# Patient Record
Sex: MALE | Race: WHITE | Employment: FULL TIME | URBAN - METROPOLITAN AREA
[De-identification: names, ages, dates, MRNs, and addresses within clinical notes are randomized per-mention and may not be internally consistent; named-entity substitution may affect disease eponyms.]

---

## 2017-02-20 RX ORDER — OMEPRAZOLE 20 MG/1
CAPSULE, DELAYED RELEASE ORAL
Qty: 30 CAPSULE | Refills: 10 | Status: SHIPPED | OUTPATIENT
Start: 2017-02-20 | End: 2018-01-10 | Stop reason: ALTCHOICE

## 2017-03-13 ENCOUNTER — OFFICE VISIT (OUTPATIENT)
Dept: FAMILY MEDICINE CLINIC | Age: 62
End: 2017-03-13

## 2017-03-13 VITALS
SYSTOLIC BLOOD PRESSURE: 116 MMHG | DIASTOLIC BLOOD PRESSURE: 72 MMHG | BODY MASS INDEX: 23.69 KG/M2 | OXYGEN SATURATION: 98 % | WEIGHT: 194.6 LBS | RESPIRATION RATE: 16 BRPM | HEART RATE: 75 BPM

## 2017-03-13 DIAGNOSIS — E78.5 HYPERLIPIDEMIA, UNSPECIFIED HYPERLIPIDEMIA TYPE: Chronic | ICD-10-CM

## 2017-03-13 DIAGNOSIS — I10 ESSENTIAL HYPERTENSION: Primary | Chronic | ICD-10-CM

## 2017-03-13 DIAGNOSIS — Z13.9 SCREENING: ICD-10-CM

## 2017-03-13 DIAGNOSIS — K21.9 GASTROESOPHAGEAL REFLUX DISEASE WITHOUT ESOPHAGITIS: Chronic | ICD-10-CM

## 2017-03-13 DIAGNOSIS — R10.13 EPIGASTRIC ABDOMINAL PAIN: ICD-10-CM

## 2017-03-13 DIAGNOSIS — Z72.0 TOBACCO ABUSE: ICD-10-CM

## 2017-03-13 PROCEDURE — 99214 OFFICE O/P EST MOD 30 MIN: CPT | Performed by: FAMILY MEDICINE

## 2017-03-14 DIAGNOSIS — Z13.9 SCREENING: ICD-10-CM

## 2017-03-14 DIAGNOSIS — E78.5 HYPERLIPIDEMIA, UNSPECIFIED HYPERLIPIDEMIA TYPE: Chronic | ICD-10-CM

## 2017-03-14 DIAGNOSIS — I10 ESSENTIAL HYPERTENSION: Chronic | ICD-10-CM

## 2017-03-14 LAB
A/G RATIO: 1.8 (ref 1.1–2.2)
ALBUMIN SERPL-MCNC: 4.5 G/DL (ref 3.4–5)
ALP BLD-CCNC: 99 U/L (ref 40–129)
ALT SERPL-CCNC: 15 U/L (ref 10–40)
ANION GAP SERPL CALCULATED.3IONS-SCNC: 15 MMOL/L (ref 3–16)
AST SERPL-CCNC: 16 U/L (ref 15–37)
ATYPICAL LYMPHOCYTE RELATIVE PERCENT: 1 % (ref 0–6)
BASOPHILS ABSOLUTE: 0 K/UL (ref 0–0.2)
BASOPHILS RELATIVE PERCENT: 0 %
BILIRUB SERPL-MCNC: 0.6 MG/DL (ref 0–1)
BUN BLDV-MCNC: 15 MG/DL (ref 7–20)
CALCIUM SERPL-MCNC: 9.5 MG/DL (ref 8.3–10.6)
CHLORIDE BLD-SCNC: 105 MMOL/L (ref 99–110)
CHOLESTEROL, TOTAL: 154 MG/DL (ref 0–199)
CO2: 26 MMOL/L (ref 21–32)
CREAT SERPL-MCNC: 0.8 MG/DL (ref 0.8–1.3)
EOSINOPHILS ABSOLUTE: 0.1 K/UL (ref 0–0.6)
EOSINOPHILS RELATIVE PERCENT: 1 %
GFR AFRICAN AMERICAN: >60
GFR NON-AFRICAN AMERICAN: >60
GLOBULIN: 2.5 G/DL
GLUCOSE BLD-MCNC: 95 MG/DL (ref 70–99)
HCT VFR BLD CALC: 48.1 % (ref 40.5–52.5)
HDLC SERPL-MCNC: 29 MG/DL (ref 40–60)
HEMOGLOBIN: 16.1 G/DL (ref 13.5–17.5)
LDL CHOLESTEROL CALCULATED: 104 MG/DL
LYMPHOCYTES ABSOLUTE: 1.4 K/UL (ref 1–5.1)
LYMPHOCYTES RELATIVE PERCENT: 16 %
MCH RBC QN AUTO: 31.9 PG (ref 26–34)
MCHC RBC AUTO-ENTMCNC: 33.5 G/DL (ref 31–36)
MCV RBC AUTO: 95.2 FL (ref 80–100)
MONOCYTES ABSOLUTE: 0.7 K/UL (ref 0–1.3)
MONOCYTES RELATIVE PERCENT: 8 %
NEUTROPHILS ABSOLUTE: 6.3 K/UL (ref 1.7–7.7)
NEUTROPHILS RELATIVE PERCENT: 74 %
PDW BLD-RTO: 14 % (ref 12.4–15.4)
PLATELET # BLD: 169 K/UL (ref 135–450)
PMV BLD AUTO: 8.6 FL (ref 5–10.5)
POTASSIUM SERPL-SCNC: 4.9 MMOL/L (ref 3.5–5.1)
PROSTATE SPECIFIC ANTIGEN: 2.17 NG/ML (ref 0–4)
RBC # BLD: 5.05 M/UL (ref 4.2–5.9)
SLIDE REVIEW: NORMAL
SODIUM BLD-SCNC: 146 MMOL/L (ref 136–145)
TOTAL PROTEIN: 7 G/DL (ref 6.4–8.2)
TRIGL SERPL-MCNC: 105 MG/DL (ref 0–150)
VLDLC SERPL CALC-MCNC: 21 MG/DL
WBC # BLD: 8.5 K/UL (ref 4–11)

## 2017-03-17 ENCOUNTER — TELEPHONE (OUTPATIENT)
Dept: FAMILY MEDICINE CLINIC | Age: 62
End: 2017-03-17

## 2017-05-01 ENCOUNTER — TELEPHONE (OUTPATIENT)
Dept: CARDIOLOGY CLINIC | Age: 62
End: 2017-05-01

## 2017-05-15 RX ORDER — FAMOTIDINE 20 MG/1
TABLET, FILM COATED ORAL
Qty: 60 TABLET | Refills: 9 | Status: SHIPPED | OUTPATIENT
Start: 2017-05-15 | End: 2018-05-07

## 2017-08-24 ENCOUNTER — HOSPITAL ENCOUNTER (OUTPATIENT)
Dept: OTHER | Age: 62
Discharge: HOME | End: 2017-08-24
Attending: INTERNAL MEDICINE | Admitting: INTERNAL MEDICINE

## 2017-09-01 ENCOUNTER — HOSPITAL ENCOUNTER (OUTPATIENT)
Dept: OTHER | Age: 62
Discharge: HOME | End: 2017-09-01
Attending: INTERNAL MEDICINE | Admitting: INTERNAL MEDICINE

## 2017-09-01 LAB — H PYLORI ANTIGEN STOOL: NEGATIVE

## 2017-09-27 ENCOUNTER — HOSPITAL ENCOUNTER (OUTPATIENT)
Dept: ENDOSCOPY | Age: 62
Discharge: HOME | End: 2017-09-27
Attending: INTERNAL MEDICINE | Admitting: INTERNAL MEDICINE

## 2017-09-27 RX ORDER — SODIUM CHLORIDE 9 MG/ML
INJECTION, SOLUTION INTRAVENOUS CONTINUOUS
Status: DISCONTINUED | OUTPATIENT
Start: 2017-09-27 | End: 2017-09-28 | Stop reason: HOSPADM

## 2017-09-27 RX ORDER — SODIUM CHLORIDE 0.9 % (FLUSH) 0.9 %
10 SYRINGE (ML) INJECTION PRN
Status: DISCONTINUED | OUTPATIENT
Start: 2017-09-27 | End: 2017-09-28 | Stop reason: HOSPADM

## 2017-09-27 RX ORDER — SODIUM CHLORIDE 0.9 % (FLUSH) 0.9 %
10 SYRINGE (ML) INJECTION EVERY 12 HOURS SCHEDULED
Status: DISCONTINUED | OUTPATIENT
Start: 2017-09-27 | End: 2017-09-28 | Stop reason: HOSPADM

## 2017-10-08 DIAGNOSIS — I10 ESSENTIAL HYPERTENSION: ICD-10-CM

## 2017-10-09 RX ORDER — AMLODIPINE BESYLATE 10 MG/1
TABLET ORAL
Qty: 30 TABLET | Refills: 10 | Status: SHIPPED | OUTPATIENT
Start: 2017-10-09 | End: 2018-08-20 | Stop reason: SDUPTHER

## 2017-11-03 ENCOUNTER — OFFICE VISIT (OUTPATIENT)
Dept: FAMILY MEDICINE CLINIC | Age: 62
End: 2017-11-03

## 2017-11-03 VITALS
HEIGHT: 76 IN | HEART RATE: 78 BPM | BODY MASS INDEX: 21.55 KG/M2 | SYSTOLIC BLOOD PRESSURE: 138 MMHG | OXYGEN SATURATION: 98 % | RESPIRATION RATE: 17 BRPM | WEIGHT: 177 LBS | DIASTOLIC BLOOD PRESSURE: 80 MMHG

## 2017-11-03 DIAGNOSIS — R10.13 ABDOMINAL PAIN, EPIGASTRIC: Primary | ICD-10-CM

## 2017-11-03 DIAGNOSIS — R10.13 ABDOMINAL PAIN, EPIGASTRIC: ICD-10-CM

## 2017-11-03 LAB
A/G RATIO: 1.6 (ref 1.1–2.2)
ALBUMIN SERPL-MCNC: 4.6 G/DL (ref 3.4–5)
ALP BLD-CCNC: 88 U/L (ref 40–129)
ALT SERPL-CCNC: 15 U/L (ref 10–40)
ANION GAP SERPL CALCULATED.3IONS-SCNC: 18 MMOL/L (ref 3–16)
AST SERPL-CCNC: 16 U/L (ref 15–37)
BANDED NEUTROPHILS RELATIVE PERCENT: 1 % (ref 0–7)
BASOPHILS ABSOLUTE: 0 K/UL (ref 0–0.2)
BASOPHILS RELATIVE PERCENT: 0 %
BILIRUB SERPL-MCNC: 0.4 MG/DL (ref 0–1)
BUN BLDV-MCNC: 12 MG/DL (ref 7–20)
CALCIUM SERPL-MCNC: 10 MG/DL (ref 8.3–10.6)
CHLORIDE BLD-SCNC: 104 MMOL/L (ref 99–110)
CO2: 21 MMOL/L (ref 21–32)
CREAT SERPL-MCNC: 0.7 MG/DL (ref 0.8–1.3)
CRENATED RBC'S: ABNORMAL
EOSINOPHILS ABSOLUTE: 0 K/UL (ref 0–0.6)
EOSINOPHILS RELATIVE PERCENT: 0 %
GFR AFRICAN AMERICAN: >60
GFR NON-AFRICAN AMERICAN: >60
GLOBULIN: 2.9 G/DL
GLUCOSE BLD-MCNC: 107 MG/DL (ref 70–99)
HCT VFR BLD CALC: 46.5 % (ref 40.5–52.5)
HEMOGLOBIN: 15.9 G/DL (ref 13.5–17.5)
LYMPHOCYTES ABSOLUTE: 1.6 K/UL (ref 1–5.1)
LYMPHOCYTES RELATIVE PERCENT: 17 %
MCH RBC QN AUTO: 32.9 PG (ref 26–34)
MCHC RBC AUTO-ENTMCNC: 34.3 G/DL (ref 31–36)
MCV RBC AUTO: 96 FL (ref 80–100)
MONOCYTES ABSOLUTE: 0.5 K/UL (ref 0–1.3)
MONOCYTES RELATIVE PERCENT: 5 %
NEUTROPHILS ABSOLUTE: 7.5 K/UL (ref 1.7–7.7)
NEUTROPHILS RELATIVE PERCENT: 77 %
PDW BLD-RTO: 13.6 % (ref 12.4–15.4)
PLATELET # BLD: 206 K/UL (ref 135–450)
PMV BLD AUTO: 8.9 FL (ref 5–10.5)
POTASSIUM SERPL-SCNC: 4.2 MMOL/L (ref 3.5–5.1)
RBC # BLD: 4.84 M/UL (ref 4.2–5.9)
SODIUM BLD-SCNC: 143 MMOL/L (ref 136–145)
TOTAL PROTEIN: 7.5 G/DL (ref 6.4–8.2)
WBC # BLD: 9.6 K/UL (ref 4–11)

## 2017-11-03 PROCEDURE — 99215 OFFICE O/P EST HI 40 MIN: CPT | Performed by: FAMILY MEDICINE

## 2017-11-03 RX ORDER — OMEPRAZOLE 40 MG/1
40 CAPSULE, DELAYED RELEASE ORAL DAILY
Qty: 30 CAPSULE | Refills: 3 | Status: SHIPPED | OUTPATIENT
Start: 2017-11-03 | End: 2018-01-10 | Stop reason: SDUPTHER

## 2017-11-03 ASSESSMENT — PATIENT HEALTH QUESTIONNAIRE - PHQ9
SUM OF ALL RESPONSES TO PHQ QUESTIONS 1-9: 0
2. FEELING DOWN, DEPRESSED OR HOPELESS: 0
1. LITTLE INTEREST OR PLEASURE IN DOING THINGS: 0
SUM OF ALL RESPONSES TO PHQ9 QUESTIONS 1 & 2: 0

## 2017-11-03 NOTE — PATIENT INSTRUCTIONS
stomach upset. Talk to your doctor if you take daily aspirin for another health problem. When should you call for help? Call 911 anytime you think you may need emergency care. For example, call if:  · You passed out (lost consciousness). · You pass maroon or very bloody stools. · You vomit blood or what looks like coffee grounds. · You have new, severe belly pain. Call your doctor now or seek immediate medical care if:  · Your pain gets worse, especially if it becomes focused in one area of your belly. · You have a new or higher fever. · Your stools are black and look like tar, or they have streaks of blood. · You have unexpected vaginal bleeding. · You have symptoms of a urinary tract infection. These may include:  ¨ Pain when you urinate. ¨ Urinating more often than usual.  ¨ Blood in your urine. · You are dizzy or lightheaded, or you feel like you may faint. Watch closely for changes in your health, and be sure to contact your doctor if:  · You are not getting better after 1 day (24 hours). Where can you learn more? Go to https://Soricimed.[employer]. org and sign in to your FlowCo account. Enter P507 in the [employer] box to learn more about \"Abdominal Pain: Care Instructions. \"     If you do not have an account, please click on the \"Sign Up Now\" link. Current as of: March 20, 2017  Content Version: 11.3  © 4866-2457 [employer]. Care instructions adapted under license by [facility] ([facility]). If you have questions about a medical condition or this instruction, always ask your healthcare professional. [place] [address] any warranty or liability for your use of this information.

## 2017-11-03 NOTE — PROGRESS NOTES
[address] Note    Date: 11/3/2017                                               Subjective/Objective:     Chief Complaint   Patient presents with    Abdominal Pain     mid abdomen pain, off and on, couple years       HPI   Pt here with abdominal pain that afflicts him intermittently for a few years. Two years ago was seen in ED for possible MI but stress test was normal. Since then the pain happens about every other day. Had normal EGD and Cscope 2 months ago. Located in the epigastrium. Is worse with food. Has some constipation with dry hard stools. Pain does not wake him up from sleeping. Sitting still in a car for a long period makes it worse. Pt currently takes pepcid. Patient Active Problem List    Diagnosis Date Noted    Essential hypertension 03/14/2016    GERD (gastroesophageal reflux disease) 03/09/2015    Hyperlipemia 09/08/2014       Past Medical History:   Diagnosis Date    GERD (gastroesophageal reflux disease)     HTN (hypertension)     Hyperlipidemia        Current Outpatient Prescriptions   Medication Sig Dispense Refill    omeprazole (PRILOSEC) 40 MG delayed release capsule Take 1 capsule by mouth daily 30 capsule 3    amLODIPine (NORVASC) 10 MG tablet TAKE ONE TABLET BY MOUTH DAILY 30 tablet 10    famotidine (PEPCID) 20 MG tablet TAKE ONE TABLET BY MOUTH TWICE A DAY 60 tablet 9    omeprazole (PRILOSEC) 20 MG delayed release capsule TAKE ONE CAPSULE BY MOUTH DAILY 30 capsule 10    pravastatin (PRAVACHOL) 40 MG tablet Take 1 tablet by mouth daily 30 tablet 11    Simethicone (GAS-X PO) Take 1 tablet by mouth daily as needed      aspirin 81 MG tablet Take 81 mg by mouth daily.  CHANTIX STARTING MONTH YESSY 0.5 MG X 11 & 1 MG X 42 tablet Take by mouth. 42 tablet 0     No current facility-administered medications for this visit.         No Known Allergies    Review of Systems   No rash, no bruise, no HA    Vitals:  /80 (Site: Left Arm, Position: Sitting, Cuff Size: Medium Adult)   Pulse 78   Resp 17   Ht 6' 4\" (1.93 m)   Wt 177 lb (80.3 kg)   SpO2 98%   BMI 21.55 kg/m²     Physical Exam   General:  Well-appearing, NAD, alert, non-toxic  HEENT:  Normocephalic, atraumatic. Pupils equal and round. CHEST/LUNGS: CTAB, no crackles, no wheeze, no rhonchi. Symmetric rise  CARDIOVASCULAR: RRR,  no murmur, no rub  EXTREMETIES: Normal movement of all extremities  GI: S/NT/ND. No masses  SKIN:  No rash, no cellulitis, no bruising, no petechiae/purpura/vesicles/pustules/abscess  PSYCH:  A+O x 3; normal affect  NEURO:  GCS 15, CN2-12 grossly intact, no focal motor/sensory deficits, no cerebellar deficits, normal gait, normal speech      Assessment/Plan     52-year-old male with ongoing abdominal pain. This is of unclear etiology. Peptic ulcer disease likely ruled out due to normal esophagogastroduodenoscopy recently, however the pain is over the epigastrium. Could possibly be cholecystitis, will obtain abdominal ultrasound. Will switch from Pepcid to high-dose omeprazole for possible gastritis. We'll check baseline labs. This could be a form of IBS, however we will await the results of the tests before referring to GI. Discussed with patient medication/s dosage, instructions, potential S/E, A/R and Drug Interaction  Educational material provided regarding patient's condition  Advise to return here if worse or go to nearest ER  Encourage fluids  Pt discharged in stable condition at 11:42      1. Abdominal pain, epigastric    - omeprazole (PRILOSEC) 40 MG delayed release capsule; Take 1 capsule by mouth daily  Dispense: 30 capsule; Refill: 3  - CBC Auto Differential; Future  - COMPREHENSIVE METABOLIC PANEL;  Future  - US Abdomen Complete      Orders Placed This Encounter   Procedures    US Abdomen Complete    CBC Auto Differential     Standing Status:   Future     Number of Occurrences:   1     Standing Expiration Date:   11/3/2018    COMPREHENSIVE

## 2018-01-10 ENCOUNTER — OFFICE VISIT (OUTPATIENT)
Dept: FAMILY MEDICINE CLINIC | Age: 63
End: 2018-01-10

## 2018-01-10 VITALS
HEIGHT: 76 IN | BODY MASS INDEX: 21.43 KG/M2 | HEART RATE: 88 BPM | OXYGEN SATURATION: 97 % | SYSTOLIC BLOOD PRESSURE: 124 MMHG | WEIGHT: 176 LBS | RESPIRATION RATE: 15 BRPM | DIASTOLIC BLOOD PRESSURE: 80 MMHG

## 2018-01-10 DIAGNOSIS — K40.20 BILATERAL INGUINAL HERNIA WITHOUT OBSTRUCTION OR GANGRENE, RECURRENCE NOT SPECIFIED: Primary | ICD-10-CM

## 2018-01-10 DIAGNOSIS — R10.13 ABDOMINAL PAIN, EPIGASTRIC: ICD-10-CM

## 2018-01-10 DIAGNOSIS — E78.5 HYPERLIPIDEMIA, UNSPECIFIED HYPERLIPIDEMIA TYPE: Chronic | ICD-10-CM

## 2018-01-10 PROCEDURE — 99214 OFFICE O/P EST MOD 30 MIN: CPT | Performed by: FAMILY MEDICINE

## 2018-01-10 RX ORDER — PRAVASTATIN SODIUM 40 MG
40 TABLET ORAL DAILY
Qty: 30 TABLET | Refills: 11 | Status: SHIPPED | OUTPATIENT
Start: 2018-01-10 | End: 2019-01-31 | Stop reason: SDUPTHER

## 2018-01-10 RX ORDER — OMEPRAZOLE 40 MG/1
40 CAPSULE, DELAYED RELEASE ORAL DAILY
Qty: 30 CAPSULE | Refills: 3 | Status: SHIPPED | OUTPATIENT
Start: 2018-01-10 | End: 2018-05-07

## 2018-01-17 ENCOUNTER — OFFICE VISIT (OUTPATIENT)
Dept: SURGERY | Age: 63
End: 2018-01-17

## 2018-01-17 VITALS
WEIGHT: 182 LBS | HEIGHT: 75 IN | SYSTOLIC BLOOD PRESSURE: 110 MMHG | DIASTOLIC BLOOD PRESSURE: 74 MMHG | BODY MASS INDEX: 22.63 KG/M2

## 2018-01-17 DIAGNOSIS — K40.90 LEFT INGUINAL HERNIA: Primary | ICD-10-CM

## 2018-01-17 PROCEDURE — 99203 OFFICE O/P NEW LOW 30 MIN: CPT | Performed by: SURGERY

## 2018-01-17 ASSESSMENT — ENCOUNTER SYMPTOMS
APNEA: 0
COLOR CHANGE: 0
EYE ITCHING: 0
BACK PAIN: 0
ABDOMINAL PAIN: 1
CHEST TIGHTNESS: 0
EYE DISCHARGE: 0

## 2018-02-05 ENCOUNTER — HOSPITAL ENCOUNTER (OUTPATIENT)
Dept: ULTRASOUND IMAGING | Age: 63
Discharge: HOME | End: 2018-02-05
Attending: FAMILY MEDICINE | Admitting: FAMILY MEDICINE

## 2018-02-05 DIAGNOSIS — R10.13 EPIGASTRIC PAIN: ICD-10-CM

## 2018-05-07 ENCOUNTER — OFFICE VISIT (OUTPATIENT)
Dept: FAMILY MEDICINE CLINIC | Age: 63
End: 2018-05-07

## 2018-05-07 VITALS
OXYGEN SATURATION: 98 % | HEART RATE: 81 BPM | SYSTOLIC BLOOD PRESSURE: 140 MMHG | BODY MASS INDEX: 21.5 KG/M2 | TEMPERATURE: 97.8 F | WEIGHT: 172 LBS | DIASTOLIC BLOOD PRESSURE: 86 MMHG

## 2018-05-07 DIAGNOSIS — R10.13 ABDOMINAL PAIN, EPIGASTRIC: ICD-10-CM

## 2018-05-07 PROCEDURE — 99213 OFFICE O/P EST LOW 20 MIN: CPT | Performed by: FAMILY MEDICINE

## 2018-05-07 RX ORDER — PANTOPRAZOLE SODIUM 40 MG/1
40 TABLET, DELAYED RELEASE ORAL DAILY
Qty: 30 TABLET | Refills: 3 | Status: SHIPPED | OUTPATIENT
Start: 2018-05-07 | End: 2018-07-23

## 2018-05-07 RX ORDER — OMEPRAZOLE 40 MG/1
40 CAPSULE, DELAYED RELEASE ORAL DAILY
Qty: 30 CAPSULE | Refills: 11 | Status: CANCELLED | OUTPATIENT
Start: 2018-05-07

## 2018-05-07 RX ORDER — SUCRALFATE 1 G/1
1 TABLET ORAL 4 TIMES DAILY
Qty: 120 TABLET | Refills: 3 | Status: SHIPPED | OUTPATIENT
Start: 2018-05-07 | End: 2018-11-15 | Stop reason: ALTCHOICE

## 2018-07-23 ENCOUNTER — OFFICE VISIT (OUTPATIENT)
Dept: FAMILY MEDICINE CLINIC | Age: 63
End: 2018-07-23

## 2018-07-23 ENCOUNTER — TELEPHONE (OUTPATIENT)
Dept: FAMILY MEDICINE CLINIC | Age: 63
End: 2018-07-23

## 2018-07-23 VITALS
HEART RATE: 81 BPM | DIASTOLIC BLOOD PRESSURE: 64 MMHG | TEMPERATURE: 98.5 F | SYSTOLIC BLOOD PRESSURE: 108 MMHG | BODY MASS INDEX: 21.25 KG/M2 | WEIGHT: 170 LBS | OXYGEN SATURATION: 99 %

## 2018-07-23 DIAGNOSIS — R10.13 EPIGASTRIC ABDOMINAL PAIN: Primary | ICD-10-CM

## 2018-07-23 DIAGNOSIS — R10.13 EPIGASTRIC ABDOMINAL PAIN: ICD-10-CM

## 2018-07-23 DIAGNOSIS — R94.31 ABNORMAL EKG: ICD-10-CM

## 2018-07-23 PROCEDURE — 99214 OFFICE O/P EST MOD 30 MIN: CPT | Performed by: FAMILY MEDICINE

## 2018-07-23 PROCEDURE — 93000 ELECTROCARDIOGRAM COMPLETE: CPT | Performed by: FAMILY MEDICINE

## 2018-07-23 RX ORDER — ESOMEPRAZOLE MAGNESIUM 20 MG/1
20 TABLET, DELAYED RELEASE ORAL DAILY
Qty: 30 TABLET | Refills: 5 | Status: SHIPPED | OUTPATIENT
Start: 2018-07-23 | End: 2018-07-23

## 2018-07-23 RX ORDER — OMEPRAZOLE 20 MG/1
20 TABLET, DELAYED RELEASE ORAL 2 TIMES DAILY
Qty: 60 TABLET | Refills: 3 | Status: SHIPPED | OUTPATIENT
Start: 2018-07-23 | End: 2020-01-08

## 2018-07-23 NOTE — PATIENT INSTRUCTIONS
Patient Education   Patient Education        Gastritis: Care Instructions  Your Care Instructions    Gastritis is a sore and upset stomach. It happens when something irritates the stomach lining. Many things can cause it. These include an infection such as the flu or something you ate or drank. Medicines or a sore on the lining of the stomach (ulcer) also can cause it. Your belly may bloat and ache. You may belch, vomit, and feel sick to your stomach. You should be able to relieve the problem by taking medicine. And it may help to change your diet. If gastritis lasts, your doctor may prescribe medicine. Follow-up care is a key part of your treatment and safety. Be sure to make and go to all appointments, and call your doctor if you are having problems. It's also a good idea to know your test results and keep a list of the medicines you take. How can you care for yourself at home? · If your doctor prescribed antibiotics, take them as directed. Do not stop taking them just because you feel better. You need to take the full course of antibiotics. · Be safe with medicines. If your doctor prescribed medicine to decrease stomach acid, take it as directed. Call your doctor if you think you are having a problem with your medicine. · Do not take any other medicine, including over-the-counter pain relievers, without talking to your doctor first.  · If your doctor recommends over-the-counter medicine to reduce stomach acid, such as Pepcid AC, Prilosec, Tagamet HB, or Zantac 75, follow the directions on the label. · Drink plenty of fluids (enough so that your urine is light yellow or clear like water) to prevent dehydration. Choose water and other caffeine-free clear liquids. If you have kidney, heart, or liver disease and have to limit fluids, talk with your doctor before you increase the amount of fluids you drink. · Limit how much alcohol you drink.   · Avoid coffee, tea, cola drinks, chocolate, and other foods with caffeine. They increase stomach acid. When should you call for help? Call 911 anytime you think you may need emergency care. For example, call if:    · You vomit blood or what looks like coffee grounds.     · You pass maroon or very bloody stools.    Call your doctor now or seek immediate medical care if:    · You start breathing fast and have not produced urine in the last 8 hours.     · You cannot keep fluids down.    Watch closely for changes in your health, and be sure to contact your doctor if:    · You do not get better as expected. Where can you learn more? Go to [email or web address] [email or web address].[email or web address]. org and sign in to your [employer] account. Enter [id number] in the [employer] box to learn more about \"Gastritis: Care Instructions. \"     If you do not have an account, please click on the \"Sign Up Now\" link. Current as of: May 12, 2017  Content Version: 11.6  © 4622-3756 [employer]. Care instructions adapted under license by [facility] [address] If you have questions about a medical condition or this instruction, always ask your healthcare professional. [place] [address] any warranty or liability for your use of this information.

## 2018-07-23 NOTE — PROGRESS NOTES
[address] Note    Date: 7/23/2018                                               Subjective/Objective:     Chief Complaint   Patient presents with    Gastroesophageal Reflux     hx of gastro reflux taking rx not always helping, unsure if protonix or carafate        HPI   Epigastric pain that is ongoing. Has gotten better with protonix in the past. But it's been back again the last few weeks. Had normal US months ago. Doesn't notice it with any particular food, has been avoiding spicy foods. No vomiting. No acid reflux. Has avoided eating certain foods. Patient Active Problem List    Diagnosis Date Noted    Essential hypertension 03/14/2016    GERD (gastroesophageal reflux disease) 03/09/2015    Hyperlipemia 09/08/2014       Past Medical History:   Diagnosis Date    GERD (gastroesophageal reflux disease)     HTN (hypertension)     Hyperlipidemia        Current Outpatient Prescriptions   Medication Sig Dispense Refill    Esomeprazole Magnesium (NEXIUM 24HR) 20 MG TBEC Take 20 mg by mouth daily 30 tablet 5    pravastatin (PRAVACHOL) 40 MG tablet Take 1 tablet by mouth daily 30 tablet 11    amLODIPine (NORVASC) 10 MG tablet TAKE ONE TABLET BY MOUTH DAILY 30 tablet 10    aspirin 81 MG tablet Take 81 mg by mouth daily.  sucralfate (CARAFATE) 1 GM tablet Take 1 tablet by mouth 4 times daily 120 tablet 3     No current facility-administered medications for this visit. No Known Allergies    Review of Systems   No diarrhea, no shortness of breath, no headache    Vitals:  /64   Pulse 81   Temp 98.5 °F (36.9 °C)   Wt 170 lb (77.1 kg)   SpO2 99%   BMI 21.25 kg/m²     Physical Exam   General:  Well-appearing, NAD, alert, non-toxic  HEENT:  Normocephalic, atraumatic. CHEST/LUNGS: No dyspnea  EXTREMETIES: Normal movement of all extremities. No edema. No joint swelling.   SKIN:  No rash, no cellulitis, no bruising, no petechiae/purpura/vesicles/pustules/abscess  PSYCH:  A+O x 3; normal affect  NEURO:  GCS 15, CN2-12 grossly intact, no focal motor/sensory deficits, normal gait, normal speech      Assessment/Plan     62-year-old male with epigastric pain, persistent. The seems most likely due to a primary gastritis or peptic ulcer disease. We will rule out H. pylori with a urea breath test.  We will switch from Protonix to Nexium to see if there is any improvement. Patient advised to follow-up with GI if his symptoms do not improve after 2 weeks. We checked an EKG to rule out cardiac cause of his pain, and it showed changes in the anterior leads compared to EKG from 3 years ago, with T-wave inversions. Unclear whether not this demonstrates ACS, we'll check a stress test to rule out significant coronary artery disease. Discussed with patient medication/s dosage, instructions, potential S/E, A/R and Drug Interaction  Educational material provided regarding patient's condition  Tylenol as needed for pain or fever  Advise to return here if worse or go to nearest ER  Encourage fluids  Pt discharged in stable condition at 13:03      1. Epigastric abdominal pain    - Esomeprazole Magnesium (NEXIUM 24HR) 20 MG TBEC; Take 20 mg by mouth daily  Dispense: 30 tablet; Refill: 5  - H. Pylori Breath Test; Future  - Nilam Aviles MD (DAREK)  - EKG 12 lead  - ECHO Stress Test; Future    2. Chest pain, unspecified type    - EKG 12 lead    3. Abnormal EKG    - ECHO Stress Test; Future      Orders Placed This Encounter   Procedures    H.  Pylori Breath Test     Standing Status:   Future     Number of Occurrences:   1     Standing Expiration Date:   7/24/2018   Nilam Aviles MD (DAREK)     Referral Priority:   Routine     Referral Type:   Consult for Advice and Opinion     Referral Reason:   Specialty Services Required     Referred to Provider:   Leah Soto MD     Requested Specialty:

## 2018-07-23 NOTE — TELEPHONE ENCOUNTER
Patient states he was currently in pharmacy but would come back later to  medication.  Please advise

## 2018-07-24 ENCOUNTER — TELEPHONE (OUTPATIENT)
Dept: FAMILY MEDICINE CLINIC | Age: 63
End: 2018-07-24

## 2018-07-24 NOTE — TELEPHONE ENCOUNTER
PA has been submitted for Omeprazole 20MG OR CPDR on [facility]  Key: [id number] PA Case ID: [id number]    Pending review

## 2018-07-25 LAB — H PYLORI BREATH TEST: NEGATIVE

## 2018-07-26 NOTE — TELEPHONE ENCOUNTER
Fax Received from [facility] regarding the Omeprazole 20mg. Insurance won't cover BID dosing on this. Pharmacy asking if Dr. Zhang Rodriguez wants to do PA or change to 40mg QD?  Please advise

## 2018-07-27 NOTE — TELEPHONE ENCOUNTER
Advised Ramona from [facility] of Dr. Martha Stone instructions - ok to change Omeprazole 20 MG to 40mg QD. Ramona from pharmacy asked to clarify if pt is supposed to be on both Omeprzole 40mg and pantoprazole 40mg.  Please contact pharmacy to advise

## 2018-09-19 ENCOUNTER — HOSPITAL ENCOUNTER (INPATIENT)
Dept: INPATIENT UNIT | Age: 63
LOS: 8 days | Discharge: HOME | DRG: 287 | End: 2018-09-27
Attending: EMERGENCY MEDICINE | Admitting: INTERNAL MEDICINE
Payer: COMMERCIAL

## 2018-09-19 DIAGNOSIS — R59.0 LYMPHADENOPATHY, MEDIASTINAL: ICD-10-CM

## 2018-09-19 DIAGNOSIS — I51.3 LEFT VENTRICULAR THROMBUS: Primary | ICD-10-CM

## 2018-09-19 LAB
A/G RATIO: 1.6 (ref 1.1–2.2)
ALBUMIN SERPL-MCNC: 4.5 G/DL (ref 3.4–5)
ALP BLD-CCNC: 83 U/L (ref 40–129)
ALT SERPL-CCNC: 18 U/L (ref 10–40)
ANION GAP SERPL CALCULATED.3IONS-SCNC: 12 MMOL/L (ref 3–16)
APTT: 26.5 SEC (ref 26–36)
APTT: 41.8 SEC (ref 26–36)
AST SERPL-CCNC: 20 U/L (ref 15–37)
BASOPHILS ABSOLUTE: 0.1 K/UL (ref 0–0.2)
BASOPHILS RELATIVE PERCENT: 1 %
BILIRUB SERPL-MCNC: 0.6 MG/DL (ref 0–1)
BILIRUBIN URINE: NEGATIVE
BLOOD, URINE: NEGATIVE
BUN BLDV-MCNC: 12 MG/DL (ref 7–20)
CALCIUM SERPL-MCNC: 9.4 MG/DL (ref 8.3–10.6)
CHLORIDE BLD-SCNC: 104 MMOL/L (ref 99–110)
CLARITY: ABNORMAL
CO2: 22 MMOL/L (ref 21–32)
COLOR: YELLOW
CREAT SERPL-MCNC: 0.7 MG/DL (ref 0.8–1.3)
EOSINOPHILS ABSOLUTE: 0 K/UL (ref 0–0.6)
EOSINOPHILS RELATIVE PERCENT: 0.3 %
EPITHELIAL CELLS, UA: 0 /HPF (ref 0–5)
GFR AFRICAN AMERICAN: >60
GFR NON-AFRICAN AMERICAN: >60
GLOBULIN: 2.8 G/DL
GLUCOSE BLD-MCNC: 100 MG/DL (ref 70–99)
GLUCOSE URINE: NEGATIVE MG/DL
HCT VFR BLD CALC: 45.1 % (ref 40.5–52.5)
HEMOGLOBIN: 15.6 G/DL (ref 13.5–17.5)
HYALINE CASTS: 0 /LPF (ref 0–8)
KETONES, URINE: NEGATIVE MG/DL
LEUKOCYTE ESTERASE, URINE: NEGATIVE
LIPASE: 48 U/L (ref 13–60)
LYMPHOCYTES ABSOLUTE: 1.1 K/UL (ref 1–5.1)
LYMPHOCYTES RELATIVE PERCENT: 14.4 %
MCH RBC QN AUTO: 33.2 PG (ref 26–34)
MCHC RBC AUTO-ENTMCNC: 34.5 G/DL (ref 31–36)
MCV RBC AUTO: 96.1 FL (ref 80–100)
MICROSCOPIC EXAMINATION: YES
MONOCYTES ABSOLUTE: 0.4 K/UL (ref 0–1.3)
MONOCYTES RELATIVE PERCENT: 5.8 %
NEUTROPHILS ABSOLUTE: 6 K/UL (ref 1.7–7.7)
NEUTROPHILS RELATIVE PERCENT: 78.5 %
NITRITE, URINE: NEGATIVE
PDW BLD-RTO: 14 % (ref 12.4–15.4)
PH UA: 6.5
PLATELET # BLD: 181 K/UL (ref 135–450)
PMV BLD AUTO: 8.1 FL (ref 5–10.5)
POTASSIUM SERPL-SCNC: 3.7 MMOL/L (ref 3.5–5.1)
PRO-BNP: 439 PG/ML (ref 0–124)
PROTEIN UA: NEGATIVE MG/DL
RBC # BLD: 4.7 M/UL (ref 4.2–5.9)
RBC UA: 1 /HPF (ref 0–4)
SODIUM BLD-SCNC: 138 MMOL/L (ref 136–145)
SPECIFIC GRAVITY UA: 1.01
TOTAL PROTEIN: 7.3 G/DL (ref 6.4–8.2)
TROPONIN: <0.01 NG/ML
URINE REFLEX TO CULTURE: ABNORMAL
URINE TYPE: ABNORMAL
UROBILINOGEN, URINE: 0.2 E.U./DL
WBC # BLD: 7.6 K/UL (ref 4–11)
WBC UA: 0 /HPF (ref 0–5)

## 2018-09-19 PROCEDURE — 99285 EMERGENCY DEPT VISIT HI MDM: CPT

## 2018-09-19 PROCEDURE — 36415 COLL VENOUS BLD VENIPUNCTURE: CPT

## 2018-09-19 PROCEDURE — 9990 CHARGE CONVERSION

## 2018-09-19 PROCEDURE — 71260 CT THORAX DX C+: CPT

## 2018-09-19 PROCEDURE — 96374 THER/PROPH/DIAG INJ IV PUSH: CPT

## 2018-09-19 PROCEDURE — 81001 URINALYSIS AUTO W/SCOPE: CPT

## 2018-09-19 PROCEDURE — 83690 ASSAY OF LIPASE: CPT

## 2018-09-19 PROCEDURE — 80053 COMPREHEN METABOLIC PANEL: CPT

## 2018-09-19 PROCEDURE — 71046 X-RAY EXAM CHEST 2 VIEWS: CPT

## 2018-09-19 PROCEDURE — 93010 ELECTROCARDIOGRAM REPORT: CPT | Performed by: INTERNAL MEDICINE

## 2018-09-19 PROCEDURE — 83880 ASSAY OF NATRIURETIC PEPTIDE: CPT

## 2018-09-19 PROCEDURE — 84484 ASSAY OF TROPONIN QUANT: CPT

## 2018-09-19 PROCEDURE — 85025 COMPLETE CBC W/AUTO DIFF WBC: CPT

## 2018-09-19 PROCEDURE — 93005 ELECTROCARDIOGRAM TRACING: CPT

## 2018-09-19 PROCEDURE — 85730 THROMBOPLASTIN TIME PARTIAL: CPT

## 2018-09-19 RX ORDER — SODIUM CHLORIDE 0.9 % (FLUSH) 0.9 %
10 SYRINGE (ML) INJECTION EVERY 12 HOURS SCHEDULED
Status: DISCONTINUED | OUTPATIENT
Start: 2018-09-19 | End: 2018-09-27 | Stop reason: HOSPADM

## 2018-09-19 RX ORDER — HEPARIN SODIUM 1000 [USP'U]/ML
2000 INJECTION, SOLUTION INTRAVENOUS; SUBCUTANEOUS PRN
Status: DISCONTINUED | OUTPATIENT
Start: 2018-09-19 | End: 2018-09-27 | Stop reason: HOSPADM

## 2018-09-19 RX ORDER — AMLODIPINE BESYLATE 5 MG/1
10 TABLET ORAL DAILY
Status: DISCONTINUED | OUTPATIENT
Start: 2018-09-20 | End: 2018-09-27 | Stop reason: HOSPADM

## 2018-09-19 RX ORDER — PRAVASTATIN SODIUM 40 MG
40 TABLET ORAL NIGHTLY
Status: DISCONTINUED | OUTPATIENT
Start: 2018-09-19 | End: 2018-09-27 | Stop reason: HOSPADM

## 2018-09-19 RX ORDER — SUCRALFATE 1 G/1
1 TABLET ORAL 4 TIMES DAILY
Status: DISCONTINUED | OUTPATIENT
Start: 2018-09-19 | End: 2018-09-27 | Stop reason: HOSPADM

## 2018-09-19 RX ORDER — PANTOPRAZOLE SODIUM 40 MG/1
40 TABLET, DELAYED RELEASE ORAL
Status: DISCONTINUED | OUTPATIENT
Start: 2018-09-20 | End: 2018-09-27 | Stop reason: HOSPADM

## 2018-09-19 RX ORDER — ONDANSETRON 2 MG/ML
4 INJECTION INTRAMUSCULAR; INTRAVENOUS EVERY 6 HOURS PRN
Status: DISCONTINUED | OUTPATIENT
Start: 2018-09-19 | End: 2018-09-27 | Stop reason: HOSPADM

## 2018-09-19 RX ORDER — ASPIRIN 81 MG/1
81 TABLET ORAL DAILY
Status: DISCONTINUED | OUTPATIENT
Start: 2018-09-20 | End: 2018-09-27 | Stop reason: HOSPADM

## 2018-09-19 RX ORDER — HEPARIN SODIUM 1000 [USP'U]/ML
4000 INJECTION, SOLUTION INTRAVENOUS; SUBCUTANEOUS PRN
Status: DISCONTINUED | OUTPATIENT
Start: 2018-09-19 | End: 2018-09-27 | Stop reason: HOSPADM

## 2018-09-19 RX ORDER — SODIUM CHLORIDE 0.9 % (FLUSH) 0.9 %
10 SYRINGE (ML) INJECTION PRN
Status: DISCONTINUED | OUTPATIENT
Start: 2018-09-19 | End: 2018-09-27 | Stop reason: HOSPADM

## 2018-09-19 RX ORDER — HEPARIN SODIUM 1000 [USP'U]/ML
4000 INJECTION, SOLUTION INTRAVENOUS; SUBCUTANEOUS ONCE
Status: COMPLETED | OUTPATIENT
Start: 2018-09-19 | End: 2018-09-19

## 2018-09-19 RX ORDER — HEPARIN SODIUM 10000 [USP'U]/100ML
12 INJECTION, SOLUTION INTRAVENOUS CONTINUOUS
Status: DISCONTINUED | OUTPATIENT
Start: 2018-09-19 | End: 2018-09-25

## 2018-09-19 RX ADMIN — PRAVASTATIN SODIUM 40 MG: 40 TABLET ORAL at 22:41

## 2018-09-19 RX ADMIN — PRAVASTATIN SODIUM 40 MG: 40 TABLET ORAL at 22:42

## 2018-09-19 RX ADMIN — HEPARIN SODIUM 4000 UNITS: 1000 INJECTION, SOLUTION INTRAVENOUS; SUBCUTANEOUS at 18:00

## 2018-09-19 RX ADMIN — HEPARIN SODIUM 12 UNITS/KG/HR: 10000 INJECTION, SOLUTION INTRAVENOUS at 18:00

## 2018-09-19 RX ADMIN — Medication 10 ML: at 22:41

## 2018-09-19 ASSESSMENT — ENCOUNTER SYMPTOMS
SHORTNESS OF BREATH: 0
ABDOMINAL PAIN: 0
DIARRHEA: 0
NAUSEA: 0
VOMITING: 0
COUGH: 0
CONSTIPATION: 0

## 2018-09-19 NOTE — LETTER
[address]  Phone: [phone]    Chandana Batista MD        September 27, 2018     Patient: Moni Gooden   YOB: 1955   Date of Visit: 9/19/2018       To Whom It May Concern: It is my medical opinion that Yanira Brown may return to full duty immediately with no restrictions. If you have any questions or concerns, please don't hesitate to call.     Sincerely,      Ashli Pearson Cardiology MD Chandana Batista MD

## 2018-09-19 NOTE — ED PROVIDER NOTES
components within normal limits    Narrative:     Performed at:  [facility] [address], [facility], [address]   Phone [phone]   URINE RT REFLEX TO CULTURE - Abnormal; Notable for the following:     Clarity, UA TURBID (*)     All other components within normal limits    Narrative:     Performed at:  [facility] [address], [facility], [address]   Phone [phone]   CBC WITH AUTO DIFFERENTIAL    Narrative:     Performed at:  [facility] [address], [facility], [facility] [address]   Phone [phone]   TROPONIN    Narrative:     Performed at:  [facility] [address], [facility], [facility] [employer]   Phone [phone]   LIPASE    Narrative:     Performed at:  [facility] [address], [facility], [facility] [address]   Phone [phone]   MICROSCOPIC URINALYSIS    Narrative:     Performed at:  [facility] [address], [facility], [address]   Phone [phone]       All other labs were within normal range or not returned as of this dictation. EKG: All EKG's are interpreted by the Emergency Department Physician who either signs or Co-signs this chart in the absence of a cardiologist.    RADIOLOGY:   Non-plain film images such as CT, Ultrasound and MRI are read by the radiologist. [facility] radiographic images are visualized and preliminarily interpreted by the  ED Provider with the below findings:    Interpretation per the Radiologist below, if available at the time of this note:    CT Chest Pulmonary Embolism W Contrast   Final Result   Large subcarinal lymph node measuring 3.6 cm in short axis. Tissue sampling   or PET-CT should be considered for further evaluation. No evidence of pulmonary embolism. Emphysematous changes within the lungs as described above.       Thickening of the bilateral adrenal epigastric, substernal chest pain has been GERD and he has been taking medication which has not been helping. Finally his GI doctor ordered a CT scan which showed a ventricular thrombus and was sent to the ER for evaluation. Patient denies any worsening of chest pain. Denies any fevers or chills. Patient denies any numbness tingling or focal weakness. Denies abdominal pain nausea vomiting diarrhea or constipation. I did give patient a CT scan CD to Dr. Mckenna Wilburn who has reviewed this and also agrees that appears to be thrombus within the ventricle. CBC was given to  of radiology who will upload this. Urinalysis is negative. Normal white count of 7.6. BNP of 438. Lipase normal.  Troponin negative. CT chest pulmonary embolism with contrast shows a lot of subcranial lymph node measuring 3.6 cm in short axis tissue sampling or PET CT scan should be considered for further evaluation. No evidence of PE. Emphysematous changes within the lungs. There is also thickening of the bilateral adrenal glands suggestive of hyperplasia. I did speak with cardiology who is updated on Patient ED plan and he is agreeable. We'll consult tomorrow. Hospitalist for inpatient for further care and management. The patient tolerated their visit well. They were seen and evaluated by the attending physician, Tj Yepez MD who agreed with the assessment and plan. I have discussed the findings of today's workup with the patient and addressed the patient's questions and concerns. FINAL IMPRESSION      1. Left ventricular thrombus    2. Lymphadenopathy, mediastinal          DISPOSITION/PLAN   DISPOSITION Decision To Admit 09/19/2018 04:26:59 PM      PATIENT REFERRED TO:  No follow-up provider specified.     DISCHARGE MEDICATIONS:  New Prescriptions    No medications on file       DISCONTINUED MEDICATIONS:  Discontinued Medications    No medications on file              (Please note that portions of this note were completed with a voice recognition program.  Efforts were made to edit the dictations but occasionally words are mis-transcribed.)    ENRIQUE Arroyo (electronically signed)            ENRIQUE Spivey  09/19/18 1737       ENRIQUE Mcconnell  09/19/18 1734

## 2018-09-19 NOTE — H&P
tablet by mouth daily 1/10/18   Grace Cueto MD       Allergies:  Patient has no known allergies. Social History:  The patient currently lives at home with family     TOBACCO:   reports that he has been smoking Cigarettes. He has a 20.00 pack-year smoking history. He has never used smokeless tobacco.  ETOH:   reports that he does not drink alcohol. Family History:  Reviewed in detail and negative for DM, Early CAD, Cancer, CVA. Positive as follows:    Family History   Problem Relation Age of Onset    Heart Disease Brother 27        CABG x 2    Heart Disease Mother         CABG x 2       REVIEW OF SYSTEMS:   Positive  And negative  noted in the HPI. All other systems reviewed and negative. PHYSICAL EXAM:    BP (!) 157/84   Pulse 50   Temp 99 °F (37.2 °C) (Infrared)   Resp 16   Ht 6' 3\" (1.905 m)   Wt 174 lb (78.9 kg)   SpO2 98%   BMI 21.75 kg/m²     General appearance: No apparent distress appears stated age and cooperative. HEENT Normal cephalic, atraumatic without obvious deformity. Pupils equal, round, and reactive to light. Extra ocular muscles intact. Conjunctivae/corneas clear. Neck: Supple, No jugular venous distention/bruits. Trachea midline without thyromegaly or adenopathy with full range of motion. Lungs: Clear to auscultation, bilaterally without Rales/Wheezes/Rhonchi with good respiratory effort. Heart: Regular rate and rhythm with Normal S1/S2 without murmurs, rubs or gallops, point of maximum impulse non-displaced  Abdomen: Soft, non-tender or non-distended without rigidity or guarding and positive bowel sounds all four quadrants. Extremities: No clubbing, cyanosis, or edema bilaterally. Full range of motion without deformity and normal gait intact. Skin: Skin color, texture, turgor normal.  No rashes or lesions. Neurologic: Alert and oriented X 3, neurovascularly intact with sensory/motor intact upper extremities/lower extremities, bilaterally.   Cranial nerves:

## 2018-09-19 NOTE — ED PROVIDER NOTES
Please note this report has been produced using speech recognition software and may contain errors related to that system including errors in grammar, punctuation, and spelling, as well as words and phrases that may be inappropriate. If there are any questions or concerns please feel free to contact the dictating provider for clarification.      Electronically signed, Emily Joya PA-C,          Emily Joya PA-C  09/19/18 1152

## 2018-09-20 LAB
APTT: 54 SEC (ref 26–36)
APTT: 63.2 SEC (ref 26–36)
EKG ATRIAL RATE: 72 BPM
EKG DIAGNOSIS: NORMAL
EKG P AXIS: 78 DEGREES
EKG P-R INTERVAL: 146 MS
EKG Q-T INTERVAL: 368 MS
EKG QRS DURATION: 88 MS
EKG QTC CALCULATION (BAZETT): 402 MS
EKG R AXIS: 89 DEGREES
EKG T AXIS: 67 DEGREES
EKG VENTRICULAR RATE: 72 BPM
LV EF: 45 %
LVEF MODALITY: NORMAL

## 2018-09-20 PROCEDURE — 9990 CHARGE CONVERSION

## 2018-09-20 PROCEDURE — 99254 IP/OBS CNSLTJ NEW/EST MOD 60: CPT | Performed by: INTERNAL MEDICINE

## 2018-09-20 PROCEDURE — 93306 TTE W/DOPPLER COMPLETE: CPT

## 2018-09-20 PROCEDURE — 85730 THROMBOPLASTIN TIME PARTIAL: CPT

## 2018-09-20 PROCEDURE — 36415 COLL VENOUS BLD VENIPUNCTURE: CPT

## 2018-09-20 RX ADMIN — PRAVASTATIN SODIUM 40 MG: 40 TABLET ORAL at 21:27

## 2018-09-20 RX ADMIN — Medication 10 ML: at 21:28

## 2018-09-20 RX ADMIN — ASPIRIN 81 MG: 81 TABLET, COATED ORAL at 08:18

## 2018-09-20 RX ADMIN — AMLODIPINE BESYLATE 10 MG: 5 TABLET ORAL at 08:18

## 2018-09-20 RX ADMIN — PANTOPRAZOLE SODIUM 40 MG: 40 TABLET, DELAYED RELEASE ORAL at 07:05

## 2018-09-20 RX ADMIN — HEPARIN SODIUM 13.94 UNITS/KG/HR: 10000 INJECTION, SOLUTION INTRAVENOUS at 18:09

## 2018-09-20 RX ADMIN — HEPARIN SODIUM 2000 UNITS: 1000 INJECTION, SOLUTION INTRAVENOUS; SUBCUTANEOUS at 00:26

## 2018-09-20 NOTE — PAYOR INFORMATION
Patient Zain Gamino:  [de-identified]  Primary AUTH/CERT:    [address] Name:   [employer]  Primary Insurance Plan Name:  Coye Ruse [employer]ESS[employer]  Primary Insurance Group Number:  [id number]  Primary Insurance Plan Type: B  Primary Insurance Policy Number:  [id number]

## 2018-09-20 NOTE — PROGRESS NOTES
Hospitalist Progress Note      PCP: Tarun Marcus MD    Date of Admission: 9/19/2018    Chief Complaint: Abnormal CT abdomen and pelvis    Hospital Course: The patient is a 59 y.o. male who presents to [facility] ([facility]) with abnormal CT abdomen/ pelvis. Patient has been having substernal / epigatsric chest pain for last 2- 3 years. He had extensive workup including egd, colonoscopy , stress test last year for similar problem. He has been told that pain is probably due to GERD. He states that he has no improvement in chest pain and last Friday he had CT abdomen done , which showed left ventricular thromus and he was advised to come to ER. Chest pain is 4/10 in internsity, aggravated by eating.      Here CTPA Large subcarinal LN . Outside film are given to radiolgy for upload. Discussed with radiologist on call,  Dr Vargas Ramirez, there is left ventricular thrombus  Present in outside films.      PMHx of HTN, GERD, HLD. .   Subjective:   Echo cardiogram is pending  If Echocardiogram reveals left ventricle thrombus he will need hypercoagulable workup and anticoagulation.   That required an another day of stay in the hospital .  If echocardiogram is normal he'll be discharged later today and will follow up with PCP about abnormal lymphadenopathy        Medications:  Reviewed    Infusion Medications    heparin (porcine) 14 Units/kg/hr (09/20/18 0027)     Scheduled Medications    pravastatin  40 mg Oral Nightly    sucralfate  1 g Oral 4x Daily    amLODIPine  10 mg Oral Daily    aspirin  81 mg Oral Daily    pantoprazole  40 mg Oral QAM AC    sodium chloride flush  10 mL Intravenous 2 times per day     PRN Meds: heparin (porcine), heparin (porcine), sodium chloride flush, magnesium hydroxide, ondansetron      Intake/Output Summary (Last 24 hours) at 09/20/18 1354  Last data filed at 09/20/18 1231   Gross per 24 hour   Intake              480 ml   Output                0 ml   Net              480 ml       Physical Exam Performed:    /63   Pulse 55   Temp 97.3 °F (36.3 °C) (Temporal)   Resp 16   Ht 6' 3\" (1.905 m)   Wt 173 lb 6.4 oz (78.7 kg)   SpO2 97%   BMI 21.67 kg/m²     General appearance: No apparent distress appears stated age and cooperative. HEENT Normal cephalic, atraumatic without obvious deformity. Pupils equal, round, and reactive to light. Extra ocular muscles intact. Conjunctivae/corneas clear. Neck: Supple, No jugular venous distention/bruits. Trachea midline without thyromegaly or adenopathy with full range of motion. Lungs: Clear to auscultation, bilaterally without Rales/Wheezes/Rhonchi with good respiratory effort. Heart: Regular rate and rhythm with Normal S1/S2 without murmurs, rubs or gallops, point of maximum impulse non-displaced  Abdomen: Soft, non-tender or non-distended without rigidity or guarding and positive bowel sounds all four quadrants. Extremities: No clubbing, cyanosis, or edema bilaterally. Full range of motion without deformity and normal gait intact. Skin: Skin color, texture, turgor normal.  No rashes or lesions. Neurologic: Alert and oriented X 3, neurovascularly intact with sensory/motor intact upper extremities/lower extremities, bilaterally. Cranial nerves: II-XII intact, grossly non-focal.  Mental status: Alert, oriented, thought content appropriate. Capillary Refill: Acceptable  < 3 seconds  Peripheral Pulses: +3 Easily felt, not easily obliterated with pressure      Labs:   Recent Labs      09/19/18   1304   WBC  7.6   HGB  15.6   HCT  45.1   PLT  181     Recent Labs      09/19/18   1304   NA  138   K  3.7   CL  104   CO2  22   BUN  12   CREATININE  0.7*   CALCIUM  9.4     Recent Labs      09/19/18   1304   AST  20   ALT  18   BILITOT  0.6   ALKPHOS  83     No results for input(s): INR in the last 72 hours.   Recent Labs      09/19/18   1304   TROPONINI  <0.01       Urinalysis:    Lab Results   Component Value Date    NITRU Negative 09/19/2018    WBCUA 0

## 2018-09-20 NOTE — CONSULTS
Lips, mucosa, tongue nl Skin Color/text/turg nl, no rash/lesions   Neck S/S, TM, NT, no bruit/JVD Psych Nl mood and affect   Lung CTA-B, unlabored, no DTP Lymph   No cervical or axillary LA   Ch wall NT, no deform Neuro  Nl gross M/S exam     LABS     CBC:   Lab Results   Component Value Date    WBC 7.6 09/19/2018    RBC 4.70 09/19/2018    HGB 15.6 09/19/2018    HCT 45.1 09/19/2018    MCV 96.1 09/19/2018    RDW 14.0 09/19/2018     09/19/2018     CMP:  Lab Results   Component Value Date     09/19/2018    K 3.7 09/19/2018     09/19/2018    CO2 22 09/19/2018    BUN 12 09/19/2018    CREATININE 0.7 09/19/2018    GFRAA >60 09/19/2018    AGRATIO 1.6 09/19/2018    LABGLOM >60 09/19/2018    GLUCOSE 100 09/19/2018    PROT 7.3 09/19/2018    CALCIUM 9.4 09/19/2018    BILITOT 0.6 09/19/2018    ALKPHOS 83 09/19/2018    AST 20 09/19/2018    ALT 18 09/19/2018     PT/INR:  No results found for: PTINR  Lab Results   Component Value Date    TROPONINI <0.01 09/19/2018     ASSESSMENT AND PLAN   ~LV thrombus   Date EF Results   Sx   Chronic sternal pain, worse with palpation.      Hx   No cad or interventions   LHC   None   MPI 3/15 73% Normal perfusion   TTE 9/13 55% No wma   Plan   Echo pending and will review  CT reviewed and may represent pap muscle  If thrombus present, will need:  1)hypercoag workup  2)anticoag with coumadin   ~SCLN  Agree with PET  Malignancy would increase risk thrombotic complications like LV thrombus

## 2018-09-21 LAB
APTT: 54.4 SEC (ref 26–36)
BASE EXCESS ARTERIAL: 0.2 MMOL/L (ref -3–3)
CARBOXYHEMOGLOBIN ARTERIAL: 1 % (ref 0–1.5)
HCO3 ARTERIAL: 24.8 MMOL/L (ref 21–29)
HEMOGLOBIN, ART, EXTENDED: 15.1 G/DL (ref 13.5–17.5)
METHEMOGLOBIN ARTERIAL: 0.5 %
O2 CONTENT ARTERIAL: 20 ML/DL
O2 SAT, ARTERIAL: 95.7 %
O2 THERAPY: NORMAL
PCO2 ARTERIAL: 39.5 MMHG (ref 35–45)
PH ARTERIAL: 7.41 (ref 7.35–7.45)
PO2 ARTERIAL: 75.3 MMHG (ref 75–108)
POC ACT LR: 143 SEC
TCO2 ARTERIAL: 58.4 MMOL/L

## 2018-09-21 PROCEDURE — 93454 CORONARY ARTERY ANGIO S&I: CPT | Performed by: INTERNAL MEDICINE

## 2018-09-21 PROCEDURE — 6360000002 HC RX W HCPCS

## 2018-09-21 PROCEDURE — 93454 CORONARY ARTERY ANGIO S&I: CPT

## 2018-09-21 PROCEDURE — 99152 MOD SED SAME PHYS/QHP 5/>YRS: CPT | Performed by: INTERNAL MEDICINE

## 2018-09-21 PROCEDURE — 4A023N7 MEASUREMENT OF CARDIAC SAMPLING AND PRESSURE, LEFT HEART, PERCUTANEOUS APPROACH: ICD-10-PCS | Performed by: INTERNAL MEDICINE

## 2018-09-21 PROCEDURE — B2111ZZ FLUOROSCOPY OF MULTIPLE CORONARY ARTERIES USING LOW OSMOLAR CONTRAST: ICD-10-PCS | Performed by: INTERNAL MEDICINE

## 2018-09-21 PROCEDURE — C1760 CLOSURE DEV, VASC: HCPCS

## 2018-09-21 PROCEDURE — C1894 INTRO/SHEATH, NON-LASER: HCPCS

## 2018-09-21 PROCEDURE — 85347 COAGULATION TIME ACTIVATED: CPT

## 2018-09-21 PROCEDURE — 99153 MOD SED SAME PHYS/QHP EA: CPT

## 2018-09-21 PROCEDURE — 36600 WITHDRAWAL OF ARTERIAL BLOOD: CPT

## 2018-09-21 PROCEDURE — 94010 BREATHING CAPACITY TEST: CPT

## 2018-09-21 PROCEDURE — 99152 MOD SED SAME PHYS/QHP 5/>YRS: CPT

## 2018-09-21 PROCEDURE — 36415 COLL VENOUS BLD VENIPUNCTURE: CPT

## 2018-09-21 PROCEDURE — 85730 THROMBOPLASTIN TIME PARTIAL: CPT

## 2018-09-21 PROCEDURE — 9990 CHARGE CONVERSION

## 2018-09-21 PROCEDURE — C1769 GUIDE WIRE: HCPCS

## 2018-09-21 PROCEDURE — 2720000010 HC SURG SUPPLY STERILE

## 2018-09-21 PROCEDURE — 99233 SBSQ HOSP IP/OBS HIGH 50: CPT | Performed by: INTERNAL MEDICINE

## 2018-09-21 PROCEDURE — 82803 BLOOD GASES ANY COMBINATION: CPT

## 2018-09-21 RX ORDER — ALPRAZOLAM 0.5 MG/1
0.5 TABLET ORAL NIGHTLY PRN
Status: DISCONTINUED | OUTPATIENT
Start: 2018-09-21 | End: 2018-09-27 | Stop reason: HOSPADM

## 2018-09-21 RX ADMIN — Medication 10 ML: at 12:53

## 2018-09-21 RX ADMIN — SUCRALFATE 1 G: 1 TABLET ORAL at 18:33

## 2018-09-21 RX ADMIN — AMLODIPINE BESYLATE 10 MG: 5 TABLET ORAL at 12:52

## 2018-09-21 RX ADMIN — SUCRALFATE 1 G: 1 TABLET ORAL at 23:24

## 2018-09-21 RX ADMIN — ASPIRIN 81 MG: 81 TABLET, COATED ORAL at 12:52

## 2018-09-21 RX ADMIN — PANTOPRAZOLE SODIUM 40 MG: 40 TABLET, DELAYED RELEASE ORAL at 05:08

## 2018-09-21 RX ADMIN — SUCRALFATE 1 G: 1 TABLET ORAL at 12:53

## 2018-09-21 RX ADMIN — PRAVASTATIN SODIUM 40 MG: 40 TABLET ORAL at 23:23

## 2018-09-21 ASSESSMENT — PAIN SCALES - GENERAL
PAINLEVEL_OUTOF10: 0

## 2018-09-21 NOTE — CONSULTS
Department of Cardiovascular & Thoracic Surgery  History and Physical / Consultation          PCP: Cassandra Morales MD    Referring Physician: Dr. Leslie Saldivar    DIAGNOSIS:  Multi-vessel CAD, LV thrombus    CHIEF COMPLAINT:  Mid-epigastric pain    History Obtained From:  patient, electronic medical record    HISTORY OF PRESENT ILLNESS:      The patient is a 59 y.o. male presenting with an abnormal abdominal CT showing LV thrombus. He stated that the epigastric pain was persistent despite taking anti-reflux medications and NSAIDs; his EGD in 2017 was benign so further CT imaging was required. He came to the ER for cardiac workup when notified his outpatient abdominal CT was read abnormal with LV thrombus. 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 A CISCO His angiogram revealed multi-vessel CAD and we are being consulted for possible CABG. CTA of chest is remarkable for large infa-hilar / paraesophageal mass and a small right lower lobe mass, as well as marked bullous emphysema. At present, the patient is in stable condition at has no complaints of chest pain or SOB and is pleasant at time of assessment. Past Medical History:        Diagnosis Date    GERD (gastroesophageal reflux disease)     HTN (hypertension)     Hyperlipidemia        Past Surgical History:    History reviewed. No pertinent surgical history. Medications:     Prior to Admission medications    Medication Sig Start Date End Date Taking? Authorizing Provider   amLODIPine (NORVASC) 10 MG tablet TAKE ONE TABLET BY MOUTH DAILY 8/20/18  Yes Rahul Glynn MD   aspirin 81 MG tablet Take 81 mg by mouth daily.    Yes Historical Provider, MD   omeprazole (PRILOSEC OTC) 20 MG tablet Take 1 tablet by mouth 2 times daily 7/23/18   Rahul Glynn MD   sucralfate (CARAFATE) 1 GM tablet Take 1 tablet by mouth 4 times daily 5/7/18   Rahul Glynn MD   pravastatin (PRAVACHOL) 40 MG tablet Take 1 tablet by mouth daily 1/10/18   Wyatt Yan MD       Current Facility-Administered Medications   Medication Dose Route Frequency Provider Last Rate Last Dose    heparin (porcine) injection 4,000 Units  4,000 Units Intravenous PRN CaroleENRIQUE Burger        heparin (porcine) injection 2,000 Units  2,000 Units Intravenous PRN CaroleENRIQUE Burger   2,000 Units at 09/20/18 0026    heparin 25,000 units in dextrose 5% 250 mL infusion  12 Units/kg/hr Intravenous Continuous CaroleENRIQUE Burger 11 mL/hr at 09/20/18 1809 13.942 Units/kg/hr at 09/20/18 1809    pravastatin (PRAVACHOL) tablet 40 mg  40 mg Oral Nightly Bob Sanchez MD   40 mg at 09/20/18 2127    sucralfate (CARAFATE) tablet 1 g  1 g Oral 4x Daily Bob Sanchez MD        amLODIPine (NORVASC) tablet 10 mg  10 mg Oral Daily Bob Sanchez MD   10 mg at 09/20/18 0818    aspirin EC tablet 81 mg  81 mg Oral Daily Bob Sanchez MD   81 mg at 09/20/18 0818    pantoprazole (PROTONIX) tablet 40 mg  40 mg Oral QAM AC Bob Sanchez MD   40 mg at 09/21/18 0718    sodium chloride flush 0.9 % injection 10 mL  10 mL Intravenous 2 times per day Bob Sanchez MD   10 mL at 09/20/18 2128    sodium chloride flush 0.9 % injection 10 mL  10 mL Intravenous PRN Bob Sanchez MD        magnesium hydroxide (MILK OF MAGNESIA) 400 MG/5ML suspension 30 mL  30 mL Oral Daily PRN Bob Sanchez MD        ondansetron [facility]) injection 4 mg  4 mg Intravenous Q6H PRN Bob Sanchez MD           Allergies:  Patient has no known allergies. Social History:    TOBACCO:   reports that he has been smoking Cigarettes. He has a 20.00 pack-year smoking history. He has never used smokeless tobacco.  ETOH:   reports that he does not drink alcohol. DRUGS:   reports that he does not use drugs. LIFESTYLE: He lives a mildly active life. He states he walks frequently each day and mows the grass every weekend.      MARITAL STATUS:    OCCUPATION:  Employed as a printer/copier repairman. Family History:    Family History   Problem Relation Age of Onset    Heart Disease Brother 27        CABG x 2    Heart Disease Mother         CABG x 2       REVIEW OF SYSTEMS:    CONSTITUTIONAL:  Pleasant and converses appropriately.  A&Ox4  DERMATOLOGICAL: Negative  NEUROLOGICAL:  negative  EYES:  Positive for  glasses  HEENT:  Negative  RESPIRATORY:  negative  CARDIOVASCULAR:  negative  GASTROINTESTINAL:  positive for reflux  GENITO-URINARY: no dysuria, trouble voiding, or hematuria  ENDOCRINE: negative  MUSCULOSKELETAL:  negative except for left knee dysarthria  HEMATOLOGICAL AND LYMPHATIC: positive for - swollen perihilar lymph node per CTPA  IMMUNOLOGICAL: negative  PSYCHOLOGICAL: negative    PHYSICAL EXAM:    VITALS:  /75   Pulse 71   Temp 98.1 °F (36.7 °C) (Oral)   Resp 18   Ht 6' 3\" (1.905 m)   Wt 177 lb (80.3 kg)   SpO2 93%   BMI 22.12 kg/m²   Blood pressure--left arm: 119/71     Right arm: 115/75    Eyes:  lids and lashes normal, pupils equal and round, extra ocular muscles intact, sclera clear, conjunctiva normal    Head/ENT:  Normocephalic, atraumatic, slightly loose teeth, has his own teeth, normal gums, & palate    Neck:  supple, symmetrical, trachea midline, no lymphadenopathy, no jugular venous distension, no carotid bruits and MASSES:  no masses    Lungs:  no increased work of breathing, good air exchange, no retractions and diminished breath sounds throughout lungs, no palpable / percussible abnormalities    Cardiovascular:  regular rate and rhythm, S1, S2 normal, no murmur, click, rub or gallop and normal apical impulse    Pulses:  Right dorsalis pedis 1, Left dorsalis pedis 1, Right posterior tibial 1, Left Posterior tibial 1, Right Femoral 2, Left Femoral 2, Right radial 2, and Left radial 2    Abdomen:  Soft, normal bowel sounds, non-tender, no hepatosplenomegaly, aorta normal and bruits

## 2018-09-21 NOTE — PROGRESS NOTES
Shift assessment complete, VSS at this time. Medications held until after cardiac cath procedure. Pt remains NPO at this time until procedure complete. Pt updated on POC, no questions at this time. Pt is anxious about procedure this morning. Pt low fall risk, up ad john and independent. Call light within reach, will continue to monitor.

## 2018-09-21 NOTE — PROGRESS NOTES
Hospitalist Progress Note      PCP: Sudha Mae MD    Date of Admission: 9/19/2018    Chief Complaint: Abnormal CT abdomen and pelvis    Hospital Course: The patient is a 59 y.o. male who presents to [facility]) with abnormal CT abdomen/ pelvis. Patient has been having substernal / epigatsric chest pain for last 2- 3 years. He had extensive workup including egd, colonoscopy , stress test last year for similar problem. He has been told that pain is probably due to GERD. He states that he has no improvement in chest pain and last Friday he had CT abdomen done , which showed left ventricular thromus and he was advised to come to ER. Chest pain is 4/10 in internsity, aggravated by eating.      Here CTPA Large subcarinal LN . Outside film are given to radiolgy for upload. Discussed with radiologist on call,  Dr Rylee Shukla, there is left ventricular thrombus  Present in outside films.      PMHx of HTN, GERD, HLD.    .   Subjective:   [facility]  today with multivessel disease, referred to  surgery for CABG  GI consulted       Medications:  Reviewed    Infusion Medications    heparin (porcine) 13.942 Units/kg/hr (09/20/18 1809)     Scheduled Medications    pravastatin  40 mg Oral Nightly    sucralfate  1 g Oral 4x Daily    amLODIPine  10 mg Oral Daily    aspirin  81 mg Oral Daily    pantoprazole  40 mg Oral QAM AC    sodium chloride flush  10 mL Intravenous 2 times per day     PRN Meds: heparin (porcine), heparin (porcine), sodium chloride flush, magnesium hydroxide, ondansetron      Intake/Output Summary (Last 24 hours) at 09/21/18 1302  Last data filed at 09/20/18 1811   Gross per 24 hour   Intake           256.34 ml   Output                0 ml   Net           256.34 ml       Physical Exam Performed:    /72   Pulse 52   Temp 98.1 °F (36.7 °C) (Oral)   Resp 18   Ht 6' 3\" (1.905 m)   Wt 177 lb (80.3 kg)   SpO2 94%   BMI 22.12 kg/m²     General appearance: No apparent distress appears stated age and cooperative. HEENT Normal cephalic, atraumatic without obvious deformity. Pupils equal, round, and reactive to light. Extra ocular muscles intact. Conjunctivae/corneas clear. Neck: Supple, No jugular venous distention/bruits. Trachea midline without thyromegaly or adenopathy with full range of motion. Lungs: Clear to auscultation, bilaterally without Rales/Wheezes/Rhonchi with good respiratory effort. Heart: Regular rate and rhythm with Normal S1/S2 without murmurs, rubs or gallops, point of maximum impulse non-displaced  Abdomen: Soft, non-tender or non-distended without rigidity or guarding and positive bowel sounds all four quadrants. Extremities: No clubbing, cyanosis, or edema bilaterally. Full range of motion without deformity and normal gait intact. Skin: Skin color, texture, turgor normal.  No rashes or lesions. Neurologic: Alert and oriented X 3, neurovascularly intact with sensory/motor intact upper extremities/lower extremities, bilaterally. Cranial nerves: II-XII intact, grossly non-focal.  Mental status: Alert, oriented, thought content appropriate. Capillary Refill: Acceptable  < 3 seconds  Peripheral Pulses: +3 Easily felt, not easily obliterated with pressure      Labs:   Recent Labs      09/19/18   1304   WBC  7.6   HGB  15.6   HCT  45.1   PLT  181     Recent Labs      09/19/18   1304   NA  138   K  3.7   CL  104   CO2  22   BUN  12   CREATININE  0.7*   CALCIUM  9.4     Recent Labs      09/19/18   1304   AST  20   ALT  18   BILITOT  0.6   ALKPHOS  83     No results for input(s): INR in the last 72 hours.   Recent Labs      09/19/18   1304   TROPONINI  <0.01       Urinalysis:      Lab Results   Component Value Date    NITRU Negative 09/19/2018    WBCUA 0 09/19/2018    RBCUA 1 09/19/2018    BLOODU Negative 09/19/2018    SPECGRAV 1.008 09/19/2018    GLUCOSEU Negative 09/19/2018       Radiology:  CT Chest Pulmonary Embolism W Contrast   Final Result   Large subcarinal lymph node measuring

## 2018-09-21 NOTE — PLAN OF CARE
Problem: Safety:  Intervention: Assess risk factors for falls  Pt low fall risk at this time, encouraged to call for assistance as needed. Problem: Pain:  Intervention: Assess pain scale for assessing level of pain  Pt aware of pain scale, denies pain at this time. Problem: Skin Integrity:  Intervention: Assess risk factors for impaired skin integrity and/or pressure ulcers  Skin remains dry and intact, no evidence of breakdown.

## 2018-09-21 NOTE — SEDATION DOCUMENTATION
Brief Pre-Op Note/Sedation Assessment      Moni Gooden  1955  [id number]  [id number]  10:34 AM    Planned Procedure: Cardiac Catheterization Procedure    Post Procedure Plan: Return to same level of care    Consent: I have discussed with the patient and/or the patient representative the indication, alternatives, and the possible risks and/or complications of the planned procedure and the anesthesia methods. The patient and/or patient representative appear to understand and agree to proceed. Chief Complaint: STEMI      Indications for the Procedure:   CAD Presentation:  Suspected CAD  Anginal Classification within 2 weeks:  CCS III - Symptoms with everyday living activities, i.e., moderate limitation  NYHA Heart Failure Class within 2 weeks: No symptoms      Anti- Anginal Meds within 2 weeks:   ANTI-ANGINAL MEDS: Yes: Aspirin and Statin (Any)      Stress or Imaging Studies Performed:  None    Vital Signs:  /75   Pulse 71   Temp 98.1 °F (36.7 °C) (Oral)   Resp 18   Ht 6' 3\" (1.905 m)   Wt 177 lb (80.3 kg)   SpO2 93%   BMI 22.12 kg/m²     Allergies:  No Known Allergies    Past Medical History:  Past Medical History:   Diagnosis Date    GERD (gastroesophageal reflux disease)     HTN (hypertension)     Hyperlipidemia          Surgical History:  History reviewed. No pertinent surgical history.       Medications:  Current Facility-Administered Medications   Medication Dose Route Frequency Provider Last Rate Last Dose    heparin (porcine) injection 4,000 Units  4,000 Units Intravenous PRN ENRIQUE Mcconnell        heparin (porcine) injection 2,000 Units  2,000 Units Intravenous PRN ENRIQUE Mcconnell   2,000 Units at 09/20/18 0026    heparin 25,000 units in dextrose 5% 250 mL infusion  12 Units/kg/hr Intravenous Continuous ENRIQUE Mcconnell 11 mL/hr at 09/20/18 1809 13.942 Units/kg/hr at 09/20/18 1809    pravastatin (PRAVACHOL) tablet 40 mg  40 mg Oral Nightly Chandana Batista MD

## 2018-09-21 NOTE — PLAN OF CARE
Problem: Safety:  Intervention: Assess risk factors for falls  Patient free from harm. ID bands on, bed in lowest position, call light in reach. Patient instructed to call for help if needed. Patient educated on ambulation and safety. Problem: Pain:  Intervention: Administer analgesics as ordered  Pt denies pain at this time. Problem: Bleeding:  Goal: Will show no signs and symptoms of excessive bleeding  Will show no signs and symptoms of excessive bleeding   Pt shows no signs of bleeding.  PTT is therapeutic   Marcello Cortes RN

## 2018-09-21 NOTE — CONSULTS
Gastroenterology Consult Note      Patient: Leydi Batista  : 1955  Acct#:      Date:  2018       History of Present Illness  Patient is a 59 y.o. male admitted with LEFT VENTRICULAR THROMBUS who is seen in consult for EUS for mediastinal lymphadenopathy. He has had chronic xyphoid pain. The pain is intermittent and not brought on with anything in particular. Had EGD 2017 which showed erosive gastritis. Tried various PPI's without help. RUQ US was recommended. Saw Dr Phu Yarbrough for the pain recently and had CT chest which showed left ventricular thrombus so was sent to the ED. CT also with large subcarinal lymph node. He underwent LHC today with diffuse CAD and CABG was recommended. We are consulted for EUS for lymph node bx. Pt denies abdominal pain, N/V. Maybe 5 pound weight loss. Past Medical History:   Diagnosis Date    GERD (gastroesophageal reflux disease)     HTN (hypertension)     Hyperlipidemia       History reviewed. No pertinent surgical history. Past Endoscopic History: see hpi  Colonoscopy with Dr Lauro Prieto for left groin pain 2017 with 2 polyps (4-5 mm in size) and external hemorrhoids. Admission Meds  No current facility-administered medications on file prior to encounter. Current Outpatient Prescriptions on File Prior to Encounter   Medication Sig Dispense Refill    amLODIPine (NORVASC) 10 MG tablet TAKE ONE TABLET BY MOUTH DAILY 30 tablet 5    aspirin 81 MG tablet Take 81 mg by mouth daily.       omeprazole (PRILOSEC OTC) 20 MG tablet Take 1 tablet by mouth 2 times daily 60 tablet 3    sucralfate (CARAFATE) 1 GM tablet Take 1 tablet by mouth 4 times daily 120 tablet 3    pravastatin (PRAVACHOL) 40 MG tablet Take 1 tablet by mouth daily 30 tablet 11         Allergies  No Known Allergies   Social   Social History   Substance Use Topics    Smoking status: Current Every Day Smoker     Packs/day: 0.50     Years: 40.00     Types: Cigarettes    Smokeless in the last 72 hours. Imaging Studies:                            CTPA: 18  Impression   Large subcarinal lymph node measuring 3.6 cm in short axis.  Tissue sampling   or PET-CT should be considered for further evaluation.       No evidence of pulmonary embolism.       Emphysematous changes within the lungs as described above.       Thickening of the bilateral adrenal glands suggestive of hyperplasia. Assessment:     Active Problems:    Left ventricular thrombus  Resolved Problems:    * No resolved hospital problems. *    Mediastinal lymphadenopathy - CT with 3.6 cm subcarinal lymph node. CAD - CT surgery following for CABG. Left ventricular thrombus - On heparin drip. Recommendations:   - will arrange for EUS with LN biopsy hopefully next week Wednesday    Discussed with Dr. Grady Ashley PA-C  [address]    I have personally performed a face to face diagnostic evaluation on this patient. I have interviewed and examined the patient and I agree with the findings and recommended plan of care. In summary, my findings and plan are the followin/M whom I have seen in GI clinic for chronic epigastric/xiphoid pain. He had EGD previously and dx with esophagitis and has tried PPI without relief. I ordered a CT to evaluate this further. The CT revealed an LV thrombus. I called the patient and asked that he goes to [facility] ER. He has since been seen by cardiology and cardiothoracic surgery. He had an echocardiogram and LHC with showed diffuse CAD and CABG recommended. CTA showed a large subcarinal mass in the background of emphysema and a small peripheral lung nodule. We are asked to get an EUS sample of the mediastinal mass. He is currently getting IV heparin for the LV thrombus.      Mediastinal mass could be lymphoma or metastatic lung cancer (adenocarcinoma or small cell cancer)    If he remains inpatient, we will try to schedule him for an EUS-FNA/FNB

## 2018-09-22 LAB — APTT: 32.2 SEC (ref 26–36)

## 2018-09-22 PROCEDURE — 2060000000 HC ICU INTERMEDIATE R&B

## 2018-09-22 PROCEDURE — 99232 SBSQ HOSP IP/OBS MODERATE 35: CPT | Performed by: INTERNAL MEDICINE

## 2018-09-22 PROCEDURE — 85730 THROMBOPLASTIN TIME PARTIAL: CPT

## 2018-09-22 PROCEDURE — 6360000002 HC RX W HCPCS: Performed by: PHYSICIAN ASSISTANT

## 2018-09-22 PROCEDURE — 36415 COLL VENOUS BLD VENIPUNCTURE: CPT

## 2018-09-22 PROCEDURE — 6370000000 HC RX 637 (ALT 250 FOR IP): Performed by: INTERNAL MEDICINE

## 2018-09-22 RX ORDER — CALCIUM CARBONATE 200(500)MG
500 TABLET,CHEWABLE ORAL 3 TIMES DAILY PRN
Status: DISCONTINUED | OUTPATIENT
Start: 2018-09-22 | End: 2018-09-27 | Stop reason: HOSPADM

## 2018-09-22 RX ADMIN — ANTACID TABLETS 500 MG: 500 TABLET, CHEWABLE ORAL at 13:35

## 2018-09-22 RX ADMIN — ASPIRIN 81 MG: 81 TABLET, COATED ORAL at 09:44

## 2018-09-22 RX ADMIN — PRAVASTATIN SODIUM 40 MG: 40 TABLET ORAL at 21:10

## 2018-09-22 RX ADMIN — LIDOCAINE HYDROCHLORIDE: 20 SOLUTION ORAL; TOPICAL at 15:30

## 2018-09-22 RX ADMIN — SUCRALFATE 1 G: 1 TABLET ORAL at 17:16

## 2018-09-22 RX ADMIN — SUCRALFATE 1 G: 1 TABLET ORAL at 11:53

## 2018-09-22 RX ADMIN — SUCRALFATE 1 G: 1 TABLET ORAL at 21:10

## 2018-09-22 RX ADMIN — HEPARIN SODIUM 12 UNITS/KG/HR: 10000 INJECTION, SOLUTION INTRAVENOUS at 06:05

## 2018-09-22 RX ADMIN — ANTACID TABLETS 500 MG: 500 TABLET, CHEWABLE ORAL at 11:53

## 2018-09-22 RX ADMIN — AMLODIPINE BESYLATE 10 MG: 5 TABLET ORAL at 09:44

## 2018-09-22 RX ADMIN — PANTOPRAZOLE SODIUM 40 MG: 40 TABLET, DELAYED RELEASE ORAL at 06:05

## 2018-09-22 ASSESSMENT — PAIN SCALES - GENERAL
PAINLEVEL_OUTOF10: 4
PAINLEVEL_OUTOF10: 0
PAINLEVEL_OUTOF10: 1
PAINLEVEL_OUTOF10: 1

## 2018-09-22 ASSESSMENT — PAIN DESCRIPTION - PAIN TYPE
TYPE: CHRONIC PAIN
TYPE: CHRONIC PAIN

## 2018-09-22 ASSESSMENT — PAIN DESCRIPTION - DESCRIPTORS: DESCRIPTORS: BURNING

## 2018-09-22 ASSESSMENT — PAIN DESCRIPTION - LOCATION
LOCATION: ABDOMEN
LOCATION: ABDOMEN

## 2018-09-22 NOTE — PROGRESS NOTES
Patient complaining of stomach pain similar to what he has been experiencing for months. Received order for TUMS and has already taken 2 with carafate dose at 1300. States pain has gotten a \"little better. \" Spoke with Dr. Da Pelaez. Will order GI cocktail and percocet for pain if GI cocktail does not alleviate pain. Will continue to monitor.

## 2018-09-22 NOTE — PROGRESS NOTES
Spoke to Dr. Rivera Romeo. Do not adjust heparin gtt at this time based on sub-therapeutic aPTT.   Santa Sotelo RN

## 2018-09-22 NOTE — PROGRESS NOTES
Patient states he had relief from GI cocktail given at 1530. Wants to know if he can have it ordered on PRN basis. It was only ordered as a one time dose. Waiting response from lori.

## 2018-09-22 NOTE — PROGRESS NOTES
Hospitalist Progress Note      PCP: Robert Mccartney MD    Date of Admission: 9/19/2018    Chief Complaint: Abnormal CT abdomen and pelvis    Hospital Course: The patient is a 59 y.o. male who presents to [facility] with abnormal CT abdomen/ pelvis. Patient has been having substernal / epigatsric chest pain for last 2- 3 years. He had extensive workup including egd, colonoscopy , stress test last year for similar problem. He has been told that pain is probably due to GERD. He states that he has no improvement in chest pain and last Friday he had CT abdomen done , which showed left ventricular thromus and he was advised to come to ER. Chest pain is 4/10 in internsity, aggravated by eating.      Here CTPA Large subcarinal LN . Outside film are given to radiolgy for upload. Discussed with radiologist on call,  Dr Cheng Stafford, there is left ventricular thrombus  Present in outside films.      PMHx of HTN, GERD, HLD. .   Subjective:   [facility]  with multivessel disease, referred to  surgery for CABG  GI consulted  for EUS for the subcarinal LN,  planning for Wednesday? ?  Patient complaining of chest pain and stated sometime it's very  sharp pain  Looks sad today  And is tearful     Medications:  Reviewed    Infusion Medications    heparin (porcine) 12 Units/kg/hr (09/22/18 3851)     Scheduled Medications    GI cocktail   Oral Once    pravastatin  40 mg Oral Nightly    sucralfate  1 g Oral 4x Daily    amLODIPine  10 mg Oral Daily    aspirin  81 mg Oral Daily    pantoprazole  40 mg Oral QAM AC    sodium chloride flush  10 mL Intravenous 2 times per day     PRN Meds: calcium carbonate, ALPRAZolam, heparin (porcine), heparin (porcine), sodium chloride flush, magnesium hydroxide, ondansetron      Intake/Output Summary (Last 24 hours) at 09/22/18 1524  Last data filed at 09/22/18 0956   Gross per 24 hour   Intake             1124 ml   Output              300 ml   Net              824 ml       Physical Exam Performed:    /76   Pulse 67   Temp 98.6 °F (37 °C) (Temporal)   Resp 16   Ht 6' 3\" (1.905 m)   Wt 171 lb 4.8 oz (77.7 kg)   SpO2 98%   BMI 21.41 kg/m²     General appearance: No apparent distress appears stated age and cooperative. HEENT Normal cephalic, atraumatic without obvious deformity. Pupils equal, round, and reactive to light. Extra ocular muscles intact. Conjunctivae/corneas clear. Neck: Supple, No jugular venous distention/bruits. Trachea midline without thyromegaly or adenopathy with full range of motion. Lungs: Clear to auscultation, bilaterally without Rales/Wheezes/Rhonchi with good respiratory effort. Heart: Regular rate and rhythm with Normal S1/S2 without murmurs, rubs or gallops, point of maximum impulse non-displaced  Abdomen: Soft, non-tender or non-distended without rigidity or guarding and positive bowel sounds all four quadrants. Extremities: No clubbing, cyanosis, or edema bilaterally. Full range of motion without deformity and normal gait intact. Skin: Skin color, texture, turgor normal.  No rashes or lesions. Neurologic: Alert and oriented X 3, neurovascularly intact with sensory/motor intact upper extremities/lower extremities, bilaterally. Cranial nerves: II-XII intact, grossly non-focal.  Mental status: Alert, oriented, thought content appropriate. Capillary Refill: Acceptable  < 3 seconds  Peripheral Pulses: +3 Easily felt, not easily obliterated with pressure      Labs:   No results for input(s): WBC, HGB, HCT, PLT in the last 72 hours. No results for input(s): NA, K, CL, CO2, BUN, CREATININE, CALCIUM, PHOS in the last 72 hours. Invalid input(s): MAGNES  No results for input(s): AST, ALT, BILIDIR, BILITOT, ALKPHOS in the last 72 hours. No results for input(s): INR in the last 72 hours. No results for input(s): Barth Recinos in the last 72 hours.     Urinalysis:      Lab Results   Component Value Date    NITRU Negative 09/19/2018    WBCUA 0 09/19/2018    RBCUA 1 09/19/2018    BLOODU Negative 09/19/2018    SPECGRAV 1.008 09/19/2018    GLUCOSEU Negative 09/19/2018       Radiology:  CT Chest Pulmonary Embolism W Contrast   Final Result   Large subcarinal lymph node measuring 3.6 cm in short axis. Tissue sampling   or PET-CT should be considered for further evaluation. No evidence of pulmonary embolism. Emphysematous changes within the lungs as described above. Thickening of the bilateral adrenal glands suggestive of hyperplasia. XR CHEST STANDARD (2 VW)   Final Result   No acute process. Assessment/Plan:    Active Hospital Problems    Diagnosis Date Noted    Left ventricular thrombus [[id number]] 09/19/2018     CAD, LHc on 09/20/18,  with MVD, referred for CABG, appreciate cardiology Recommendations. Needs cancer workup before  That. Left ventricular thrombus:- incidental finding?, will admit for anticoagulation. Start heparin gtt to prevent stroke. Echo . Consult cardiology.  Aspirin.      Chest pain:- likely GERD with relation to eating, not sure if due to thrombus vs  Enlarged LN vs  GERD vs costochondritis, trial of GI cocktail.      Subcarinal LAD:- consulted GI for EUS and bx     HTN:- stable, continue home meds    Tobacco abuse:- counseled on cessation    DVT Prophylaxis: heparin gtt   Diet: DIET LOW SODIUM 2 GM;  Code Status: Full Code    PT/OT Eval Status: n/a     Dispo - inpatient     Ashley Clark MD

## 2018-09-23 LAB — APTT: 34 SEC (ref 26–36)

## 2018-09-23 PROCEDURE — 85730 THROMBOPLASTIN TIME PARTIAL: CPT

## 2018-09-23 PROCEDURE — 2060000000 HC ICU INTERMEDIATE R&B

## 2018-09-23 PROCEDURE — 6360000002 HC RX W HCPCS: Performed by: PHYSICIAN ASSISTANT

## 2018-09-23 PROCEDURE — 99024 POSTOP FOLLOW-UP VISIT: CPT | Performed by: THORACIC SURGERY (CARDIOTHORACIC VASCULAR SURGERY)

## 2018-09-23 PROCEDURE — 99232 SBSQ HOSP IP/OBS MODERATE 35: CPT | Performed by: INTERNAL MEDICINE

## 2018-09-23 PROCEDURE — 6370000000 HC RX 637 (ALT 250 FOR IP): Performed by: INTERNAL MEDICINE

## 2018-09-23 PROCEDURE — 36415 COLL VENOUS BLD VENIPUNCTURE: CPT

## 2018-09-23 RX ADMIN — AMLODIPINE BESYLATE 10 MG: 5 TABLET ORAL at 09:19

## 2018-09-23 RX ADMIN — SUCRALFATE 1 G: 1 TABLET ORAL at 22:14

## 2018-09-23 RX ADMIN — HEPARIN SODIUM 12 UNITS/KG/HR: 10000 INJECTION, SOLUTION INTRAVENOUS at 06:11

## 2018-09-23 RX ADMIN — SUCRALFATE 1 G: 1 TABLET ORAL at 11:19

## 2018-09-23 RX ADMIN — PANTOPRAZOLE SODIUM 40 MG: 40 TABLET, DELAYED RELEASE ORAL at 06:11

## 2018-09-23 RX ADMIN — PRAVASTATIN SODIUM 40 MG: 40 TABLET ORAL at 22:14

## 2018-09-23 RX ADMIN — LIDOCAINE HYDROCHLORIDE: 20 SOLUTION ORAL; TOPICAL at 16:13

## 2018-09-23 RX ADMIN — ANTACID TABLETS 500 MG: 500 TABLET, CHEWABLE ORAL at 22:14

## 2018-09-23 RX ADMIN — SUCRALFATE 1 G: 1 TABLET ORAL at 16:13

## 2018-09-23 RX ADMIN — ANTACID TABLETS 500 MG: 500 TABLET, CHEWABLE ORAL at 17:49

## 2018-09-23 RX ADMIN — ASPIRIN 81 MG: 81 TABLET, COATED ORAL at 09:19

## 2018-09-23 ASSESSMENT — PAIN DESCRIPTION - DESCRIPTORS
DESCRIPTORS: PRESSURE
DESCRIPTORS: BURNING

## 2018-09-23 ASSESSMENT — PAIN SCALES - GENERAL
PAINLEVEL_OUTOF10: 0
PAINLEVEL_OUTOF10: 2
PAINLEVEL_OUTOF10: 0
PAINLEVEL_OUTOF10: 0
PAINLEVEL_OUTOF10: 2

## 2018-09-23 ASSESSMENT — PAIN DESCRIPTION - LOCATION
LOCATION: CHEST
LOCATION: ABDOMEN

## 2018-09-23 ASSESSMENT — PAIN DESCRIPTION - PAIN TYPE
TYPE: ACUTE PAIN
TYPE: ACUTE PAIN

## 2018-09-23 NOTE — PROGRESS NOTES
Progress Note         DIAGNOSIS:  Multi-vessel CAD, LV thrombus  stable  Vital Signs:                                                 /73   Pulse 61   Temp 97.6 °F (36.4 °C) (Oral)   Resp 16   Ht 6' 3\" (1.905 m)   Wt 174 lb 11.2 oz (79.2 kg)   SpO2 97%   BMI 21.84 kg/m²           Admission Weight: 174 lb (78.9 kg)      Vent Settings:        Drips:      I/O:    Intake/Output Summary (Last 24 hours) at 09/23/18 1351  Last data filed at 09/22/18 1757   Gross per 24 hour   Intake           569.18 ml   Output                0 ml   Net           569.18 ml     Chest Tube:     CV: reg,  Pulm: decreased  Abd: soft  Ext: warm, 1+ edema    Data Review:  CBC: No results for input(s): WBC, HGB, HCT, MCV, PLT in the last 72 hours. BMP: No results for input(s): NA, K, CL, CO2, PHOS, BUN, CREATININE, CALCIUM, MG in the last 72 hours. Invalid input(s): KRFLXMG  Cardiac Enzymes: No results for input(s): CKTOTAL, CKMB, CKMBINDEX, TROPONINI in the last 72 hours. PT/INR: No results for input(s): PROTIME, INR in the last 72 hours. APTT:   Recent Labs      09/21/18   0450  09/22/18   0912  09/23/18   1020   APTT  54.4*  32.2  34.0       Assessment/Plan:  US-guided biopsy of mass on Wednesday.    Chandana Dominguez MD  9/23/2018  1:51 PM

## 2018-09-23 NOTE — PROGRESS NOTES
Hospitalist Progress Note      PCP: Mirna Mendiola MD    Date of Admission: 9/19/2018    Chief Complaint: Abnormal CT abdomen and pelvis    Hospital Course: The patient is a 59 y.o. male who presents to [facility] with abnormal CT abdomen/ pelvis. Patient has been having substernal / epigatsric chest pain for last 2- 3 years. He had extensive workup including egd, colonoscopy , stress test last year for similar problem. He has been told that pain is probably due to GERD. He states that he has no improvement in chest pain and last Friday he had CT abdomen done , which showed left ventricular thromus and he was advised to come to ER. Chest pain is 4/10 in internsity, aggravated by eating.      Here CTPA Large subcarinal LN . Outside film are given to radiolgy for upload. Discussed with radiologist on call,  Dr Bonnell Denver, there is left ventricular thrombus  Present in outside films.      PMHx of HTN, GERD, HLD.    .   Subjective:   [facility]  with multivessel disease, referred to  surgery for CABG  GI consulted  for EUS for the subcarinal LN,  can only be done on Wednesday at this hospital  Chest pain has improved after GI cocktail  Still on heparin drip  We'll check with cardiology about the further plan of care, inpatient versus outpatient anticoagulation    Medications:  Reviewed    Infusion Medications    heparin (porcine) 12 Units/kg/hr (09/23/18 3610)     Scheduled Medications    pravastatin  40 mg Oral Nightly    sucralfate  1 g Oral 4x Daily    amLODIPine  10 mg Oral Daily    aspirin  81 mg Oral Daily    pantoprazole  40 mg Oral QAM AC    sodium chloride flush  10 mL Intravenous 2 times per day     PRN Meds: calcium carbonate, GI cocktail, ALPRAZolam, heparin (porcine), heparin (porcine), sodium chloride flush, magnesium hydroxide, ondansetron      Intake/Output Summary (Last 24 hours) at 09/23/18 1002  Last data filed at 09/22/18 1757   Gross per 24 hour   Intake           569.18 ml   Output 0 ml   Net           569.18 ml       Physical Exam Performed:    /71   Pulse 63   Temp 97.5 °F (36.4 °C) (Oral)   Resp 16   Ht 6' 3\" (1.905 m)   Wt 174 lb 11.2 oz (79.2 kg)   SpO2 98%   BMI 21.84 kg/m²     General appearance: No apparent distress appears stated age and cooperative. HEENT Normal cephalic, atraumatic without obvious deformity. Pupils equal, round, and reactive to light. Extra ocular muscles intact. Conjunctivae/corneas clear. Neck: Supple, No jugular venous distention/bruits. Trachea midline without thyromegaly or adenopathy with full range of motion. Lungs: Clear to auscultation, bilaterally without Rales/Wheezes/Rhonchi with good respiratory effort. Heart: Regular rate and rhythm with Normal S1/S2 without murmurs, rubs or gallops, point of maximum impulse non-displaced  Abdomen: Soft, non-tender or non-distended without rigidity or guarding and positive bowel sounds all four quadrants. Extremities: No clubbing, cyanosis, or edema bilaterally. Full range of motion without deformity and normal gait intact. Skin: Skin color, texture, turgor normal.  No rashes or lesions. Neurologic: Alert and oriented X 3, neurovascularly intact with sensory/motor intact upper extremities/lower extremities, bilaterally. Cranial nerves: II-XII intact, grossly non-focal.  Mental status: Alert, oriented, thought content appropriate. Capillary Refill: Acceptable  < 3 seconds  Peripheral Pulses: +3 Easily felt, not easily obliterated with pressure      Labs:   No results for input(s): WBC, HGB, HCT, PLT in the last 72 hours. No results for input(s): NA, K, CL, CO2, BUN, CREATININE, CALCIUM, PHOS in the last 72 hours. Invalid input(s): MAGNES  No results for input(s): AST, ALT, BILIDIR, BILITOT, ALKPHOS in the last 72 hours. No results for input(s): INR in the last 72 hours. No results for input(s): Lenice Blitz in the last 72 hours.     Urinalysis:      Lab Results   Component Value Date    NITRU Negative 09/19/2018    WBCUA 0 09/19/2018    RBCUA 1 09/19/2018    BLOODU Negative 09/19/2018    SPECGRAV 1.008 09/19/2018    GLUCOSEU Negative 09/19/2018       Radiology:  CT Chest Pulmonary Embolism W Contrast   Final Result   Large subcarinal lymph node measuring 3.6 cm in short axis. Tissue sampling   or PET-CT should be considered for further evaluation. No evidence of pulmonary embolism. Emphysematous changes within the lungs as described above. Thickening of the bilateral adrenal glands suggestive of hyperplasia. XR CHEST STANDARD (2 VW)   Final Result   No acute process. Assessment/Plan:    Active Hospital Problems    Diagnosis Date Noted    Left ventricular thrombus [[id number]] 09/19/2018     CAD, LHc on 09/20/18,  with MVD, referred for CABG, appreciate cardiology Recommendations. Needs cancer workup before That. Left ventricular thrombus:- Start heparin gtt to prevent stroke. appreciate cardiology recommnedtaion, decision about need for anticoagulation     Chest pain:- likely GERD with relation to eating, not sure if due to thrombus vs  Enlarged LN vs  GERD vs costochondritis, trial of GI cocktail.      Subcarinal LAD:- consulted GI for EUS and bx     HTN:- stable, continue home meds    Tobacco abuse:- counseled on cessation    DVT Prophylaxis: heparin gtt   Diet: DIET LOW SODIUM 2 GM;  Code Status: Full Code    PT/OT Eval Status: n/a     Dispo - inpatient     Tracie Gomez MD

## 2018-09-24 PROBLEM — Z72.0 TOBACCO USE: Status: ACTIVE | Noted: 2018-09-24

## 2018-09-24 PROBLEM — R59.9 ENLARGED LYMPH NODE: Status: ACTIVE | Noted: 2018-09-24

## 2018-09-24 PROBLEM — I21.02 ST ELEVATION MYOCARDIAL INFARCTION INVOLVING LEFT ANTERIOR DESCENDING (LAD) CORONARY ARTERY (HCC): Status: ACTIVE | Noted: 2018-09-24

## 2018-09-24 LAB
APTT: 42.1 SEC (ref 26–36)
TROPONIN: <0.01 NG/ML

## 2018-09-24 PROCEDURE — 93005 ELECTROCARDIOGRAM TRACING: CPT | Performed by: INTERNAL MEDICINE

## 2018-09-24 PROCEDURE — 36415 COLL VENOUS BLD VENIPUNCTURE: CPT

## 2018-09-24 PROCEDURE — 6370000000 HC RX 637 (ALT 250 FOR IP): Performed by: INTERNAL MEDICINE

## 2018-09-24 PROCEDURE — 6370000000 HC RX 637 (ALT 250 FOR IP): Performed by: NURSE PRACTITIONER

## 2018-09-24 PROCEDURE — 2580000003 HC RX 258: Performed by: INTERNAL MEDICINE

## 2018-09-24 PROCEDURE — 2060000000 HC ICU INTERMEDIATE R&B

## 2018-09-24 PROCEDURE — 99233 SBSQ HOSP IP/OBS HIGH 50: CPT | Performed by: INTERNAL MEDICINE

## 2018-09-24 PROCEDURE — 85730 THROMBOPLASTIN TIME PARTIAL: CPT

## 2018-09-24 PROCEDURE — 6360000002 HC RX W HCPCS: Performed by: PHYSICIAN ASSISTANT

## 2018-09-24 PROCEDURE — 84484 ASSAY OF TROPONIN QUANT: CPT

## 2018-09-24 RX ORDER — NITROGLYCERIN 0.4 MG/1
TABLET SUBLINGUAL
Status: DISPENSED
Start: 2018-09-24 | End: 2018-09-24

## 2018-09-24 RX ORDER — NITROGLYCERIN 0.4 MG/1
0.4 TABLET SUBLINGUAL EVERY 5 MIN PRN
Status: DISCONTINUED | OUTPATIENT
Start: 2018-09-24 | End: 2018-09-27 | Stop reason: HOSPADM

## 2018-09-24 RX ADMIN — PRAVASTATIN SODIUM 40 MG: 40 TABLET ORAL at 20:35

## 2018-09-24 RX ADMIN — SUCRALFATE 1 G: 1 TABLET ORAL at 15:47

## 2018-09-24 RX ADMIN — Medication 10 ML: at 20:36

## 2018-09-24 RX ADMIN — AMLODIPINE BESYLATE 10 MG: 5 TABLET ORAL at 11:00

## 2018-09-24 RX ADMIN — SUCRALFATE 1 G: 1 TABLET ORAL at 23:57

## 2018-09-24 RX ADMIN — NITROGLYCERIN 0.4 MG: 0.4 TABLET SUBLINGUAL at 17:30

## 2018-09-24 RX ADMIN — SUCRALFATE 1 G: 1 TABLET ORAL at 20:35

## 2018-09-24 RX ADMIN — HEPARIN SODIUM 12 UNITS/KG/HR: 10000 INJECTION, SOLUTION INTRAVENOUS at 07:11

## 2018-09-24 RX ADMIN — SUCRALFATE 1 G: 1 TABLET ORAL at 11:00

## 2018-09-24 RX ADMIN — NITROGLYCERIN 0.4 MG: 0.4 TABLET SUBLINGUAL at 11:00

## 2018-09-24 RX ADMIN — PANTOPRAZOLE SODIUM 40 MG: 40 TABLET, DELAYED RELEASE ORAL at 04:36

## 2018-09-24 RX ADMIN — MAGESIUM CITRATE 296 ML: 1.75 LIQUID ORAL at 15:47

## 2018-09-24 RX ADMIN — ALPRAZOLAM 0.5 MG: 0.5 TABLET ORAL at 23:58

## 2018-09-24 RX ADMIN — ANTACID TABLETS 500 MG: 500 TABLET, CHEWABLE ORAL at 15:47

## 2018-09-24 RX ADMIN — ASPIRIN 81 MG: 81 TABLET, COATED ORAL at 11:00

## 2018-09-24 ASSESSMENT — PAIN SCALES - GENERAL
PAINLEVEL_OUTOF10: 0
PAINLEVEL_OUTOF10: 0
PAINLEVEL_OUTOF10: 3
PAINLEVEL_OUTOF10: 3

## 2018-09-24 ASSESSMENT — PAIN DESCRIPTION - PAIN TYPE
TYPE: ACUTE PAIN
TYPE: ACUTE PAIN

## 2018-09-24 ASSESSMENT — PAIN DESCRIPTION - LOCATION
LOCATION: CHEST
LOCATION: CHEST

## 2018-09-24 NOTE — PROGRESS NOTES
CC: Mid-epigastric pain    Consult Multi-Vessel CAD - LV thrombus    Mediastinal lymphadenopathy - marked bullous emphysema    CP last evening - better after GI cocktail     On Heparin gtt    EKG changes noted    NSR  CTA  Cardiology - repeat trops <0.01 X 2  GI: schedule for an EUS-FNA/FNB with Dr. Laura Rivas or Dr. Kya Mohamud this coming Wednesday    Plan: will follow, will verify timing of procedure with patient

## 2018-09-25 ENCOUNTER — ANESTHESIA EVENT (OUTPATIENT)
Dept: ENDOSCOPY | Age: 63
DRG: 287 | End: 2018-09-25
Payer: COMMERCIAL

## 2018-09-25 LAB
APTT: 33.1 SEC (ref 26–36)
EKG ATRIAL RATE: 64 BPM
EKG DIAGNOSIS: NORMAL
EKG P AXIS: 86 DEGREES
EKG P-R INTERVAL: 154 MS
EKG Q-T INTERVAL: 402 MS
EKG QRS DURATION: 90 MS
EKG QTC CALCULATION (BAZETT): 414 MS
EKG R AXIS: 84 DEGREES
EKG T AXIS: 71 DEGREES
EKG VENTRICULAR RATE: 64 BPM

## 2018-09-25 PROCEDURE — 36415 COLL VENOUS BLD VENIPUNCTURE: CPT

## 2018-09-25 PROCEDURE — 6370000000 HC RX 637 (ALT 250 FOR IP): Performed by: INTERNAL MEDICINE

## 2018-09-25 PROCEDURE — 93010 ELECTROCARDIOGRAM REPORT: CPT | Performed by: INTERNAL MEDICINE

## 2018-09-25 PROCEDURE — 85730 THROMBOPLASTIN TIME PARTIAL: CPT

## 2018-09-25 PROCEDURE — 2580000003 HC RX 258: Performed by: INTERNAL MEDICINE

## 2018-09-25 PROCEDURE — 2060000000 HC ICU INTERMEDIATE R&B

## 2018-09-25 PROCEDURE — 99232 SBSQ HOSP IP/OBS MODERATE 35: CPT | Performed by: INTERNAL MEDICINE

## 2018-09-25 PROCEDURE — 6360000002 HC RX W HCPCS: Performed by: PHYSICIAN ASSISTANT

## 2018-09-25 RX ORDER — HEPARIN SODIUM 10000 [USP'U]/100ML
12 INJECTION, SOLUTION INTRAVENOUS CONTINUOUS
Status: DISCONTINUED | OUTPATIENT
Start: 2018-09-25 | End: 2018-09-26

## 2018-09-25 RX ADMIN — HEPARIN SODIUM 12 UNITS/KG/HR: 10000 INJECTION, SOLUTION INTRAVENOUS at 12:29

## 2018-09-25 RX ADMIN — SUCRALFATE 1 G: 1 TABLET ORAL at 21:46

## 2018-09-25 RX ADMIN — Medication 10 ML: at 08:26

## 2018-09-25 RX ADMIN — AMLODIPINE BESYLATE 10 MG: 5 TABLET ORAL at 08:26

## 2018-09-25 RX ADMIN — ASPIRIN 81 MG: 81 TABLET, COATED ORAL at 08:26

## 2018-09-25 RX ADMIN — PANTOPRAZOLE SODIUM 40 MG: 40 TABLET, DELAYED RELEASE ORAL at 07:20

## 2018-09-25 RX ADMIN — SUCRALFATE 1 G: 1 TABLET ORAL at 18:55

## 2018-09-25 RX ADMIN — Medication 10 ML: at 21:46

## 2018-09-25 RX ADMIN — PRAVASTATIN SODIUM 40 MG: 40 TABLET ORAL at 21:46

## 2018-09-25 RX ADMIN — SUCRALFATE 1 G: 1 TABLET ORAL at 14:52

## 2018-09-25 RX ADMIN — SUCRALFATE 1 G: 1 TABLET ORAL at 08:26

## 2018-09-25 ASSESSMENT — PAIN SCALES - GENERAL
PAINLEVEL_OUTOF10: 0

## 2018-09-25 ASSESSMENT — PAIN DESCRIPTION - LOCATION: LOCATION: CHEST

## 2018-09-25 ASSESSMENT — PAIN DESCRIPTION - PAIN TYPE: TYPE: ACUTE PAIN

## 2018-09-25 NOTE — PROGRESS NOTES
CC: Consult Multi-Vessel CAD - LV thrombus    Mediastinal lymphadenopathy - marked bullous emphysema    No chest or mid-upper gastric pain overnight    Remains on Heparin gtt    NSR  CTA  GI: 360 EUS tomorrow at 330 pm    Plan: will need to stop Heparin gtt prior to procedure, will wait for GI to decide

## 2018-09-25 NOTE — PLAN OF CARE
Problem: Pain:  Goal: Pain level will decrease  Pain level will decrease   Denied any pain so far this shift. Xanax given per request for sleep.

## 2018-09-25 NOTE — PROGRESS NOTES
[address]   Progress Note  Cardiology    Chief Complaint   Patient presents with    Abdominal Pain     Pt c/o epigastric pain for a few months now. Pt had CT done with abnormal results. HPI  Repeat trops were neg. He will stay here until biopsy and then can probably be dismissed until biopsy is resulted. He will need CABG ultimately for severe 3V cad. The pt tells me his biopsy is scheduled for 3:30 tomorrow. There are no notes in the chart. Medications/Labs all Reviewed    No results for input(s): WBC, HGB, HCT, MCV, PLT in the last 72 hours. No results for input(s): CREATININE, BUN, NA, K, CL, CO2 in the last 72 hours. No results for input(s): INR, PROTIME in the last 72 hours. MEDICATIONS:    pravastatin  40 mg Oral Nightly    sucralfate  1 g Oral 4x Daily    amLODIPine  10 mg Oral Daily    aspirin  81 mg Oral Daily    pantoprazole  40 mg Oral QAM AC    sodium chloride flush  10 mL Intravenous 2 times per day      heparin (porcine) 12 Units/kg/hr (09/25/18 1229)       Physical Examination:    /69   Pulse 54   Temp 97.7 °F (36.5 °C) (Oral)   Resp 18   Ht 6' 3\" (1.905 m)   Wt 168 lb 11.2 oz (76.5 kg)   SpO2 99%   BMI 21.09 kg/m²     Respiratory:  · Resp Assessment: Normal respiratory effort  · Resp Auscultation: Clear to auscultation bilaterally   Cardiovascular:  · Auscultation: regular rate and rhythm, normal S1S2, no murmur, rub or gallop  · Palpation:  Nl PMI  · JVP:  normal  · Extremities: No Edema  Abdomen:  · Soft, non-tender  · Normal bowel sounds  Extremities:  ·  No Cyanosis or Clubbing  Neurological/Psychiatric:  · Oriented to time, place, and person  · Non-anxious  Skin Warm and dry    Assessment:    Patient Active Hospital Problem List:   Left ventricular thrombus (9/19/2018) - due to anterior MI of unknown duration    Assessment: on heparin- thrombus does appear to be old.     3vcad - needs CABG    Mediastinal lymph node which is large and needs

## 2018-09-25 NOTE — PROGRESS NOTES
Nutrition Assessment    Type and Reason for Visit: Initial    Malnutrition Assessment:  · Malnutrition Status: No malnutrition    Nutrition Diagnosis:   · Problem: No nutrition diagnosis at this time    Nutrition Assessment:  · Subjective Assessment: RD identified LOS- Pt reports good appetite pta and now. Pt denies any n/v. Pt denies any recent wt loss. Pt denies any needs. Nutrition Risk Level   Risk Level: Low    Nutrition Intervention  Food and/or Delivery: Continue current diet  Nutrition Education/Counseling/Coordination of Care:  Continued Inpatient Monitoring, Education Not Indicated    Patient assessed for nutrition risk. Deemed to be at low risk at this time. Will continue to follow patient.       Electronically signed by Ernie Nageotte, RD, [address] FRANK Leon on 9/25/18 at 12:14 PM    Contact Number: [phone]

## 2018-09-26 ENCOUNTER — ANESTHESIA (OUTPATIENT)
Dept: ENDOSCOPY | Age: 63
DRG: 287 | End: 2018-09-26
Payer: COMMERCIAL

## 2018-09-26 VITALS — OXYGEN SATURATION: 99 % | SYSTOLIC BLOOD PRESSURE: 130 MMHG | DIASTOLIC BLOOD PRESSURE: 73 MMHG

## 2018-09-26 LAB — APTT: 41.6 SEC (ref 26–36)

## 2018-09-26 PROCEDURE — 3609012400 HC EGD TRANSORAL BIOPSY SINGLE/MULTIPLE: Performed by: INTERNAL MEDICINE

## 2018-09-26 PROCEDURE — 85730 THROMBOPLASTIN TIME PARTIAL: CPT

## 2018-09-26 PROCEDURE — 2709999900 HC NON-CHARGEABLE SUPPLY: Performed by: INTERNAL MEDICINE

## 2018-09-26 PROCEDURE — 88173 CYTOPATH EVAL FNA REPORT: CPT

## 2018-09-26 PROCEDURE — 7100000000 HC PACU RECOVERY - FIRST 15 MIN: Performed by: INTERNAL MEDICINE

## 2018-09-26 PROCEDURE — 07D78ZX EXTRACTION OF THORAX LYMPHATIC, VIA NATURAL OR ARTIFICIAL OPENING ENDOSCOPIC, DIAGNOSTIC: ICD-10-PCS | Performed by: INTERNAL MEDICINE

## 2018-09-26 PROCEDURE — 2500000003 HC RX 250 WO HCPCS: Performed by: INTERNAL MEDICINE

## 2018-09-26 PROCEDURE — 2720000010 HC SURG SUPPLY STERILE: Performed by: INTERNAL MEDICINE

## 2018-09-26 PROCEDURE — 3609018500 HC EGD US SCOPE W/ADJACENT STRUCTURES: Performed by: INTERNAL MEDICINE

## 2018-09-26 PROCEDURE — 6370000000 HC RX 637 (ALT 250 FOR IP): Performed by: INTERNAL MEDICINE

## 2018-09-26 PROCEDURE — 88305 TISSUE EXAM BY PATHOLOGIST: CPT

## 2018-09-26 PROCEDURE — 3609020800 HC EGD W/EUS FNA: Performed by: INTERNAL MEDICINE

## 2018-09-26 PROCEDURE — 3700000001 HC ADD 15 MINUTES (ANESTHESIA): Performed by: INTERNAL MEDICINE

## 2018-09-26 PROCEDURE — 88172 CYTP DX EVAL FNA 1ST EA SITE: CPT

## 2018-09-26 PROCEDURE — 6360000002 HC RX W HCPCS: Performed by: NURSE ANESTHETIST, CERTIFIED REGISTERED

## 2018-09-26 PROCEDURE — 2580000003 HC RX 258: Performed by: INTERNAL MEDICINE

## 2018-09-26 PROCEDURE — 0DJ08ZZ INSPECTION OF UPPER INTESTINAL TRACT, VIA NATURAL OR ARTIFICIAL OPENING ENDOSCOPIC: ICD-10-PCS | Performed by: INTERNAL MEDICINE

## 2018-09-26 PROCEDURE — 7100000001 HC PACU RECOVERY - ADDTL 15 MIN: Performed by: INTERNAL MEDICINE

## 2018-09-26 PROCEDURE — 2060000000 HC ICU INTERMEDIATE R&B

## 2018-09-26 PROCEDURE — S0028 INJECTION, FAMOTIDINE, 20 MG: HCPCS | Performed by: INTERNAL MEDICINE

## 2018-09-26 PROCEDURE — 2500000003 HC RX 250 WO HCPCS: Performed by: NURSE ANESTHETIST, CERTIFIED REGISTERED

## 2018-09-26 PROCEDURE — 2580000003 HC RX 258: Performed by: NURSE ANESTHETIST, CERTIFIED REGISTERED

## 2018-09-26 PROCEDURE — 88177 CYTP FNA EVAL EA ADDL: CPT

## 2018-09-26 PROCEDURE — 3700000000 HC ANESTHESIA ATTENDED CARE: Performed by: INTERNAL MEDICINE

## 2018-09-26 PROCEDURE — 36415 COLL VENOUS BLD VENIPUNCTURE: CPT

## 2018-09-26 PROCEDURE — 6360000002 HC RX W HCPCS: Performed by: NURSE PRACTITIONER

## 2018-09-26 RX ORDER — PROPOFOL 10 MG/ML
INJECTION, EMULSION INTRAVENOUS CONTINUOUS PRN
Status: DISCONTINUED | OUTPATIENT
Start: 2018-09-26 | End: 2018-09-26 | Stop reason: SDUPTHER

## 2018-09-26 RX ORDER — LIDOCAINE HYDROCHLORIDE 20 MG/ML
INJECTION, SOLUTION INFILTRATION; PERINEURAL PRN
Status: DISCONTINUED | OUTPATIENT
Start: 2018-09-26 | End: 2018-09-26 | Stop reason: SDUPTHER

## 2018-09-26 RX ORDER — HEPARIN SODIUM 10000 [USP'U]/100ML
12 INJECTION, SOLUTION INTRAVENOUS CONTINUOUS
Status: DISCONTINUED | OUTPATIENT
Start: 2018-09-26 | End: 2018-09-27 | Stop reason: HOSPADM

## 2018-09-26 RX ORDER — SODIUM CHLORIDE 9 MG/ML
INJECTION, SOLUTION INTRAVENOUS CONTINUOUS PRN
Status: DISCONTINUED | OUTPATIENT
Start: 2018-09-26 | End: 2018-09-26 | Stop reason: SDUPTHER

## 2018-09-26 RX ORDER — PROPOFOL 10 MG/ML
INJECTION, EMULSION INTRAVENOUS PRN
Status: DISCONTINUED | OUTPATIENT
Start: 2018-09-26 | End: 2018-09-26 | Stop reason: SDUPTHER

## 2018-09-26 RX ORDER — EPHEDRINE SULFATE 50 MG/ML
INJECTION INTRAVENOUS PRN
Status: DISCONTINUED | OUTPATIENT
Start: 2018-09-26 | End: 2018-09-26 | Stop reason: SDUPTHER

## 2018-09-26 RX ADMIN — PROPOFOL 200 MCG/KG/MIN: 10 INJECTION, EMULSION INTRAVENOUS at 16:06

## 2018-09-26 RX ADMIN — Medication 10 ML: at 21:48

## 2018-09-26 RX ADMIN — HEPARIN SODIUM 12 UNITS/KG/HR: 10000 INJECTION, SOLUTION INTRAVENOUS at 21:43

## 2018-09-26 RX ADMIN — SODIUM CHLORIDE: 9 INJECTION, SOLUTION INTRAVENOUS at 16:06

## 2018-09-26 RX ADMIN — SUCRALFATE 1 G: 1 TABLET ORAL at 19:15

## 2018-09-26 RX ADMIN — PRAVASTATIN SODIUM 40 MG: 40 TABLET ORAL at 21:46

## 2018-09-26 RX ADMIN — SUCRALFATE 1 G: 1 TABLET ORAL at 21:46

## 2018-09-26 RX ADMIN — NITROGLYCERIN 0.4 MG: 0.4 TABLET SUBLINGUAL at 13:20

## 2018-09-26 RX ADMIN — LIDOCAINE HYDROCHLORIDE 80 MG: 20 INJECTION, SOLUTION INFILTRATION; PERINEURAL at 16:08

## 2018-09-26 RX ADMIN — Medication 10 ML: at 12:10

## 2018-09-26 RX ADMIN — FAMOTIDINE 40 MG: 10 INJECTION, SOLUTION INTRAVENOUS at 08:50

## 2018-09-26 RX ADMIN — ALPRAZOLAM 0.5 MG: 0.5 TABLET ORAL at 21:50

## 2018-09-26 RX ADMIN — EPHEDRINE SULFATE 10 MG: 50 INJECTION INTRAVENOUS at 16:14

## 2018-09-26 RX ADMIN — PROPOFOL 70 MG: 10 INJECTION, EMULSION INTRAVENOUS at 16:08

## 2018-09-26 ASSESSMENT — PAIN DESCRIPTION - DESCRIPTORS
DESCRIPTORS: ACHING
DESCRIPTORS: ACHING

## 2018-09-26 ASSESSMENT — PAIN DESCRIPTION - ORIENTATION
ORIENTATION: LEFT
ORIENTATION: LEFT

## 2018-09-26 ASSESSMENT — PAIN SCALES - GENERAL
PAINLEVEL_OUTOF10: 1
PAINLEVEL_OUTOF10: 0

## 2018-09-26 ASSESSMENT — PAIN DESCRIPTION - LOCATION
LOCATION: FLANK
LOCATION: RIB CAGE

## 2018-09-26 ASSESSMENT — ENCOUNTER SYMPTOMS: DYSPNEA ACTIVITY LEVEL: AFTER AMBULATING 1 FLIGHT OF STAIRS

## 2018-09-26 ASSESSMENT — PAIN DESCRIPTION - PAIN TYPE
TYPE: ACUTE PAIN
TYPE: ACUTE PAIN

## 2018-09-26 ASSESSMENT — PAIN - FUNCTIONAL ASSESSMENT: PAIN_FUNCTIONAL_ASSESSMENT: 0-10

## 2018-09-26 NOTE — PROGRESS NOTES
Pt awaiting biopsy. Please let me know if he needs to stay overnight after this procedure or if he can be dismissed on meds including anticoagulants.

## 2018-09-26 NOTE — PROGRESS NOTES
0 ml   Net              800 ml       Physical Exam Performed:    /72   Pulse 54   Temp 97.5 °F (36.4 °C) (Oral)   Resp 18   Ht 6' 3\" (1.905 m)   Wt 169 lb 11.2 oz (77 kg)   SpO2 100%   BMI 21.21 kg/m²     General appearance: No apparent distress appears stated age and cooperative. HEENT Normal cephalic, atraumatic without obvious deformity. Pupils equal, round, and reactive to light. Extra ocular muscles intact. Conjunctivae/corneas clear. Neck: Supple, No jugular venous distention/bruits. Trachea midline without thyromegaly or adenopathy with full range of motion. Lungs: Clear to auscultation, bilaterally without Rales/Wheezes/Rhonchi with good respiratory effort. Heart: Regular rate and rhythm with Normal S1/S2 without murmurs, rubs or gallops, point of maximum impulse non-displaced  Abdomen: Soft, non-tender or non-distended without rigidity or guarding and positive bowel sounds all four quadrants. Extremities: No clubbing, cyanosis, or edema bilaterally. Full range of motion without deformity and normal gait intact. Skin: Skin color, texture, turgor normal.  No rashes or lesions. Neurologic: Alert and oriented X 3, neurovascularly intact with sensory/motor intact upper extremities/lower extremities, bilaterally. Cranial nerves: II-XII intact, grossly non-focal.  Mental status: Alert, oriented, thought content appropriate. Capillary Refill: Acceptable  < 3 seconds  Peripheral Pulses: +3 Easily felt, not easily obliterated with pressure      Labs:   No results for input(s): WBC, HGB, HCT, PLT in the last 72 hours. No results for input(s): NA, K, CL, CO2, BUN, CREATININE, CALCIUM, PHOS in the last 72 hours. Invalid input(s): MAGNES  No results for input(s): AST, ALT, BILIDIR, BILITOT, ALKPHOS in the last 72 hours. No results for input(s): INR in the last 72 hours.   Recent Labs      09/24/18   1043  09/24/18   1237  09/24/18   1857   TROPONINI  <0.01  <0.01  <0.01 Urinalysis:      Lab Results   Component Value Date    NITRU Negative 09/19/2018    WBCUA 0 09/19/2018    RBCUA 1 09/19/2018    BLOODU Negative 09/19/2018    SPECGRAV 1.008 09/19/2018    GLUCOSEU Negative 09/19/2018       Radiology:  CT Chest Pulmonary Embolism W Contrast   Final Result   Large subcarinal lymph node measuring 3.6 cm in short axis. Tissue sampling   or PET-CT should be considered for further evaluation. No evidence of pulmonary embolism. Emphysematous changes within the lungs as described above. Thickening of the bilateral adrenal glands suggestive of hyperplasia. XR CHEST STANDARD (2 VW)   Final Result   No acute process. Assessment/Plan:    Active Hospital Problems    Diagnosis Date Noted    ST elevation myocardial infarction involving left anterior descending (LAD) coronary artery ([facility] [address] [I21.02] 09/24/2018    Tobacco use [Z72.0] 09/24/2018    Enlarged lymph node [R59.9] 09/24/2018    Left ventricular thrombus [[id number]] 09/19/2018     CAD, c on 09/20/18,  with MVD, referred for CABG, appreciate cardiology Recommendations. Left ventricular thrombus:- Start heparin gtt to prevent stroke. appreciate cardiology recommnedtaion, decision about need for anticoagulation. Thrombus is old per cards.     Chest pain:- likely GERD with relation to eating, not sure if due to thrombus vs  Enlarged LN vs  GERD vs costochondritis, trial of GI cocktail.      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  No lymph node identified. FNA of a proximal periesophgeal lesion turned out to be thyroid   Management of the mediastinal cyst per CT surgery. HTN:- stable, continue home meds    Tobacco abuse:- counseled on cessation    DVT Prophylaxis: heparin gtt   Diet: Diet NPO, After Midnight  Code Status: Full Code    PT/OT Eval Status: n/a     Dispo - await CT sure input on moving forward with CABG.     Gonzalo Scales MD

## 2018-09-26 NOTE — ANESTHESIA PRE PROCEDURE
Department of Anesthesiology  Preprocedure Note       Name:  Zara Medel   Age:  59 y.o.  :  1955                                          MRN:  [id number]         Date:  2018      Surgeon: Raul Wise):  Farhana Barron MD    Procedure: Procedure(s):  EGD ESOPHAGOGASTRODUODENOSCOPY ULTRASOUND    Medications prior to admission:   Prior to Admission medications    Medication Sig Start Date End Date Taking? Authorizing Provider   amLODIPine (NORVASC) 10 MG tablet TAKE ONE TABLET BY MOUTH DAILY 18  Yes Loreto Deshpande MD   aspirin 81 MG tablet Take 81 mg by mouth daily.    Yes Historical Provider, MD   omeprazole (PRILOSEC OTC) 20 MG tablet Take 1 tablet by mouth 2 times daily 18   Loreto Deshpande MD   sucralfate (CARAFATE) 1 GM tablet Take 1 tablet by mouth 4 times daily 18   Loreto Deshpande MD   pravastatin (PRAVACHOL) 40 MG tablet Take 1 tablet by mouth daily 1/10/18   Loreto Deshpande MD       Current medications:    Current Facility-Administered Medications   Medication Dose Route Frequency Provider Last Rate Last Dose    nitroGLYCERIN (NITROSTAT) SL tablet 0.4 mg  0.4 mg Sublingual Q5 Min PRN Krishna Quinn MD   0.4 mg at 18 1320    calcium carbonate (TUMS) chewable tablet 500 mg  500 mg Oral TID PRN Ezequiel Barajas MD   500 mg at 18 1547    aluminum & magnesium hydroxide-simethicone (MAALOX) 30 mL, lidocaine viscous (XYLOCAINE) 5 mL (GI COCKTAIL)   Oral BID PRN Ezequiel Barajas MD        ALPRAZolam Kriss Fees) tablet 0.5 mg  0.5 mg Oral Nightly PRN Ezequiel Barajas MD   0.5 mg at 18 2648    heparin (porcine) injection 4,000 Units  4,000 Units Intravenous PRN CaroleENRIQUE Burger        heparin (porcine) injection 2,000 Units  2,000 Units Intravenous PRN CaroleENRIQUE Burger   2,000 Units at 18 0026    pravastatin (PRAVACHOL) tablet 40 mg  40 mg Oral Nightly Ezequiel Barajas MD   40 mg at 18 2146    sucralfate (CARAFATE) tablet 1 g  1 g Oral 4x

## 2018-09-26 NOTE — PROGRESS NOTES
Adm to pacu from endo per bed awakening. Respirations easy & symmetrical. Abdomen soft. Denies pain.

## 2018-09-26 NOTE — OP NOTE
[address] [place] [address]  Endoscopy Note    Patient: Odilon Mcleod   : 1955  Acct#:     Procedure: Endoscopic ultrasound with FNA  EGD    Date: 2018    Surgeon:  Collette Serrano MD    Referring Physician:  Dr. Shea Vázquez    Anesthesia:  MAC per Anesthesia. Indications: This is a 59y.o. year old male who presents today with 3.6 cm subcarinal lymph node for EGD/EUS for further evaluation. Consent: Risks, benefits, and alternatives were explained and informed consent was obtained. Monitoring:  Patient was monitored with continuous pulse oximetry, telemetry, and intermittent blood pressures. Details of the Procedure: The patient was then taken to the endoscopy suite. A time-out was performed. The patient and staff were in agreement as to the correct patient and procedure. The above anesthesia was administered by the anesthesia department. The patient was placed in the left lateral position. The Olympus videoendoscope was placed in the patient's mouth and under direct visualization passed into the esophagus and advanced without difficulty to the 2nd portion of the duodenum. Views were good, patient toleration was good. Retroflexion was performed in the stomach. Findings:  1. The esophagus appeared normal without evidence of Vallejo's esophagus or reflux esophagitis. 2.  Normal stomach. 3.  Normal duodenum    Next, the curvilinear array echoendoscope was advanced without difficulty to the 2nd portion of the duodenum. Endosonographic views were good, patient toleration was good. Findings: There was a 4.26 x 1.79 cm anechoic cyst in the subcarinal space. I reviewed CT with radiology and the lesion identified on CT was fluid density consistent with a cyst.  There were some surrounding lymph nodes. I looked for mediastinal adenopathy and did see a 3.57 x 1.55 cm lesion adjacent to the proximal esophagus.    Using doppler ultrasound to find a vessel-free path into the lesion,

## 2018-09-26 NOTE — ANESTHESIA POSTPROCEDURE EVALUATION
Department of Anesthesiology  Postprocedure Note    Patient: Scooby Carter  MRN: [id number]  YOB: 1955  Date of evaluation: 9/26/2018  Time:  5:29 PM     Procedure Summary     Date:  09/26/18 Room / Location:  [facility] ENDO 05 / [facility] ENDOSCOPY    Anesthesia Start:  6310 Anesthesia Stop:  0206    Procedures:       EGD ESOPHAGOGASTRODUODENOSCOPY ULTRASOUND (N/A )      EGD BIOPSY (N/A Abdomen) Diagnosis:  (Mediastinal mass)    Surgeon:  Charley Nicole MD Responsible Provider:      Anesthesia Type:  MAC ASA Status:  4          Anesthesia Type: MAC    Bala Phase I: Bala Score: 10    Bala Phase II:      Last vitals: Reviewed and per EMR flowsheets.        Anesthesia Post Evaluation    Patient location during evaluation: PACU  Patient participation: complete - patient participated  Level of consciousness: awake and alert  Pain score: 0  Airway patency: patent  Nausea & Vomiting: no nausea and no vomiting  Complications: no  Cardiovascular status: blood pressure returned to baseline  Respiratory status: acceptable  Hydration status: euvolemic

## 2018-09-26 NOTE — PROGRESS NOTES
Teaching / education initiated regarding perioperative experience, expectations, and pain management during stay. Patient verbalized understanding. Pt to unit for EGD with US. Pt initially admitted with Left sided Rib cage pain. Pt with multiple consults noted. Pt to have this procedure for Lymph Node Biopsy. Dr. Neo Oshea aware Heparin gtt turned off at 0900 and PTT drawn at 0440 this AM. No additional Bloodwork to be drawn. Per floor Nurse Pt reported Left sided Rib cage pain and given Nitro with relief noted at 1320. Dr. Simon Trejo aware and will proceed with procedure.

## 2018-09-26 NOTE — PLAN OF CARE
Problem: Bleeding:  Goal: Will show no signs and symptoms of excessive bleeding  Will show no signs and symptoms of excessive bleeding   Heparin infusing without difficulty no signs of bleeding.

## 2018-09-26 NOTE — H&P
Relation Age of Onset    Heart Disease Brother 27        CABG x 2    Heart Disease Mother         CABG x 2        PHYSICAL EXAM:      BP (!) 152/86   Pulse 67   Temp 98.2 °F (36.8 °C) (Temporal)   Resp 16   Ht 6' 3\" (1.905 m)   Wt 169 lb 11.2 oz (77 kg)   SpO2 100%   BMI 21.21 kg/m²  I        Heart:  RRR    Lungs:  CTA b    Abdomen:  S/NT/ND/+BS      ASSESSMENT AND PLAN:  ASA: per anesthesia  Mallampati: per anesthesia  1. Patient is a 59 y.o. male here for EUS and EGD   2. Procedure options, risks and benefits reviewed with the patient. The patient expresses understanding.     Neal Catherine

## 2018-09-26 NOTE — PROCEDURES
[facility]                      350 Located within [facility], [address]                                PULMONARY FUNCTION    PATIENT NAME: Sadie Morris                      :        1955  MED REC NO:   [id number]                          ROOM:       3361  ACCOUNT NO:   [de-identified]                          ADMIT DATE: 2018  PROVIDER:     Jaxon Perez MD    DATE OF PROCEDURE:  2018    INTERPRETATION:  Spirometry reveals normal FVC. FEV1 is reduced at 2.89  liters which is 68% predicted. FEV1/FVC ratio is reduced at 53%. IMPRESSION:  Mild to moderate obstructive lung disease.         Eri Najera MD    D: 2018 16:56:10       T: 2018 2:37:15     [id number]  Job#: [id number]     Doc#: [id number]    CC:

## 2018-09-27 VITALS
HEIGHT: 75 IN | HEART RATE: 60 BPM | SYSTOLIC BLOOD PRESSURE: 111 MMHG | WEIGHT: 170.7 LBS | BODY MASS INDEX: 21.22 KG/M2 | RESPIRATION RATE: 17 BRPM | DIASTOLIC BLOOD PRESSURE: 62 MMHG | TEMPERATURE: 97.9 F | OXYGEN SATURATION: 98 %

## 2018-09-27 LAB
APTT: 37.3 SEC (ref 26–36)
APTT: 38.6 SEC (ref 26–36)

## 2018-09-27 PROCEDURE — 6360000002 HC RX W HCPCS: Performed by: NURSE PRACTITIONER

## 2018-09-27 PROCEDURE — 6370000000 HC RX 637 (ALT 250 FOR IP): Performed by: INTERNAL MEDICINE

## 2018-09-27 PROCEDURE — 2580000003 HC RX 258: Performed by: INTERNAL MEDICINE

## 2018-09-27 PROCEDURE — 99232 SBSQ HOSP IP/OBS MODERATE 35: CPT | Performed by: NURSE PRACTITIONER

## 2018-09-27 PROCEDURE — 36415 COLL VENOUS BLD VENIPUNCTURE: CPT

## 2018-09-27 PROCEDURE — 6360000002 HC RX W HCPCS: Performed by: PHYSICIAN ASSISTANT

## 2018-09-27 PROCEDURE — 85730 THROMBOPLASTIN TIME PARTIAL: CPT

## 2018-09-27 RX ORDER — NITROGLYCERIN 0.4 MG/1
TABLET SUBLINGUAL
Qty: 25 TABLET | Refills: 3 | Status: ON HOLD | OUTPATIENT
Start: 2018-09-27 | End: 2018-10-31 | Stop reason: HOSPADM

## 2018-09-27 RX ADMIN — ASPIRIN 81 MG: 81 TABLET, COATED ORAL at 08:41

## 2018-09-27 RX ADMIN — AMLODIPINE BESYLATE 10 MG: 5 TABLET ORAL at 08:42

## 2018-09-27 RX ADMIN — HEPARIN SODIUM 16 UNITS/KG/HR: 10000 INJECTION, SOLUTION INTRAVENOUS at 11:28

## 2018-09-27 RX ADMIN — HEPARIN SODIUM 2000 UNITS: 1000 INJECTION, SOLUTION INTRAVENOUS; SUBCUTANEOUS at 04:20

## 2018-09-27 RX ADMIN — Medication 10 ML: at 11:30

## 2018-09-27 RX ADMIN — PANTOPRAZOLE SODIUM 40 MG: 40 TABLET, DELAYED RELEASE ORAL at 04:21

## 2018-09-27 RX ADMIN — HEPARIN SODIUM 2000 UNITS: 1000 INJECTION, SOLUTION INTRAVENOUS; SUBCUTANEOUS at 11:28

## 2018-09-27 RX ADMIN — SUCRALFATE 1 G: 1 TABLET ORAL at 08:42

## 2018-09-27 RX ADMIN — SUCRALFATE 1 G: 1 TABLET ORAL at 13:34

## 2018-09-27 ASSESSMENT — PAIN DESCRIPTION - PAIN TYPE: TYPE: ACUTE PAIN

## 2018-09-27 ASSESSMENT — PAIN DESCRIPTION - LOCATION: LOCATION: RIB CAGE

## 2018-09-27 ASSESSMENT — PAIN SCALES - GENERAL
PAINLEVEL_OUTOF10: 0

## 2018-09-27 ASSESSMENT — PAIN DESCRIPTION - DESCRIPTORS: DESCRIPTORS: ACHING

## 2018-09-27 ASSESSMENT — PAIN DESCRIPTION - ORIENTATION: ORIENTATION: LEFT

## 2018-09-27 NOTE — PROGRESS NOTES
[facility] GI  Gastroenterology Progress Note    Ev Voss is a 59 y.o. male patient. Principal Problem:    ST elevation myocardial infarction involving left anterior descending (LAD) coronary artery (HCC)  Active Problems:    Left ventricular thrombus    Tobacco use    Enlarged lymph node  Resolved Problems:    * No resolved hospital problems. *      SUBJECTIVE:  No GI complaints. ROS:  No fever, chills  No chest pain, palpitations  No SOB, cough  Gastrointestinal ROS: no abdominal pain, N/V     Physical    VITALS:  /67   Pulse 65   Temp 97.7 °F (36.5 °C) (Oral)   Resp 18   Ht 6' 3\" (1.905 m)   Wt 170 lb 11.2 oz (77.4 kg)   SpO2 98%   BMI 21.34 kg/m²   TEMPERATURE:  Current - Temp: 97.7 °F (36.5 °C); Max - Temp  Av.8 °F (36.6 °C)  Min: 97.3 °F (36.3 °C)  Max: 98.2 °F (36.8 °C)    NAD  Regular rate   Lungs CTA Bilaterally  Abdomen soft, ND, NT,  Bowel sounds normal.    Data    Data Review:    No results for input(s): WBC, HGB, HCT, MCV, PLT in the last 72 hours. No results for input(s): NA, K, CL, CO2, PHOS, BUN, CREATININE in the last 72 hours. Invalid input(s): CA  No results for input(s): AST, ALT, ALB, BILIDIR, BILITOT, ALKPHOS in the last 72 hours. No results for input(s): LIPASE, AMYLASE in the last 72 hours. No results for input(s): PROTIME, INR in the last 72 hours. No results for input(s): PTT in the last 72 hours. 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 - per CT surgery  CAD - CT surgery following for CABG. Left ventricular thrombus - On heparin drip. Discussed with Dr. Jessenia Grace, [address]        I have personally performed a face to face diagnostic evaluation on this patient. I have interviewed and examined the patient and I agree with the findings and recommended plan of care.   In summary, my findings and plan are the following:  EUS noted from yesterday. Mediastinal lesion is a cyst.  Pt denies any xiphoid pain at this point also. Will defer mgt at this time to cardiology. Will sign off. Call with questions.        Erasmo Flood MD  [address] [facility]

## 2018-09-27 NOTE — PLAN OF CARE
Problem: Pain:  Goal: Pain level will decrease  Pain level will decrease   Outcome: Met This Shift  Patient has no complaint of pain at this time. Patient encouraged to inform nurse if this changes. Will continue to monitor comfort. Problem: Bleeding:  Goal: Will show no signs and symptoms of excessive bleeding  Will show no signs and symptoms of excessive bleeding   Outcome: Met This Shift  Patient with low Aptt. Infusion increased and half of bolus given as ordered. Will re-check aptt at 1730. Patient has no signs of bleeding at this time. Will continue to monitor. Problem: Falls - Risk of:  Goal: Will remain free from falls  Will remain free from falls   Outcome: Completed Date Met: 09/27/18  Fall risk assessment completed. Pt gait steady. Clear pathway provided to and from bathroom as pt is ambulatory. Bed kept in lowest position with wheels locked. Call light within reach. Pt encouraged to call for any needs. Pt free from accidental injury this shift.

## 2018-09-27 NOTE — PROGRESS NOTES
Update:    S/P Endoscopic ultrasound with FNA;  EGD - 9/26    2 passes were made into the lesion. Slides were made and specimen was also sent in cytology. I spoke w/ Dr. Ghazal Gage; it looks like benign thyroid tissue, he also states that the mass itself was not Bx, per  GI (Dr. Rogers Lose;  the mediastinal \"cyst\" was not Bx because of risk of infection. Plan: OK for D/C from our standpoint and follow with Pulmonary re: bronchus nodule and prior to F/U with us. ADD: Spoke with Radiology, Dr. 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 She review the CT with another Radiologist and said the same thing. She, Dr. Uriel Ulloa did say the that we should Bx that right bronchus node, it does look concerning. Dr. Uriel Ulloa will update CT results to include it. ADD: Spoke with Dr. Juliana West regarding case.   He stated that mass  a cyst and the bronchus node is a lymph node    ADD: Spoke with Dr. Natalia Garduno and Hospitalist they willD/C patient and follow-up with Dr. Lukasz Rodgers in the office +/- MRI

## 2018-09-27 NOTE — PLAN OF CARE
Problem: Pain:  Goal: Pain level will decrease  Pain level will decrease   Outcome: Ongoing  Pt denies pain overnight. Problem: Discharge Planning:  Goal: Patients continuum of care needs are met  Patients continuum of care needs are met   Outcome: Ongoing  Pt independent, ambulatory, aware of needs, calls for assistance appropriately. Problem: Falls - Risk of:  Goal: Will remain free from falls  Will remain free from falls   Outcome: Ongoing  Pt remains free from falls. Safety precautions in place. Bed in lowest position, bed wheels locked, medium fall risk, alert and oriented, able to call for assistance as needed, call light with in reach, yellow blanket in place, fall risk wrist band on, SAFE outside of doorway.  Will continue to monitor

## 2018-09-27 NOTE — PROGRESS NOTES
CVTS    CAD -    Card: LV thrombus. Hep gtt. Transition to oral med. Pulm: bullous emphysema. Bronchial cyst. Lymph node biopsy 8/26, pending. Anticipate period of anticoagulation for LV thrombus, then cabg.   Will discuss with Dr. Nehemiah Cook MD  9/27/2018  8:31 AM

## 2018-09-28 PROBLEM — Q34.1 MEDIASTINAL CYST: Status: ACTIVE | Noted: 2018-09-01

## 2018-10-01 ENCOUNTER — TELEPHONE (OUTPATIENT)
Dept: CARDIOLOGY CLINIC | Age: 63
End: 2018-10-01

## 2018-10-01 NOTE — TELEPHONE ENCOUNTER
called with concerns of chest pains. Nitro only last a couple hours and then he has shooting pains across his chest.  He is waiting on double bypass. The pain comes and goes through out the day, increasing in frequency. Spoke with RN Jacquelynne Crigler, advised him to go to the ER.

## 2018-10-03 ENCOUNTER — TELEPHONE (OUTPATIENT)
Dept: CARDIOTHORACIC SURGERY | Age: 63
End: 2018-10-03

## 2018-10-10 ENCOUNTER — HOSPITAL ENCOUNTER (OUTPATIENT)
Dept: PET IMAGING | Age: 63
Discharge: HOME | End: 2018-10-10
Payer: COMMERCIAL

## 2018-10-10 ENCOUNTER — HOSPITAL ENCOUNTER (OUTPATIENT)
Age: 63
End: 2018-10-10
Payer: COMMERCIAL

## 2018-10-10 VITALS — HEIGHT: 74 IN | WEIGHT: 174 LBS | BODY MASS INDEX: 22.33 KG/M2

## 2018-10-10 DIAGNOSIS — R91.8 LUNG MASS: ICD-10-CM

## 2018-10-10 PROCEDURE — 3430000000 HC RX DIAGNOSTIC RADIOPHARMACEUTICAL: Performed by: THORACIC SURGERY (CARDIOTHORACIC VASCULAR SURGERY)

## 2018-10-10 PROCEDURE — A9552 F18 FDG: HCPCS | Performed by: THORACIC SURGERY (CARDIOTHORACIC VASCULAR SURGERY)

## 2018-10-10 PROCEDURE — 78815 PET IMAGE W/CT SKULL-THIGH: CPT

## 2018-10-10 RX ORDER — FLUDEOXYGLUCOSE F 18 200 MCI/ML
16 INJECTION, SOLUTION INTRAVENOUS
Status: COMPLETED | OUTPATIENT
Start: 2018-10-10 | End: 2018-10-10

## 2018-10-10 RX ADMIN — FLUDEOXYGLUCOSE F 18 16 MILLICURIE: 200 INJECTION, SOLUTION INTRAVENOUS at 15:44

## 2018-10-11 ENCOUNTER — TELEPHONE (OUTPATIENT)
Dept: CARDIOTHORACIC SURGERY | Age: 63
End: 2018-10-11

## 2018-10-11 ENCOUNTER — OFFICE VISIT (OUTPATIENT)
Dept: CARDIOTHORACIC SURGERY | Age: 63
End: 2018-10-11
Payer: COMMERCIAL

## 2018-10-11 VITALS
WEIGHT: 175.6 LBS | OXYGEN SATURATION: 97 % | HEIGHT: 75 IN | DIASTOLIC BLOOD PRESSURE: 80 MMHG | SYSTOLIC BLOOD PRESSURE: 128 MMHG | HEART RATE: 88 BPM | BODY MASS INDEX: 21.83 KG/M2 | TEMPERATURE: 97.8 F

## 2018-10-11 DIAGNOSIS — I25.10 CORONARY ARTERY DISEASE INVOLVING NATIVE CORONARY ARTERY OF NATIVE HEART, ANGINA PRESENCE UNSPECIFIED: Primary | ICD-10-CM

## 2018-10-11 DIAGNOSIS — Q34.1 MEDIASTINAL CYST: ICD-10-CM

## 2018-10-11 PROCEDURE — 99212 OFFICE O/P EST SF 10 MIN: CPT | Performed by: THORACIC SURGERY (CARDIOTHORACIC VASCULAR SURGERY)

## 2018-10-11 RX ORDER — BUPROPION HYDROCHLORIDE 75 MG/1
75 TABLET ORAL 2 TIMES DAILY
Qty: 60 TABLET | Refills: 1 | Status: SHIPPED | OUTPATIENT
Start: 2018-10-11 | End: 2019-05-02 | Stop reason: SDUPTHER

## 2018-10-11 NOTE — PROGRESS NOTES
MrCharley Domingo Grijalva is here to discuss treatment of CAD +  Review of Systems:  Constitutional:  No night sweats, headaches,no  weight loss. Eyes:  No glaucoma, cataracts. ENMT:  No nosebleeds, no  deviated septum. Has not seen a DDS in a while  Cardiac:  No arrhythmias. Vascular:  No claudication,no  varicosities. GI:  No PUD, + heartburn +. GERD  :  No kidney stones,no  frequent UTIs  Musculoskeletal:  No arthritis,no  gout. Respiratory:  No SOB, emphysema, asthma +COPD  Integumentary:  No dermatitis, itching, no  rash. Neurological:  No stroke, TIAs, no seizures. Psychiatric:  No depression,  +anxiety pertaining to surgery  Endocrine: No diabetes, no thyroid issues. Hematologic:  No bleeding, easy bruising. Immunologic:  No known cancer, no steroid therapies.

## 2018-10-17 ENCOUNTER — TELEPHONE (OUTPATIENT)
Dept: CARDIOTHORACIC SURGERY | Age: 63
End: 2018-10-17

## 2018-10-17 ENCOUNTER — TELEPHONE (OUTPATIENT)
Dept: CARDIOLOGY CLINIC | Age: 63
End: 2018-10-17

## 2018-10-18 ENCOUNTER — TELEPHONE (OUTPATIENT)
Dept: CARDIOTHORACIC SURGERY | Age: 63
End: 2018-10-18

## 2018-10-22 ENCOUNTER — HOSPITAL ENCOUNTER (OUTPATIENT)
Dept: PREADMISSION TESTING | Age: 63
Discharge: HOME | DRG: 236 | End: 2018-10-26
Payer: COMMERCIAL

## 2018-10-22 ENCOUNTER — ANESTHESIA EVENT (OUTPATIENT)
Dept: OPERATING ROOM | Age: 63
DRG: 236 | End: 2018-10-22
Payer: COMMERCIAL

## 2018-10-22 ENCOUNTER — HOSPITAL ENCOUNTER (OUTPATIENT)
Dept: VASCULAR LAB | Age: 63
Discharge: HOME | DRG: 236 | End: 2018-10-22
Payer: COMMERCIAL

## 2018-10-22 VITALS
HEIGHT: 74 IN | OXYGEN SATURATION: 97 % | BODY MASS INDEX: 22.46 KG/M2 | HEART RATE: 75 BPM | WEIGHT: 175 LBS | DIASTOLIC BLOOD PRESSURE: 68 MMHG | SYSTOLIC BLOOD PRESSURE: 117 MMHG | RESPIRATION RATE: 18 BRPM | TEMPERATURE: 99.1 F

## 2018-10-22 DIAGNOSIS — Z01.810 PRE-OPERATIVE CARDIOVASCULAR EXAMINATION: Primary | ICD-10-CM

## 2018-10-22 LAB
ABO/RH: NORMAL
ALBUMIN SERPL-MCNC: 4.6 G/DL (ref 3.4–5)
ALP BLD-CCNC: 79 U/L (ref 40–129)
ALT SERPL-CCNC: 19 U/L (ref 10–40)
ANION GAP SERPL CALCULATED.3IONS-SCNC: 13 MMOL/L (ref 3–16)
ANTIBODY SCREEN: NORMAL
APTT: 26.3 SEC (ref 26–36)
AST SERPL-CCNC: 21 U/L (ref 15–37)
BASOPHILS ABSOLUTE: 0.1 K/UL (ref 0–0.2)
BASOPHILS RELATIVE PERCENT: 1.1 %
BILIRUB SERPL-MCNC: 0.6 MG/DL (ref 0–1)
BILIRUBIN DIRECT: <0.2 MG/DL (ref 0–0.3)
BILIRUBIN URINE: NEGATIVE
BILIRUBIN, INDIRECT: NORMAL MG/DL (ref 0–1)
BLOOD, URINE: NEGATIVE
BUN BLDV-MCNC: 14 MG/DL (ref 7–20)
CALCIUM SERPL-MCNC: 9.8 MG/DL (ref 8.3–10.6)
CHLORIDE BLD-SCNC: 102 MMOL/L (ref 99–110)
CHOLESTEROL, TOTAL: 139 MG/DL (ref 0–199)
CLARITY: CLEAR
CO2: 23 MMOL/L (ref 21–32)
COLOR: ABNORMAL
CREAT SERPL-MCNC: 0.9 MG/DL (ref 0.8–1.3)
EOSINOPHILS ABSOLUTE: 0.1 K/UL (ref 0–0.6)
EOSINOPHILS RELATIVE PERCENT: 0.7 %
ESTIMATED AVERAGE GLUCOSE: 108.3 MG/DL
GFR AFRICAN AMERICAN: >60
GFR NON-AFRICAN AMERICAN: >60
GLUCOSE BLD-MCNC: 105 MG/DL (ref 70–99)
GLUCOSE URINE: NEGATIVE MG/DL
HBA1C MFR BLD: 5.4 %
HCT VFR BLD CALC: 45.7 % (ref 40.5–52.5)
HDLC SERPL-MCNC: 38 MG/DL (ref 40–60)
HEMOGLOBIN: 16 G/DL (ref 13.5–17.5)
INR BLD: 1.19 (ref 0.86–1.14)
KETONES, URINE: ABNORMAL MG/DL
LDL CHOLESTEROL CALCULATED: 82 MG/DL
LEUKOCYTE ESTERASE, URINE: NEGATIVE
LYMPHOCYTES ABSOLUTE: 1 K/UL (ref 1–5.1)
LYMPHOCYTES RELATIVE PERCENT: 12.5 %
MCH RBC QN AUTO: 33.2 PG (ref 26–34)
MCHC RBC AUTO-ENTMCNC: 34.9 G/DL (ref 31–36)
MCV RBC AUTO: 95 FL (ref 80–100)
MICROSCOPIC EXAMINATION: ABNORMAL
MONOCYTES ABSOLUTE: 0.5 K/UL (ref 0–1.3)
MONOCYTES RELATIVE PERCENT: 6 %
NEUTROPHILS ABSOLUTE: 6.5 K/UL (ref 1.7–7.7)
NEUTROPHILS RELATIVE PERCENT: 79.7 %
NITRITE, URINE: NEGATIVE
PDW BLD-RTO: 13.6 % (ref 12.4–15.4)
PH UA: 6.5
PLATELET # BLD: 173 K/UL (ref 135–450)
PMV BLD AUTO: 7.8 FL (ref 5–10.5)
POTASSIUM SERPL-SCNC: 4.3 MMOL/L (ref 3.5–5.1)
PROTEIN UA: NEGATIVE MG/DL
PROTHROMBIN TIME: 13.6 SEC (ref 9.8–13)
RBC # BLD: 4.81 M/UL (ref 4.2–5.9)
SODIUM BLD-SCNC: 138 MMOL/L (ref 136–145)
SPECIFIC GRAVITY UA: 1.02
TOTAL PROTEIN: 7.5 G/DL (ref 6.4–8.2)
TRIGL SERPL-MCNC: 93 MG/DL (ref 0–150)
URINE REFLEX TO CULTURE: ABNORMAL
URINE TYPE: ABNORMAL
UROBILINOGEN, URINE: 0.2 E.U./DL
VLDLC SERPL CALC-MCNC: 19 MG/DL
WBC # BLD: 8.1 K/UL (ref 4–11)

## 2018-10-22 PROCEDURE — 80048 BASIC METABOLIC PNL TOTAL CA: CPT

## 2018-10-22 PROCEDURE — 86900 BLOOD TYPING SEROLOGIC ABO: CPT

## 2018-10-22 PROCEDURE — 93005 ELECTROCARDIOGRAM TRACING: CPT | Performed by: EMERGENCY MEDICINE

## 2018-10-22 PROCEDURE — 93880 EXTRACRANIAL BILAT STUDY: CPT

## 2018-10-22 PROCEDURE — 93010 ELECTROCARDIOGRAM REPORT: CPT | Performed by: INTERNAL MEDICINE

## 2018-10-22 PROCEDURE — 80061 LIPID PANEL: CPT

## 2018-10-22 PROCEDURE — 85610 PROTHROMBIN TIME: CPT

## 2018-10-22 PROCEDURE — 36415 COLL VENOUS BLD VENIPUNCTURE: CPT

## 2018-10-22 PROCEDURE — 81003 URINALYSIS AUTO W/O SCOPE: CPT

## 2018-10-22 PROCEDURE — 85025 COMPLETE CBC W/AUTO DIFF WBC: CPT

## 2018-10-22 PROCEDURE — 85730 THROMBOPLASTIN TIME PARTIAL: CPT

## 2018-10-22 PROCEDURE — 86901 BLOOD TYPING SEROLOGIC RH(D): CPT

## 2018-10-22 PROCEDURE — 93971 EXTREMITY STUDY: CPT

## 2018-10-22 PROCEDURE — 83036 HEMOGLOBIN GLYCOSYLATED A1C: CPT

## 2018-10-22 PROCEDURE — 86850 RBC ANTIBODY SCREEN: CPT

## 2018-10-22 PROCEDURE — 80076 HEPATIC FUNCTION PANEL: CPT

## 2018-10-22 RX ORDER — UREA 10 %
1 LOTION (ML) TOPICAL NIGHTLY PRN
COMMUNITY
End: 2021-09-01

## 2018-10-22 ASSESSMENT — COPD QUESTIONNAIRES: CAT_SEVERITY: MODERATE

## 2018-10-25 ENCOUNTER — HOSPITAL ENCOUNTER (INPATIENT)
Age: 63
LOS: 13 days | Discharge: HOME HEALTH | DRG: 236 | End: 2018-11-07
Attending: EMERGENCY MEDICINE | Admitting: THORACIC SURGERY (CARDIOTHORACIC VASCULAR SURGERY)
Payer: COMMERCIAL

## 2018-10-25 ENCOUNTER — TELEPHONE (OUTPATIENT)
Dept: CARDIOLOGY CLINIC | Age: 63
End: 2018-10-25

## 2018-10-25 ENCOUNTER — APPOINTMENT (OUTPATIENT)
Dept: GENERAL RADIOLOGY | Age: 63
DRG: 236 | End: 2018-10-25
Payer: COMMERCIAL

## 2018-10-25 DIAGNOSIS — I25.708 ATHEROSCLEROSIS OF CORONARY ARTERY BYPASS GRAFT OF NATIVE HEART WITH STABLE ANGINA PECTORIS (HCC): ICD-10-CM

## 2018-10-25 DIAGNOSIS — I20.0 UNSTABLE ANGINA (HCC): Primary | ICD-10-CM

## 2018-10-25 DIAGNOSIS — I25.810 ARTERIOSCLEROSIS OF ARTERIAL CORONARY ARTERY BYPASS GRAFT: ICD-10-CM

## 2018-10-25 LAB
A/G RATIO: 1.5 (ref 1.1–2.2)
ALBUMIN SERPL-MCNC: 4.4 G/DL (ref 3.4–5)
ALP BLD-CCNC: 79 U/L (ref 40–129)
ALT SERPL-CCNC: 21 U/L (ref 10–40)
ANION GAP SERPL CALCULATED.3IONS-SCNC: 11 MMOL/L (ref 3–16)
APTT: 29 SEC (ref 26–36)
APTT: 29.4 SEC (ref 26–36)
AST SERPL-CCNC: 22 U/L (ref 15–37)
BASOPHILS ABSOLUTE: 0.1 K/UL (ref 0–0.2)
BASOPHILS RELATIVE PERCENT: 1 %
BILIRUB SERPL-MCNC: 0.6 MG/DL (ref 0–1)
BUN BLDV-MCNC: 13 MG/DL (ref 7–20)
CALCIUM SERPL-MCNC: 9.9 MG/DL (ref 8.3–10.6)
CHLORIDE BLD-SCNC: 102 MMOL/L (ref 99–110)
CO2: 26 MMOL/L (ref 21–32)
CREAT SERPL-MCNC: 0.8 MG/DL (ref 0.8–1.3)
EOSINOPHILS ABSOLUTE: 0.1 K/UL (ref 0–0.6)
EOSINOPHILS RELATIVE PERCENT: 1.8 %
GFR AFRICAN AMERICAN: >60
GFR NON-AFRICAN AMERICAN: >60
GLOBULIN: 2.9 G/DL
GLUCOSE BLD-MCNC: 92 MG/DL (ref 70–99)
HCT VFR BLD CALC: 43.2 % (ref 40.5–52.5)
HCT VFR BLD CALC: 44.6 % (ref 40.5–52.5)
HEMOGLOBIN: 14.6 G/DL (ref 13.5–17.5)
HEMOGLOBIN: 15.1 G/DL (ref 13.5–17.5)
INR BLD: 1.1 (ref 0.86–1.14)
LYMPHOCYTES ABSOLUTE: 1.4 K/UL (ref 1–5.1)
LYMPHOCYTES RELATIVE PERCENT: 18.6 %
MCH RBC QN AUTO: 33 PG (ref 26–34)
MCH RBC QN AUTO: 33.1 PG (ref 26–34)
MCHC RBC AUTO-ENTMCNC: 33.8 G/DL (ref 31–36)
MCHC RBC AUTO-ENTMCNC: 33.9 G/DL (ref 31–36)
MCV RBC AUTO: 97.2 FL (ref 80–100)
MCV RBC AUTO: 97.9 FL (ref 80–100)
MONOCYTES ABSOLUTE: 0.7 K/UL (ref 0–1.3)
MONOCYTES RELATIVE PERCENT: 8.8 %
NEUTROPHILS ABSOLUTE: 5.2 K/UL (ref 1.7–7.7)
NEUTROPHILS RELATIVE PERCENT: 69.8 %
PDW BLD-RTO: 13.4 % (ref 12.4–15.4)
PDW BLD-RTO: 13.6 % (ref 12.4–15.4)
PLATELET # BLD: 165 K/UL (ref 135–450)
PLATELET # BLD: 170 K/UL (ref 135–450)
PMV BLD AUTO: 7.2 FL (ref 5–10.5)
PMV BLD AUTO: 7.2 FL (ref 5–10.5)
POTASSIUM REFLEX MAGNESIUM: 4.3 MMOL/L (ref 3.5–5.1)
PROTHROMBIN TIME: 12.5 SEC (ref 9.8–13)
RBC # BLD: 4.42 M/UL (ref 4.2–5.9)
RBC # BLD: 4.59 M/UL (ref 4.2–5.9)
SODIUM BLD-SCNC: 139 MMOL/L (ref 136–145)
TOTAL PROTEIN: 7.3 G/DL (ref 6.4–8.2)
TROPONIN: <0.01 NG/ML
TROPONIN: <0.01 NG/ML
WBC # BLD: 7.2 K/UL (ref 4–11)
WBC # BLD: 7.4 K/UL (ref 4–11)

## 2018-10-25 PROCEDURE — 2000000000 HC ICU R&B

## 2018-10-25 PROCEDURE — 96374 THER/PROPH/DIAG INJ IV PUSH: CPT

## 2018-10-25 PROCEDURE — 6360000002 HC RX W HCPCS: Performed by: EMERGENCY MEDICINE

## 2018-10-25 PROCEDURE — 2500000003 HC RX 250 WO HCPCS: Performed by: EMERGENCY MEDICINE

## 2018-10-25 PROCEDURE — 94760 N-INVAS EAR/PLS OXIMETRY 1: CPT

## 2018-10-25 PROCEDURE — 80053 COMPREHEN METABOLIC PANEL: CPT

## 2018-10-25 PROCEDURE — 93005 ELECTROCARDIOGRAM TRACING: CPT | Performed by: PHYSICIAN ASSISTANT

## 2018-10-25 PROCEDURE — 99291 CRITICAL CARE FIRST HOUR: CPT

## 2018-10-25 PROCEDURE — 6360000002 HC RX W HCPCS: Performed by: HOSPITALIST

## 2018-10-25 PROCEDURE — 71046 X-RAY EXAM CHEST 2 VIEWS: CPT

## 2018-10-25 PROCEDURE — 6370000000 HC RX 637 (ALT 250 FOR IP): Performed by: HOSPITALIST

## 2018-10-25 PROCEDURE — 85730 THROMBOPLASTIN TIME PARTIAL: CPT

## 2018-10-25 PROCEDURE — 6370000000 HC RX 637 (ALT 250 FOR IP): Performed by: EMERGENCY MEDICINE

## 2018-10-25 PROCEDURE — 85027 COMPLETE CBC AUTOMATED: CPT

## 2018-10-25 PROCEDURE — 2100000000 HC CCU R&B

## 2018-10-25 PROCEDURE — 85025 COMPLETE CBC W/AUTO DIFF WBC: CPT

## 2018-10-25 PROCEDURE — 84484 ASSAY OF TROPONIN QUANT: CPT

## 2018-10-25 PROCEDURE — 85610 PROTHROMBIN TIME: CPT

## 2018-10-25 RX ORDER — HEPARIN SODIUM 5000 [USP'U]/ML
4000 INJECTION, SOLUTION INTRAVENOUS; SUBCUTANEOUS PRN
Status: DISCONTINUED | OUTPATIENT
Start: 2018-10-25 | End: 2018-10-26 | Stop reason: SDUPTHER

## 2018-10-25 RX ORDER — ASPIRIN 81 MG/1
243 TABLET, CHEWABLE ORAL ONCE
Status: COMPLETED | OUTPATIENT
Start: 2018-10-25 | End: 2018-10-25

## 2018-10-25 RX ORDER — HEPARIN SODIUM 1000 [USP'U]/ML
2000 INJECTION, SOLUTION INTRAVENOUS; SUBCUTANEOUS PRN
Status: DISCONTINUED | OUTPATIENT
Start: 2018-10-25 | End: 2018-10-26 | Stop reason: ALTCHOICE

## 2018-10-25 RX ORDER — NITROGLYCERIN 20 MG/100ML
5 INJECTION INTRAVENOUS CONTINUOUS
Status: DISCONTINUED | OUTPATIENT
Start: 2018-10-25 | End: 2018-10-26 | Stop reason: ALTCHOICE

## 2018-10-25 RX ORDER — HEPARIN SODIUM 5000 [USP'U]/ML
4000 INJECTION, SOLUTION INTRAVENOUS; SUBCUTANEOUS ONCE
Status: COMPLETED | OUTPATIENT
Start: 2018-10-25 | End: 2018-10-25

## 2018-10-25 RX ORDER — ASPIRIN 81 MG/1
81 TABLET ORAL DAILY
Status: DISCONTINUED | OUTPATIENT
Start: 2018-10-26 | End: 2018-10-29

## 2018-10-25 RX ORDER — HEPARIN SODIUM 1000 [USP'U]/ML
4000 INJECTION, SOLUTION INTRAVENOUS; SUBCUTANEOUS PRN
Status: DISCONTINUED | OUTPATIENT
Start: 2018-10-25 | End: 2018-10-26 | Stop reason: ALTCHOICE

## 2018-10-25 RX ORDER — BUPROPION HYDROCHLORIDE 75 MG/1
75 TABLET ORAL 2 TIMES DAILY
Status: DISCONTINUED | OUTPATIENT
Start: 2018-10-25 | End: 2018-11-04

## 2018-10-25 RX ORDER — ONDANSETRON 2 MG/ML
4 INJECTION INTRAMUSCULAR; INTRAVENOUS EVERY 6 HOURS PRN
Status: DISCONTINUED | OUTPATIENT
Start: 2018-10-25 | End: 2018-10-29

## 2018-10-25 RX ORDER — HEPARIN SODIUM 1000 [USP'U]/ML
4000 INJECTION, SOLUTION INTRAVENOUS; SUBCUTANEOUS ONCE
Status: DISCONTINUED | OUTPATIENT
Start: 2018-10-25 | End: 2018-10-26 | Stop reason: ALTCHOICE

## 2018-10-25 RX ORDER — PRAVASTATIN SODIUM 40 MG
40 TABLET ORAL DAILY
Status: DISCONTINUED | OUTPATIENT
Start: 2018-10-26 | End: 2018-11-07 | Stop reason: HOSPADM

## 2018-10-25 RX ORDER — ASPIRIN 81 MG/1
81 TABLET, CHEWABLE ORAL DAILY
Status: DISCONTINUED | OUTPATIENT
Start: 2018-10-26 | End: 2018-10-26

## 2018-10-25 RX ORDER — ATORVASTATIN CALCIUM 40 MG/1
40 TABLET, FILM COATED ORAL NIGHTLY
Status: DISCONTINUED | OUTPATIENT
Start: 2018-10-25 | End: 2018-10-25 | Stop reason: ALTCHOICE

## 2018-10-25 RX ORDER — HEPARIN SODIUM 10000 [USP'U]/100ML
12 INJECTION, SOLUTION INTRAVENOUS CONTINUOUS
Status: DISCONTINUED | OUTPATIENT
Start: 2018-10-25 | End: 2018-10-26 | Stop reason: SDUPTHER

## 2018-10-25 RX ORDER — MORPHINE SULFATE 2 MG/ML
2 INJECTION, SOLUTION INTRAMUSCULAR; INTRAVENOUS EVERY 4 HOURS PRN
Status: DISCONTINUED | OUTPATIENT
Start: 2018-10-25 | End: 2018-10-29

## 2018-10-25 RX ORDER — SODIUM CHLORIDE 0.9 % (FLUSH) 0.9 %
10 SYRINGE (ML) INJECTION EVERY 12 HOURS SCHEDULED
Status: DISCONTINUED | OUTPATIENT
Start: 2018-10-25 | End: 2018-10-29

## 2018-10-25 RX ORDER — SODIUM CHLORIDE 0.9 % (FLUSH) 0.9 %
10 SYRINGE (ML) INJECTION PRN
Status: DISCONTINUED | OUTPATIENT
Start: 2018-10-25 | End: 2018-10-29

## 2018-10-25 RX ORDER — SUCRALFATE 1 G/1
1 TABLET ORAL
Status: DISCONTINUED | OUTPATIENT
Start: 2018-10-25 | End: 2018-10-29

## 2018-10-25 RX ORDER — HEPARIN SODIUM 10000 [USP'U]/100ML
12 INJECTION, SOLUTION INTRAVENOUS CONTINUOUS
Status: DISCONTINUED | OUTPATIENT
Start: 2018-10-25 | End: 2018-10-26 | Stop reason: ALTCHOICE

## 2018-10-25 RX ORDER — HEPARIN SODIUM 5000 [USP'U]/ML
2000 INJECTION, SOLUTION INTRAVENOUS; SUBCUTANEOUS PRN
Status: DISCONTINUED | OUTPATIENT
Start: 2018-10-25 | End: 2018-10-26 | Stop reason: SDUPTHER

## 2018-10-25 RX ADMIN — HEPARIN SODIUM AND DEXTROSE 12 UNITS/KG/HR: 10000; 5 INJECTION INTRAVENOUS at 18:54

## 2018-10-25 RX ADMIN — SUCRALFATE 1 G: 1 TABLET ORAL at 21:39

## 2018-10-25 RX ADMIN — HEPARIN SODIUM 4000 UNITS: 1000 INJECTION INTRAVENOUS; SUBCUTANEOUS at 06:00

## 2018-10-25 RX ADMIN — HEPARIN SODIUM 4000 UNITS: 5000 INJECTION INTRAVENOUS; SUBCUTANEOUS at 18:54

## 2018-10-25 RX ADMIN — BUPROPION HYDROCHLORIDE 75 MG: 75 TABLET, FILM COATED ORAL at 22:13

## 2018-10-25 RX ADMIN — NITROGLYCERIN 5 MCG/MIN: 20 INJECTION INTRAVENOUS at 18:54

## 2018-10-25 RX ADMIN — ASPIRIN 243 MG: 81 TABLET, CHEWABLE ORAL at 19:27

## 2018-10-25 ASSESSMENT — PAIN DESCRIPTION - PAIN TYPE: TYPE: ACUTE PAIN

## 2018-10-25 ASSESSMENT — PAIN DESCRIPTION - LOCATION: LOCATION: CHEST

## 2018-10-25 ASSESSMENT — PAIN SCALES - GENERAL
PAINLEVEL_OUTOF10: 2
PAINLEVEL_OUTOF10: 0

## 2018-10-25 NOTE — ED PROVIDER NOTES
MG FOR LOW K   TROPONIN     Radiology Studies:   XR CHEST STANDARD (2 VW)    (Results Pending)       For all further history/physical please see subsequent provider note for further details and disposition. Comment: Please note this report has been produced using speech recognition software and may contain errors related to that system including errors in grammar, punctuation, and spelling, as well as words and phrases that may be inappropriate. If there are any questions or concerns please feel free to contact the dictating provider for clarification.      Electronically signed, ENRIQUE Hung,           ENRIQUE Mcconnell  10/25/18 0900

## 2018-10-26 LAB
APTT: 102.6 SEC (ref 26–36)
APTT: 28.5 SEC (ref 26–36)
EKG ATRIAL RATE: 57 BPM
EKG ATRIAL RATE: 69 BPM
EKG DIAGNOSIS: NORMAL
EKG DIAGNOSIS: NORMAL
EKG P AXIS: 71 DEGREES
EKG P AXIS: 78 DEGREES
EKG P-R INTERVAL: 152 MS
EKG P-R INTERVAL: 154 MS
EKG Q-T INTERVAL: 362 MS
EKG Q-T INTERVAL: 424 MS
EKG QRS DURATION: 90 MS
EKG QRS DURATION: 90 MS
EKG QTC CALCULATION (BAZETT): 387 MS
EKG QTC CALCULATION (BAZETT): 412 MS
EKG R AXIS: 74 DEGREES
EKG R AXIS: 83 DEGREES
EKG T AXIS: 65 DEGREES
EKG T AXIS: 86 DEGREES
EKG VENTRICULAR RATE: 57 BPM
EKG VENTRICULAR RATE: 69 BPM
HCT VFR BLD CALC: 44 % (ref 40.5–52.5)
HEMOGLOBIN: 14.8 G/DL (ref 13.5–17.5)
MCH RBC QN AUTO: 32.8 PG (ref 26–34)
MCHC RBC AUTO-ENTMCNC: 33.7 G/DL (ref 31–36)
MCV RBC AUTO: 97.4 FL (ref 80–100)
PDW BLD-RTO: 13.4 % (ref 12.4–15.4)
PLATELET # BLD: 155 K/UL (ref 135–450)
PMV BLD AUTO: 7.5 FL (ref 5–10.5)
RBC # BLD: 4.52 M/UL (ref 4.2–5.9)
TROPONIN: <0.01 NG/ML
WBC # BLD: 5.9 K/UL (ref 4–11)

## 2018-10-26 PROCEDURE — 85730 THROMBOPLASTIN TIME PARTIAL: CPT

## 2018-10-26 PROCEDURE — 93010 ELECTROCARDIOGRAM REPORT: CPT | Performed by: INTERNAL MEDICINE

## 2018-10-26 PROCEDURE — 99223 1ST HOSP IP/OBS HIGH 75: CPT | Performed by: INTERNAL MEDICINE

## 2018-10-26 PROCEDURE — 6370000000 HC RX 637 (ALT 250 FOR IP): Performed by: INTERNAL MEDICINE

## 2018-10-26 PROCEDURE — 2100000000 HC CCU R&B

## 2018-10-26 PROCEDURE — 6370000000 HC RX 637 (ALT 250 FOR IP): Performed by: HOSPITALIST

## 2018-10-26 PROCEDURE — 93005 ELECTROCARDIOGRAM TRACING: CPT | Performed by: HOSPITALIST

## 2018-10-26 PROCEDURE — 2580000003 HC RX 258: Performed by: HOSPITALIST

## 2018-10-26 PROCEDURE — 84484 ASSAY OF TROPONIN QUANT: CPT

## 2018-10-26 PROCEDURE — 85027 COMPLETE CBC AUTOMATED: CPT

## 2018-10-26 PROCEDURE — 2000000000 HC ICU R&B

## 2018-10-26 RX ORDER — SODIUM CHLORIDE 0.9 % (FLUSH) 0.9 %
10 SYRINGE (ML) INJECTION EVERY 12 HOURS SCHEDULED
Status: DISCONTINUED | OUTPATIENT
Start: 2018-10-26 | End: 2018-10-29

## 2018-10-26 RX ORDER — NITROGLYCERIN 0.4 MG/1
0.4 TABLET SUBLINGUAL EVERY 5 MIN PRN
Status: DISCONTINUED | OUTPATIENT
Start: 2018-10-26 | End: 2018-10-29

## 2018-10-26 RX ORDER — ALPRAZOLAM 0.5 MG/1
0.5 TABLET ORAL NIGHTLY PRN
Status: DISCONTINUED | OUTPATIENT
Start: 2018-10-26 | End: 2018-10-29

## 2018-10-26 RX ORDER — PANTOPRAZOLE SODIUM 40 MG/10ML
40 INJECTION, POWDER, LYOPHILIZED, FOR SOLUTION INTRAVENOUS ONCE
Status: COMPLETED | OUTPATIENT
Start: 2018-10-29 | End: 2018-10-29

## 2018-10-26 RX ORDER — SODIUM CHLORIDE 0.9 % (FLUSH) 0.9 %
10 SYRINGE (ML) INJECTION PRN
Status: DISCONTINUED | OUTPATIENT
Start: 2018-10-26 | End: 2018-10-29

## 2018-10-26 RX ORDER — ALPRAZOLAM 0.25 MG/1
0.25 TABLET ORAL ONCE
Status: DISCONTINUED | OUTPATIENT
Start: 2018-10-28 | End: 2018-10-29

## 2018-10-26 RX ORDER — SODIUM CHLORIDE, SODIUM LACTATE, POTASSIUM CHLORIDE, CALCIUM CHLORIDE 600; 310; 30; 20 MG/100ML; MG/100ML; MG/100ML; MG/100ML
INJECTION, SOLUTION INTRAVENOUS CONTINUOUS
Status: DISCONTINUED | OUTPATIENT
Start: 2018-10-29 | End: 2018-10-29

## 2018-10-26 RX ADMIN — SUCRALFATE 1 G: 1 TABLET ORAL at 20:43

## 2018-10-26 RX ADMIN — Medication 10 ML: at 09:59

## 2018-10-26 RX ADMIN — SUCRALFATE 1 G: 1 TABLET ORAL at 06:57

## 2018-10-26 RX ADMIN — BUPROPION HYDROCHLORIDE 75 MG: 75 TABLET, FILM COATED ORAL at 20:43

## 2018-10-26 RX ADMIN — PRAVASTATIN SODIUM 40 MG: 40 TABLET ORAL at 09:58

## 2018-10-26 RX ADMIN — ASPIRIN 81 MG: 81 TABLET, COATED ORAL at 09:58

## 2018-10-26 RX ADMIN — ALPRAZOLAM 0.5 MG: 0.5 TABLET ORAL at 20:43

## 2018-10-26 RX ADMIN — BUPROPION HYDROCHLORIDE 75 MG: 75 TABLET, FILM COATED ORAL at 09:58

## 2018-10-26 RX ADMIN — SUCRALFATE 1 G: 1 TABLET ORAL at 17:29

## 2018-10-26 RX ADMIN — SUCRALFATE 1 G: 1 TABLET ORAL at 09:58

## 2018-10-26 ASSESSMENT — PAIN SCALES - GENERAL
PAINLEVEL_OUTOF10: 0

## 2018-10-26 NOTE — ED NOTES
Report reviewed and given to [facility]. Pt to be transferred to CVU 12 with RN on monitor. NTG gtt and Heparin gtt will continue to infuse on transport.      Christina Bermudez RN  10/25/18 4091

## 2018-10-26 NOTE — PROGRESS NOTES
APTT = 102.6. Per Heparin sliding scale protocol, placing Heparin gtt on hold for one hour and reducing the gtt by 4 units/kg/hr to 8 units/kg./hr. Recheck due at 0815.

## 2018-10-27 PROCEDURE — 99232 SBSQ HOSP IP/OBS MODERATE 35: CPT | Performed by: INTERNAL MEDICINE

## 2018-10-27 PROCEDURE — 2580000003 HC RX 258: Performed by: HOSPITALIST

## 2018-10-27 PROCEDURE — 6360000002 HC RX W HCPCS: Performed by: HOSPITALIST

## 2018-10-27 PROCEDURE — 6370000000 HC RX 637 (ALT 250 FOR IP): Performed by: HOSPITALIST

## 2018-10-27 PROCEDURE — 2580000003 HC RX 258: Performed by: NURSE PRACTITIONER

## 2018-10-27 PROCEDURE — 6370000000 HC RX 637 (ALT 250 FOR IP): Performed by: INTERNAL MEDICINE

## 2018-10-27 PROCEDURE — 2000000000 HC ICU R&B

## 2018-10-27 RX ADMIN — BUPROPION HYDROCHLORIDE 75 MG: 75 TABLET, FILM COATED ORAL at 08:19

## 2018-10-27 RX ADMIN — SUCRALFATE 1 G: 1 TABLET ORAL at 22:23

## 2018-10-27 RX ADMIN — Medication 10 ML: at 22:24

## 2018-10-27 RX ADMIN — ENOXAPARIN SODIUM 80 MG: 80 INJECTION SUBCUTANEOUS at 08:18

## 2018-10-27 RX ADMIN — ALPRAZOLAM 0.5 MG: 0.5 TABLET ORAL at 22:23

## 2018-10-27 RX ADMIN — ASPIRIN 81 MG: 81 TABLET, COATED ORAL at 08:19

## 2018-10-27 RX ADMIN — BUPROPION HYDROCHLORIDE 75 MG: 75 TABLET, FILM COATED ORAL at 22:23

## 2018-10-27 RX ADMIN — SUCRALFATE 1 G: 1 TABLET ORAL at 08:18

## 2018-10-27 RX ADMIN — SUCRALFATE 1 G: 1 TABLET ORAL at 16:43

## 2018-10-27 RX ADMIN — SUCRALFATE 1 G: 1 TABLET ORAL at 12:29

## 2018-10-27 RX ADMIN — Medication 10 ML: at 08:19

## 2018-10-27 RX ADMIN — PRAVASTATIN SODIUM 40 MG: 40 TABLET ORAL at 08:19

## 2018-10-27 ASSESSMENT — PAIN SCALES - GENERAL
PAINLEVEL_OUTOF10: 0

## 2018-10-27 NOTE — PROGRESS NOTES
rhythm, normal S1, S2. No murmur. No edema in lower extremities  Respiratory: Not using accessory muscles. Good inspiratory effort. Clear to auscultation bilaterally, no wheeze or crackles. GI: Abdomen soft, no tenderness, not distended, normal bowel sounds  Musculoskeletal: No cyanosis in digits, neck supple  Neurology: CN 2-12 grossly intact. No speech or motor deficits  Psych: Normal affect. Alert and oriented in time, place and person  Skin: Warm, dry, normal turgor  Extremity exam shows brisk capillary refill. Peripheral pulses are palpable in lower extremities     Labs and Tests:  CBC:   Recent Labs      10/25/18   1749  10/25/18   1848  10/26/18   0900   WBC  7.4  7.2  5.9   HGB  15.1  14.6  14.8   PLT  170  165  155     BMP:    Recent Labs      10/25/18   1749   NA  139   K  4.3   CL  102   CO2  26   BUN  13   CREATININE  0.8   GLUCOSE  92     Hepatic:   Recent Labs      10/25/18   1749   AST  22   ALT  21   BILITOT  0.6   ALKPHOS  79     XR CHEST STANDARD (2 VW)   Final Result   Emphysema without acute findings. Problem List  Active Problems:    Chest pain    Unstable angina (HCC)  Resolved Problems:    * No resolved hospital problems. *       Assessment & Plan:   Chest pain  - believe this is likely GI rather than actual cardia  - no EKG changes  - Continue home medication no beta blocker due to low heart rate  -Plan for CABG on Monday    CAD  Scheduled for CABG on Monday. Per CT Surg    GERD  Ppi.       IV Access:   Ha:  Diet: DIET CARDIAC; No Caffeine  Diet NPO Time Specified  Code:Full Code  DVT PPX  Disposition       Haleigh Parks MD   10/27/2018 12:04 PM

## 2018-10-28 LAB
A/G RATIO: 1.6 (ref 1.1–2.2)
ABO/RH: NORMAL
ABO/RH: NORMAL
ALBUMIN SERPL-MCNC: 3.9 G/DL (ref 3.4–5)
ALP BLD-CCNC: 71 U/L (ref 40–129)
ALT SERPL-CCNC: 13 U/L (ref 10–40)
ANION GAP SERPL CALCULATED.3IONS-SCNC: 10 MMOL/L (ref 3–16)
ANTIBODY SCREEN: NORMAL
AST SERPL-CCNC: 11 U/L (ref 15–37)
BILIRUB SERPL-MCNC: 0.3 MG/DL (ref 0–1)
BUN BLDV-MCNC: 14 MG/DL (ref 7–20)
CALCIUM SERPL-MCNC: 8.7 MG/DL (ref 8.3–10.6)
CHLORIDE BLD-SCNC: 106 MMOL/L (ref 99–110)
CO2: 25 MMOL/L (ref 21–32)
CREAT SERPL-MCNC: 0.8 MG/DL (ref 0.8–1.3)
GFR AFRICAN AMERICAN: >60
GFR NON-AFRICAN AMERICAN: >60
GLOBULIN: 2.5 G/DL
GLUCOSE BLD-MCNC: 142 MG/DL (ref 70–99)
HCT VFR BLD CALC: 42.9 % (ref 40.5–52.5)
HEMOGLOBIN: 14.4 G/DL (ref 13.5–17.5)
MCH RBC QN AUTO: 32.7 PG (ref 26–34)
MCHC RBC AUTO-ENTMCNC: 33.6 G/DL (ref 31–36)
MCV RBC AUTO: 97.3 FL (ref 80–100)
PDW BLD-RTO: 14.1 % (ref 12.4–15.4)
PLATELET # BLD: 155 K/UL (ref 135–450)
PMV BLD AUTO: 8.7 FL (ref 5–10.5)
POTASSIUM REFLEX MAGNESIUM: 4.2 MMOL/L (ref 3.5–5.1)
RBC # BLD: 4.41 M/UL (ref 4.2–5.9)
REASON FOR REJECTION: NORMAL
REASON FOR REJECTION: NORMAL
REJECTED TEST: NORMAL
REJECTED TEST: NORMAL
SODIUM BLD-SCNC: 141 MMOL/L (ref 136–145)
TOTAL PROTEIN: 6.4 G/DL (ref 6.4–8.2)
WBC # BLD: 5.8 K/UL (ref 4–11)

## 2018-10-28 PROCEDURE — 86900 BLOOD TYPING SEROLOGIC ABO: CPT

## 2018-10-28 PROCEDURE — 2580000003 HC RX 258: Performed by: NURSE PRACTITIONER

## 2018-10-28 PROCEDURE — 80053 COMPREHEN METABOLIC PANEL: CPT

## 2018-10-28 PROCEDURE — 6370000000 HC RX 637 (ALT 250 FOR IP): Performed by: NURSE PRACTITIONER

## 2018-10-28 PROCEDURE — 2580000003 HC RX 258: Performed by: HOSPITALIST

## 2018-10-28 PROCEDURE — 6370000000 HC RX 637 (ALT 250 FOR IP): Performed by: HOSPITALIST

## 2018-10-28 PROCEDURE — 2000000000 HC ICU R&B

## 2018-10-28 PROCEDURE — 6370000000 HC RX 637 (ALT 250 FOR IP): Performed by: INTERNAL MEDICINE

## 2018-10-28 PROCEDURE — 86923 COMPATIBILITY TEST ELECTRIC: CPT

## 2018-10-28 PROCEDURE — 86850 RBC ANTIBODY SCREEN: CPT

## 2018-10-28 PROCEDURE — 85027 COMPLETE CBC AUTOMATED: CPT

## 2018-10-28 PROCEDURE — 99232 SBSQ HOSP IP/OBS MODERATE 35: CPT | Performed by: INTERNAL MEDICINE

## 2018-10-28 PROCEDURE — 6360000002 HC RX W HCPCS: Performed by: HOSPITALIST

## 2018-10-28 PROCEDURE — 86901 BLOOD TYPING SEROLOGIC RH(D): CPT

## 2018-10-28 RX ORDER — CEFAZOLIN SODIUM 2 G/100ML
2 INJECTION, SOLUTION INTRAVENOUS
Status: DISCONTINUED | OUTPATIENT
Start: 2018-10-29 | End: 2018-10-29 | Stop reason: HOSPADM

## 2018-10-28 RX ORDER — VANCOMYCIN HYDROCHLORIDE 1 G/200ML
1000 INJECTION, SOLUTION INTRAVENOUS
Status: COMPLETED | OUTPATIENT
Start: 2018-10-29 | End: 2018-10-29

## 2018-10-28 RX ORDER — CHLORHEXIDINE GLUCONATE 4 G/100ML
SOLUTION TOPICAL SEE ADMIN INSTRUCTIONS
Status: DISCONTINUED | OUTPATIENT
Start: 2018-10-28 | End: 2018-10-28 | Stop reason: CLARIF

## 2018-10-28 RX ORDER — ALPRAZOLAM 0.25 MG/1
0.25 TABLET ORAL 4 TIMES DAILY PRN
Status: DISCONTINUED | OUTPATIENT
Start: 2018-10-28 | End: 2018-11-07 | Stop reason: HOSPADM

## 2018-10-28 RX ADMIN — ALPRAZOLAM 0.5 MG: 0.5 TABLET ORAL at 22:18

## 2018-10-28 RX ADMIN — Medication 10 ML: at 08:10

## 2018-10-28 RX ADMIN — SUCRALFATE 1 G: 1 TABLET ORAL at 22:18

## 2018-10-28 RX ADMIN — SUCRALFATE 1 G: 1 TABLET ORAL at 08:05

## 2018-10-28 RX ADMIN — SUCRALFATE 1 G: 1 TABLET ORAL at 11:59

## 2018-10-28 RX ADMIN — PRAVASTATIN SODIUM 40 MG: 40 TABLET ORAL at 08:05

## 2018-10-28 RX ADMIN — BUPROPION HYDROCHLORIDE 75 MG: 75 TABLET, FILM COATED ORAL at 08:08

## 2018-10-28 RX ADMIN — BUPROPION HYDROCHLORIDE 75 MG: 75 TABLET, FILM COATED ORAL at 22:18

## 2018-10-28 RX ADMIN — ENOXAPARIN SODIUM 80 MG: 80 INJECTION SUBCUTANEOUS at 08:09

## 2018-10-28 RX ADMIN — MUPIROCIN: 20 OINTMENT TOPICAL at 23:08

## 2018-10-28 RX ADMIN — SUCRALFATE 1 G: 1 TABLET ORAL at 17:00

## 2018-10-28 RX ADMIN — ASPIRIN 81 MG: 81 TABLET, COATED ORAL at 08:05

## 2018-10-28 RX ADMIN — Medication 10 ML: at 22:18

## 2018-10-28 ASSESSMENT — PAIN SCALES - GENERAL
PAINLEVEL_OUTOF10: 0

## 2018-10-28 NOTE — PROGRESS NOTES
No CP. Up and around ad john. Bored and a little anxious. Exam unchanged. Has no new questions regarding tomorrow's surgery. Will provide xanax if he would like.

## 2018-10-28 NOTE — PROGRESS NOTES
Assisted patient with the completion of [address] and Living Will. Patient designated his spouse, Soheila President, as his [address]. Copy of Advance Directives placed in soft chart on unit and original returned to patient. Patient to notify RN if/when follow-up  visit wanted and/or needed. Spiritual support provided through active listening, pastoral presence, and blessing as Patient and his wife reflected on current health issues and concerns.

## 2018-10-28 NOTE — PROGRESS NOTES
[facility]  Daily Progress Note    Admit Date:  10/25/2018     CC:  CAD, HTN, CHOL, TOB  Subj:  Today, doing well. No cp, sob. Obj:   /60   Pulse 69   Temp 98.6 °F (37 °C) (Temporal)   Resp 18   Ht 6' 2\" (1.88 m)   Wt 172 lb 6.4 oz (78.2 kg)   SpO2 97%   BMI 22.13 kg/m²       Intake/Output Summary (Last 24 hours) at 10/28/18 0742  Last data filed at 10/27/18 2100   Gross per 24 hour   Intake              720 ml   Output              250 ml   Net              470 ml     Gen Alert, coop, no distress Heart  RRR, no MRG, nl apical impulse   Head NC, AT, no abnorm Abd  Soft, NT, +BS, no mass, no OM   Eyes PERRLA, conj/corn clear Ext  Ext nl, AT, no C/C/E   Nose Nares nl, no drain, NT Pulse 2+ and symmetric   Throat Lips, mucosa, tongue nl Skin Color/text/turg nl, no rash/lesions   Neck S/S, TM, NT, no bruit/JVD Psych Nl mood and affect   Lung CTA-B, unlabored, no DTP Lymph   No cervical or axillary LA   Ch wall NT, no deform Neuro  Nl gross M/S exam     Medications:    [START ON 11/2/2018] influenza virus vaccine  0.5 mL Intramuscular Once    sodium chloride flush  10 mL Intravenous 2 times per day    [START ON 10/29/2018] pantoprazole  40 mg Intravenous Once    ALPRAZolam  0.25 mg Oral Once    enoxaparin  1 mg/kg Subcutaneous Daily    aspirin  81 mg Oral Daily    buPROPion  75 mg Oral BID    pravastatin  40 mg Oral Daily    sucralfate  1 g Oral 4x Daily AC & HS    sodium chloride flush  10 mL Intravenous 2 times per day      [START ON 10/29/2018] lactated ringers         Lab Data:  CBC:   Recent Labs      10/25/18   1848  10/26/18   0900  10/28/18   0625   WBC  7.2  5.9  5.8   HGB  14.6  14.8  14.4   PLT  165  155  155     BMP:    Recent Labs      10/25/18   1749   NA  139   K  4.3   CO2  26   BUN  13   CREATININE  0.8     BNP:  No results for input(s): BNP in the last 72 hours.     Plan:  ~CAD  No sx today  Plan CABG Monday   No bb due to low hr  ~HTN  Stable  Plan Continue

## 2018-10-29 ENCOUNTER — ANESTHESIA (OUTPATIENT)
Dept: OPERATING ROOM | Age: 63
DRG: 236 | End: 2018-10-29
Payer: COMMERCIAL

## 2018-10-29 ENCOUNTER — APPOINTMENT (OUTPATIENT)
Dept: GENERAL RADIOLOGY | Age: 63
DRG: 236 | End: 2018-10-29
Payer: COMMERCIAL

## 2018-10-29 VITALS — TEMPERATURE: 99.2 F | RESPIRATION RATE: 16 BRPM | OXYGEN SATURATION: 98 %

## 2018-10-29 LAB
ACTIVATED CLOTTING TIME: 119 SEC (ref 99–130)
ACTIVATED CLOTTING TIME: 143 SEC (ref 99–130)
ACTIVATED CLOTTING TIME: 408 SEC (ref 99–130)
ACTIVATED CLOTTING TIME: 421 SEC (ref 99–130)
ACTIVATED CLOTTING TIME: 445 SEC (ref 99–130)
ACTIVATED CLOTTING TIME: 507 SEC (ref 99–130)
ACTIVATED CLOTTING TIME: 518 SEC (ref 99–130)
ACTIVATED CLOTTING TIME: 535 SEC (ref 99–130)
ANION GAP SERPL CALCULATED.3IONS-SCNC: 6 MMOL/L (ref 3–16)
BASE EXCESS ARTERIAL: -1 (ref -3–3)
BASE EXCESS ARTERIAL: -2 (ref -3–3)
BASE EXCESS ARTERIAL: 0 (ref -3–3)
BASE EXCESS ARTERIAL: 0 (ref -3–3)
BASE EXCESS ARTERIAL: 1 (ref -3–3)
BUN BLDV-MCNC: 13 MG/DL (ref 7–20)
CALCIUM IONIZED: 1.08 MMOL/L (ref 1.12–1.32)
CALCIUM IONIZED: 1.09 MMOL/L (ref 1.12–1.32)
CALCIUM IONIZED: 1.09 MMOL/L (ref 1.12–1.32)
CALCIUM IONIZED: 1.1 MMOL/L (ref 1.12–1.32)
CALCIUM IONIZED: 1.12 MMOL/L (ref 1.12–1.32)
CALCIUM IONIZED: 1.14 MMOL/L (ref 1.12–1.32)
CALCIUM IONIZED: 1.18 MMOL/L (ref 1.12–1.32)
CALCIUM IONIZED: 1.18 MMOL/L (ref 1.12–1.32)
CALCIUM IONIZED: 1.3 MMOL/L (ref 1.12–1.32)
CALCIUM SERPL-MCNC: 8.6 MG/DL (ref 8.3–10.6)
CHLORIDE BLD-SCNC: 109 MMOL/L (ref 99–110)
CO2: 27 MMOL/L (ref 21–32)
CREAT SERPL-MCNC: 0.8 MG/DL (ref 0.8–1.3)
GFR AFRICAN AMERICAN: >60
GFR NON-AFRICAN AMERICAN: >60
GLUCOSE BLD-MCNC: 100 MG/DL (ref 70–99)
GLUCOSE BLD-MCNC: 100 MG/DL (ref 70–99)
GLUCOSE BLD-MCNC: 103 MG/DL (ref 70–99)
GLUCOSE BLD-MCNC: 104 MG/DL (ref 70–99)
GLUCOSE BLD-MCNC: 104 MG/DL (ref 70–99)
GLUCOSE BLD-MCNC: 105 MG/DL (ref 70–99)
GLUCOSE BLD-MCNC: 106 MG/DL (ref 70–99)
GLUCOSE BLD-MCNC: 107 MG/DL (ref 70–99)
GLUCOSE BLD-MCNC: 107 MG/DL (ref 70–99)
GLUCOSE BLD-MCNC: 109 MG/DL (ref 70–99)
GLUCOSE BLD-MCNC: 109 MG/DL (ref 70–99)
GLUCOSE BLD-MCNC: 114 MG/DL (ref 70–99)
GLUCOSE BLD-MCNC: 116 MG/DL (ref 70–99)
GLUCOSE BLD-MCNC: 119 MG/DL (ref 70–99)
GLUCOSE BLD-MCNC: 125 MG/DL (ref 70–99)
GLUCOSE BLD-MCNC: 131 MG/DL (ref 70–99)
GLUCOSE BLD-MCNC: 140 MG/DL (ref 70–99)
GLUCOSE BLD-MCNC: 145 MG/DL (ref 70–99)
GLUCOSE BLD-MCNC: 155 MG/DL (ref 70–99)
GLUCOSE BLD-MCNC: 92 MG/DL (ref 70–99)
GLUCOSE BLD-MCNC: 99 MG/DL (ref 70–99)
HCO3 ARTERIAL: 23.3 MMOL/L (ref 21–29)
HCO3 ARTERIAL: 23.3 MMOL/L (ref 21–29)
HCO3 ARTERIAL: 23.6 MMOL/L (ref 21–29)
HCO3 ARTERIAL: 23.8 MMOL/L (ref 21–29)
HCO3 ARTERIAL: 24.3 MMOL/L (ref 21–29)
HCO3 ARTERIAL: 24.4 MMOL/L (ref 21–29)
HCO3 ARTERIAL: 24.8 MMOL/L (ref 21–29)
HCO3 ARTERIAL: 25.3 MMOL/L (ref 21–29)
HCO3 ARTERIAL: 26.9 MMOL/L (ref 21–29)
HCO3 ARTERIAL: 27.1 MMOL/L (ref 21–29)
HCT VFR BLD CALC: 39.6 % (ref 40.5–52.5)
HEMOGLOBIN: 10.7 GM/DL (ref 13.5–17.5)
HEMOGLOBIN: 12.4 GM/DL (ref 13.5–17.5)
HEMOGLOBIN: 12.5 GM/DL (ref 13.5–17.5)
HEMOGLOBIN: 13.3 GM/DL (ref 13.5–17.5)
HEMOGLOBIN: 13.6 G/DL (ref 13.5–17.5)
HEMOGLOBIN: 9 GM/DL (ref 13.5–17.5)
HEMOGLOBIN: 9.2 GM/DL (ref 13.5–17.5)
HEMOGLOBIN: 9.4 GM/DL (ref 13.5–17.5)
HEMOGLOBIN: 9.5 GM/DL (ref 13.5–17.5)
HEMOGLOBIN: 9.8 GM/DL (ref 13.5–17.5)
LACTATE: 0.9 MMOL/L (ref 0.4–2)
LACTATE: 1.06 MMOL/L (ref 0.4–2)
LACTATE: 1.62 MMOL/L (ref 0.4–2)
LACTATE: 2.45 MMOL/L (ref 0.4–2)
LACTATE: 2.7 MMOL/L (ref 0.4–2)
LACTATE: 2.92 MMOL/L (ref 0.4–2)
LACTATE: 3.55 MMOL/L (ref 0.4–2)
MAGNESIUM: 3.2 MG/DL (ref 1.8–2.4)
MCH RBC QN AUTO: 33.1 PG (ref 26–34)
MCHC RBC AUTO-ENTMCNC: 34.4 G/DL (ref 31–36)
MCV RBC AUTO: 96.2 FL (ref 80–100)
O2 SAT, ARTERIAL: 100 % (ref 93–100)
O2 SAT, ARTERIAL: 95 % (ref 93–100)
O2 SAT, ARTERIAL: 99 % (ref 93–100)
O2 SAT, ARTERIAL: 99 % (ref 93–100)
PCO2 ARTERIAL: 34.4 MM HG (ref 35–45)
PCO2 ARTERIAL: 36.5 MM HG (ref 35–45)
PCO2 ARTERIAL: 40.1 MM HG (ref 35–45)
PCO2 ARTERIAL: 40.1 MM HG (ref 35–45)
PCO2 ARTERIAL: 42.6 MM HG (ref 35–45)
PCO2 ARTERIAL: 44.2 MM HG (ref 35–45)
PCO2 ARTERIAL: 47.3 MM HG (ref 35–45)
PCO2 ARTERIAL: 49.7 MM HG (ref 35–45)
PCO2 ARTERIAL: 54.7 MM HG (ref 35–45)
PCO2 ARTERIAL: 58.1 MM HG (ref 35–45)
PDW BLD-RTO: 13.1 % (ref 12.4–15.4)
PERFORMED ON: ABNORMAL
PERFORMED ON: NORMAL
PH ARTERIAL: 7.28 (ref 7.35–7.45)
PH ARTERIAL: 7.3 (ref 7.35–7.45)
PH ARTERIAL: 7.3 (ref 7.35–7.45)
PH ARTERIAL: 7.32 (ref 7.35–7.45)
PH ARTERIAL: 7.35 (ref 7.35–7.45)
PH ARTERIAL: 7.36 (ref 7.35–7.45)
PH ARTERIAL: 7.37 (ref 7.35–7.45)
PH ARTERIAL: 7.41 (ref 7.35–7.45)
PH ARTERIAL: 7.42 (ref 7.35–7.45)
PH ARTERIAL: 7.44 (ref 7.35–7.45)
PLATELET # BLD: 122 K/UL (ref 135–450)
PMV BLD AUTO: 7.5 FL (ref 5–10.5)
PO2 ARTERIAL: 152.2 MM HG (ref 75–108)
PO2 ARTERIAL: 159.6 MM HG (ref 75–108)
PO2 ARTERIAL: 229.3 MM HG (ref 75–108)
PO2 ARTERIAL: 295.3 MM HG (ref 75–108)
PO2 ARTERIAL: 297.8 MM HG (ref 75–108)
PO2 ARTERIAL: 301.9 MM HG (ref 75–108)
PO2 ARTERIAL: 364.4 MM HG (ref 75–108)
PO2 ARTERIAL: 374.7 MM HG (ref 75–108)
PO2 ARTERIAL: 398.7 MM HG (ref 75–108)
PO2 ARTERIAL: 79.1 MM HG (ref 75–108)
POC HEMATOCRIT: 26 % (ref 41–53)
POC HEMATOCRIT: 27 % (ref 41–53)
POC HEMATOCRIT: 28 % (ref 41–53)
POC HEMATOCRIT: 28 % (ref 41–53)
POC HEMATOCRIT: 29 % (ref 41–53)
POC HEMATOCRIT: 31 % (ref 41–53)
POC HEMATOCRIT: 37 % (ref 41–53)
POC HEMATOCRIT: 37 % (ref 41–53)
POC HEMATOCRIT: 39 % (ref 41–53)
POC PATIENT TEMP: 97.6
POC PATIENT TEMP: 98
POC PATIENT TEMP: 98.8
POC POTASSIUM: 4.4 MMOL/L (ref 3.5–5.1)
POC POTASSIUM: 4.5 MMOL/L (ref 3.5–5.1)
POC POTASSIUM: 4.6 MMOL/L (ref 3.5–5.1)
POC POTASSIUM: 4.6 MMOL/L (ref 3.5–5.1)
POC POTASSIUM: 4.7 MMOL/L (ref 3.5–5.1)
POC POTASSIUM: 5.1 MMOL/L (ref 3.5–5.1)
POC POTASSIUM: 5.2 MMOL/L (ref 3.5–5.1)
POC POTASSIUM: 5.3 MMOL/L (ref 3.5–5.1)
POC POTASSIUM: 5.3 MMOL/L (ref 3.5–5.1)
POC SAMPLE TYPE: ABNORMAL
POC SODIUM: 141 MMOL/L (ref 136–145)
POC SODIUM: 142 MMOL/L (ref 136–145)
POC SODIUM: 143 MMOL/L (ref 136–145)
POC SODIUM: 147 MMOL/L (ref 136–145)
POTASSIUM SERPL-SCNC: 4.4 MMOL/L (ref 3.5–5.1)
POTASSIUM SERPL-SCNC: 4.9 MMOL/L (ref 3.5–5.1)
RBC # BLD: 4.12 M/UL (ref 4.2–5.9)
SODIUM BLD-SCNC: 142 MMOL/L (ref 136–145)
TCO2 ARTERIAL: 24 MMOL/L
TCO2 ARTERIAL: 25 MMOL/L
TCO2 ARTERIAL: 26 MMOL/L
TCO2 ARTERIAL: 27 MMOL/L
TCO2 ARTERIAL: 29 MMOL/L
TCO2 ARTERIAL: 29 MMOL/L
WBC # BLD: 12.1 K/UL (ref 4–11)

## 2018-10-29 PROCEDURE — 6370000000 HC RX 637 (ALT 250 FOR IP): Performed by: ANESTHESIOLOGY

## 2018-10-29 PROCEDURE — 6360000002 HC RX W HCPCS: Performed by: THORACIC SURGERY (CARDIOTHORACIC VASCULAR SURGERY)

## 2018-10-29 PROCEDURE — 6370000000 HC RX 637 (ALT 250 FOR IP): Performed by: NURSE PRACTITIONER

## 2018-10-29 PROCEDURE — 2500000003 HC RX 250 WO HCPCS: Performed by: ANESTHESIOLOGY

## 2018-10-29 PROCEDURE — 85027 COMPLETE CBC AUTOMATED: CPT

## 2018-10-29 PROCEDURE — C9113 INJ PANTOPRAZOLE SODIUM, VIA: HCPCS | Performed by: THORACIC SURGERY (CARDIOTHORACIC VASCULAR SURGERY)

## 2018-10-29 PROCEDURE — 94002 VENT MGMT INPAT INIT DAY: CPT

## 2018-10-29 PROCEDURE — 6370000000 HC RX 637 (ALT 250 FOR IP): Performed by: THORACIC SURGERY (CARDIOTHORACIC VASCULAR SURGERY)

## 2018-10-29 PROCEDURE — 85347 COAGULATION TIME ACTIVATED: CPT

## 2018-10-29 PROCEDURE — 7100000011 HC PHASE II RECOVERY - ADDTL 15 MIN

## 2018-10-29 PROCEDURE — 7100000010 HC PHASE II RECOVERY - FIRST 15 MIN

## 2018-10-29 PROCEDURE — 06BQ4ZZ EXCISION OF LEFT SAPHENOUS VEIN, PERCUTANEOUS ENDOSCOPIC APPROACH: ICD-10-PCS | Performed by: THORACIC SURGERY (CARDIOTHORACIC VASCULAR SURGERY)

## 2018-10-29 PROCEDURE — C9113 INJ PANTOPRAZOLE SODIUM, VIA: HCPCS | Performed by: NURSE PRACTITIONER

## 2018-10-29 PROCEDURE — C1729 CATH, DRAINAGE: HCPCS | Performed by: THORACIC SURGERY (CARDIOTHORACIC VASCULAR SURGERY)

## 2018-10-29 PROCEDURE — 2580000003 HC RX 258: Performed by: ANESTHESIOLOGY

## 2018-10-29 PROCEDURE — 2580000003 HC RX 258: Performed by: NURSE PRACTITIONER

## 2018-10-29 PROCEDURE — 84132 ASSAY OF SERUM POTASSIUM: CPT

## 2018-10-29 PROCEDURE — 6360000002 HC RX W HCPCS: Performed by: ANESTHESIOLOGY

## 2018-10-29 PROCEDURE — P9045 ALBUMIN (HUMAN), 5%, 250 ML: HCPCS | Performed by: THORACIC SURGERY (CARDIOTHORACIC VASCULAR SURGERY)

## 2018-10-29 PROCEDURE — 85014 HEMATOCRIT: CPT

## 2018-10-29 PROCEDURE — 3600000018 HC SURGERY OHS ADDTL 15MIN: Performed by: THORACIC SURGERY (CARDIOTHORACIC VASCULAR SURGERY)

## 2018-10-29 PROCEDURE — 6370000000 HC RX 637 (ALT 250 FOR IP)

## 2018-10-29 PROCEDURE — 2500000003 HC RX 250 WO HCPCS: Performed by: THORACIC SURGERY (CARDIOTHORACIC VASCULAR SURGERY)

## 2018-10-29 PROCEDURE — 2700000000 HC OXYGEN THERAPY PER DAY

## 2018-10-29 PROCEDURE — C1773 RET DEV, INSERTABLE: HCPCS | Performed by: THORACIC SURGERY (CARDIOTHORACIC VASCULAR SURGERY)

## 2018-10-29 PROCEDURE — 6370000000 HC RX 637 (ALT 250 FOR IP): Performed by: HOSPITALIST

## 2018-10-29 PROCEDURE — 3700000001 HC ADD 15 MINUTES (ANESTHESIA): Performed by: THORACIC SURGERY (CARDIOTHORACIC VASCULAR SURGERY)

## 2018-10-29 PROCEDURE — 2100000000 HC CCU R&B

## 2018-10-29 PROCEDURE — 2580000003 HC RX 258: Performed by: THORACIC SURGERY (CARDIOTHORACIC VASCULAR SURGERY)

## 2018-10-29 PROCEDURE — 94762 N-INVAS EAR/PLS OXIMTRY CONT: CPT

## 2018-10-29 PROCEDURE — 71045 X-RAY EXAM CHEST 1 VIEW: CPT

## 2018-10-29 PROCEDURE — 88305 TISSUE EXAM BY PATHOLOGIST: CPT

## 2018-10-29 PROCEDURE — C1751 CATH, INF, PER/CENT/MIDLINE: HCPCS | Performed by: THORACIC SURGERY (CARDIOTHORACIC VASCULAR SURGERY)

## 2018-10-29 PROCEDURE — 82803 BLOOD GASES ANY COMBINATION: CPT

## 2018-10-29 PROCEDURE — 021209W BYPASS CORONARY ARTERY, THREE ARTERIES FROM AORTA WITH AUTOLOGOUS VENOUS TISSUE, OPEN APPROACH: ICD-10-PCS | Performed by: THORACIC SURGERY (CARDIOTHORACIC VASCULAR SURGERY)

## 2018-10-29 PROCEDURE — 83735 ASSAY OF MAGNESIUM: CPT

## 2018-10-29 PROCEDURE — 3600000008 HC SURGERY OHS BASE: Performed by: THORACIC SURGERY (CARDIOTHORACIC VASCULAR SURGERY)

## 2018-10-29 PROCEDURE — 88185 FLOWCYTOMETRY/TC ADD-ON: CPT

## 2018-10-29 PROCEDURE — 83605 ASSAY OF LACTIC ACID: CPT

## 2018-10-29 PROCEDURE — 6360000002 HC RX W HCPCS: Performed by: NURSE PRACTITIONER

## 2018-10-29 PROCEDURE — 5A1221Z PERFORMANCE OF CARDIAC OUTPUT, CONTINUOUS: ICD-10-PCS | Performed by: THORACIC SURGERY (CARDIOTHORACIC VASCULAR SURGERY)

## 2018-10-29 PROCEDURE — 0232T NJX PLATELET PLASMA: CPT | Performed by: THORACIC SURGERY (CARDIOTHORACIC VASCULAR SURGERY)

## 2018-10-29 PROCEDURE — 88184 FLOWCYTOMETRY/ TC 1 MARKER: CPT

## 2018-10-29 PROCEDURE — 80048 BASIC METABOLIC PNL TOTAL CA: CPT

## 2018-10-29 PROCEDURE — 84295 ASSAY OF SERUM SODIUM: CPT

## 2018-10-29 PROCEDURE — 2709999900 HC NON-CHARGEABLE SUPPLY: Performed by: THORACIC SURGERY (CARDIOTHORACIC VASCULAR SURGERY)

## 2018-10-29 PROCEDURE — 3700000000 HC ANESTHESIA ATTENDED CARE: Performed by: THORACIC SURGERY (CARDIOTHORACIC VASCULAR SURGERY)

## 2018-10-29 PROCEDURE — B246ZZ4 ULTRASONOGRAPHY OF RIGHT AND LEFT HEART, TRANSESOPHAGEAL: ICD-10-PCS | Performed by: THORACIC SURGERY (CARDIOTHORACIC VASCULAR SURGERY)

## 2018-10-29 PROCEDURE — 02100Z9 BYPASS CORONARY ARTERY, ONE ARTERY FROM LEFT INTERNAL MAMMARY, OPEN APPROACH: ICD-10-PCS | Performed by: THORACIC SURGERY (CARDIOTHORACIC VASCULAR SURGERY)

## 2018-10-29 PROCEDURE — 82330 ASSAY OF CALCIUM: CPT

## 2018-10-29 PROCEDURE — 2580000003 HC RX 258

## 2018-10-29 PROCEDURE — 82947 ASSAY GLUCOSE BLOOD QUANT: CPT

## 2018-10-29 RX ORDER — PROTAMINE SULFATE 10 MG/ML
INJECTION, SOLUTION INTRAVENOUS PRN
Status: DISCONTINUED | OUTPATIENT
Start: 2018-10-29 | End: 2018-10-29 | Stop reason: SDUPTHER

## 2018-10-29 RX ORDER — ALBUTEROL SULFATE 90 UG/1
2 AEROSOL, METERED RESPIRATORY (INHALATION) EVERY 6 HOURS PRN
Status: DISCONTINUED | OUTPATIENT
Start: 2018-10-29 | End: 2018-10-29

## 2018-10-29 RX ORDER — SODIUM CHLORIDE 0.9 % (FLUSH) 0.9 %
10 SYRINGE (ML) INJECTION PRN
Status: DISCONTINUED | OUTPATIENT
Start: 2018-10-29 | End: 2018-11-07 | Stop reason: HOSPADM

## 2018-10-29 RX ORDER — ESMOLOL HYDROCHLORIDE 10 MG/ML
5 INJECTION INTRAVENOUS ONCE
Status: COMPLETED | OUTPATIENT
Start: 2018-10-29 | End: 2018-10-29

## 2018-10-29 RX ORDER — SODIUM CHLORIDE 9 MG/ML
INJECTION, SOLUTION INTRAVENOUS
Status: COMPLETED
Start: 2018-10-29 | End: 2018-10-29

## 2018-10-29 RX ORDER — DEXTROSE MONOHYDRATE 25 G/50ML
12.5 INJECTION, SOLUTION INTRAVENOUS PRN
Status: DISCONTINUED | OUTPATIENT
Start: 2018-10-29 | End: 2018-11-07 | Stop reason: HOSPADM

## 2018-10-29 RX ORDER — SODIUM CHLORIDE, SODIUM LACTATE, POTASSIUM CHLORIDE, AND CALCIUM CHLORIDE .6; .31; .03; .02 G/100ML; G/100ML; G/100ML; G/100ML
250 INJECTION, SOLUTION INTRAVENOUS CONTINUOUS PRN
Status: DISCONTINUED | OUTPATIENT
Start: 2018-10-29 | End: 2018-10-30

## 2018-10-29 RX ORDER — LISINOPRIL 5 MG/1
2.5 TABLET ORAL
Status: DISCONTINUED | OUTPATIENT
Start: 2018-10-31 | End: 2018-11-04

## 2018-10-29 RX ORDER — ASPIRIN 300 MG/1
300 SUPPOSITORY RECTAL ONCE
Status: DISCONTINUED | OUTPATIENT
Start: 2018-10-29 | End: 2018-10-31

## 2018-10-29 RX ORDER — MILRINONE LACTATE 0.2 MG/ML
0.38 INJECTION, SOLUTION INTRAVENOUS CONTINUOUS PRN
Status: DISCONTINUED | OUTPATIENT
Start: 2018-10-29 | End: 2018-10-30

## 2018-10-29 RX ORDER — PANTOPRAZOLE SODIUM 40 MG/1
40 TABLET, DELAYED RELEASE ORAL DAILY
Status: DISCONTINUED | OUTPATIENT
Start: 2018-10-30 | End: 2018-11-07 | Stop reason: HOSPADM

## 2018-10-29 RX ORDER — DEXTROSE MONOHYDRATE 50 MG/ML
100 INJECTION, SOLUTION INTRAVENOUS PRN
Status: DISCONTINUED | OUTPATIENT
Start: 2018-10-29 | End: 2018-11-07 | Stop reason: HOSPADM

## 2018-10-29 RX ORDER — OXYCODONE HYDROCHLORIDE 5 MG/1
5 TABLET ORAL EVERY 4 HOURS PRN
Status: DISCONTINUED | OUTPATIENT
Start: 2018-10-29 | End: 2018-11-07 | Stop reason: HOSPADM

## 2018-10-29 RX ORDER — DEXTROSE, SODIUM CHLORIDE, AND POTASSIUM CHLORIDE 5; .45; .15 G/100ML; G/100ML; G/100ML
1000 INJECTION INTRAVENOUS CONTINUOUS
Status: DISCONTINUED | OUTPATIENT
Start: 2018-10-29 | End: 2018-10-30

## 2018-10-29 RX ORDER — TETRACAINE HYDROCHLORIDE 5 MG/ML
2 SOLUTION OPHTHALMIC 2 TIMES DAILY
Status: DISCONTINUED | OUTPATIENT
Start: 2018-10-29 | End: 2018-10-29

## 2018-10-29 RX ORDER — SODIUM CHLORIDE 0.9 % (FLUSH) 0.9 %
10 SYRINGE (ML) INJECTION EVERY 12 HOURS SCHEDULED
Status: DISCONTINUED | OUTPATIENT
Start: 2018-10-29 | End: 2018-11-07 | Stop reason: HOSPADM

## 2018-10-29 RX ORDER — NITROGLYCERIN 20 MG/100ML
10 INJECTION INTRAVENOUS CONTINUOUS PRN
Status: DISCONTINUED | OUTPATIENT
Start: 2018-10-29 | End: 2018-10-30

## 2018-10-29 RX ORDER — DOCUSATE SODIUM 100 MG/1
100 CAPSULE, LIQUID FILLED ORAL 2 TIMES DAILY
Status: DISCONTINUED | OUTPATIENT
Start: 2018-10-30 | End: 2018-11-07 | Stop reason: HOSPADM

## 2018-10-29 RX ORDER — NICOTINE POLACRILEX 4 MG
15 LOZENGE BUCCAL PRN
Status: DISCONTINUED | OUTPATIENT
Start: 2018-10-29 | End: 2018-10-29

## 2018-10-29 RX ORDER — ALBUMIN, HUMAN INJ 5% 5 %
25 SOLUTION INTRAVENOUS PRN
Status: DISCONTINUED | OUTPATIENT
Start: 2018-10-29 | End: 2018-11-05

## 2018-10-29 RX ORDER — ZOLPIDEM TARTRATE 5 MG/1
5 TABLET ORAL NIGHTLY PRN
Status: DISCONTINUED | OUTPATIENT
Start: 2018-10-30 | End: 2018-11-07 | Stop reason: HOSPADM

## 2018-10-29 RX ORDER — SUCCINYLCHOLINE CHLORIDE 20 MG/ML
INJECTION INTRAMUSCULAR; INTRAVENOUS PRN
Status: DISCONTINUED | OUTPATIENT
Start: 2018-10-29 | End: 2018-10-29 | Stop reason: SDUPTHER

## 2018-10-29 RX ORDER — VANCOMYCIN HYDROCHLORIDE 1 G/20ML
INJECTION, POWDER, LYOPHILIZED, FOR SOLUTION INTRAVENOUS PRN
Status: DISCONTINUED | OUTPATIENT
Start: 2018-10-29 | End: 2018-10-29 | Stop reason: SDUPTHER

## 2018-10-29 RX ORDER — DOBUTAMINE HYDROCHLORIDE 200 MG/100ML
2 INJECTION INTRAVENOUS CONTINUOUS PRN
Status: DISCONTINUED | OUTPATIENT
Start: 2018-10-29 | End: 2018-10-30

## 2018-10-29 RX ORDER — CEFAZOLIN SODIUM 1 G/3ML
INJECTION, POWDER, FOR SOLUTION INTRAMUSCULAR; INTRAVENOUS PRN
Status: DISCONTINUED | OUTPATIENT
Start: 2018-10-29 | End: 2018-10-29 | Stop reason: SDUPTHER

## 2018-10-29 RX ORDER — MEPERIDINE HYDROCHLORIDE 50 MG/ML
25 INJECTION INTRAMUSCULAR; INTRAVENOUS; SUBCUTANEOUS
Status: ACTIVE | OUTPATIENT
Start: 2018-10-29 | End: 2018-10-29

## 2018-10-29 RX ORDER — CEFAZOLIN SODIUM 2 G/100ML
2 INJECTION, SOLUTION INTRAVENOUS EVERY 8 HOURS
Status: COMPLETED | OUTPATIENT
Start: 2018-10-29 | End: 2018-10-31

## 2018-10-29 RX ORDER — VANCOMYCIN HYDROCHLORIDE 1 G/200ML
1000 INJECTION, SOLUTION INTRAVENOUS ONCE
Status: COMPLETED | OUTPATIENT
Start: 2018-10-29 | End: 2018-10-29

## 2018-10-29 RX ORDER — BUPIVACAINE HYDROCHLORIDE 2.5 MG/ML
INJECTION, SOLUTION EPIDURAL; INFILTRATION; INTRACAUDAL
Status: COMPLETED | OUTPATIENT
Start: 2018-10-29 | End: 2018-10-29

## 2018-10-29 RX ORDER — POTASSIUM CHLORIDE 29.8 MG/ML
20 INJECTION INTRAVENOUS PRN
Status: DISCONTINUED | OUTPATIENT
Start: 2018-10-29 | End: 2018-10-31

## 2018-10-29 RX ORDER — TETRAHYDROZOLINE HCL 0.05 %
2 DROPS OPHTHALMIC (EYE) 2 TIMES DAILY
Status: DISCONTINUED | OUTPATIENT
Start: 2018-10-29 | End: 2018-10-29 | Stop reason: CLARIF

## 2018-10-29 RX ORDER — FENTANYL CITRATE 50 UG/ML
INJECTION, SOLUTION INTRAMUSCULAR; INTRAVENOUS PRN
Status: DISCONTINUED | OUTPATIENT
Start: 2018-10-29 | End: 2018-10-29 | Stop reason: SDUPTHER

## 2018-10-29 RX ORDER — SODIUM CHLORIDE 9 MG/ML
INJECTION, SOLUTION INTRAVENOUS CONTINUOUS PRN
Status: DISCONTINUED | OUTPATIENT
Start: 2018-10-29 | End: 2018-10-29 | Stop reason: SDUPTHER

## 2018-10-29 RX ORDER — CARBOXYMETHYLCELLULOSE SODIUM 10 MG/ML
2 GEL OPHTHALMIC EVERY 4 HOURS PRN
Status: DISCONTINUED | OUTPATIENT
Start: 2018-10-29 | End: 2018-11-07 | Stop reason: HOSPADM

## 2018-10-29 RX ORDER — MAGNESIUM SULFATE IN WATER 40 MG/ML
2 INJECTION, SOLUTION INTRAVENOUS PRN
Status: DISCONTINUED | OUTPATIENT
Start: 2018-10-29 | End: 2018-10-31

## 2018-10-29 RX ORDER — DEXTROSE MONOHYDRATE 50 MG/ML
100 INJECTION, SOLUTION INTRAVENOUS PRN
Status: DISCONTINUED | OUTPATIENT
Start: 2018-10-29 | End: 2018-10-29

## 2018-10-29 RX ORDER — CALCIUM CHLORIDE 100 MG/ML
INJECTION INTRAVENOUS; INTRAVENTRICULAR
Status: COMPLETED | OUTPATIENT
Start: 2018-10-29 | End: 2018-10-29

## 2018-10-29 RX ORDER — MIDAZOLAM HYDROCHLORIDE 5 MG/ML
INJECTION INTRAMUSCULAR; INTRAVENOUS PRN
Status: DISCONTINUED | OUTPATIENT
Start: 2018-10-29 | End: 2018-10-29 | Stop reason: SDUPTHER

## 2018-10-29 RX ORDER — NICOTINE POLACRILEX 4 MG
15 LOZENGE BUCCAL PRN
Status: DISCONTINUED | OUTPATIENT
Start: 2018-10-29 | End: 2018-11-07 | Stop reason: HOSPADM

## 2018-10-29 RX ORDER — POTASSIUM CHLORIDE 750 MG/1
10 TABLET, FILM COATED, EXTENDED RELEASE ORAL
Status: DISCONTINUED | OUTPATIENT
Start: 2018-10-30 | End: 2018-11-02

## 2018-10-29 RX ORDER — OXYCODONE HYDROCHLORIDE 5 MG/1
10 TABLET ORAL EVERY 4 HOURS PRN
Status: DISCONTINUED | OUTPATIENT
Start: 2018-10-29 | End: 2018-11-07 | Stop reason: HOSPADM

## 2018-10-29 RX ORDER — DIPHENHYDRAMINE HCL 25 MG
25 TABLET ORAL NIGHTLY PRN
Status: DISCONTINUED | OUTPATIENT
Start: 2018-10-30 | End: 2018-11-07 | Stop reason: HOSPADM

## 2018-10-29 RX ORDER — FUROSEMIDE 10 MG/ML
20 INJECTION INTRAMUSCULAR; INTRAVENOUS PRN
Status: DISCONTINUED | OUTPATIENT
Start: 2018-10-29 | End: 2018-11-07 | Stop reason: HOSPADM

## 2018-10-29 RX ORDER — NITROGLYCERIN 40 MG/1
1 PATCH TRANSDERMAL DAILY
Status: DISCONTINUED | OUTPATIENT
Start: 2018-10-30 | End: 2018-10-31

## 2018-10-29 RX ORDER — FUROSEMIDE 10 MG/ML
20 INJECTION INTRAMUSCULAR; INTRAVENOUS 2 TIMES DAILY
Status: DISCONTINUED | OUTPATIENT
Start: 2018-10-30 | End: 2018-11-02

## 2018-10-29 RX ORDER — DOPAMINE HYDROCHLORIDE 160 MG/100ML
2 INJECTION, SOLUTION INTRAVENOUS CONTINUOUS PRN
Status: DISCONTINUED | OUTPATIENT
Start: 2018-10-29 | End: 2018-10-30

## 2018-10-29 RX ORDER — FONDAPARINUX SODIUM 2.5 MG/.5ML
2.5 INJECTION SUBCUTANEOUS DAILY
Status: DISCONTINUED | OUTPATIENT
Start: 2018-10-30 | End: 2018-11-07 | Stop reason: HOSPADM

## 2018-10-29 RX ORDER — PROPOFOL 10 MG/ML
INJECTION, EMULSION INTRAVENOUS PRN
Status: DISCONTINUED | OUTPATIENT
Start: 2018-10-29 | End: 2018-10-29 | Stop reason: SDUPTHER

## 2018-10-29 RX ORDER — POLYETHYLENE GLYCOL 3350 17 G/17G
17 POWDER, FOR SOLUTION ORAL DAILY PRN
Status: DISCONTINUED | OUTPATIENT
Start: 2018-10-29 | End: 2018-11-07 | Stop reason: HOSPADM

## 2018-10-29 RX ORDER — DEXTROSE MONOHYDRATE 25 G/50ML
12.5 INJECTION, SOLUTION INTRAVENOUS PRN
Status: DISCONTINUED | OUTPATIENT
Start: 2018-10-29 | End: 2018-10-29

## 2018-10-29 RX ORDER — ALBUTEROL SULFATE 2.5 MG/3ML
2.5 SOLUTION RESPIRATORY (INHALATION) EVERY 6 HOURS PRN
Status: DISCONTINUED | OUTPATIENT
Start: 2018-10-29 | End: 2018-11-07 | Stop reason: HOSPADM

## 2018-10-29 RX ORDER — PANTOPRAZOLE SODIUM 40 MG/10ML
40 INJECTION, POWDER, LYOPHILIZED, FOR SOLUTION INTRAVENOUS DAILY
Status: DISCONTINUED | OUTPATIENT
Start: 2018-10-29 | End: 2018-10-30

## 2018-10-29 RX ORDER — BISACODYL 10 MG
10 SUPPOSITORY, RECTAL RECTAL DAILY PRN
Status: DISCONTINUED | OUTPATIENT
Start: 2018-10-29 | End: 2018-11-07 | Stop reason: HOSPADM

## 2018-10-29 RX ORDER — MAGNESIUM HYDROXIDE 1200 MG/15ML
LIQUID ORAL CONTINUOUS PRN
Status: COMPLETED | OUTPATIENT
Start: 2018-10-29 | End: 2018-10-29

## 2018-10-29 RX ORDER — METOCLOPRAMIDE HYDROCHLORIDE 5 MG/ML
10 INJECTION INTRAMUSCULAR; INTRAVENOUS EVERY 6 HOURS PRN
Status: DISCONTINUED | OUTPATIENT
Start: 2018-10-29 | End: 2018-11-07 | Stop reason: HOSPADM

## 2018-10-29 RX ORDER — PROTAMINE SULFATE 10 MG/ML
50 INJECTION, SOLUTION INTRAVENOUS
Status: ACTIVE | OUTPATIENT
Start: 2018-10-29 | End: 2018-10-29

## 2018-10-29 RX ORDER — LANOLIN ALCOHOL/MO/W.PET/CERES
400 CREAM (GRAM) TOPICAL 2 TIMES DAILY
Status: DISCONTINUED | OUTPATIENT
Start: 2018-10-30 | End: 2018-11-02

## 2018-10-29 RX ORDER — ACETAMINOPHEN 500 MG
1000 TABLET ORAL EVERY 6 HOURS
Status: DISCONTINUED | OUTPATIENT
Start: 2018-10-29 | End: 2018-11-07 | Stop reason: HOSPADM

## 2018-10-29 RX ORDER — MORPHINE SULFATE 2 MG/ML
2 INJECTION, SOLUTION INTRAMUSCULAR; INTRAVENOUS
Status: DISCONTINUED | OUTPATIENT
Start: 2018-10-29 | End: 2018-10-31

## 2018-10-29 RX ORDER — ONDANSETRON 2 MG/ML
4 INJECTION INTRAMUSCULAR; INTRAVENOUS EVERY 6 HOURS PRN
Status: DISCONTINUED | OUTPATIENT
Start: 2018-10-29 | End: 2018-11-07 | Stop reason: HOSPADM

## 2018-10-29 RX ORDER — TETRACAINE HYDROCHLORIDE 5 MG/ML
1 SOLUTION OPHTHALMIC EVERY 4 HOURS
Status: DISCONTINUED | OUTPATIENT
Start: 2018-10-29 | End: 2018-10-31

## 2018-10-29 RX ORDER — 0.9 % SODIUM CHLORIDE 0.9 %
10 VIAL (ML) INJECTION DAILY
Status: DISCONTINUED | OUTPATIENT
Start: 2018-10-29 | End: 2018-10-30

## 2018-10-29 RX ORDER — HEPARIN SODIUM 1000 [USP'U]/ML
INJECTION, SOLUTION INTRAVENOUS; SUBCUTANEOUS PRN
Status: DISCONTINUED | OUTPATIENT
Start: 2018-10-29 | End: 2018-10-29 | Stop reason: SDUPTHER

## 2018-10-29 RX ORDER — PROPOFOL 10 MG/ML
10 INJECTION, EMULSION INTRAVENOUS
Status: DISCONTINUED | OUTPATIENT
Start: 2018-10-29 | End: 2018-10-30

## 2018-10-29 RX ADMIN — POLYETHYLENE GLYCOL 3350 17 G: 17 POWDER, FOR SOLUTION ORAL at 15:00

## 2018-10-29 RX ADMIN — ALBUMIN (HUMAN) 12.5 G: 12.5 INJECTION, SOLUTION INTRAVENOUS at 12:42

## 2018-10-29 RX ADMIN — MORPHINE SULFATE 2 MG: 2 INJECTION, SOLUTION INTRAMUSCULAR; INTRAVENOUS at 12:56

## 2018-10-29 RX ADMIN — TETRACAINE HYDROCHLORIDE 1 DROP: 5 SOLUTION OPHTHALMIC at 23:38

## 2018-10-29 RX ADMIN — NITROGLYCERIN 10 MCG/MIN: 20 INJECTION INTRAVENOUS at 12:55

## 2018-10-29 RX ADMIN — SUCCINYLCHOLINE CHLORIDE 200 MG: 20 INJECTION, SOLUTION INTRAMUSCULAR; INTRAVENOUS at 07:15

## 2018-10-29 RX ADMIN — MUPIROCIN: 20 OINTMENT TOPICAL at 06:04

## 2018-10-29 RX ADMIN — BUPROPION HYDROCHLORIDE 75 MG: 75 TABLET, FILM COATED ORAL at 21:23

## 2018-10-29 RX ADMIN — SODIUM CHLORIDE: 9 INJECTION, SOLUTION INTRAVENOUS at 07:15

## 2018-10-29 RX ADMIN — SODIUM CHLORIDE 1 UNITS/HR: 9 INJECTION, SOLUTION INTRAVENOUS at 12:45

## 2018-10-29 RX ADMIN — CARBOXYMETHYLCELLULOSE SODIUM 2 DROP: 10 GEL OPHTHALMIC at 23:41

## 2018-10-29 RX ADMIN — TETRACAINE HYDROCHLORIDE 1 DROP: 5 SOLUTION OPHTHALMIC at 20:02

## 2018-10-29 RX ADMIN — OXYCODONE HYDROCHLORIDE 10 MG: 5 TABLET ORAL at 23:34

## 2018-10-29 RX ADMIN — HEPARIN SODIUM 25000 UNITS: 1000 INJECTION INTRAVENOUS; SUBCUTANEOUS at 08:51

## 2018-10-29 RX ADMIN — ACETAMINOPHEN 1000 MG: 500 TABLET, FILM COATED ORAL at 18:29

## 2018-10-29 RX ADMIN — VANCOMYCIN HYDROCHLORIDE 1000 MG: 1 INJECTION, SOLUTION INTRAVENOUS at 07:07

## 2018-10-29 RX ADMIN — SODIUM CHLORIDE 1000 ML: 9 INJECTION, SOLUTION INTRAVENOUS at 14:15

## 2018-10-29 RX ADMIN — MIDAZOLAM HYDROCHLORIDE 5 MG: 5 INJECTION INTRAMUSCULAR; INTRAVENOUS at 07:15

## 2018-10-29 RX ADMIN — MUPIROCIN: 20 OINTMENT TOPICAL at 21:23

## 2018-10-29 RX ADMIN — ALBUMIN (HUMAN) 12.5 G: 12.5 INJECTION, SOLUTION INTRAVENOUS at 14:11

## 2018-10-29 RX ADMIN — PANTOPRAZOLE SODIUM 40 MG: 40 INJECTION, POWDER, FOR SOLUTION INTRAVENOUS at 05:24

## 2018-10-29 RX ADMIN — VANCOMYCIN HYDROCHLORIDE 1000 MG: 1 INJECTION, SOLUTION INTRAVENOUS at 18:30

## 2018-10-29 RX ADMIN — PROPOFOL 200 MG: 10 INJECTION, EMULSION INTRAVENOUS at 07:15

## 2018-10-29 RX ADMIN — CEFAZOLIN SODIUM 2 G: 2 INJECTION, SOLUTION INTRAVENOUS at 23:34

## 2018-10-29 RX ADMIN — CEFAZOLIN 2000 MG: 1 INJECTION, POWDER, FOR SOLUTION INTRAVENOUS at 07:15

## 2018-10-29 RX ADMIN — MORPHINE SULFATE 2 MG: 2 INJECTION, SOLUTION INTRAMUSCULAR; INTRAVENOUS at 15:22

## 2018-10-29 RX ADMIN — Medication 5 MCG/KG/MIN: at 11:16

## 2018-10-29 RX ADMIN — VANCOMYCIN HYDROCHLORIDE 1000 MG: 1 INJECTION, POWDER, LYOPHILIZED, FOR SOLUTION INTRAVENOUS at 07:15

## 2018-10-29 RX ADMIN — TETRACAINE HYDROCHLORIDE 2 DROP: 5 SOLUTION OPHTHALMIC at 16:26

## 2018-10-29 RX ADMIN — METOCLOPRAMIDE 10 MG: 5 INJECTION, SOLUTION INTRAMUSCULAR; INTRAVENOUS at 13:21

## 2018-10-29 RX ADMIN — HEPARIN SODIUM 10000 UNITS: 1000 INJECTION INTRAVENOUS; SUBCUTANEOUS at 09:05

## 2018-10-29 RX ADMIN — ESMOLOL HYDROCHLORIDE 5 MG: 100 INJECTION, SOLUTION INTRAVENOUS at 06:33

## 2018-10-29 RX ADMIN — CEFAZOLIN SODIUM 2 G: 2 INJECTION, SOLUTION INTRAVENOUS at 15:05

## 2018-10-29 RX ADMIN — MUPIROCIN: 20 OINTMENT TOPICAL at 12:30

## 2018-10-29 RX ADMIN — OXYCODONE HYDROCHLORIDE 10 MG: 5 TABLET ORAL at 14:52

## 2018-10-29 RX ADMIN — FENTANYL CITRATE 250 MCG: 50 INJECTION, SOLUTION INTRAMUSCULAR; INTRAVENOUS at 07:15

## 2018-10-29 RX ADMIN — OXYCODONE HYDROCHLORIDE 10 MG: 5 TABLET ORAL at 19:08

## 2018-10-29 RX ADMIN — MORPHINE SULFATE 2 MG: 2 INJECTION, SOLUTION INTRAMUSCULAR; INTRAVENOUS at 17:55

## 2018-10-29 RX ADMIN — CARBOXYMETHYLCELLULOSE SODIUM 2 DROP: 10 GEL OPHTHALMIC at 18:26

## 2018-10-29 RX ADMIN — CEFAZOLIN 1000 MG: 1 INJECTION, POWDER, FOR SOLUTION INTRAVENOUS at 11:22

## 2018-10-29 RX ADMIN — ASPIRIN 325 MG: 325 TABLET, DELAYED RELEASE ORAL at 18:29

## 2018-10-29 RX ADMIN — Medication 10 ML: at 13:25

## 2018-10-29 RX ADMIN — SODIUM CHLORIDE, POTASSIUM CHLORIDE, SODIUM LACTATE AND CALCIUM CHLORIDE: 600; 310; 30; 20 INJECTION, SOLUTION INTRAVENOUS at 06:22

## 2018-10-29 RX ADMIN — MORPHINE SULFATE 2 MG: 2 INJECTION, SOLUTION INTRAMUSCULAR; INTRAVENOUS at 21:23

## 2018-10-29 RX ADMIN — POTASSIUM CHLORIDE, DEXTROSE MONOHYDRATE AND SODIUM CHLORIDE 1000 ML: 150; 5; 450 INJECTION, SOLUTION INTRAVENOUS at 12:43

## 2018-10-29 RX ADMIN — SODIUM CHLORIDE 3 UNITS/HR: 9 INJECTION, SOLUTION INTRAVENOUS at 08:58

## 2018-10-29 RX ADMIN — HEPARIN SODIUM 3000 UNITS: 1000 INJECTION INTRAVENOUS; SUBCUTANEOUS at 07:45

## 2018-10-29 RX ADMIN — CARBOXYMETHYLCELLULOSE SODIUM 2 DROP: 10 GEL OPHTHALMIC at 23:39

## 2018-10-29 RX ADMIN — PANTOPRAZOLE SODIUM 40 MG: 40 INJECTION, POWDER, FOR SOLUTION INTRAVENOUS at 13:24

## 2018-10-29 RX ADMIN — PROTAMINE SULFATE 250 MG: 10 INJECTION, SOLUTION INTRAVENOUS at 11:13

## 2018-10-29 ASSESSMENT — PAIN SCALES - GENERAL
PAINLEVEL_OUTOF10: 7
PAINLEVEL_OUTOF10: 10
PAINLEVEL_OUTOF10: 8
PAINLEVEL_OUTOF10: 9
PAINLEVEL_OUTOF10: 10
PAINLEVEL_OUTOF10: 6
PAINLEVEL_OUTOF10: 8
PAINLEVEL_OUTOF10: 8
PAINLEVEL_OUTOF10: 0

## 2018-10-29 ASSESSMENT — PULMONARY FUNCTION TESTS
PIF_VALUE: 1
PIF_VALUE: 1
PIF_VALUE: 19
PIF_VALUE: 19
PIF_VALUE: 1
PIF_VALUE: 18
PIF_VALUE: 1
PIF_VALUE: 18
PIF_VALUE: 15
PIF_VALUE: 19
PIF_VALUE: 20
PIF_VALUE: 13
PIF_VALUE: 20
PIF_VALUE: 19
PIF_VALUE: 0
PIF_VALUE: 1
PIF_VALUE: 17
PIF_VALUE: 19
PIF_VALUE: 1
PIF_VALUE: 19
PIF_VALUE: 17
PIF_VALUE: 2
PIF_VALUE: 18
PIF_VALUE: 18
PIF_VALUE: 1
PIF_VALUE: 14
PIF_VALUE: 1
PIF_VALUE: 1
PIF_VALUE: 19
PIF_VALUE: 19
PIF_VALUE: 21
PIF_VALUE: 19
PIF_VALUE: 1
PIF_VALUE: 3
PIF_VALUE: 1
PIF_VALUE: 1
PIF_VALUE: 15
PIF_VALUE: 1
PIF_VALUE: 20
PIF_VALUE: 1
PIF_VALUE: 18
PIF_VALUE: 22
PIF_VALUE: 19
PIF_VALUE: 2
PIF_VALUE: 18
PIF_VALUE: 19
PIF_VALUE: 0
PIF_VALUE: 15
PIF_VALUE: 19
PIF_VALUE: 15
PIF_VALUE: 18
PIF_VALUE: 14
PIF_VALUE: 1
PIF_VALUE: 0
PIF_VALUE: 0
PIF_VALUE: 1
PIF_VALUE: 19
PIF_VALUE: 1
PIF_VALUE: 20
PIF_VALUE: 18
PIF_VALUE: 18
PIF_VALUE: 21
PIF_VALUE: 15
PIF_VALUE: 20
PIF_VALUE: 1
PIF_VALUE: 18
PIF_VALUE: 1
PIF_VALUE: 1
PIF_VALUE: 20
PIF_VALUE: 19
PIF_VALUE: 19
PIF_VALUE: 1
PIF_VALUE: 18
PIF_VALUE: 15
PIF_VALUE: 20
PIF_VALUE: 1
PIF_VALUE: 12
PIF_VALUE: 1
PIF_VALUE: 16
PIF_VALUE: 1
PIF_VALUE: 20
PIF_VALUE: 1
PIF_VALUE: 1
PIF_VALUE: 14
PIF_VALUE: 1
PIF_VALUE: 20
PIF_VALUE: 14
PIF_VALUE: 20
PIF_VALUE: 20
PIF_VALUE: 19
PIF_VALUE: 15
PIF_VALUE: 22
PIF_VALUE: 18
PIF_VALUE: 1
PIF_VALUE: 21
PIF_VALUE: 1
PIF_VALUE: 21
PIF_VALUE: 1
PIF_VALUE: 15
PIF_VALUE: 20
PIF_VALUE: 1
PIF_VALUE: 18
PIF_VALUE: 15
PIF_VALUE: 19
PIF_VALUE: 14
PIF_VALUE: 18
PIF_VALUE: 2
PIF_VALUE: 19
PIF_VALUE: 18
PIF_VALUE: 18
PIF_VALUE: 1
PIF_VALUE: 19
PIF_VALUE: 2
PIF_VALUE: 18
PIF_VALUE: 1
PIF_VALUE: 1
PIF_VALUE: 14
PIF_VALUE: 19
PIF_VALUE: 15
PIF_VALUE: 19
PIF_VALUE: 14
PIF_VALUE: 18
PIF_VALUE: 18
PIF_VALUE: 19
PIF_VALUE: 2
PIF_VALUE: 20
PIF_VALUE: 1
PIF_VALUE: 15
PIF_VALUE: 1
PIF_VALUE: 19
PIF_VALUE: 18
PIF_VALUE: 1
PIF_VALUE: 19
PIF_VALUE: 18
PIF_VALUE: 20
PIF_VALUE: 18
PIF_VALUE: 1
PIF_VALUE: 20
PIF_VALUE: 1
PIF_VALUE: 15
PIF_VALUE: 15
PIF_VALUE: 1
PIF_VALUE: 20
PIF_VALUE: 21
PIF_VALUE: 1
PIF_VALUE: 1
PIF_VALUE: 14
PIF_VALUE: 17
PIF_VALUE: 1
PIF_VALUE: 15
PIF_VALUE: 2
PIF_VALUE: 1
PIF_VALUE: 18
PIF_VALUE: 19
PIF_VALUE: 19
PIF_VALUE: 16
PIF_VALUE: 18
PIF_VALUE: 18
PIF_VALUE: 21
PIF_VALUE: 19
PIF_VALUE: 19
PIF_VALUE: 1
PIF_VALUE: 19
PIF_VALUE: 21
PIF_VALUE: 15
PIF_VALUE: 18
PIF_VALUE: 19
PIF_VALUE: 1
PIF_VALUE: 17
PIF_VALUE: 1
PIF_VALUE: 1
PIF_VALUE: 20
PIF_VALUE: 19
PIF_VALUE: 14
PIF_VALUE: 15
PIF_VALUE: 14
PIF_VALUE: 19
PIF_VALUE: 1
PIF_VALUE: 2
PIF_VALUE: 20
PIF_VALUE: 17
PIF_VALUE: 20
PIF_VALUE: 1
PIF_VALUE: 19
PIF_VALUE: 1
PIF_VALUE: 1
PIF_VALUE: 19
PIF_VALUE: 14
PIF_VALUE: 19
PIF_VALUE: 19
PIF_VALUE: 17
PIF_VALUE: 14
PIF_VALUE: 19
PIF_VALUE: 19
PIF_VALUE: 14
PIF_VALUE: 15
PIF_VALUE: 18
PIF_VALUE: 20
PIF_VALUE: 18
PIF_VALUE: 15
PIF_VALUE: 1
PIF_VALUE: 18
PIF_VALUE: 0
PIF_VALUE: 1
PIF_VALUE: 18
PIF_VALUE: 1
PIF_VALUE: 1
PIF_VALUE: 19
PIF_VALUE: 20
PIF_VALUE: 19
PIF_VALUE: 1
PIF_VALUE: 18
PIF_VALUE: 19
PIF_VALUE: 18
PIF_VALUE: 1
PIF_VALUE: 2
PIF_VALUE: 18
PIF_VALUE: 14
PIF_VALUE: 18
PIF_VALUE: 18
PIF_VALUE: 19
PIF_VALUE: 15
PIF_VALUE: 19
PIF_VALUE: 15
PIF_VALUE: 19
PIF_VALUE: 19
PIF_VALUE: 18
PIF_VALUE: 1
PIF_VALUE: 21
PIF_VALUE: 19
PIF_VALUE: 1
PIF_VALUE: 20
PIF_VALUE: 19
PIF_VALUE: 20
PIF_VALUE: 1
PIF_VALUE: 2
PIF_VALUE: 19
PIF_VALUE: 14
PIF_VALUE: 0
PIF_VALUE: 21
PIF_VALUE: 15
PIF_VALUE: 19
PIF_VALUE: 2
PIF_VALUE: 18
PIF_VALUE: 19
PIF_VALUE: 19
PIF_VALUE: 15
PIF_VALUE: 19
PIF_VALUE: 1
PIF_VALUE: 0
PIF_VALUE: 1
PIF_VALUE: 20
PIF_VALUE: 1
PIF_VALUE: 1
PIF_VALUE: 14
PIF_VALUE: 1
PIF_VALUE: 15
PIF_VALUE: 15
PIF_VALUE: 18
PIF_VALUE: 1
PIF_VALUE: 1
PIF_VALUE: 19
PIF_VALUE: 1
PIF_VALUE: 1
PIF_VALUE: 18
PIF_VALUE: 1
PIF_VALUE: 20
PIF_VALUE: 14
PIF_VALUE: 1
PIF_VALUE: 20
PIF_VALUE: 19
PIF_VALUE: 15
PIF_VALUE: 1
PIF_VALUE: 1
PIF_VALUE: 18
PIF_VALUE: 0
PIF_VALUE: 3
PIF_VALUE: 19
PIF_VALUE: 18
PIF_VALUE: 17
PIF_VALUE: 18
PIF_VALUE: 19
PIF_VALUE: 1
PIF_VALUE: 20
PIF_VALUE: 16
PIF_VALUE: 1

## 2018-10-29 ASSESSMENT — PAIN DESCRIPTION - PAIN TYPE
TYPE: ACUTE PAIN;SURGICAL PAIN
TYPE: SURGICAL PAIN

## 2018-10-29 ASSESSMENT — PAIN DESCRIPTION - ORIENTATION
ORIENTATION: MID;LEFT
ORIENTATION: LEFT
ORIENTATION: LEFT

## 2018-10-29 ASSESSMENT — PAIN DESCRIPTION - LOCATION: LOCATION: EYE

## 2018-10-29 NOTE — PROGRESS NOTES
Rec'd call from radiology stating that OG in L lower lung. OG removed.      Gómez Christianson  10/29/2018

## 2018-10-29 NOTE — PROGRESS NOTES
Pt NIF -23 per RN. Patient has been successfully weaned from Mechanical Ventilation. Patient extubated and placed on 4 liters/min via nasal cannula. Post extubation SpO2 is 100% with HR  76 bpm and RR 16 breaths/min. Patient had strong cough that was productive of clear  sputum. Extubation Well tolerated by patient. Maria Esther Rossi

## 2018-10-29 NOTE — PROGRESS NOTES
Pt c/o intense L eye pain. Assessed eye, noted redness and persistent tearing. Pupil WNL. NP Annmarie Ordoñez notified of eye pain. NP to order treatment.      Gómez Syed  10/29/2018

## 2018-10-29 NOTE — ANESTHESIA POSTPROCEDURE EVALUATION
Department of Anesthesiology  Postprocedure Note    Patient: Jenise Hou  MRN: [id number]  YOB: 1955  Date of evaluation: 10/29/2018  Time:  1:16 PM     Procedure Summary     Date:  10/29/18 Room / Location:  [place] [address] / [facility]    Anesthesia Start:  0715 Anesthesia Stop:  7330    Procedure:  CISCO, TOTAL CPB, AORTO-CABG X 3 USING ENDOSCOPICALLY HARVESTED LEFT SVG, LIMA GRAFT X 1, LEFT INTERNAL MAMMARY ARTERY LYMPH NODE BIOPSY, DOPPLER FLOW VERIFICATION, BILATERAL INTERCOSTAL NERVE BLOCK (N/A ) Diagnosis:  (Coronary Artery Disease)    Surgeon:  Gissel Bansal MD Responsible Provider:  Kathleen Alvraado MD    Anesthesia Type:  general ASA Status:  4          Anesthesia Type: general    Bala Phase I:      Bala Phase II:      Last vitals: Reviewed and per EMR flowsheets.        Anesthesia Post Evaluation    Patient location during evaluation: ICU  Patient participation: complete - patient cannot participate  Level of consciousness: sedated and ventilated  Airway patency: patent  Nausea & Vomiting: no nausea and no vomiting  Complications: no  Cardiovascular status: blood pressure returned to baseline and hemodynamically stable  Respiratory status: acceptable  Hydration status: euvolemic

## 2018-10-29 NOTE — PROGRESS NOTES
Pt stated immediate relief from tetracaine eye gtts. Offered eye patch to prevent pt from rubbing affected eye, pt stated his glasses should help instead of patch. Glasses applied.      Gómez Syed  10/29/2018

## 2018-10-30 LAB
ANION GAP SERPL CALCULATED.3IONS-SCNC: 9 MMOL/L (ref 3–16)
BUN BLDV-MCNC: 14 MG/DL (ref 7–20)
CALCIUM SERPL-MCNC: 8.1 MG/DL (ref 8.3–10.6)
CHLORIDE BLD-SCNC: 104 MMOL/L (ref 99–110)
CO2: 23 MMOL/L (ref 21–32)
CREAT SERPL-MCNC: 0.7 MG/DL (ref 0.8–1.3)
EKG ATRIAL RATE: 69 BPM
EKG DIAGNOSIS: NORMAL
EKG P AXIS: 79 DEGREES
EKG P-R INTERVAL: 144 MS
EKG Q-T INTERVAL: 374 MS
EKG QRS DURATION: 102 MS
EKG QTC CALCULATION (BAZETT): 400 MS
EKG R AXIS: 90 DEGREES
EKG T AXIS: 80 DEGREES
EKG VENTRICULAR RATE: 69 BPM
GFR AFRICAN AMERICAN: >60
GFR NON-AFRICAN AMERICAN: >60
GLUCOSE BLD-MCNC: 100 MG/DL (ref 70–99)
GLUCOSE BLD-MCNC: 100 MG/DL (ref 70–99)
GLUCOSE BLD-MCNC: 102 MG/DL (ref 70–99)
GLUCOSE BLD-MCNC: 103 MG/DL (ref 70–99)
GLUCOSE BLD-MCNC: 104 MG/DL (ref 70–99)
GLUCOSE BLD-MCNC: 106 MG/DL (ref 70–99)
GLUCOSE BLD-MCNC: 107 MG/DL (ref 70–99)
GLUCOSE BLD-MCNC: 107 MG/DL (ref 70–99)
GLUCOSE BLD-MCNC: 110 MG/DL (ref 70–99)
GLUCOSE BLD-MCNC: 116 MG/DL (ref 70–99)
GLUCOSE BLD-MCNC: 128 MG/DL (ref 70–99)
GLUCOSE BLD-MCNC: 129 MG/DL (ref 70–99)
GLUCOSE BLD-MCNC: 130 MG/DL (ref 70–99)
GLUCOSE BLD-MCNC: 136 MG/DL (ref 70–99)
GLUCOSE BLD-MCNC: 142 MG/DL (ref 70–99)
GLUCOSE BLD-MCNC: 146 MG/DL (ref 70–99)
GLUCOSE BLD-MCNC: 82 MG/DL (ref 70–99)
GLUCOSE BLD-MCNC: 83 MG/DL (ref 70–99)
GLUCOSE BLD-MCNC: 84 MG/DL (ref 70–99)
GLUCOSE BLD-MCNC: 93 MG/DL (ref 70–99)
GLUCOSE BLD-MCNC: 94 MG/DL (ref 70–99)
GLUCOSE BLD-MCNC: 97 MG/DL (ref 70–99)
HCT VFR BLD CALC: 33.2 % (ref 40.5–52.5)
HEMOGLOBIN: 11.5 G/DL (ref 13.5–17.5)
MAGNESIUM: 2.3 MG/DL (ref 1.8–2.4)
MCH RBC QN AUTO: 33 PG (ref 26–34)
MCHC RBC AUTO-ENTMCNC: 34.7 G/DL (ref 31–36)
MCV RBC AUTO: 95.3 FL (ref 80–100)
PDW BLD-RTO: 13.5 % (ref 12.4–15.4)
PERFORMED ON: ABNORMAL
PERFORMED ON: NORMAL
PLATELET # BLD: 91 K/UL (ref 135–450)
PLATELET SLIDE REVIEW: ABNORMAL
PMV BLD AUTO: 7.8 FL (ref 5–10.5)
POTASSIUM SERPL-SCNC: 4.3 MMOL/L (ref 3.5–5.1)
RBC # BLD: 3.49 M/UL (ref 4.2–5.9)
SLIDE REVIEW: ABNORMAL
SODIUM BLD-SCNC: 136 MMOL/L (ref 136–145)
WBC # BLD: 10.8 K/UL (ref 4–11)

## 2018-10-30 PROCEDURE — P9045 ALBUMIN (HUMAN), 5%, 250 ML: HCPCS | Performed by: THORACIC SURGERY (CARDIOTHORACIC VASCULAR SURGERY)

## 2018-10-30 PROCEDURE — 6370000000 HC RX 637 (ALT 250 FOR IP): Performed by: HOSPITALIST

## 2018-10-30 PROCEDURE — 2100000000 HC CCU R&B

## 2018-10-30 PROCEDURE — 6370000000 HC RX 637 (ALT 250 FOR IP): Performed by: THORACIC SURGERY (CARDIOTHORACIC VASCULAR SURGERY)

## 2018-10-30 PROCEDURE — 80048 BASIC METABOLIC PNL TOTAL CA: CPT

## 2018-10-30 PROCEDURE — 99024 POSTOP FOLLOW-UP VISIT: CPT | Performed by: THORACIC SURGERY (CARDIOTHORACIC VASCULAR SURGERY)

## 2018-10-30 PROCEDURE — 83735 ASSAY OF MAGNESIUM: CPT

## 2018-10-30 PROCEDURE — 36592 COLLECT BLOOD FROM PICC: CPT

## 2018-10-30 PROCEDURE — 99232 SBSQ HOSP IP/OBS MODERATE 35: CPT | Performed by: INTERNAL MEDICINE

## 2018-10-30 PROCEDURE — 85027 COMPLETE CBC AUTOMATED: CPT

## 2018-10-30 PROCEDURE — 94762 N-INVAS EAR/PLS OXIMTRY CONT: CPT

## 2018-10-30 PROCEDURE — 6360000002 HC RX W HCPCS: Performed by: THORACIC SURGERY (CARDIOTHORACIC VASCULAR SURGERY)

## 2018-10-30 PROCEDURE — 2580000003 HC RX 258: Performed by: THORACIC SURGERY (CARDIOTHORACIC VASCULAR SURGERY)

## 2018-10-30 RX ADMIN — PANTOPRAZOLE SODIUM 40 MG: 40 TABLET, DELAYED RELEASE ORAL at 09:23

## 2018-10-30 RX ADMIN — POTASSIUM CHLORIDE 10 MEQ: 750 TABLET, FILM COATED, EXTENDED RELEASE ORAL at 09:23

## 2018-10-30 RX ADMIN — POTASSIUM CHLORIDE 10 MEQ: 750 TABLET, FILM COATED, EXTENDED RELEASE ORAL at 17:32

## 2018-10-30 RX ADMIN — FUROSEMIDE 20 MG: 10 INJECTION, SOLUTION INTRAMUSCULAR; INTRAVENOUS at 09:23

## 2018-10-30 RX ADMIN — MUPIROCIN: 20 OINTMENT TOPICAL at 19:57

## 2018-10-30 RX ADMIN — ASPIRIN 325 MG: 325 TABLET, DELAYED RELEASE ORAL at 09:23

## 2018-10-30 RX ADMIN — Medication 400 MG: at 19:56

## 2018-10-30 RX ADMIN — OXYCODONE HYDROCHLORIDE 10 MG: 5 TABLET ORAL at 04:29

## 2018-10-30 RX ADMIN — OXYCODONE HYDROCHLORIDE 10 MG: 5 TABLET ORAL at 17:47

## 2018-10-30 RX ADMIN — Medication 10 ML: at 09:25

## 2018-10-30 RX ADMIN — OXYCODONE HYDROCHLORIDE 10 MG: 5 TABLET ORAL at 13:49

## 2018-10-30 RX ADMIN — POTASSIUM CHLORIDE 10 MEQ: 750 TABLET, FILM COATED, EXTENDED RELEASE ORAL at 12:11

## 2018-10-30 RX ADMIN — MUPIROCIN: 20 OINTMENT TOPICAL at 09:25

## 2018-10-30 RX ADMIN — ACETAMINOPHEN 1000 MG: 500 TABLET, FILM COATED ORAL at 12:14

## 2018-10-30 RX ADMIN — CEFAZOLIN SODIUM 2 G: 2 INJECTION, SOLUTION INTRAVENOUS at 16:06

## 2018-10-30 RX ADMIN — FUROSEMIDE 20 MG: 10 INJECTION, SOLUTION INTRAMUSCULAR; INTRAVENOUS at 17:34

## 2018-10-30 RX ADMIN — OXYCODONE HYDROCHLORIDE 10 MG: 5 TABLET ORAL at 22:04

## 2018-10-30 RX ADMIN — ALBUMIN (HUMAN) 25 G: 12.5 INJECTION, SOLUTION INTRAVENOUS at 11:41

## 2018-10-30 RX ADMIN — BUPROPION HYDROCHLORIDE 75 MG: 75 TABLET, FILM COATED ORAL at 09:23

## 2018-10-30 RX ADMIN — PRAVASTATIN SODIUM 40 MG: 40 TABLET ORAL at 19:56

## 2018-10-30 RX ADMIN — OXYCODONE HYDROCHLORIDE 10 MG: 5 TABLET ORAL at 09:37

## 2018-10-30 RX ADMIN — CEFAZOLIN SODIUM 2 G: 2 INJECTION, SOLUTION INTRAVENOUS at 23:37

## 2018-10-30 RX ADMIN — DOCUSATE SODIUM 100 MG: 100 CAPSULE, LIQUID FILLED ORAL at 19:56

## 2018-10-30 RX ADMIN — BUPROPION HYDROCHLORIDE 75 MG: 75 TABLET, FILM COATED ORAL at 19:56

## 2018-10-30 RX ADMIN — CEFAZOLIN SODIUM 2 G: 2 INJECTION, SOLUTION INTRAVENOUS at 07:49

## 2018-10-30 RX ADMIN — ACETAMINOPHEN 1000 MG: 500 TABLET, FILM COATED ORAL at 06:36

## 2018-10-30 RX ADMIN — POTASSIUM CHLORIDE 20 MEQ: 400 INJECTION, SOLUTION INTRAVENOUS at 17:35

## 2018-10-30 RX ADMIN — DOCUSATE SODIUM 100 MG: 100 CAPSULE, LIQUID FILLED ORAL at 09:23

## 2018-10-30 RX ADMIN — ACETAMINOPHEN 1000 MG: 500 TABLET, FILM COATED ORAL at 00:54

## 2018-10-30 RX ADMIN — Medication 400 MG: at 09:23

## 2018-10-30 RX ADMIN — Medication 10 ML: at 19:56

## 2018-10-30 RX ADMIN — ACETAMINOPHEN 1000 MG: 500 TABLET, FILM COATED ORAL at 18:04

## 2018-10-30 RX ADMIN — FONDAPARINUX SODIUM 2.5 MG: 2.5 INJECTION, SOLUTION SUBCUTANEOUS at 09:22

## 2018-10-30 ASSESSMENT — PAIN DESCRIPTION - ORIENTATION
ORIENTATION: MID

## 2018-10-30 ASSESSMENT — PAIN SCALES - GENERAL
PAINLEVEL_OUTOF10: 3
PAINLEVEL_OUTOF10: 7
PAINLEVEL_OUTOF10: 7
PAINLEVEL_OUTOF10: 6
PAINLEVEL_OUTOF10: 6
PAINLEVEL_OUTOF10: 8
PAINLEVEL_OUTOF10: 8
PAINLEVEL_OUTOF10: 6
PAINLEVEL_OUTOF10: 0
PAINLEVEL_OUTOF10: 0
PAINLEVEL_OUTOF10: 4
PAINLEVEL_OUTOF10: 7
PAINLEVEL_OUTOF10: 8

## 2018-10-30 ASSESSMENT — PAIN DESCRIPTION - PROGRESSION
CLINICAL_PROGRESSION: RESOLVED
CLINICAL_PROGRESSION: GRADUALLY IMPROVING

## 2018-10-30 ASSESSMENT — PAIN DESCRIPTION - PAIN TYPE
TYPE: SURGICAL PAIN

## 2018-10-30 ASSESSMENT — PAIN DESCRIPTION - LOCATION
LOCATION: STERNUM

## 2018-10-30 ASSESSMENT — PAIN DESCRIPTION - FREQUENCY
FREQUENCY: CONTINUOUS
FREQUENCY: CONTINUOUS

## 2018-10-30 ASSESSMENT — PAIN DESCRIPTION - DESCRIPTORS: DESCRIPTORS: CONSTANT

## 2018-10-30 NOTE — PROGRESS NOTES
No air leak noted at this time. Will continue to monitor.     [facility]  10/29/2018

## 2018-10-30 NOTE — PROGRESS NOTES
Nutrition Assessment    Type and Reason for Visit: Initial    Nutrition Recommendations:   1. Continue current diet  2. Encourage po intake. 3. Family to attend CVU discharge class on Friday. Malnutrition Assessment:  · Malnutrition Status: No malnutrition    Nutrition Diagnosis:   · Problem: No nutrition diagnosis at this time    Nutrition Assessment:  · Subjective Assessment: RD identified LOS assessment. Pt s/p CABG x3 on 10/29. Pt reports no N/V or pain. Pt reports great appetite. Breakfast tray was in the room and po intake was 100%. Pt is not at risk for further nutrition compromise at this time. · Nutrition-Focused Physical Findings: Non-pitting trace edema. I/O's: +4L  · Wound Type: Surgical Wound (incision L leg and chest)  · Current Nutrition Therapies:  · Oral Diet Orders: Cardiac, 2gm Sodium   · Oral Diet intake: %  · Oral Nutrition Supplement (ONS) Orders: None  · Anthropometric Measures:  · Ht: 6' 2\" (188 cm)   · Current Body Wt: 184 lb (83.5 kg)  · Admission Body Wt: 175 lb (79.4 kg)  · Ideal Body Wt: 190 lb (86.2 kg),   · BMI Classification: BMI 18.5 - 24.9 Normal Weight    Estimated Intake vs Estimated Needs: Intake Meets Needs    Nutrition Risk Level: Low    Nutrition Interventions:   Continue current diet  Continued Inpatient Monitoring, Education Not Indicated    Nutrition Evaluation:   · Evaluation: Goals set   · Goals: po intake 75% or greater on appropriate cardiac diet    · Monitoring: Meal Intake, Weight    See Adult Nutrition Doc Flowsheet for more detail.      Electronically signed by Loren Kidd on 10/30/18 at 10:34 AM    Contact Number: [phone]

## 2018-10-31 ENCOUNTER — APPOINTMENT (OUTPATIENT)
Dept: GENERAL RADIOLOGY | Age: 63
DRG: 236 | End: 2018-10-31
Payer: COMMERCIAL

## 2018-10-31 LAB
ANION GAP SERPL CALCULATED.3IONS-SCNC: 8 MMOL/L (ref 3–16)
BUN BLDV-MCNC: 15 MG/DL (ref 7–20)
CALCIUM SERPL-MCNC: 9.1 MG/DL (ref 8.3–10.6)
CHLORIDE BLD-SCNC: 103 MMOL/L (ref 99–110)
CO2: 29 MMOL/L (ref 21–32)
CREAT SERPL-MCNC: 0.7 MG/DL (ref 0.8–1.3)
GFR AFRICAN AMERICAN: >60
GFR NON-AFRICAN AMERICAN: >60
GLUCOSE BLD-MCNC: 100 MG/DL (ref 70–99)
GLUCOSE BLD-MCNC: 108 MG/DL (ref 70–99)
GLUCOSE BLD-MCNC: 109 MG/DL (ref 70–99)
GLUCOSE BLD-MCNC: 122 MG/DL (ref 70–99)
GLUCOSE BLD-MCNC: 131 MG/DL (ref 70–99)
GLUCOSE BLD-MCNC: 138 MG/DL (ref 70–99)
GLUCOSE BLD-MCNC: 81 MG/DL (ref 70–99)
GLUCOSE BLD-MCNC: 97 MG/DL (ref 70–99)
HCT VFR BLD CALC: 33.2 % (ref 40.5–52.5)
HEMOGLOBIN: 11.6 G/DL (ref 13.5–17.5)
MAGNESIUM: 2.4 MG/DL (ref 1.8–2.4)
MCH RBC QN AUTO: 33.3 PG (ref 26–34)
MCHC RBC AUTO-ENTMCNC: 34.8 G/DL (ref 31–36)
MCV RBC AUTO: 95.6 FL (ref 80–100)
PDW BLD-RTO: 13.9 % (ref 12.4–15.4)
PERFORMED ON: ABNORMAL
PERFORMED ON: NORMAL
PERFORMED ON: NORMAL
PLATELET # BLD: 100 K/UL (ref 135–450)
PMV BLD AUTO: 8 FL (ref 5–10.5)
POTASSIUM SERPL-SCNC: 4.4 MMOL/L (ref 3.5–5.1)
RBC # BLD: 3.47 M/UL (ref 4.2–5.9)
SODIUM BLD-SCNC: 140 MMOL/L (ref 136–145)
WBC # BLD: 10.7 K/UL (ref 4–11)

## 2018-10-31 PROCEDURE — 97116 GAIT TRAINING THERAPY: CPT

## 2018-10-31 PROCEDURE — 6370000000 HC RX 637 (ALT 250 FOR IP): Performed by: HOSPITALIST

## 2018-10-31 PROCEDURE — G8987 SELF CARE CURRENT STATUS: HCPCS

## 2018-10-31 PROCEDURE — 6370000000 HC RX 637 (ALT 250 FOR IP): Performed by: THORACIC SURGERY (CARDIOTHORACIC VASCULAR SURGERY)

## 2018-10-31 PROCEDURE — G8979 MOBILITY GOAL STATUS: HCPCS

## 2018-10-31 PROCEDURE — 97530 THERAPEUTIC ACTIVITIES: CPT

## 2018-10-31 PROCEDURE — 36592 COLLECT BLOOD FROM PICC: CPT

## 2018-10-31 PROCEDURE — P9045 ALBUMIN (HUMAN), 5%, 250 ML: HCPCS | Performed by: THORACIC SURGERY (CARDIOTHORACIC VASCULAR SURGERY)

## 2018-10-31 PROCEDURE — G8989 SELF CARE D/C STATUS: HCPCS

## 2018-10-31 PROCEDURE — 97165 OT EVAL LOW COMPLEX 30 MIN: CPT

## 2018-10-31 PROCEDURE — 99232 SBSQ HOSP IP/OBS MODERATE 35: CPT | Performed by: INTERNAL MEDICINE

## 2018-10-31 PROCEDURE — 2700000000 HC OXYGEN THERAPY PER DAY

## 2018-10-31 PROCEDURE — G8978 MOBILITY CURRENT STATUS: HCPCS

## 2018-10-31 PROCEDURE — 85027 COMPLETE CBC AUTOMATED: CPT

## 2018-10-31 PROCEDURE — 2580000003 HC RX 258: Performed by: THORACIC SURGERY (CARDIOTHORACIC VASCULAR SURGERY)

## 2018-10-31 PROCEDURE — 71045 X-RAY EXAM CHEST 1 VIEW: CPT

## 2018-10-31 PROCEDURE — G8988 SELF CARE GOAL STATUS: HCPCS

## 2018-10-31 PROCEDURE — G8980 MOBILITY D/C STATUS: HCPCS

## 2018-10-31 PROCEDURE — 2100000000 HC CCU R&B

## 2018-10-31 PROCEDURE — 97161 PT EVAL LOW COMPLEX 20 MIN: CPT

## 2018-10-31 PROCEDURE — 80048 BASIC METABOLIC PNL TOTAL CA: CPT

## 2018-10-31 PROCEDURE — 6360000002 HC RX W HCPCS: Performed by: THORACIC SURGERY (CARDIOTHORACIC VASCULAR SURGERY)

## 2018-10-31 PROCEDURE — 94762 N-INVAS EAR/PLS OXIMTRY CONT: CPT

## 2018-10-31 PROCEDURE — 83735 ASSAY OF MAGNESIUM: CPT

## 2018-10-31 RX ORDER — AMIODARONE HYDROCHLORIDE 200 MG/1
200 TABLET ORAL DAILY
Status: DISCONTINUED | OUTPATIENT
Start: 2018-10-31 | End: 2018-11-01

## 2018-10-31 RX ORDER — TETRACAINE HYDROCHLORIDE 5 MG/ML
1 SOLUTION OPHTHALMIC EVERY 4 HOURS PRN
Status: DISCONTINUED | OUTPATIENT
Start: 2018-10-31 | End: 2018-11-07 | Stop reason: HOSPADM

## 2018-10-31 RX ADMIN — MUPIROCIN: 20 OINTMENT TOPICAL at 09:00

## 2018-10-31 RX ADMIN — OXYCODONE HYDROCHLORIDE 5 MG: 5 TABLET ORAL at 18:14

## 2018-10-31 RX ADMIN — MORPHINE SULFATE 2 MG: 2 INJECTION, SOLUTION INTRAMUSCULAR; INTRAVENOUS at 01:21

## 2018-10-31 RX ADMIN — METOPROLOL TARTRATE 12.5 MG: 25 TABLET ORAL at 09:15

## 2018-10-31 RX ADMIN — ASPIRIN 325 MG: 325 TABLET, DELAYED RELEASE ORAL at 08:55

## 2018-10-31 RX ADMIN — POTASSIUM CHLORIDE 10 MEQ: 750 TABLET, FILM COATED, EXTENDED RELEASE ORAL at 12:13

## 2018-10-31 RX ADMIN — Medication 10 ML: at 08:57

## 2018-10-31 RX ADMIN — ACETAMINOPHEN 1000 MG: 500 TABLET, FILM COATED ORAL at 18:11

## 2018-10-31 RX ADMIN — MORPHINE SULFATE 2 MG: 2 INJECTION, SOLUTION INTRAMUSCULAR; INTRAVENOUS at 06:35

## 2018-10-31 RX ADMIN — POTASSIUM CHLORIDE 10 MEQ: 750 TABLET, FILM COATED, EXTENDED RELEASE ORAL at 08:56

## 2018-10-31 RX ADMIN — DOCUSATE SODIUM 100 MG: 100 CAPSULE, LIQUID FILLED ORAL at 08:56

## 2018-10-31 RX ADMIN — ACETAMINOPHEN 1000 MG: 500 TABLET, FILM COATED ORAL at 06:58

## 2018-10-31 RX ADMIN — FONDAPARINUX SODIUM 2.5 MG: 2.5 INJECTION, SOLUTION SUBCUTANEOUS at 08:56

## 2018-10-31 RX ADMIN — DOCUSATE SODIUM 100 MG: 100 CAPSULE, LIQUID FILLED ORAL at 20:12

## 2018-10-31 RX ADMIN — PANTOPRAZOLE SODIUM 40 MG: 40 TABLET, DELAYED RELEASE ORAL at 08:56

## 2018-10-31 RX ADMIN — MUPIROCIN: 20 OINTMENT TOPICAL at 20:14

## 2018-10-31 RX ADMIN — AMIODARONE HYDROCHLORIDE: 50 INJECTION, SOLUTION INTRAVENOUS at 21:10

## 2018-10-31 RX ADMIN — PRAVASTATIN SODIUM 40 MG: 40 TABLET ORAL at 08:56

## 2018-10-31 RX ADMIN — FUROSEMIDE 20 MG: 10 INJECTION, SOLUTION INTRAMUSCULAR; INTRAVENOUS at 18:11

## 2018-10-31 RX ADMIN — ACETAMINOPHEN 1000 MG: 500 TABLET, FILM COATED ORAL at 14:49

## 2018-10-31 RX ADMIN — ALBUMIN (HUMAN) 12.5 G: 12.5 INJECTION, SOLUTION INTRAVENOUS at 21:28

## 2018-10-31 RX ADMIN — BUPROPION HYDROCHLORIDE 75 MG: 75 TABLET, FILM COATED ORAL at 08:55

## 2018-10-31 RX ADMIN — AMIODARONE HYDROCHLORIDE 200 MG: 200 TABLET ORAL at 20:51

## 2018-10-31 RX ADMIN — OXYCODONE HYDROCHLORIDE 10 MG: 5 TABLET ORAL at 04:42

## 2018-10-31 RX ADMIN — BUPROPION HYDROCHLORIDE 75 MG: 75 TABLET, FILM COATED ORAL at 20:12

## 2018-10-31 RX ADMIN — ACETAMINOPHEN 1000 MG: 500 TABLET, FILM COATED ORAL at 01:04

## 2018-10-31 RX ADMIN — Medication 10 ML: at 20:12

## 2018-10-31 RX ADMIN — TETRACAINE HYDROCHLORIDE 1 DROP: 5 SOLUTION OPHTHALMIC at 09:01

## 2018-10-31 RX ADMIN — POTASSIUM CHLORIDE 10 MEQ: 750 TABLET, FILM COATED, EXTENDED RELEASE ORAL at 18:10

## 2018-10-31 RX ADMIN — DEXTROSE MONOHYDRATE 150 MG: 50 INJECTION, SOLUTION INTRAVENOUS at 20:51

## 2018-10-31 RX ADMIN — FUROSEMIDE 20 MG: 10 INJECTION, SOLUTION INTRAMUSCULAR; INTRAVENOUS at 12:12

## 2018-10-31 RX ADMIN — Medication 10 ML: at 18:18

## 2018-10-31 ASSESSMENT — PAIN DESCRIPTION - PROGRESSION
CLINICAL_PROGRESSION: GRADUALLY WORSENING

## 2018-10-31 ASSESSMENT — PAIN DESCRIPTION - LOCATION
LOCATION: STERNUM
LOCATION: STERNUM
LOCATION: CHEST
LOCATION: CHEST
LOCATION: BACK;STERNUM
LOCATION: CHEST
LOCATION: STERNUM

## 2018-10-31 ASSESSMENT — PAIN DESCRIPTION - ORIENTATION
ORIENTATION: LOWER;MID
ORIENTATION: MID
ORIENTATION: MID
ORIENTATION: UPPER
ORIENTATION: MID
ORIENTATION: LOWER

## 2018-10-31 ASSESSMENT — PAIN SCALES - GENERAL
PAINLEVEL_OUTOF10: 0
PAINLEVEL_OUTOF10: 9
PAINLEVEL_OUTOF10: 8
PAINLEVEL_OUTOF10: 8
PAINLEVEL_OUTOF10: 5
PAINLEVEL_OUTOF10: 0
PAINLEVEL_OUTOF10: 8
PAINLEVEL_OUTOF10: 8
PAINLEVEL_OUTOF10: 7
PAINLEVEL_OUTOF10: 5
PAINLEVEL_OUTOF10: 0
PAINLEVEL_OUTOF10: 0

## 2018-10-31 ASSESSMENT — PAIN DESCRIPTION - DESCRIPTORS
DESCRIPTORS: ACHING
DESCRIPTORS: ACHING

## 2018-10-31 ASSESSMENT — PAIN DESCRIPTION - PAIN TYPE
TYPE: SURGICAL PAIN
TYPE: SURGICAL PAIN
TYPE: SURGICAL PAIN;ACUTE PAIN
TYPE: SURGICAL PAIN

## 2018-10-31 ASSESSMENT — PAIN DESCRIPTION - FREQUENCY
FREQUENCY: INTERMITTENT
FREQUENCY: CONTINUOUS
FREQUENCY: INTERMITTENT

## 2018-10-31 NOTE — PROGRESS NOTES
CC: S/P CABG X 4 10/29    Doing well    CV:HIY92-75, sbp 100-110( Lopressor 12.5 BID, ACE 2.5)  Pulm:Diminished Polo Bases, o/w CTA, O2 Sats 93% on 4L NC, afebrile  Renal:K+ 4.4, Creat 0.7  Heme:WBC 10.7, H/H 11/33,   UOP: 5165 last 24  WT: 81.5/83.8/pre-op 78.9 lasix 20 IV BID  Incision:C/D/I sternum stable    Plan: wean O2, agressive I/S, cont diuresis, PT/OT, LOC

## 2018-11-01 LAB
ANION GAP SERPL CALCULATED.3IONS-SCNC: 8 MMOL/L (ref 3–16)
BLOOD BANK DISPENSE STATUS: NORMAL
BLOOD BANK DISPENSE STATUS: NORMAL
BLOOD BANK PRODUCT CODE: NORMAL
BLOOD BANK PRODUCT CODE: NORMAL
BPU ID: NORMAL
BPU ID: NORMAL
BUN BLDV-MCNC: 21 MG/DL (ref 7–20)
CALCIUM SERPL-MCNC: 8.8 MG/DL (ref 8.3–10.6)
CHLORIDE BLD-SCNC: 106 MMOL/L (ref 99–110)
CO2: 28 MMOL/L (ref 21–32)
CREAT SERPL-MCNC: 0.7 MG/DL (ref 0.8–1.3)
DESCRIPTION BLOOD BANK: NORMAL
DESCRIPTION BLOOD BANK: NORMAL
GFR AFRICAN AMERICAN: >60
GFR NON-AFRICAN AMERICAN: >60
GLUCOSE BLD-MCNC: 109 MG/DL (ref 70–99)
HCT VFR BLD CALC: 33.1 % (ref 40.5–52.5)
HEMOGLOBIN: 11.3 G/DL (ref 13.5–17.5)
MAGNESIUM: 2 MG/DL (ref 1.8–2.4)
MCH RBC QN AUTO: 32.9 PG (ref 26–34)
MCHC RBC AUTO-ENTMCNC: 34.1 G/DL (ref 31–36)
MCV RBC AUTO: 96.6 FL (ref 80–100)
PDW BLD-RTO: 13.7 % (ref 12.4–15.4)
PLATELET # BLD: 109 K/UL (ref 135–450)
PMV BLD AUTO: 8 FL (ref 5–10.5)
POTASSIUM SERPL-SCNC: 4.2 MMOL/L (ref 3.5–5.1)
RBC # BLD: 3.43 M/UL (ref 4.2–5.9)
SODIUM BLD-SCNC: 142 MMOL/L (ref 136–145)
WBC # BLD: 9.9 K/UL (ref 4–11)

## 2018-11-01 PROCEDURE — 2100000000 HC CCU R&B

## 2018-11-01 PROCEDURE — 94760 N-INVAS EAR/PLS OXIMETRY 1: CPT

## 2018-11-01 PROCEDURE — 99024 POSTOP FOLLOW-UP VISIT: CPT | Performed by: THORACIC SURGERY (CARDIOTHORACIC VASCULAR SURGERY)

## 2018-11-01 PROCEDURE — 6370000000 HC RX 637 (ALT 250 FOR IP): Performed by: THORACIC SURGERY (CARDIOTHORACIC VASCULAR SURGERY)

## 2018-11-01 PROCEDURE — 85027 COMPLETE CBC AUTOMATED: CPT

## 2018-11-01 PROCEDURE — 6360000002 HC RX W HCPCS: Performed by: THORACIC SURGERY (CARDIOTHORACIC VASCULAR SURGERY)

## 2018-11-01 PROCEDURE — 6370000000 HC RX 637 (ALT 250 FOR IP): Performed by: HOSPITALIST

## 2018-11-01 PROCEDURE — 80048 BASIC METABOLIC PNL TOTAL CA: CPT

## 2018-11-01 PROCEDURE — 83735 ASSAY OF MAGNESIUM: CPT

## 2018-11-01 PROCEDURE — 6370000000 HC RX 637 (ALT 250 FOR IP): Performed by: NURSE PRACTITIONER

## 2018-11-01 PROCEDURE — 2580000003 HC RX 258: Performed by: THORACIC SURGERY (CARDIOTHORACIC VASCULAR SURGERY)

## 2018-11-01 PROCEDURE — 36592 COLLECT BLOOD FROM PICC: CPT

## 2018-11-01 RX ORDER — AMIODARONE HYDROCHLORIDE 200 MG/1
400 TABLET ORAL 2 TIMES DAILY
Status: DISCONTINUED | OUTPATIENT
Start: 2018-11-01 | End: 2018-11-04

## 2018-11-01 RX ORDER — TRAMADOL HYDROCHLORIDE 50 MG/1
50 TABLET ORAL EVERY 4 HOURS PRN
Status: DISCONTINUED | OUTPATIENT
Start: 2018-11-01 | End: 2018-11-07 | Stop reason: HOSPADM

## 2018-11-01 RX ADMIN — PRAVASTATIN SODIUM 40 MG: 40 TABLET ORAL at 20:34

## 2018-11-01 RX ADMIN — Medication 400 MG: at 09:06

## 2018-11-01 RX ADMIN — POTASSIUM CHLORIDE 10 MEQ: 750 TABLET, FILM COATED, EXTENDED RELEASE ORAL at 09:06

## 2018-11-01 RX ADMIN — BUPROPION HYDROCHLORIDE 75 MG: 75 TABLET, FILM COATED ORAL at 20:29

## 2018-11-01 RX ADMIN — OXYCODONE HYDROCHLORIDE 5 MG: 5 TABLET ORAL at 09:05

## 2018-11-01 RX ADMIN — TRAMADOL HYDROCHLORIDE 50 MG: 50 TABLET, FILM COATED ORAL at 12:08

## 2018-11-01 RX ADMIN — POTASSIUM CHLORIDE 10 MEQ: 750 TABLET, FILM COATED, EXTENDED RELEASE ORAL at 18:01

## 2018-11-01 RX ADMIN — METOPROLOL TARTRATE 12.5 MG: 25 TABLET ORAL at 13:18

## 2018-11-01 RX ADMIN — AMIODARONE HYDROCHLORIDE 400 MG: 200 TABLET ORAL at 20:28

## 2018-11-01 RX ADMIN — OXYCODONE HYDROCHLORIDE 10 MG: 5 TABLET ORAL at 18:01

## 2018-11-01 RX ADMIN — FONDAPARINUX SODIUM 2.5 MG: 2.5 INJECTION, SOLUTION SUBCUTANEOUS at 09:06

## 2018-11-01 RX ADMIN — SODIUM CHLORIDE 75 MG: 0.9 INJECTION INTRAVENOUS at 09:57

## 2018-11-01 RX ADMIN — OXYCODONE HYDROCHLORIDE 5 MG: 5 TABLET ORAL at 05:32

## 2018-11-01 RX ADMIN — AMIODARONE HYDROCHLORIDE: 50 INJECTION, SOLUTION INTRAVENOUS at 06:35

## 2018-11-01 RX ADMIN — MUPIROCIN: 20 OINTMENT TOPICAL at 20:32

## 2018-11-01 RX ADMIN — ACETAMINOPHEN 1000 MG: 500 TABLET, FILM COATED ORAL at 06:23

## 2018-11-01 RX ADMIN — DOCUSATE SODIUM 100 MG: 100 CAPSULE, LIQUID FILLED ORAL at 09:06

## 2018-11-01 RX ADMIN — AMIODARONE HYDROCHLORIDE 400 MG: 200 TABLET ORAL at 09:06

## 2018-11-01 RX ADMIN — PANTOPRAZOLE SODIUM 40 MG: 40 TABLET, DELAYED RELEASE ORAL at 09:06

## 2018-11-01 RX ADMIN — BUPROPION HYDROCHLORIDE 75 MG: 75 TABLET, FILM COATED ORAL at 09:06

## 2018-11-01 RX ADMIN — Medication 400 MG: at 20:29

## 2018-11-01 RX ADMIN — ACETAMINOPHEN 1000 MG: 500 TABLET, FILM COATED ORAL at 18:01

## 2018-11-01 RX ADMIN — METOPROLOL TARTRATE 12.5 MG: 25 TABLET ORAL at 20:31

## 2018-11-01 RX ADMIN — Medication 10 ML: at 09:06

## 2018-11-01 RX ADMIN — Medication 10 ML: at 20:29

## 2018-11-01 RX ADMIN — FUROSEMIDE 20 MG: 10 INJECTION, SOLUTION INTRAMUSCULAR; INTRAVENOUS at 09:07

## 2018-11-01 RX ADMIN — ASPIRIN 325 MG: 325 TABLET, DELAYED RELEASE ORAL at 09:06

## 2018-11-01 ASSESSMENT — PAIN SCALES - GENERAL
PAINLEVEL_OUTOF10: 7
PAINLEVEL_OUTOF10: 0
PAINLEVEL_OUTOF10: 8
PAINLEVEL_OUTOF10: 4
PAINLEVEL_OUTOF10: 3
PAINLEVEL_OUTOF10: 4
PAINLEVEL_OUTOF10: 6

## 2018-11-01 ASSESSMENT — PAIN DESCRIPTION - LOCATION
LOCATION: STERNUM
LOCATION: STERNUM

## 2018-11-01 ASSESSMENT — PAIN DESCRIPTION - PAIN TYPE
TYPE: SURGICAL PAIN
TYPE: SURGICAL PAIN

## 2018-11-01 NOTE — PROGRESS NOTES
Amiodarone currently infusing. Infusion began at 2055. BP consistently low (75/48), pt asymptomatic. Dr. Severiano Peacemaker called unit for update, informed of conversion to atrial fib and current amiodarone infusion. OK per Severiano Peacemaker to give albumin for low BP. PRN albumin given at this time (see MAR). Will continue to monitor BP.   Electronically signed by Donn Carrillo RN on 10/31/2018 at 9:36 PM

## 2018-11-01 NOTE — DISCHARGE SUMMARY
the exception of a short bout of A-Fib. The patient was discharged on 11/07/2018 in [facility]    Pathology:    Lymph node, left internal mammary, excision:     - Benign/reactive appearing lymph node with sinus histiocytosis     - Negative for malignancy    Disposition: Home with HomeCare    Condition: Stable    Medications:    ACE:N//A Hypotension  Aspirin: mg Daily  Betablocker:Lopressor 12.5 mg BID  Statin:Pravachol 40 mg Daily    Discharge Medications:   Rico Bonner [facility] [address] Medication Instructions POR:[id number]    Printed on:11/12/18 5687   Medication Information                      amiodarone (CORDARONE) 200 MG tablet  Take 1 tablet by mouth daily Amiodarone Taper Dose    400 mg Twice a day for 7 days then  200 mg Twice a day for 7 days then  200 mg Daily for 14 days then    STOP             amiodarone (CORDARONE) 200 MG tablet  Take 1 tablet by mouth daily             amoxicillin-clavulanate (AUGMENTIN) 875-125 MG per tablet  Take 1 tablet by mouth 2 times daily for 7 days             aspirin 325 MG EC tablet  Take 1 tablet by mouth daily             buPROPion (WELLBUTRIN) 75 MG tablet  Take 1 tablet by mouth 2 times daily 1 tablet by mouth 2 times daily for 3 days and then 2 tablets by mouth 2 times daily.              docusate sodium (COLACE, DULCOLAX) 100 MG CAPS  Take 100 mg by mouth 2 times daily             melatonin 1 MG tablet  Take 1 mg by mouth nightly as needed for Sleep             metoprolol tartrate (LOPRESSOR) 25 MG tablet  Take 0.5 tablets by mouth 2 times daily             omeprazole (PRILOSEC OTC) 20 MG tablet  Take 1 tablet by mouth 2 times daily             pravastatin (PRAVACHOL) 40 MG tablet  Take 1 tablet by mouth daily             sucralfate (CARAFATE) 1 GM tablet  Take 1 tablet by mouth 4 times daily             tamsulosin (FLOMAX) 0.4 MG capsule  Take 1 capsule by mouth daily for 14 days             traMADol (ULTRAM) 50 MG tablet  Take 1 tablet by mouth every 4 hours as needed

## 2018-11-02 PROCEDURE — 99232 SBSQ HOSP IP/OBS MODERATE 35: CPT | Performed by: INTERNAL MEDICINE

## 2018-11-02 PROCEDURE — 2100000000 HC CCU R&B

## 2018-11-02 PROCEDURE — 99024 POSTOP FOLLOW-UP VISIT: CPT | Performed by: THORACIC SURGERY (CARDIOTHORACIC VASCULAR SURGERY)

## 2018-11-02 PROCEDURE — 2580000003 HC RX 258: Performed by: THORACIC SURGERY (CARDIOTHORACIC VASCULAR SURGERY)

## 2018-11-02 PROCEDURE — 6370000000 HC RX 637 (ALT 250 FOR IP): Performed by: INTERNAL MEDICINE

## 2018-11-02 PROCEDURE — 6360000002 HC RX W HCPCS: Performed by: THORACIC SURGERY (CARDIOTHORACIC VASCULAR SURGERY)

## 2018-11-02 PROCEDURE — 51798 US URINE CAPACITY MEASURE: CPT

## 2018-11-02 PROCEDURE — 6370000000 HC RX 637 (ALT 250 FOR IP): Performed by: HOSPITALIST

## 2018-11-02 PROCEDURE — 51702 INSERT TEMP BLADDER CATH: CPT

## 2018-11-02 PROCEDURE — 6370000000 HC RX 637 (ALT 250 FOR IP): Performed by: THORACIC SURGERY (CARDIOTHORACIC VASCULAR SURGERY)

## 2018-11-02 PROCEDURE — 6370000000 HC RX 637 (ALT 250 FOR IP): Performed by: NURSE PRACTITIONER

## 2018-11-02 RX ORDER — PSEUDOEPHEDRINE HCL 30 MG
100 TABLET ORAL 2 TIMES DAILY
Qty: 60 CAPSULE | Refills: 0 | Status: SHIPPED | OUTPATIENT
Start: 2018-11-02 | End: 2018-11-15 | Stop reason: ALTCHOICE

## 2018-11-02 RX ORDER — SUCRALFATE 1 G/1
1 TABLET ORAL EVERY 6 HOURS SCHEDULED
Status: DISCONTINUED | OUTPATIENT
Start: 2018-11-02 | End: 2018-11-07 | Stop reason: HOSPADM

## 2018-11-02 RX ORDER — OXYCODONE HYDROCHLORIDE 5 MG/1
5 TABLET ORAL EVERY 4 HOURS PRN
Qty: 28 TABLET | Refills: 0 | Status: SHIPPED | OUTPATIENT
Start: 2018-11-02 | End: 2018-11-07 | Stop reason: HOSPADM

## 2018-11-02 RX ORDER — SUCRALFATE 1 G/1
1 TABLET ORAL EVERY 6 HOURS SCHEDULED
Status: DISCONTINUED | OUTPATIENT
Start: 2018-11-02 | End: 2018-11-02

## 2018-11-02 RX ORDER — AMIODARONE HYDROCHLORIDE 200 MG/1
200 TABLET ORAL DAILY
Qty: 56 TABLET | Refills: 0 | Status: SHIPPED | OUTPATIENT
Start: 2018-11-02 | End: 2018-11-15 | Stop reason: ALTCHOICE

## 2018-11-02 RX ADMIN — BUPROPION HYDROCHLORIDE 75 MG: 75 TABLET, FILM COATED ORAL at 21:17

## 2018-11-02 RX ADMIN — DOCUSATE SODIUM 100 MG: 100 CAPSULE, LIQUID FILLED ORAL at 21:17

## 2018-11-02 RX ADMIN — FUROSEMIDE 20 MG: 10 INJECTION, SOLUTION INTRAMUSCULAR; INTRAVENOUS at 08:25

## 2018-11-02 RX ADMIN — POTASSIUM CHLORIDE 10 MEQ: 750 TABLET, FILM COATED, EXTENDED RELEASE ORAL at 08:25

## 2018-11-02 RX ADMIN — AMIODARONE HYDROCHLORIDE 400 MG: 200 TABLET ORAL at 21:17

## 2018-11-02 RX ADMIN — METOPROLOL TARTRATE 25 MG: 25 TABLET ORAL at 08:24

## 2018-11-02 RX ADMIN — AMIODARONE HYDROCHLORIDE 400 MG: 200 TABLET ORAL at 08:25

## 2018-11-02 RX ADMIN — DOCUSATE SODIUM 100 MG: 100 CAPSULE, LIQUID FILLED ORAL at 08:25

## 2018-11-02 RX ADMIN — ASPIRIN 325 MG: 325 TABLET, DELAYED RELEASE ORAL at 08:26

## 2018-11-02 RX ADMIN — MUPIROCIN: 20 OINTMENT TOPICAL at 21:21

## 2018-11-02 RX ADMIN — Medication 10 ML: at 21:22

## 2018-11-02 RX ADMIN — TRAMADOL HYDROCHLORIDE 50 MG: 50 TABLET, FILM COATED ORAL at 18:07

## 2018-11-02 RX ADMIN — Medication 400 MG: at 08:25

## 2018-11-02 RX ADMIN — LIDOCAINE HYDROCHLORIDE: 20 JELLY TOPICAL at 09:17

## 2018-11-02 RX ADMIN — ACETAMINOPHEN 1000 MG: 500 TABLET, FILM COATED ORAL at 02:11

## 2018-11-02 RX ADMIN — BUPROPION HYDROCHLORIDE 75 MG: 75 TABLET, FILM COATED ORAL at 08:25

## 2018-11-02 RX ADMIN — ACETAMINOPHEN 1000 MG: 500 TABLET, FILM COATED ORAL at 18:54

## 2018-11-02 RX ADMIN — SUCRALFATE 1 G: 1 TABLET ORAL at 22:00

## 2018-11-02 RX ADMIN — OXYCODONE HYDROCHLORIDE 10 MG: 5 TABLET ORAL at 08:10

## 2018-11-02 RX ADMIN — MUPIROCIN: 20 OINTMENT TOPICAL at 09:00

## 2018-11-02 RX ADMIN — OXYCODONE HYDROCHLORIDE 10 MG: 5 TABLET ORAL at 02:10

## 2018-11-02 RX ADMIN — SUCRALFATE 1 G: 1 TABLET ORAL at 15:54

## 2018-11-02 RX ADMIN — PANTOPRAZOLE SODIUM 40 MG: 40 TABLET, DELAYED RELEASE ORAL at 08:25

## 2018-11-02 RX ADMIN — ACETAMINOPHEN 1000 MG: 500 TABLET, FILM COATED ORAL at 08:23

## 2018-11-02 RX ADMIN — Medication 10 ML: at 08:29

## 2018-11-02 RX ADMIN — PRAVASTATIN SODIUM 40 MG: 40 TABLET ORAL at 08:25

## 2018-11-02 RX ADMIN — OXYCODONE HYDROCHLORIDE 10 MG: 5 TABLET ORAL at 18:52

## 2018-11-02 RX ADMIN — FONDAPARINUX SODIUM 2.5 MG: 2.5 INJECTION, SOLUTION SUBCUTANEOUS at 08:26

## 2018-11-02 ASSESSMENT — PAIN SCALES - GENERAL
PAINLEVEL_OUTOF10: 2
PAINLEVEL_OUTOF10: 9
PAINLEVEL_OUTOF10: 7
PAINLEVEL_OUTOF10: 7
PAINLEVEL_OUTOF10: 2
PAINLEVEL_OUTOF10: 7
PAINLEVEL_OUTOF10: 0
PAINLEVEL_OUTOF10: 7

## 2018-11-02 ASSESSMENT — PAIN DESCRIPTION - DESCRIPTORS: DESCRIPTORS: SHARP

## 2018-11-02 ASSESSMENT — PAIN DESCRIPTION - PAIN TYPE
TYPE: ACUTE PAIN
TYPE: ACUTE PAIN

## 2018-11-02 ASSESSMENT — PAIN DESCRIPTION - LOCATION
LOCATION: CHEST;INCISION
LOCATION: CHEST

## 2018-11-02 ASSESSMENT — PAIN DESCRIPTION - FREQUENCY: FREQUENCY: INTERMITTENT

## 2018-11-02 ASSESSMENT — PAIN DESCRIPTION - ORIENTATION
ORIENTATION: MID
ORIENTATION: MID

## 2018-11-02 NOTE — CARE COORDINATION
Discharge planning-    Per CVU staff, possible discharge this weekend. [facility] notified. [facility] [address]     Will continue to follow for support and discharge planning.    -Matthew Esquivel, MSW, LSW

## 2018-11-02 NOTE — PROGRESS NOTES
[facility] home care referral. Spoke with pt  re: home care plan of care/services. Agreeable. Will follow for home care. Aware of pending weekend discharge. Preliminary information faxed to [facility].

## 2018-11-03 ENCOUNTER — APPOINTMENT (OUTPATIENT)
Dept: GENERAL RADIOLOGY | Age: 63
DRG: 236 | End: 2018-11-03
Payer: COMMERCIAL

## 2018-11-03 ENCOUNTER — APPOINTMENT (OUTPATIENT)
Dept: CT IMAGING | Age: 63
DRG: 236 | End: 2018-11-03
Payer: COMMERCIAL

## 2018-11-03 LAB
BILIRUBIN URINE: NEGATIVE
BLOOD, URINE: ABNORMAL
CLARITY: ABNORMAL
COLOR: YELLOW
EPITHELIAL CELLS, UA: 1 /HPF (ref 0–5)
GLUCOSE BLD-MCNC: 102 MG/DL (ref 70–99)
GLUCOSE URINE: NEGATIVE MG/DL
HYALINE CASTS: 2 /LPF (ref 0–8)
KETONES, URINE: NEGATIVE MG/DL
LEUKOCYTE ESTERASE, URINE: ABNORMAL
MICROSCOPIC EXAMINATION: YES
NITRITE, URINE: NEGATIVE
PERFORMED ON: ABNORMAL
PH UA: 8.5
PROTEIN UA: 30 MG/DL
RBC UA: 165 /HPF (ref 0–4)
SPECIFIC GRAVITY UA: 1.02
URINE TYPE: ABNORMAL
UROBILINOGEN, URINE: 1 E.U./DL
WBC UA: 4 /HPF (ref 0–5)

## 2018-11-03 PROCEDURE — 99024 POSTOP FOLLOW-UP VISIT: CPT | Performed by: THORACIC SURGERY (CARDIOTHORACIC VASCULAR SURGERY)

## 2018-11-03 PROCEDURE — 70450 CT HEAD/BRAIN W/O DYE: CPT

## 2018-11-03 PROCEDURE — 2100000000 HC CCU R&B

## 2018-11-03 PROCEDURE — 51798 US URINE CAPACITY MEASURE: CPT

## 2018-11-03 PROCEDURE — 71045 X-RAY EXAM CHEST 1 VIEW: CPT

## 2018-11-03 PROCEDURE — 87040 BLOOD CULTURE FOR BACTERIA: CPT

## 2018-11-03 PROCEDURE — 81001 URINALYSIS AUTO W/SCOPE: CPT

## 2018-11-03 PROCEDURE — 6370000000 HC RX 637 (ALT 250 FOR IP): Performed by: HOSPITALIST

## 2018-11-03 PROCEDURE — 6360000002 HC RX W HCPCS: Performed by: THORACIC SURGERY (CARDIOTHORACIC VASCULAR SURGERY)

## 2018-11-03 PROCEDURE — 94760 N-INVAS EAR/PLS OXIMETRY 1: CPT

## 2018-11-03 PROCEDURE — 6370000000 HC RX 637 (ALT 250 FOR IP): Performed by: THORACIC SURGERY (CARDIOTHORACIC VASCULAR SURGERY)

## 2018-11-03 PROCEDURE — 93005 ELECTROCARDIOGRAM TRACING: CPT | Performed by: NURSE PRACTITIONER

## 2018-11-03 PROCEDURE — 6370000000 HC RX 637 (ALT 250 FOR IP): Performed by: INTERNAL MEDICINE

## 2018-11-03 PROCEDURE — 51702 INSERT TEMP BLADDER CATH: CPT

## 2018-11-03 PROCEDURE — 2580000003 HC RX 258: Performed by: THORACIC SURGERY (CARDIOTHORACIC VASCULAR SURGERY)

## 2018-11-03 RX ORDER — AMOXICILLIN AND CLAVULANATE POTASSIUM 500; 125 MG/1; MG/1
1 TABLET, FILM COATED ORAL EVERY 8 HOURS SCHEDULED
Status: DISCONTINUED | OUTPATIENT
Start: 2018-11-03 | End: 2018-11-04

## 2018-11-03 RX ADMIN — BUPROPION HYDROCHLORIDE 75 MG: 75 TABLET, FILM COATED ORAL at 21:05

## 2018-11-03 RX ADMIN — LISINOPRIL 2.5 MG: 5 TABLET ORAL at 16:13

## 2018-11-03 RX ADMIN — AMIODARONE HYDROCHLORIDE 400 MG: 200 TABLET ORAL at 09:51

## 2018-11-03 RX ADMIN — PRAVASTATIN SODIUM 40 MG: 40 TABLET ORAL at 09:51

## 2018-11-03 RX ADMIN — METOPROLOL TARTRATE 12.5 MG: 25 TABLET ORAL at 21:05

## 2018-11-03 RX ADMIN — SUCRALFATE 1 G: 1 TABLET ORAL at 18:41

## 2018-11-03 RX ADMIN — ACETAMINOPHEN 1000 MG: 500 TABLET, FILM COATED ORAL at 00:43

## 2018-11-03 RX ADMIN — DOCUSATE SODIUM 100 MG: 100 CAPSULE, LIQUID FILLED ORAL at 21:05

## 2018-11-03 RX ADMIN — ASPIRIN 325 MG: 325 TABLET, DELAYED RELEASE ORAL at 09:51

## 2018-11-03 RX ADMIN — PANTOPRAZOLE SODIUM 40 MG: 40 TABLET, DELAYED RELEASE ORAL at 09:50

## 2018-11-03 RX ADMIN — ACETAMINOPHEN 1000 MG: 500 TABLET, FILM COATED ORAL at 07:19

## 2018-11-03 RX ADMIN — SUCRALFATE 1 G: 1 TABLET ORAL at 08:16

## 2018-11-03 RX ADMIN — AMIODARONE HYDROCHLORIDE 400 MG: 200 TABLET ORAL at 21:05

## 2018-11-03 RX ADMIN — ACETAMINOPHEN 1000 MG: 500 TABLET, FILM COATED ORAL at 13:31

## 2018-11-03 RX ADMIN — SUCRALFATE 1 G: 1 TABLET ORAL at 23:53

## 2018-11-03 RX ADMIN — Medication 10 ML: at 21:08

## 2018-11-03 RX ADMIN — AMOXICILLIN AND CLAVULANATE POTASSIUM 1 TABLET: 500; 125 TABLET, FILM COATED ORAL at 23:53

## 2018-11-03 RX ADMIN — BUPROPION HYDROCHLORIDE 75 MG: 75 TABLET, FILM COATED ORAL at 09:51

## 2018-11-03 RX ADMIN — OXYCODONE HYDROCHLORIDE 5 MG: 5 TABLET ORAL at 18:41

## 2018-11-03 RX ADMIN — OXYCODONE HYDROCHLORIDE 5 MG: 5 TABLET ORAL at 08:13

## 2018-11-03 RX ADMIN — Medication 10 ML: at 09:52

## 2018-11-03 RX ADMIN — ACETAMINOPHEN 1000 MG: 500 TABLET, FILM COATED ORAL at 19:18

## 2018-11-03 RX ADMIN — FONDAPARINUX SODIUM 2.5 MG: 2.5 INJECTION, SOLUTION SUBCUTANEOUS at 09:51

## 2018-11-03 RX ADMIN — AMOXICILLIN AND CLAVULANATE POTASSIUM 1 TABLET: 500; 125 TABLET, FILM COATED ORAL at 16:13

## 2018-11-03 RX ADMIN — METOPROLOL TARTRATE 12.5 MG: 25 TABLET ORAL at 09:51

## 2018-11-03 RX ADMIN — SUCRALFATE 1 G: 1 TABLET ORAL at 13:31

## 2018-11-03 RX ADMIN — DOCUSATE SODIUM 100 MG: 100 CAPSULE, LIQUID FILLED ORAL at 09:51

## 2018-11-03 ASSESSMENT — PAIN SCALES - GENERAL
PAINLEVEL_OUTOF10: 6
PAINLEVEL_OUTOF10: 5
PAINLEVEL_OUTOF10: 0
PAINLEVEL_OUTOF10: 5
PAINLEVEL_OUTOF10: 0
PAINLEVEL_OUTOF10: 8
PAINLEVEL_OUTOF10: 4
PAINLEVEL_OUTOF10: 4
PAINLEVEL_OUTOF10: 8
PAINLEVEL_OUTOF10: 0
PAINLEVEL_OUTOF10: 0
PAINLEVEL_OUTOF10: 8
PAINLEVEL_OUTOF10: 0

## 2018-11-03 ASSESSMENT — PAIN DESCRIPTION - ONSET: ONSET: ON-GOING

## 2018-11-03 ASSESSMENT — PAIN DESCRIPTION - PAIN TYPE
TYPE: ACUTE PAIN
TYPE: ACUTE PAIN

## 2018-11-03 ASSESSMENT — PAIN DESCRIPTION - FREQUENCY
FREQUENCY: INTERMITTENT
FREQUENCY: INTERMITTENT

## 2018-11-03 ASSESSMENT — PAIN DESCRIPTION - LOCATION
LOCATION: CHEST
LOCATION: CHEST

## 2018-11-03 ASSESSMENT — PAIN DESCRIPTION - DESCRIPTORS
DESCRIPTORS: SHARP
DESCRIPTORS: DULL;ACHING

## 2018-11-03 ASSESSMENT — PAIN DESCRIPTION - ORIENTATION
ORIENTATION: MID
ORIENTATION: MID

## 2018-11-03 NOTE — PROGRESS NOTES
Pt very uncomfortable, out of be every 10 minutes trying to urinate, Pt unable to urinate since perez catheter removed this am. Pt bladder scanned for 460 ml's urine. .  MD notified. 18 Romansh perez catheter inserted per order,  using aseptic technique. Pt tolerated well. 400 ml's gabriella urine returned.   Will monitor

## 2018-11-03 NOTE — OP NOTE
marginal were 1.5 mm vessels. There were excellent Doppler  signals in all grafts post cardiopulmonary bypass. No left ventricular  thrombus was seen with transesophageal echocardiography. Ejection  fraction improved to 50% at the end of procedure associated with better  anterior wall motion. The patient did have an enlarged left internal  mammary artery lymph node that was encountered during dissection of the  left internal mammary artery that was sent as specimen to pathology. DRAINS:  Left pleural and a mediastinal 24-French Yordy. PACING WIRES:  Bipolar and ventricular. COMPLICATIONS:  None apparent. DISPOSITION:  Critical, but stable to CVICU. REPORT OF PROCEDURE:  After placement of appropriate monitors, induction  of general endotracheal anesthesia, and infusion of appropriate  antibiotics, the patient was sterilely prepped and draped. Saphenous  vein was endoscopically harvested from the left leg and thigh. These  wounds were irrigated and closed in layers after achieving meticulous  hemostasis. Concurrent to vein harvest, a median sternotomy was  performed and the left internal mammary artery was taken down from the  anterior chest wall. The patient was given a systemic dose of heparin. The internal mammary artery was ligated distally and divided. It was  injected with papaverine, clamped, and set aside. The patient was then  cannulated in a standard fashion for antegrade cardioplegia and  cardiopulmonary bypass. After reaching an appropriate ACT, the patient  underwent cardiopulmonary bypass without difficulty. The aorta was  crossclamped and warm antegrade cardioplegia was given until cardiac  standstill and then cold blood cardioplegia was given to complete  induction and intermittently throughout the case as needed. First, the  vein graft to the posterior descending artery was performed in a running  end-to-side fashion.   Next, a punch aortotomy was performed and 11/03/2018 2:34:03     [email or web address]  Job#: [id number]     Doc#: [id number]    CC:  Raechel Grammes, MD Fayetta Peabody, MD

## 2018-11-03 NOTE — PROGRESS NOTES
Called and spoke with wife Pawan Wong at 0600 and explained to her about the patient's fall and new onset fever. Answered all questions and concerns. Triple lumen CVC removed from RIJ per protocol using sterile technique. Procedure explained to patient and TLC removed with catheter intact. Pressure held for 5 min and hemostasis achieved. Site cleaned with chloroprep and tegaderm applied. Current temp 100.9. Patient given two popsicles and some ice water. Next dose of Tylenol due at 0700.

## 2018-11-03 NOTE — PROGRESS NOTES
Axillary temp 102.2, 1000 mg tylenol given. Pt becoming increasingly agitated, behavior consistent with disorientation but pt appropriately answers questions. Wife at bedside and expresses concern that pt is not acting normally.   Page out to MD, awaiting return call

## 2018-11-03 NOTE — PROGRESS NOTES
([facility] [facility]    Mediastinal cyst    Unstable angina ([facility] [address] [facility]    Coronary artery disease involving native coronary artery of native heart with angina pectoris ([facility] [address] [facility]       Assessment/plan:   ~CAD  Complains of surgical chest pain  CABG: 10/29/18: LIMA-LAD, SVG - PDA, SVG - dx-OM. Plan   order 12 lead EKG and agree with surgical treatment plan of care     ~ Post op Atrial Fibrillation      On Amiodarone protocol        In sinus rhythm    ~HTN  Stable: Lisinopril and Lopressor   Plan     Continue current medications    ~  Hyperlipidemia  On Pravastatin 40 mg daily   10/22/18: HDL: 38, LDL: 82    ~Tobacco  Recently quit  Plan     Continued cessation    Lungs diminished and Oral temp 101-  order chest x-ray      Dispo: Will continue to monitor mental status- per neuro checks  Please see orders. Discussed with patient and nursing.       The patient is on a beta-blocker  The patient is on an ace-i/ARB  The patient is on a statin  The patient is not on antiplatelet therapy  The patient does have history of AF, and is not on anticoagulation    Signed:  Tenisha Price, [address]  [phone]

## 2018-11-03 NOTE — PROGRESS NOTES
Pt becoming increasingly agitated, shakey. Pt is very unsteady on his feet but continues to get out of bed without calling for assistance after multiple reminders about fall risk precautions. Pt remains A&O x4, Oral temp 101. 6. Cool cloths provided to help lower temp.   Will continue to monitor

## 2018-11-03 NOTE — PROGRESS NOTES
CC: s/p CABG, urinary retention, fever    No c/o this morning, stumbled in blankets last night and only hit his side on chair.   Tmax 101.9  IJ D/C's this morning  CTAB RRR sternum stable, calves soft and NTTP  As per CC  Will check UA and then likely D/C perez as bladder has been decompressed for ~24 hours  Encouraged IS  CBC in AM

## 2018-11-04 LAB
ANION GAP SERPL CALCULATED.3IONS-SCNC: 9 MMOL/L (ref 3–16)
BASOPHILS ABSOLUTE: 0 K/UL (ref 0–0.2)
BASOPHILS RELATIVE PERCENT: 0.2 %
BUN BLDV-MCNC: 15 MG/DL (ref 7–20)
CALCIUM SERPL-MCNC: 8.2 MG/DL (ref 8.3–10.6)
CHLORIDE BLD-SCNC: 97 MMOL/L (ref 99–110)
CO2: 23 MMOL/L (ref 21–32)
CREAT SERPL-MCNC: 0.9 MG/DL (ref 0.8–1.3)
EKG ATRIAL RATE: 87 BPM
EKG DIAGNOSIS: NORMAL
EKG P AXIS: 69 DEGREES
EKG P-R INTERVAL: 148 MS
EKG Q-T INTERVAL: 340 MS
EKG QRS DURATION: 94 MS
EKG QTC CALCULATION (BAZETT): 409 MS
EKG R AXIS: 77 DEGREES
EKG T AXIS: 68 DEGREES
EKG VENTRICULAR RATE: 87 BPM
EOSINOPHILS ABSOLUTE: 0 K/UL (ref 0–0.6)
EOSINOPHILS RELATIVE PERCENT: 0.1 %
GFR AFRICAN AMERICAN: >60
GFR NON-AFRICAN AMERICAN: >60
GLUCOSE BLD-MCNC: 110 MG/DL (ref 70–99)
GLUCOSE BLD-MCNC: 156 MG/DL (ref 70–99)
HCT VFR BLD CALC: 31.6 % (ref 40.5–52.5)
HCT VFR BLD CALC: 31.9 % (ref 40.5–52.5)
HEMOGLOBIN: 11 G/DL (ref 13.5–17.5)
HEMOGLOBIN: 11 G/DL (ref 13.5–17.5)
LYMPHOCYTES ABSOLUTE: 0.3 K/UL (ref 1–5.1)
LYMPHOCYTES RELATIVE PERCENT: 3.2 %
MCH RBC QN AUTO: 33 PG (ref 26–34)
MCH RBC QN AUTO: 33.5 PG (ref 26–34)
MCHC RBC AUTO-ENTMCNC: 34.4 G/DL (ref 31–36)
MCHC RBC AUTO-ENTMCNC: 34.6 G/DL (ref 31–36)
MCV RBC AUTO: 95.8 FL (ref 80–100)
MCV RBC AUTO: 96.9 FL (ref 80–100)
MONOCYTES ABSOLUTE: 0.7 K/UL (ref 0–1.3)
MONOCYTES RELATIVE PERCENT: 6.3 %
NEUTROPHILS ABSOLUTE: 9.8 K/UL (ref 1.7–7.7)
NEUTROPHILS RELATIVE PERCENT: 90.2 %
PDW BLD-RTO: 13.7 % (ref 12.4–15.4)
PDW BLD-RTO: 13.8 % (ref 12.4–15.4)
PERFORMED ON: ABNORMAL
PLATELET # BLD: 136 K/UL (ref 135–450)
PLATELET # BLD: 148 K/UL (ref 135–450)
PMV BLD AUTO: 8.7 FL (ref 5–10.5)
PMV BLD AUTO: 9.5 FL (ref 5–10.5)
POTASSIUM SERPL-SCNC: 3.6 MMOL/L (ref 3.5–5.1)
RBC # BLD: 3.26 M/UL (ref 4.2–5.9)
RBC # BLD: 3.33 M/UL (ref 4.2–5.9)
SODIUM BLD-SCNC: 129 MMOL/L (ref 136–145)
WBC # BLD: 10.9 K/UL (ref 4–11)
WBC # BLD: 11.3 K/UL (ref 4–11)

## 2018-11-04 PROCEDURE — 85025 COMPLETE CBC W/AUTO DIFF WBC: CPT

## 2018-11-04 PROCEDURE — 6360000002 HC RX W HCPCS: Performed by: THORACIC SURGERY (CARDIOTHORACIC VASCULAR SURGERY)

## 2018-11-04 PROCEDURE — 2700000000 HC OXYGEN THERAPY PER DAY

## 2018-11-04 PROCEDURE — 6370000000 HC RX 637 (ALT 250 FOR IP): Performed by: HOSPITALIST

## 2018-11-04 PROCEDURE — 6370000000 HC RX 637 (ALT 250 FOR IP): Performed by: THORACIC SURGERY (CARDIOTHORACIC VASCULAR SURGERY)

## 2018-11-04 PROCEDURE — 93010 ELECTROCARDIOGRAM REPORT: CPT | Performed by: INTERNAL MEDICINE

## 2018-11-04 PROCEDURE — 2100000000 HC CCU R&B

## 2018-11-04 PROCEDURE — 94760 N-INVAS EAR/PLS OXIMETRY 1: CPT

## 2018-11-04 PROCEDURE — 2580000003 HC RX 258: Performed by: THORACIC SURGERY (CARDIOTHORACIC VASCULAR SURGERY)

## 2018-11-04 PROCEDURE — 6370000000 HC RX 637 (ALT 250 FOR IP): Performed by: UROLOGY

## 2018-11-04 PROCEDURE — 80048 BASIC METABOLIC PNL TOTAL CA: CPT

## 2018-11-04 PROCEDURE — 6370000000 HC RX 637 (ALT 250 FOR IP): Performed by: INTERNAL MEDICINE

## 2018-11-04 PROCEDURE — 99024 POSTOP FOLLOW-UP VISIT: CPT | Performed by: THORACIC SURGERY (CARDIOTHORACIC VASCULAR SURGERY)

## 2018-11-04 PROCEDURE — 2580000003 HC RX 258

## 2018-11-04 PROCEDURE — 99232 SBSQ HOSP IP/OBS MODERATE 35: CPT | Performed by: NURSE PRACTITIONER

## 2018-11-04 RX ORDER — SODIUM CHLORIDE 9 MG/ML
INJECTION, SOLUTION INTRAVENOUS CONTINUOUS
Status: DISCONTINUED | OUTPATIENT
Start: 2018-11-04 | End: 2018-11-05

## 2018-11-04 RX ORDER — TAMSULOSIN HYDROCHLORIDE 0.4 MG/1
0.4 CAPSULE ORAL DAILY
Status: DISCONTINUED | OUTPATIENT
Start: 2018-11-04 | End: 2018-11-07 | Stop reason: HOSPADM

## 2018-11-04 RX ORDER — AMIODARONE HYDROCHLORIDE 200 MG/1
200 TABLET ORAL 2 TIMES DAILY
Status: DISCONTINUED | OUTPATIENT
Start: 2018-11-04 | End: 2018-11-05

## 2018-11-04 RX ORDER — SODIUM CHLORIDE 9 MG/ML
INJECTION, SOLUTION INTRAVENOUS
Status: COMPLETED
Start: 2018-11-04 | End: 2018-11-04

## 2018-11-04 RX ORDER — CIPROFLOXACIN 500 MG/1
500 TABLET, FILM COATED ORAL EVERY 12 HOURS SCHEDULED
Status: DISCONTINUED | OUTPATIENT
Start: 2018-11-04 | End: 2018-11-05

## 2018-11-04 RX ADMIN — AMIODARONE HYDROCHLORIDE 200 MG: 200 TABLET ORAL at 22:22

## 2018-11-04 RX ADMIN — ACETAMINOPHEN 1000 MG: 500 TABLET, FILM COATED ORAL at 19:52

## 2018-11-04 RX ADMIN — Medication 10 ML: at 09:55

## 2018-11-04 RX ADMIN — SODIUM CHLORIDE 500 ML: 9 INJECTION, SOLUTION INTRAVENOUS at 08:44

## 2018-11-04 RX ADMIN — METOPROLOL TARTRATE 12.5 MG: 25 TABLET ORAL at 09:55

## 2018-11-04 RX ADMIN — DOCUSATE SODIUM 100 MG: 100 CAPSULE, LIQUID FILLED ORAL at 22:22

## 2018-11-04 RX ADMIN — SUCRALFATE 1 G: 1 TABLET ORAL at 19:57

## 2018-11-04 RX ADMIN — AMIODARONE HYDROCHLORIDE 200 MG: 200 TABLET ORAL at 09:55

## 2018-11-04 RX ADMIN — SODIUM CHLORIDE 1.5 G: 900 INJECTION INTRAVENOUS at 19:52

## 2018-11-04 RX ADMIN — PANTOPRAZOLE SODIUM 40 MG: 40 TABLET, DELAYED RELEASE ORAL at 09:54

## 2018-11-04 RX ADMIN — SODIUM CHLORIDE 1.5 G: 900 INJECTION INTRAVENOUS at 14:42

## 2018-11-04 RX ADMIN — DOCUSATE SODIUM 100 MG: 100 CAPSULE, LIQUID FILLED ORAL at 09:55

## 2018-11-04 RX ADMIN — ACETAMINOPHEN 1000 MG: 500 TABLET, FILM COATED ORAL at 00:55

## 2018-11-04 RX ADMIN — PRAVASTATIN SODIUM 40 MG: 40 TABLET ORAL at 09:55

## 2018-11-04 RX ADMIN — SODIUM CHLORIDE: 9 INJECTION, SOLUTION INTRAVENOUS at 13:54

## 2018-11-04 RX ADMIN — CIPROFLOXACIN 500 MG: 500 TABLET, FILM COATED ORAL at 22:21

## 2018-11-04 RX ADMIN — ACETAMINOPHEN 1000 MG: 500 TABLET, FILM COATED ORAL at 09:57

## 2018-11-04 RX ADMIN — SODIUM CHLORIDE: 9 INJECTION, SOLUTION INTRAVENOUS at 13:15

## 2018-11-04 RX ADMIN — ALPRAZOLAM 0.25 MG: 0.25 TABLET ORAL at 00:02

## 2018-11-04 RX ADMIN — TAMSULOSIN HYDROCHLORIDE 0.4 MG: 0.4 CAPSULE ORAL at 09:55

## 2018-11-04 RX ADMIN — SUCRALFATE 1 G: 1 TABLET ORAL at 05:58

## 2018-11-04 RX ADMIN — CIPROFLOXACIN 500 MG: 500 TABLET, FILM COATED ORAL at 09:55

## 2018-11-04 RX ADMIN — ASPIRIN 325 MG: 325 TABLET, DELAYED RELEASE ORAL at 09:55

## 2018-11-04 RX ADMIN — SODIUM CHLORIDE 1.5 G: 900 INJECTION INTRAVENOUS at 08:44

## 2018-11-04 RX ADMIN — FONDAPARINUX SODIUM 2.5 MG: 2.5 INJECTION, SOLUTION SUBCUTANEOUS at 09:54

## 2018-11-04 ASSESSMENT — PAIN SCALES - GENERAL
PAINLEVEL_OUTOF10: 0

## 2018-11-04 NOTE — PROGRESS NOTES
B/P low 70's/30's. 500 cc saline bolus given. B/P improving, 90/49. MD notified, orders received.   Will monitor

## 2018-11-04 NOTE — PROGRESS NOTES
CC: s/p CABG, fever of unknown origin    No c/o this morning  NSR  WBC only 11.3  Head CT negative, sinuses clear  CXR no infiltrate  UA yesterday unremarkable  Sternum healing well no signs infection, sternum stable to deep palpation  Left EVH site and tract no signs infection, calves and thighs soft  Right eye no suppuration  Neck supple, can touch chin to chest  abd soft, NTTP in all quadrants, NABS  CTAB RRR  S/p CABG, urinary retention, FUO  Plan check diff, switch augmentin to unasyn  D/C wellbutryn and decrease amiodarone  ?  Can prostatitis be present with negative UA -- will add cipro until urology comments

## 2018-11-04 NOTE — PROGRESS NOTES
the take off of the acute marginal, 50% at the crux, normal PDA and PL branches    Patient Active Problem List   Diagnosis    Hyperlipemia    GERD (gastroesophageal reflux disease)    Essential hypertension    Left ventricular thrombus    ST elevation myocardial infarction involving left anterior descending (LAD) coronary artery (HCC)    Tobacco use    Enlarged lymph node    Chest pain    Bullous emphysema (HCC)    Mediastinal cyst    Unstable angina ([facility] [facility]    Coronary artery disease involving native coronary artery of native heart with angina pectoris ([facility])       Assessment/plan:   ~CAD  Denies any chest pain  CABG: 10/29/18: LIMA-LAD, SVG - PDA, SVG - dx-OM. Plan : agree with surgical treatment plan of care     ~ Post op Atrial Fibrillation      On Amiodarone protocol        In sinus rhythm    ~HTN  Stable: Lisinopril and Lopressor   Plan     Continue current medications    ~  Hyperlipidemia  On Pravastatin 40 mg daily   10/22/18: HDL: 38, LDL: 82  Plan: continue current medication   ~Tobacco  Recently quit  Plan     Continued cessation        Dispo:    Please see orders. Discussed with patient and nursing.       The patient is on a beta-blocker  The patient is on an ace-i/ARB  The patient is on a statin  The patient is not on antiplatelet therapy  The patient does have history of AF, and is not on anticoagulation    Signed:  Latasha Che, [address]  [phone]

## 2018-11-05 LAB
ANION GAP SERPL CALCULATED.3IONS-SCNC: 10 MMOL/L (ref 3–16)
BUN BLDV-MCNC: 16 MG/DL (ref 7–20)
CALCIUM SERPL-MCNC: 8.1 MG/DL (ref 8.3–10.6)
CHLORIDE BLD-SCNC: 106 MMOL/L (ref 99–110)
CO2: 22 MMOL/L (ref 21–32)
CREAT SERPL-MCNC: 0.8 MG/DL (ref 0.8–1.3)
GFR AFRICAN AMERICAN: >60
GFR NON-AFRICAN AMERICAN: >60
GLUCOSE BLD-MCNC: 98 MG/DL (ref 70–99)
HCT VFR BLD CALC: 29.7 % (ref 40.5–52.5)
HEMOGLOBIN: 10.3 G/DL (ref 13.5–17.5)
MCH RBC QN AUTO: 33 PG (ref 26–34)
MCHC RBC AUTO-ENTMCNC: 34.7 G/DL (ref 31–36)
MCV RBC AUTO: 95.2 FL (ref 80–100)
PDW BLD-RTO: 13.7 % (ref 12.4–15.4)
PLATELET # BLD: 160 K/UL (ref 135–450)
PMV BLD AUTO: 8.2 FL (ref 5–10.5)
POTASSIUM SERPL-SCNC: 3.8 MMOL/L (ref 3.5–5.1)
RBC # BLD: 3.13 M/UL (ref 4.2–5.9)
SODIUM BLD-SCNC: 138 MMOL/L (ref 136–145)
WBC # BLD: 5.2 K/UL (ref 4–11)

## 2018-11-05 PROCEDURE — 6370000000 HC RX 637 (ALT 250 FOR IP): Performed by: UROLOGY

## 2018-11-05 PROCEDURE — 6360000002 HC RX W HCPCS: Performed by: THORACIC SURGERY (CARDIOTHORACIC VASCULAR SURGERY)

## 2018-11-05 PROCEDURE — 6370000000 HC RX 637 (ALT 250 FOR IP): Performed by: THORACIC SURGERY (CARDIOTHORACIC VASCULAR SURGERY)

## 2018-11-05 PROCEDURE — 2580000003 HC RX 258: Performed by: THORACIC SURGERY (CARDIOTHORACIC VASCULAR SURGERY)

## 2018-11-05 PROCEDURE — 6370000000 HC RX 637 (ALT 250 FOR IP): Performed by: NURSE PRACTITIONER

## 2018-11-05 PROCEDURE — 85027 COMPLETE CBC AUTOMATED: CPT

## 2018-11-05 PROCEDURE — 99232 SBSQ HOSP IP/OBS MODERATE 35: CPT | Performed by: INTERNAL MEDICINE

## 2018-11-05 PROCEDURE — 6370000000 HC RX 637 (ALT 250 FOR IP): Performed by: HOSPITALIST

## 2018-11-05 PROCEDURE — 2580000003 HC RX 258: Performed by: NURSE PRACTITIONER

## 2018-11-05 PROCEDURE — 94760 N-INVAS EAR/PLS OXIMETRY 1: CPT

## 2018-11-05 PROCEDURE — 6370000000 HC RX 637 (ALT 250 FOR IP): Performed by: INTERNAL MEDICINE

## 2018-11-05 PROCEDURE — 2140000000 HC CCU INTERMEDIATE R&B

## 2018-11-05 PROCEDURE — 80048 BASIC METABOLIC PNL TOTAL CA: CPT

## 2018-11-05 RX ORDER — AMIODARONE HYDROCHLORIDE 200 MG/1
200 TABLET ORAL DAILY
Status: DISCONTINUED | OUTPATIENT
Start: 2018-11-06 | End: 2018-11-07 | Stop reason: HOSPADM

## 2018-11-05 RX ORDER — BETHANECHOL CHLORIDE 10 MG/1
25 TABLET ORAL 2 TIMES DAILY
Status: DISCONTINUED | OUTPATIENT
Start: 2018-11-05 | End: 2018-11-06

## 2018-11-05 RX ORDER — SODIUM CHLORIDE 9 MG/ML
INJECTION, SOLUTION INTRAVENOUS CONTINUOUS
Status: DISCONTINUED | OUTPATIENT
Start: 2018-11-05 | End: 2018-11-07

## 2018-11-05 RX ADMIN — ACETAMINOPHEN 1000 MG: 500 TABLET, FILM COATED ORAL at 06:18

## 2018-11-05 RX ADMIN — ACETAMINOPHEN 1000 MG: 500 TABLET, FILM COATED ORAL at 18:20

## 2018-11-05 RX ADMIN — ACETAMINOPHEN 1000 MG: 500 TABLET, FILM COATED ORAL at 23:31

## 2018-11-05 RX ADMIN — SUCRALFATE 1 G: 1 TABLET ORAL at 18:20

## 2018-11-05 RX ADMIN — PANTOPRAZOLE SODIUM 40 MG: 40 TABLET, DELAYED RELEASE ORAL at 08:03

## 2018-11-05 RX ADMIN — SODIUM CHLORIDE 1.5 G: 900 INJECTION INTRAVENOUS at 12:24

## 2018-11-05 RX ADMIN — DOCUSATE SODIUM 100 MG: 100 CAPSULE, LIQUID FILLED ORAL at 20:15

## 2018-11-05 RX ADMIN — SODIUM CHLORIDE 1.5 G: 900 INJECTION INTRAVENOUS at 03:07

## 2018-11-05 RX ADMIN — SODIUM CHLORIDE: 9 INJECTION, SOLUTION INTRAVENOUS at 04:05

## 2018-11-05 RX ADMIN — SODIUM CHLORIDE 1.5 G: 900 INJECTION INTRAVENOUS at 08:08

## 2018-11-05 RX ADMIN — SUCRALFATE 1 G: 1 TABLET ORAL at 06:18

## 2018-11-05 RX ADMIN — CIPROFLOXACIN 500 MG: 500 TABLET, FILM COATED ORAL at 08:03

## 2018-11-05 RX ADMIN — Medication 10 ML: at 08:04

## 2018-11-05 RX ADMIN — BETHANECHOL CHLORIDE 25 MG: 10 TABLET ORAL at 18:20

## 2018-11-05 RX ADMIN — FONDAPARINUX SODIUM 2.5 MG: 2.5 INJECTION, SOLUTION SUBCUTANEOUS at 08:03

## 2018-11-05 RX ADMIN — TAMSULOSIN HYDROCHLORIDE 0.4 MG: 0.4 CAPSULE ORAL at 08:03

## 2018-11-05 RX ADMIN — AMIODARONE HYDROCHLORIDE 200 MG: 200 TABLET ORAL at 08:02

## 2018-11-05 RX ADMIN — ASPIRIN 325 MG: 325 TABLET, DELAYED RELEASE ORAL at 08:03

## 2018-11-05 RX ADMIN — SODIUM CHLORIDE 1.5 G: 900 INJECTION INTRAVENOUS at 23:31

## 2018-11-05 RX ADMIN — METOPROLOL TARTRATE 12.5 MG: 25 TABLET ORAL at 20:15

## 2018-11-05 RX ADMIN — DOCUSATE SODIUM 100 MG: 100 CAPSULE, LIQUID FILLED ORAL at 08:03

## 2018-11-05 RX ADMIN — PRAVASTATIN SODIUM 40 MG: 40 TABLET ORAL at 08:03

## 2018-11-05 RX ADMIN — SODIUM CHLORIDE: 9 INJECTION, SOLUTION INTRAVENOUS at 15:48

## 2018-11-05 RX ADMIN — Medication 10 ML: at 20:16

## 2018-11-05 RX ADMIN — SUCRALFATE 1 G: 1 TABLET ORAL at 12:13

## 2018-11-05 RX ADMIN — SUCRALFATE 1 G: 1 TABLET ORAL at 23:31

## 2018-11-05 RX ADMIN — SODIUM CHLORIDE 1.5 G: 900 INJECTION INTRAVENOUS at 18:20

## 2018-11-05 RX ADMIN — ACETAMINOPHEN 1000 MG: 500 TABLET, FILM COATED ORAL at 12:13

## 2018-11-05 ASSESSMENT — PAIN SCALES - GENERAL
PAINLEVEL_OUTOF10: 0

## 2018-11-05 NOTE — PROGRESS NOTES
Kathie Street is a 59 y.o. male patient. 1. Unstable angina ([facility] [address]    2. Arteriosclerosis of arterial coronary artery bypass graft      Past Medical History:   Diagnosis Date    Bullous emphysema ([facility] [address]     CAD (coronary artery disease)     GERD (gastroesophageal reflux disease)     HTN (hypertension)     Hyperlipidemia     Mediastinal cyst 09/2018     No past surgical history pertinent negatives on file. Scheduled Meds:   [START ON 11/6/2018] amiodarone  200 mg Oral Daily    bethanechol  25 mg Oral BID    ampicillin-sulbactam (UNASYN) IVPB 1.5 g  1.5 g Intravenous 4 times per day    tamsulosin  0.4 mg Oral Daily    sucralfate  1 g Oral 4 times per day    sodium chloride flush  10 mL Intravenous 2 times per day    acetaminophen  1,000 mg Oral Q6H    docusate sodium  100 mg Oral BID    pantoprazole  40 mg Oral Daily    metoprolol tartrate  12.5 mg Oral BID    fondaparinux  2.5 mg Subcutaneous Daily    aspirin  325 mg Oral Daily    pravastatin  40 mg Oral Daily     Continuous Infusions:   dextrose       PRN Meds:traMADol, tetracaine, sodium chloride flush, oxyCODONE **OR** oxyCODONE, diphenhydrAMINE, zolpidem, polyethylene glycol, magnesium hydroxide, bisacodyl, ondansetron, metoclopramide, albumin human, furosemide, glucose, dextrose, glucagon (rDNA), dextrose, albuterol, carboxymethylcellulose PF, ALPRAZolam    No Known Allergies  Active Problems:    Chest pain    Unstable angina (HCC)    Coronary artery disease involving native coronary artery of native heart with angina pectoris ([facility] [address]  Resolved Problems:    * No resolved hospital problems. *    Blood pressure 89/70, pulse 70, temperature 98.1 °F (36.7 °C), temperature source Temporal, resp. rate 19, height 6' 2\" (1.88 m), weight 173 lb 15.1 oz (78.9 kg), SpO2 94 %. Subjective:   Diet: Adequate intake. Activity level: Returning to normal.    Pain control: Well controlled.       Objective:  Vital signs (most recent): Blood pressure 89/70, pulse 70, temperature 98.1 °F (36.7 °C), temperature source Temporal, resp. rate 19, height 6' 2\" (1.88 m), weight 173 lb 15.1 oz (78.9 kg), SpO2 94 %. General appearance: Comfortable. Abdomen: Abdomen is soft. Extremities: There is normal range of motion. Neurological: The patient is alert and oriented to person, place and time. Assessment:   Condition: In stable condition.        retention  Void diff    recc    Add urecholine  flomax  Void trial luis enrique Cloud MD  11/5/2018

## 2018-11-05 NOTE — PLAN OF CARE
Problem: Falls - Risk of:  Goal: Will remain free from falls  Will remain free from falls   Outcome: Met This Shift  Gait steady. Up ad john. Low fall risk in this pre-op setting. Goal: Absence of physical injury  Absence of physical injury   Outcome: Met This Shift      Problem: Pain:  Goal: Control of acute pain  Control of acute pain   Outcome: Met This Shift  Patient has denied acute pain. The pain that brought him to the hospital was epigastric pain and can be reproduced by pressing on lower part of sternum about zyphoid process. Problem: Preoperative Routine:  Goal: Will comply with preparation for surgery or procedure  Will comply with preparation for surgery or procedure   Outcome: Ongoing  In agreement. Problem: Anxiety:  Goal: Level of anxiety will decrease  Level of anxiety will decrease   Outcome: Ongoing  High level of anxiety. \"Just my personality. \"
Problem: Falls - Risk of:  Goal: Will remain free from falls  Will remain free from falls   Outcome: Ongoing  Pt free from falls this shift, bed side table next to bed, call light in reach, bed in lowest position, wheels locked. Encouraged to call for any assistance    Problem: Activity Intolerance:  Goal: Able to perform prescribed physical activity  Able to perform prescribed physical activity   Outcome: Ongoing  Pt's activity tolerance assessed each shift and as needed. Pt requires stand by assistance with ambulation. Ambulation with four-wheel walker has been needed post-op. Pt able to sit up in chair as ordered. Able to ambulate to restroom with little assistance. Will cont to monitor. Problem: Cardiac Output - Decreased:  Goal: Hemodynamic stability will improve  Hemodynamic stability will improve   Outcome: Ongoing  Pulse rate and rhythm, peripheral pulses, and capillary refill assessed every shift with assessment. General color and body temperature monitored throughout shift and with vitals. Assess for edema with head to toe assessment. Administer treatments and medications as ordered. Monitor patient's weight. Problem: Pain:  Goal: Control of acute pain  Control of acute pain   Outcome: Ongoing  Pt alert and oriented. Pt able to communicate present pain and use the pain scale appropriately. Nonpharmacological pain reducers and pain medication offered as needed. Will cont to monitor.
Problem: Falls - Risk of:  Goal: Will remain free from falls  Will remain free from falls   Outcome: Ongoing  Pt free from falls this shift, bed side table next to bed, call light in reach, bed in lowest position, wheels locked. Encouraged to call for any assistance    Problem: Activity Intolerance:  Goal: Able to perform prescribed physical activity  Able to perform prescribed physical activity   Outcome: Ongoing  Pt's activity tolerance assessed each shift and as needed. Pt requires stand by assistance with ambulation. Ambulation with four-wheel walker has been needed post-op. Pt able to sit up in chair as ordered. Able to ambulate to restroom with little assistance. Will cont to monitor. Problem: Cardiac Output - Decreased:  Goal: Hemodynamic stability will improve  Hemodynamic stability will improve   Outcome: Ongoing  Pulse rate and rhythm, peripheral pulses, and capillary refill assessed every shift with assessment. General color and body temperature monitored throughout shift and with vitals. Assess for edema with head to toe assessment. Administer treatments and medications as ordered. Monitor patient's weight. Problem: Pain:  Goal: Control of acute pain  Control of acute pain   Outcome: Ongoing  Pt alert and oriented. Pt able to communicate present pain and use the pain scale appropriately. Nonpharmacological pain reducers and pain medication offered as needed. Will cont to monitor.
Problem: Falls - Risk of:  Goal: Will remain free from falls  Will remain free from falls   Outcome: Ongoing  Pt remains free of falls. Fall risk protocol in place. Bed locked in lowest position. Pt belongings within reach. Call light in reach. Pt instructed to call for assistance, verbalizes understanding. Will continue to monitor. Problem: Pain:  Goal: Control of acute pain  Control of acute pain   Outcome: Ongoing  Pt with C/O of acute sternal incision pain at start of shift. Informed of next available Oxy dose. Instructed to alert RN when needed, verbalized understanding. Pt resting in bed at this time. Continuing to monitor.
Problem: Falls - Risk of:  Goal: Will remain free from falls  Will remain free from falls   Outcome: Ongoing  Pt up in chair at this time, pt A&OX3, agrees to ring for staff before getting up. Fall precautions in place:chair alarm on, pt declines non-skid socks, prefers gym shoes on, Call light with in reach, chair/bed wheels locked, Bed side table with in reach. Will continue to monitor. Problem: Activity Intolerance:  Goal: Able to perform prescribed physical activity  Able to perform prescribed physical activity   Outcome: Ongoing  Pt ambulated 600 ft this AM using four wheel walker with RN, pt is unsteady and trips over left foot at times d/t not lifting it high enough when ambulating. Pt needs contact guard assist when walking. Over all pt tolerated walk fairly well. Will continue to monitor. Problem: Cardiac Output - Decreased:  Goal: Hemodynamic stability will improve  Hemodynamic stability will improve   Outcome: Ongoing  Pt BP 98/63, per parameters beta blocker held for SBP <100.
continue to monitor. Problem: Cardiac Output - Decreased:  Goal: Hemodynamic stability will improve  Hemodynamic stability will improve   Outcome: Met This Shift  Pulse rate and rhythm, peripheral pulses, and capillary refill assessed every shift with assessment. General color and body temperature monitored throughout shift and with vitals. Assess for edema with head to toe assessment. Administer treatments and medications as ordered. Monitor patient's weight. Problem: Fluid Volume - Imbalance:  Goal: Ability to achieve a balanced intake and output will improve  Ability to achieve a balanced intake and output will improve   Outcome: Met This Shift  Monitor intake and output every 8 hours. Obtain daily weights. Condition of mucous membranes, presence of edema and adventitious lung sounds included as part of head-to-toe assessments.

## 2018-11-05 NOTE — PROGRESS NOTES
Patient is doing much better. Highest temp overnight was 99.9. Denies pain or discomfort. Tried to get patient to get up to the chair this morning and get bathed but he declined. Did get him to complete perez care. His mentation is definitely better. Not sure of how steady he is on his feet yet. Will continue to monitor.

## 2018-11-05 NOTE — PROGRESS NOTES
Nutrition Assessment    Type and Reason for Visit: Reassess    Nutrition Recommendations:   2. Continue current diet  2. Encourage po    Malnutrition Assessment:  · Malnutrition Status: No malnutrition    Nutrition Diagnosis:   · Problem: Inadequate energy intake  · Etiology: related to Insufficient energy/nutrient consumption     Signs and symptoms:  as evidenced by Intake 25-50%    Nutrition Assessment:  · Subjective Assessment: Pt reports fair appetite. Appetite for the past few days has been around 26-50%. Pt had a fever over the weekend and didn't feel like eating much. Pt denies any needs at this time. Does not want a supplement. Pt is at risk for nutritional compromise d/t poor appetite for the past few days. · Nutrition-Focused Physical Findings: No edema noted. I/O's: -3163  · Wound Type: Surgical Wound (incision L leg and chest)  · Current Nutrition Therapies:  · Oral Diet Orders: General, No Added Salt (3-4gm) (No caffeine)   · Oral Diet intake: 26-50%, %  · Oral Nutrition Supplement (ONS) Orders: None  · Anthropometric Measures:  · Ht: 6' 2\" (188 cm)   · Current Body Wt: 173 lb (78.5 kg)  · Admission Body Wt: 175 lb (79.4 kg)  · Ideal Body Wt: 190 lb (86.2 kg), % Ideal Body    · BMI Classification: BMI 18.5 - 24.9 Normal Weight  · Comparative Standards (Estimated Nutrition Needs):  · Estimated Daily Total Kcal: 8036-8899  · Estimated Daily Protein (g):  gms    Estimated Intake vs Estimated Needs: Intake Less Than Needs    Nutrition Risk Level: Moderate    Nutrition Interventions:   Continue current diet  Continued Inpatient Monitoring, Education Completed (Family attended CVU class)    Nutrition Evaluation:   · Evaluation: Progress towards goals declining   · Goals: po intake 75% or greater on appropriate cardiac diet    · Monitoring: Meal Intake, Weight    See Adult Nutrition Doc Flowsheet for more detail.      Electronically signed by Briana Valdez RD, CNSC, LD on 11/5/18 at 9:17

## 2018-11-06 LAB
LEFT VENTRICULAR EJECTION FRACTION HIGH VALUE: 45 %
LEFT VENTRICULAR EJECTION FRACTION MODE: NORMAL
LV EF: 45 %
LVEF MODALITY: NORMAL

## 2018-11-06 PROCEDURE — G8989 SELF CARE D/C STATUS: HCPCS

## 2018-11-06 PROCEDURE — G8979 MOBILITY GOAL STATUS: HCPCS

## 2018-11-06 PROCEDURE — 6370000000 HC RX 637 (ALT 250 FOR IP): Performed by: INTERNAL MEDICINE

## 2018-11-06 PROCEDURE — 94760 N-INVAS EAR/PLS OXIMETRY 1: CPT

## 2018-11-06 PROCEDURE — 6360000002 HC RX W HCPCS: Performed by: THORACIC SURGERY (CARDIOTHORACIC VASCULAR SURGERY)

## 2018-11-06 PROCEDURE — 93306 TTE W/DOPPLER COMPLETE: CPT

## 2018-11-06 PROCEDURE — 97164 PT RE-EVAL EST PLAN CARE: CPT

## 2018-11-06 PROCEDURE — 2580000003 HC RX 258: Performed by: THORACIC SURGERY (CARDIOTHORACIC VASCULAR SURGERY)

## 2018-11-06 PROCEDURE — G8980 MOBILITY D/C STATUS: HCPCS

## 2018-11-06 PROCEDURE — 99231 SBSQ HOSP IP/OBS SF/LOW 25: CPT | Performed by: INTERNAL MEDICINE

## 2018-11-06 PROCEDURE — G8987 SELF CARE CURRENT STATUS: HCPCS

## 2018-11-06 PROCEDURE — 2140000000 HC CCU INTERMEDIATE R&B

## 2018-11-06 PROCEDURE — 6370000000 HC RX 637 (ALT 250 FOR IP): Performed by: THORACIC SURGERY (CARDIOTHORACIC VASCULAR SURGERY)

## 2018-11-06 PROCEDURE — 6370000000 HC RX 637 (ALT 250 FOR IP): Performed by: NURSE PRACTITIONER

## 2018-11-06 PROCEDURE — 6370000000 HC RX 637 (ALT 250 FOR IP): Performed by: UROLOGY

## 2018-11-06 PROCEDURE — 6370000000 HC RX 637 (ALT 250 FOR IP): Performed by: HOSPITALIST

## 2018-11-06 PROCEDURE — G8988 SELF CARE GOAL STATUS: HCPCS

## 2018-11-06 PROCEDURE — G8978 MOBILITY CURRENT STATUS: HCPCS

## 2018-11-06 PROCEDURE — 97168 OT RE-EVAL EST PLAN CARE: CPT

## 2018-11-06 PROCEDURE — 6360000004 HC RX CONTRAST MEDICATION: Performed by: INTERNAL MEDICINE

## 2018-11-06 RX ORDER — AMOXICILLIN AND CLAVULANATE POTASSIUM 250; 125 MG/1; MG/1
1 TABLET, FILM COATED ORAL EVERY 8 HOURS SCHEDULED
Status: DISCONTINUED | OUTPATIENT
Start: 2018-11-07 | End: 2018-11-06

## 2018-11-06 RX ORDER — AMOXICILLIN AND CLAVULANATE POTASSIUM 875; 125 MG/1; MG/1
1 TABLET, FILM COATED ORAL 2 TIMES DAILY
Status: DISCONTINUED | OUTPATIENT
Start: 2018-11-06 | End: 2018-11-07 | Stop reason: HOSPADM

## 2018-11-06 RX ADMIN — HUMAN ALBUMIN MICROSPHERES AND PERFLUTREN 0.11 MG: 10; .22 INJECTION, SOLUTION INTRAVENOUS at 15:00

## 2018-11-06 RX ADMIN — ACETAMINOPHEN 1000 MG: 500 TABLET, FILM COATED ORAL at 21:45

## 2018-11-06 RX ADMIN — SUCRALFATE 1 G: 1 TABLET ORAL at 12:22

## 2018-11-06 RX ADMIN — FONDAPARINUX SODIUM 2.5 MG: 2.5 INJECTION, SOLUTION SUBCUTANEOUS at 09:14

## 2018-11-06 RX ADMIN — PANTOPRAZOLE SODIUM 40 MG: 40 TABLET, DELAYED RELEASE ORAL at 09:12

## 2018-11-06 RX ADMIN — SUCRALFATE 1 G: 1 TABLET ORAL at 05:18

## 2018-11-06 RX ADMIN — BETHANECHOL CHLORIDE 25 MG: 10 TABLET ORAL at 09:12

## 2018-11-06 RX ADMIN — SODIUM CHLORIDE 1.5 G: 900 INJECTION INTRAVENOUS at 12:22

## 2018-11-06 RX ADMIN — AMIODARONE HYDROCHLORIDE 200 MG: 200 TABLET ORAL at 09:13

## 2018-11-06 RX ADMIN — DOCUSATE SODIUM 100 MG: 100 CAPSULE, LIQUID FILLED ORAL at 09:13

## 2018-11-06 RX ADMIN — Medication 10 ML: at 21:45

## 2018-11-06 RX ADMIN — TRAMADOL HYDROCHLORIDE 50 MG: 50 TABLET, FILM COATED ORAL at 17:44

## 2018-11-06 RX ADMIN — TAMSULOSIN HYDROCHLORIDE 0.4 MG: 0.4 CAPSULE ORAL at 09:14

## 2018-11-06 RX ADMIN — ASPIRIN 325 MG: 325 TABLET, DELAYED RELEASE ORAL at 09:13

## 2018-11-06 RX ADMIN — SUCRALFATE 1 G: 1 TABLET ORAL at 17:44

## 2018-11-06 RX ADMIN — AMOXICILLIN AND CLAVULANATE POTASSIUM 1 TABLET: 875; 125 TABLET, FILM COATED ORAL at 21:44

## 2018-11-06 RX ADMIN — ACETAMINOPHEN 1000 MG: 500 TABLET, FILM COATED ORAL at 05:18

## 2018-11-06 RX ADMIN — SUCRALFATE 1 G: 1 TABLET ORAL at 23:56

## 2018-11-06 RX ADMIN — PRAVASTATIN SODIUM 40 MG: 40 TABLET ORAL at 09:14

## 2018-11-06 RX ADMIN — SODIUM CHLORIDE 1.5 G: 900 INJECTION INTRAVENOUS at 05:18

## 2018-11-06 ASSESSMENT — PAIN SCALES - GENERAL
PAINLEVEL_OUTOF10: 4
PAINLEVEL_OUTOF10: 0
PAINLEVEL_OUTOF10: 2
PAINLEVEL_OUTOF10: 0

## 2018-11-06 ASSESSMENT — PAIN DESCRIPTION - LOCATION
LOCATION: STERNUM
LOCATION: STERNUM

## 2018-11-06 ASSESSMENT — PAIN DESCRIPTION - PAIN TYPE
TYPE: ACUTE PAIN
TYPE: ACUTE PAIN

## 2018-11-06 NOTE — PROGRESS NOTES
Exceptions: Wears glasses at all times  Hearing: Within functional limits     Subjective  General  Chart Reviewed: Yes  Family / Caregiver Present: No  Diagnosis: Chest pain, s/p CABG  Follows Commands: Within Functional Limits  General Comment  Comments: supine in bed upon arrival  Subjective  Subjective: Denied pain, agreeable to therapy. States he doesn't know wy he fell  Pain Screening  Patient Currently in Pain: Denies          Orientation  Orientation  Overall Orientation Status: Within Functional Limits  Social/Functional History  Social/Functional History  Lives With: Spouse  Type of Home: House  Home Layout: One level  Home Access: Stairs to enter with rails  Entrance Stairs - Number of Steps: 3 (from garage) 1 without rail in front of home  Entrance Stairs - Rails: Both  Bathroom Shower/Tub: Walk-in shower  Bathroom Toilet: Standard  Bathroom Equipment: Built-in shower seat, Grab bars in shower  Bathroom Accessibility: Walker accessible  ADL Assistance: Independent  Homemaking Assistance: Independent  Ambulation Assistance: Independent  Transfer Assistance: Independent  Active : Yes  Occupation: Full time employment  Type of occupation: [employer]  Leisure & Hobbies: Yardwork, TV  Additional Comments: Falls: Patient endorses no falls in the last 6 months prior to admission. Patient with 1 fall this admission.  Spouse works FT, likely will be at work upon pt discharge    Objective          AROM RLE (degrees)  RLE AROM: WFL  AROM LLE (degrees)  LLE AROM : WFL  Strength RLE  Strength RLE: WFL  Strength LLE  Strength LLE: WFL        Bed mobility  Supine to Sit: Modified independent (cues to maintain sternal precautions)  Scooting: Modified independent  Transfers  Sit to Stand: Modified independent (from bed and toilet, cues for sternal precautions)  Stand to sit: Modified independent  Ambulation  Ambulation?: Yes  Ambulation 1  Surface: level tile  Assistance: Modified Independent  Quality of

## 2018-11-06 NOTE — PROGRESS NOTES
RESPIRATORY THERAPY ASSESSMENT    Name:  [facility] Record Number:  [id number]  Age: 59 y.o. Gender: male  : 1955  Today's Date:  2018  Room:  [place] [address]    Assessment   Patient Admission Diagnosis      Allergies  No Known Allergies    Minimum Predicted Vital Capacity:             Actual Vital Capacity:                Pulmonary History: COPD   Home Oxygen Therapy:  room air  Home Respiratory Therapy: None  Current Respiratory Therapy:  Albuterol PRN  Treatment Type: IS       Respiratory Severity Index(RSI)   Patients with orders for inhalation medications, oxygen, or any therapeutic treatment modality will be placed on Respiratory Protocol. They will be assessed with the first treatment and at least every 72 hours thereafter. The following severity scale will be used to determine frequency of treatment intervention.     Smoking History: Pulmonary Disease or Smoking History, Greater than 15 pack year = 2    Social History  Social History   Substance Use Topics    Smoking status: Former Smoker     Packs/day: 0.50     Years: 40.00     Types: Cigarettes     Quit date: 10/12/2018    Smokeless tobacco: Never Used    Alcohol use No       Recent Surgical History: Thoracic or Upper Airway = 3  Past Surgical History  Past Surgical History:   Procedure Laterality Date    CARDIAC CATHETERIZATION  2018    Dr. Merrell Goltz  2017    Dr. Raghav Ford - transverse colon polyps x2 (4mm, 5mm), tubular adenoma & hyperplastic    OR CABG, ARTERY-VEIN, THREE N/A 10/29/2018    CISCO, TOTAL CPB, AORTO-CABG X 3 USING ENDOSCOPICALLY HARVESTED LEFT SVG, LIMA GRAFT X 1, LEFT INTERNAL MAMMARY ARTERY LYMPH NODE BIOPSY, DOPPLER FLOW VERIFICATION, BILATERAL INTERCOSTAL NERVE BLOCK performed by Darryl Diaz MD at [address]  2017    Dr. Raghav Ford - erosive gastritis    UPPER GASTROINTESTINAL ENDOSCOPY N/A 2018    Dr. Gloria booth/EUS & needle hours, contact physician for possible discontinuation.

## 2018-11-06 NOTE — PROGRESS NOTES
Occupational Therapy   Occupational Therapy Re-evaluation  Date: 2018   Patient Name: mUesh Bess  MRN: [id number]     : 1955    Date of Service: 2018    Discharge Recommendations: Umesh Bess scored a 24/24 on the -[facility] ADL Inpatient form. At this time, no further OT is recommended upon discharge due to pt functioning close to baseline level of function. Recommend patient returns to prior setting with prior services. OT Equipment Recommendations  Equipment Needed: No      Patient Diagnosis(es): The primary encounter diagnosis was Unstable angina ([facility] [address] A diagnosis of Arteriosclerosis of arterial coronary artery bypass graft was also pertinent to this visit. has a past medical history of Bullous emphysema ([facility] [address] CAD (coronary artery disease); GERD (gastroesophageal reflux disease); HTN (hypertension); Hyperlipidemia; and Mediastinal cyst.   has a past surgical history that includes Cardiac catheterization (2018); Upper gastrointestinal endoscopy (2017); Colonoscopy (2017); Upper gastrointestinal endoscopy (N/A, 2018); and pr cabg, artery-vein, three (N/A, 10/29/2018). Treatment Diagnosis: CABG      Restrictions  Restrictions/Precautions  Restrictions/Precautions: Fall Risk, Cardiac (High fall risk)  Position Activity Restriction  Sternal Precautions: No Pushing, No Pulling, 5# Lifting Restrictions  Other position/activity restrictions: 10/25: Umesh Bess is a 59 y.o. male who presented with   Worsening chest pain at home. Patient says having chest pain on and off. Has is scheduled for bypass on Monday. Patient is taking about 20 nitro since Monday not much relief. Describes pressure-like pain at times dull. Reticulocyte center workup recently. Patient currently denies any fevers chills no shortness of breath. Pt fell on , had fever, was hypotensive, and had urinary retention.  Ha catheter d/c'd. Bp 129/71 after mobility, O2 sats 92-95% Supervision  Functional Mobility Comments: supervision progressing to mod I x~300' (see PT notes for gait analysis)  Toilet Transfers  Toilet - Technique: Ambulating  Equipment Used: Standard toilet  Toilet Transfer: Supervision  Toilet Transfers Comments: verbal cues to maintain sternal precautions  ADL  Grooming: Modified independent  (in stance at sink to complete oral care)  Additional Comments: Pt declined to complete further ADLs throughout re-evaluation. Pt educated on completing LB ADLs (dressing and bathing) in seated position to increase safety and to decrease risk of falling  Tone RUE  RUE Tone: Normotonic  Tone LUE  LUE Tone: Normotonic  Coordination  Movements Are Fluid And Coordinated: Yes        Transfers  Sit to stand: Supervision  Stand to sit: Supervision     Cognition  Overall Cognitive Status: WFL  Perception  Overall Perceptual Status: WFL     Sensation  Overall Sensation Status: WFL        LUE AROM (degrees)  LUE AROM : WFL  RUE AROM (degrees)  RUE AROM : WFL  LUE Strength  LUE Strength Comment: not assessed bilaterally d/t sternal precautions                  Assessment   Assessment: Pt is at or near baseline level of function. Anticipate no further OT at this time. Treatment Diagnosis: CABG  Prognosis: Good  Patient Education: Eval, POC, discharge recommendations, safety with ADLs  REQUIRES OT FOLLOW UP: No  Activity Tolerance  Activity Tolerance: Patient Tolerated treatment well  Safety Devices  Safety Devices in place: Yes  Type of devices: Left in chair;Chair alarm in place;Nurse notified;Call light within reach; Patient at risk for falls; All fall risk precautions in place        G-Code  OT G-codes  Functional Limitation: Self care  Self Care Current Status (): 0 percent impaired, limited or restricted  Self Care Goal Status (): 0 percent impaired, limited or restricted  Self Care Discharge Status (): 0 percent impaired, limited or restricted

## 2018-11-07 VITALS
DIASTOLIC BLOOD PRESSURE: 79 MMHG | WEIGHT: 179.23 LBS | OXYGEN SATURATION: 93 % | HEART RATE: 71 BPM | RESPIRATION RATE: 16 BRPM | BODY MASS INDEX: 23 KG/M2 | HEIGHT: 74 IN | SYSTOLIC BLOOD PRESSURE: 125 MMHG | TEMPERATURE: 97.6 F

## 2018-11-07 PROCEDURE — 6370000000 HC RX 637 (ALT 250 FOR IP): Performed by: UROLOGY

## 2018-11-07 PROCEDURE — 99024 POSTOP FOLLOW-UP VISIT: CPT | Performed by: THORACIC SURGERY (CARDIOTHORACIC VASCULAR SURGERY)

## 2018-11-07 PROCEDURE — 6370000000 HC RX 637 (ALT 250 FOR IP): Performed by: NURSE PRACTITIONER

## 2018-11-07 PROCEDURE — 94760 N-INVAS EAR/PLS OXIMETRY 1: CPT

## 2018-11-07 PROCEDURE — 6370000000 HC RX 637 (ALT 250 FOR IP): Performed by: HOSPITALIST

## 2018-11-07 PROCEDURE — 6370000000 HC RX 637 (ALT 250 FOR IP): Performed by: THORACIC SURGERY (CARDIOTHORACIC VASCULAR SURGERY)

## 2018-11-07 PROCEDURE — 6370000000 HC RX 637 (ALT 250 FOR IP): Performed by: INTERNAL MEDICINE

## 2018-11-07 PROCEDURE — 2580000003 HC RX 258: Performed by: THORACIC SURGERY (CARDIOTHORACIC VASCULAR SURGERY)

## 2018-11-07 RX ORDER — AMIODARONE HYDROCHLORIDE 200 MG/1
200 TABLET ORAL DAILY
Qty: 30 TABLET | Refills: 3 | Status: SHIPPED | OUTPATIENT
Start: 2018-11-08 | End: 2019-02-12 | Stop reason: SDUPTHER

## 2018-11-07 RX ORDER — TAMSULOSIN HYDROCHLORIDE 0.4 MG/1
0.4 CAPSULE ORAL DAILY
Qty: 14 CAPSULE | Refills: 0 | Status: SHIPPED | OUTPATIENT
Start: 2018-11-08 | End: 2018-11-15 | Stop reason: ALTCHOICE

## 2018-11-07 RX ORDER — AMOXICILLIN AND CLAVULANATE POTASSIUM 875; 125 MG/1; MG/1
1 TABLET, FILM COATED ORAL 2 TIMES DAILY
Qty: 14 TABLET | Refills: 0 | Status: SHIPPED | OUTPATIENT
Start: 2018-11-07 | End: 2018-11-14

## 2018-11-07 RX ORDER — TRAMADOL HYDROCHLORIDE 50 MG/1
50 TABLET ORAL EVERY 4 HOURS PRN
Qty: 28 TABLET | Refills: 0 | Status: SHIPPED | OUTPATIENT
Start: 2018-11-07 | End: 2018-11-14

## 2018-11-07 RX ADMIN — AMIODARONE HYDROCHLORIDE 200 MG: 200 TABLET ORAL at 09:01

## 2018-11-07 RX ADMIN — PRAVASTATIN SODIUM 40 MG: 40 TABLET ORAL at 09:01

## 2018-11-07 RX ADMIN — PANTOPRAZOLE SODIUM 40 MG: 40 TABLET, DELAYED RELEASE ORAL at 09:01

## 2018-11-07 RX ADMIN — TAMSULOSIN HYDROCHLORIDE 0.4 MG: 0.4 CAPSULE ORAL at 09:01

## 2018-11-07 RX ADMIN — METOPROLOL TARTRATE 12.5 MG: 25 TABLET ORAL at 09:01

## 2018-11-07 RX ADMIN — AMOXICILLIN AND CLAVULANATE POTASSIUM 1 TABLET: 875; 125 TABLET, FILM COATED ORAL at 09:01

## 2018-11-07 RX ADMIN — ASPIRIN 325 MG: 325 TABLET, DELAYED RELEASE ORAL at 09:01

## 2018-11-07 RX ADMIN — Medication 10 ML: at 09:02

## 2018-11-07 RX ADMIN — ACETAMINOPHEN 1000 MG: 500 TABLET, FILM COATED ORAL at 09:01

## 2018-11-07 RX ADMIN — SUCRALFATE 1 G: 1 TABLET ORAL at 11:40

## 2018-11-07 RX ADMIN — ACETAMINOPHEN 1000 MG: 500 TABLET, FILM COATED ORAL at 11:40

## 2018-11-07 RX ADMIN — SUCRALFATE 1 G: 1 TABLET ORAL at 09:05

## 2018-11-07 ASSESSMENT — PAIN SCALES - GENERAL
PAINLEVEL_OUTOF10: 0

## 2018-11-07 NOTE — PROGRESS NOTES
Discharge instructions reviewed with patient and family member. Patient and family verbalized understanding. All home medications have been reviewed, questions answered and patient voiced understanding. All medication side effects reviewed and patient and family verbalized understanding. Patient given prescriptions, discharge instructions, and appointment times. Patient discharged to home with family via private car. Taken to lobby via wheelchair. Follow up appointment(s) reviewed with patient and all attempts made to schedule within 7 days of discharge. [facility]  Depression Screen    1. During the past month, have you often been bothered by feeling down, depressed, or hopeless?   no    2. During the past month, have you often been bothered by little interest or pleasure in       doing things?    no

## 2018-11-08 ENCOUNTER — CARE COORDINATION (OUTPATIENT)
Dept: CASE MANAGEMENT | Age: 63
End: 2018-11-08

## 2018-11-08 DIAGNOSIS — I20.0 UNSTABLE ANGINA (HCC): Primary | ICD-10-CM

## 2018-11-08 LAB — BLOOD CULTURE, ROUTINE: NORMAL

## 2018-11-12 NOTE — CARE COORDINATION
[facility] Transitions Initial Follow Up Call    Call within 2 business days of discharge: No    Patient: Rabia Jaime Patient : 1955   MRN: [id number]  Reason for Admission: CP; CABGx4  Discharge Date: 18 RARS: Readmission Risk Score: 19     Spoke with: [address] [facility]    Non-face-to-face services provided:  Obtained and reviewed discharge summary and/or continuity of care documents    Care Transitions 24 Hour Call    Do you have any ongoing symptoms?:  No  Do you have a copy of your discharge instructions?:  Yes  Do you have all of your prescriptions and are they filled?:  Yes  Have you been contacted by a [facility] Pharmacist?:  No  Have you scheduled your follow up appointment?:  Yes  How are you going to get to your appointment?:  Car - family or friend to transport  Were you discharged with any Home Care or Post Acute Services:  Yes  Post Acute Services:  Home Health (Comment: [facility])  Do you have support at home?:  Partner/Spouse/SO  Do you feel like you have everything you need to keep you well at home?:  Yes  Are you an active caregiver in your home?:  No  Care Transitions Interventions  No Identified Needs       Pt states doing well, no issues or concerns. Incision CDI; minimal pain, stitches bothers him the most. Has all new meds, reviewed all others. [facility]ts [facility] Friday, coming back out today.  Agreed to more CTC f/u calls      Follow Up  Future Appointments  Date Time Provider Meeta Patel   11/15/2018 1:45 PM Solo Yang MD [facility]   2018 10:00 AM Anne-Marie Tyler, APRN - CNP [facility]       Olivia Ramirez RN

## 2018-11-15 ENCOUNTER — OFFICE VISIT (OUTPATIENT)
Dept: CARDIOTHORACIC SURGERY | Age: 63
End: 2018-11-15

## 2018-11-15 VITALS
HEART RATE: 51 BPM | BODY MASS INDEX: 21.88 KG/M2 | OXYGEN SATURATION: 95 % | DIASTOLIC BLOOD PRESSURE: 60 MMHG | HEIGHT: 75 IN | TEMPERATURE: 98.1 F | WEIGHT: 176 LBS | SYSTOLIC BLOOD PRESSURE: 106 MMHG

## 2018-11-15 DIAGNOSIS — Z95.1 S/P CABG (CORONARY ARTERY BYPASS GRAFT): Primary | ICD-10-CM

## 2018-11-15 PROCEDURE — 99024 POSTOP FOLLOW-UP VISIT: CPT | Performed by: THORACIC SURGERY (CARDIOTHORACIC VASCULAR SURGERY)

## 2018-11-15 NOTE — PATIENT INSTRUCTIONS
PREVENTION OF RECURRENT ATHEROSCLEROSIS:  A MESSAGE TO PATIENTS AND THEIR LOVED ONES FROM YOUR SURGEON    You have recently had successful surgery for atherosclerosis. Now your real work begins. Atherosclerosis (hardening of the arteries by cholesterol and fat deposits) can be slowed or stopped from further blocking your own arteries or the new bypass grafts by following \"risk factor management\". If you follow these principles carefully, you can reduce your chances of having heart attacks, strokes, and the need for future surgery. Your cardiologist and primary care doctor should follow these concepts, but your surgeons believe that this is so important that we want you to begin thinking about and following these concepts now. These are the important risk factors you and your doctors should follow carefully. The marked ones apply to YOU:    [x] SMOKING: Absolutely positively never smoke again. Keep your home and work place smoke­ free. Your doctors can prescribe patches, gum or other medications to help. [x] BLOOD PRESSURE CONTROL: Your blood pressure should be less than 140/90. If you have diabetes or kidney disease, your blood pressure should be less than 130/80. Weight reduction, exercise and medications can help you control high blood pressure. [x] CHOLESTEROL AND FATS: A diet low in saturated fat (< 7%) and low in cholesterol (< 200 mg/day), and weight reduction, and exercise, and medications as necessary can help you achieve these goals:  Low density (bad) cholesterol: <70 mg/dL (the lower, the better)   Triglycerides: <150 mg/dL (the lower, the better)   High density (good) cholesterol: >40 mg/dL (the higher, the better)  Make an appointment to see your primary care physician, Dr. Evangelist Price 2 months after your surgery to check your cholesterol profile.   [x] EXERCISE: Your cardiac rehabilitation program will help you work up to a regular make you sweat\" program of at least 30 minutes, at least 6 times per

## 2018-11-19 ENCOUNTER — CARE COORDINATION (OUTPATIENT)
Dept: CASE MANAGEMENT | Age: 63
End: 2018-11-19

## 2018-11-20 ENCOUNTER — OFFICE VISIT (OUTPATIENT)
Dept: CARDIOLOGY CLINIC | Age: 63
End: 2018-11-20
Payer: COMMERCIAL

## 2018-11-20 ENCOUNTER — TELEPHONE (OUTPATIENT)
Dept: CARDIOTHORACIC SURGERY | Age: 63
End: 2018-11-20

## 2018-11-20 VITALS
BODY MASS INDEX: 21.26 KG/M2 | HEART RATE: 62 BPM | SYSTOLIC BLOOD PRESSURE: 122 MMHG | HEIGHT: 75 IN | WEIGHT: 171 LBS | DIASTOLIC BLOOD PRESSURE: 84 MMHG

## 2018-11-20 DIAGNOSIS — I10 ESSENTIAL HYPERTENSION: Chronic | ICD-10-CM

## 2018-11-20 DIAGNOSIS — I25.119 CORONARY ARTERY DISEASE INVOLVING NATIVE CORONARY ARTERY OF NATIVE HEART WITH ANGINA PECTORIS (HCC): ICD-10-CM

## 2018-11-20 DIAGNOSIS — Z72.0 TOBACCO USE: Primary | ICD-10-CM

## 2018-11-20 PROCEDURE — 99214 OFFICE O/P EST MOD 30 MIN: CPT | Performed by: NURSE PRACTITIONER

## 2018-11-20 RX ORDER — TRAMADOL HYDROCHLORIDE 50 MG/1
50 TABLET ORAL EVERY 8 HOURS PRN
COMMUNITY
Start: 2018-11-20 | End: 2018-11-27

## 2018-11-20 NOTE — LETTER
- Benign/reactive appearing lymph node with sinus histiocytosis     - Negative for malignancy    He has been following a walking program, already listed for rehab. He feels like he is getting stronger, appetite good, following walking program, he has not noticed his heart out of rhythm. Medications    Current Outpatient Prescriptions   Medication Sig Dispense Refill    amiodarone (CORDARONE) 200 MG tablet Take 1 tablet by mouth daily 30 tablet 3    aspirin 325 MG EC tablet Take 1 tablet by mouth daily 30 tablet 3    metoprolol tartrate (LOPRESSOR) 25 MG tablet Take 0.5 tablets by mouth 2 times daily 60 tablet 3    melatonin 1 MG tablet Take 1 mg by mouth nightly as needed for Sleep      buPROPion (WELLBUTRIN) 75 MG tablet Take 1 tablet by mouth 2 times daily 1 tablet by mouth 2 times daily for 3 days and then 2 tablets by mouth 2 times daily. (Patient taking differently: Take 75 mg by mouth 2 times daily 1 tablet by mouth 2 times daily for 3 days and then 2 tablets by mouth 2 times daily.) 60 tablet 1    omeprazole (PRILOSEC OTC) 20 MG tablet Take 1 tablet by mouth 2 times daily 60 tablet 3    pravastatin (PRAVACHOL) 40 MG tablet Take 1 tablet by mouth daily 30 tablet 11     No current facility-administered medications for this visit. Social History     Social History Narrative    No narrative on file       Family History   Problem Relation Age of Onset    Heart Disease Brother 27        CABG x 2    Heart Disease Mother         CABG x 2         Review of Systems:  Cardiac: No chest pain and no palpitations. Respiratory: No new SOB, PND, orthopnea or cough. Neurological: No syncope or TIA. General: No major weight gain or loss, no fatigue, no weakness, no fever. Musculoskeletal: No new muscle or joint pain. GI: No change in bowel habits. Psychiatric: No anxiety, no panic, no insomnia, no depression. Skin: No rash.        Lab Results   Component Value Date    CHOL 139 10/22/2018 TRIG 93 10/22/2018    HDL 38 10/22/2018     No results for input(s): AST, ALT, ALB, BILIDIR, BILITOT, ALKPHOS in the last 72 hours. Vitals:    11/19/18 0953   BP: 122/84   Pulse: 62       Physical Examination  Constitutional: He is oriented to person, place and time. He appears well developed and well nourished. Head: Normocephalic and atraumatic. Neck: Neck supple. No JVD present. Carotid bruit is not present. No mass and no thyromegaly present. Cardiovascular: Normal rate, regular rhythm, normal heart sounds and intact distal pulses. Exam reveals no gallop and no friction rub. No murmur heard. Surgical incision looks good, no drainage, redness  Pulmonary/Chest: Effort normal and breath sounds normal.  No respiratory distress. He has no wheezes, rhonchi or rales. Abdominal: Soft, non-tender. Bowel sounds and aorta are normal.  He exhibits no organomegaly, mass or bruit. Extremities: No edema, cyanosis or clubbing. Neurological: He is alert, and oriented to person, place and time. He has normal reflexes. No cranial nerve deficit. Coordination normal.  Skin: Skin is warm and dry. There is no rash or diaphoresis. Phychiatric: He has a normal mood and affect. He speech is normal and behavior is normal.  Judgement, cognition and memory are normal.    Echo 11/18   -Patient is supine in bed. S/p open heart.   -Technically difficult examination.   -Normal left ventricle size.   -There is concentric left ventricular hypertrophy.   - Ejection fraction is visually estimated to be 45%.  -There is apical septal hypokinesis.   -There is a suggestion of layered thrombus in the apical septal area.    -Optison was used to delineate the apex.   -F/V'=7.7.   -Diastolic filling parameters suggest grade I diastolic dysfunction.   -The mitral valve leaflets appear mildly thickened.   -Mild mitral regurgitation.   -Mild tricuspid regurgitation with RVSP of 31 mmHg.       Assessment & Plan:      Diagnosis Orders

## 2018-11-20 NOTE — COMMUNICATION BODY
related to smoking were reviewed with the patient. Recommend maintaining a smoke-free lifestyle. Products available for smoking cessation were discussed in detail.     Saturated fat diet discussed  Exercise program discussed, going to cardiac rehab    NEHEMIAH Cooper [address] [facility]

## 2018-12-06 ENCOUNTER — TELEPHONE (OUTPATIENT)
Dept: CARDIOTHORACIC SURGERY | Age: 63
End: 2018-12-06

## 2018-12-06 ENCOUNTER — HOSPITAL ENCOUNTER (OUTPATIENT)
Dept: CARDIAC REHAB | Age: 63
Setting detail: THERAPIES SERIES
Discharge: HOME | End: 2018-12-06
Payer: COMMERCIAL

## 2018-12-06 NOTE — TELEPHONE ENCOUNTER
Dr. Chary Martinez patient had CABG one month ago. Now having something going on he is having a hard time explaining. Would like to speak with the nurse. Please call him [phone].

## 2018-12-07 ENCOUNTER — HOSPITAL ENCOUNTER (OUTPATIENT)
Dept: CARDIAC REHAB | Age: 63
Setting detail: THERAPIES SERIES
Discharge: HOME | End: 2018-12-07
Payer: COMMERCIAL

## 2018-12-07 PROCEDURE — 93798 PHYS/QHP OP CAR RHAB W/ECG: CPT

## 2018-12-10 ENCOUNTER — HOSPITAL ENCOUNTER (OUTPATIENT)
Dept: CARDIAC REHAB | Age: 63
Setting detail: THERAPIES SERIES
Discharge: HOME | End: 2018-12-10
Payer: COMMERCIAL

## 2018-12-10 PROCEDURE — 93798 PHYS/QHP OP CAR RHAB W/ECG: CPT

## 2018-12-12 ENCOUNTER — OFFICE VISIT (OUTPATIENT)
Dept: FAMILY MEDICINE CLINIC | Age: 63
End: 2018-12-12
Payer: COMMERCIAL

## 2018-12-12 ENCOUNTER — TELEPHONE (OUTPATIENT)
Dept: CARDIOLOGY CLINIC | Age: 63
End: 2018-12-12

## 2018-12-12 ENCOUNTER — HOSPITAL ENCOUNTER (OUTPATIENT)
Dept: CARDIAC REHAB | Age: 63
Setting detail: THERAPIES SERIES
Discharge: HOME | End: 2018-12-12
Payer: COMMERCIAL

## 2018-12-12 VITALS
DIASTOLIC BLOOD PRESSURE: 90 MMHG | WEIGHT: 174 LBS | HEART RATE: 67 BPM | SYSTOLIC BLOOD PRESSURE: 150 MMHG | OXYGEN SATURATION: 97 % | BODY MASS INDEX: 21.75 KG/M2

## 2018-12-12 DIAGNOSIS — F41.9 ANXIETY: ICD-10-CM

## 2018-12-12 DIAGNOSIS — I25.119 CORONARY ARTERY DISEASE INVOLVING NATIVE CORONARY ARTERY OF NATIVE HEART WITH ANGINA PECTORIS (HCC): Primary | ICD-10-CM

## 2018-12-12 PROCEDURE — 99214 OFFICE O/P EST MOD 30 MIN: CPT | Performed by: FAMILY MEDICINE

## 2018-12-12 PROCEDURE — 93798 PHYS/QHP OP CAR RHAB W/ECG: CPT

## 2018-12-12 RX ORDER — SERTRALINE HYDROCHLORIDE 25 MG/1
25 TABLET, FILM COATED ORAL DAILY
Qty: 30 TABLET | Refills: 0 | Status: SHIPPED | OUTPATIENT
Start: 2018-12-12 | End: 2018-12-28 | Stop reason: SDUPTHER

## 2018-12-12 ASSESSMENT — PATIENT HEALTH QUESTIONNAIRE - PHQ9
2. FEELING DOWN, DEPRESSED OR HOPELESS: 1
SUM OF ALL RESPONSES TO PHQ9 QUESTIONS 1 & 2: 2
SUM OF ALL RESPONSES TO PHQ QUESTIONS 1-9: 2
SUM OF ALL RESPONSES TO PHQ QUESTIONS 1-9: 2
1. LITTLE INTEREST OR PLEASURE IN DOING THINGS: 1

## 2018-12-14 ENCOUNTER — HOSPITAL ENCOUNTER (OUTPATIENT)
Dept: CARDIAC REHAB | Age: 63
Setting detail: THERAPIES SERIES
Discharge: HOME | End: 2018-12-14
Payer: COMMERCIAL

## 2018-12-14 PROCEDURE — 93798 PHYS/QHP OP CAR RHAB W/ECG: CPT

## 2018-12-19 ENCOUNTER — HOSPITAL ENCOUNTER (OUTPATIENT)
Dept: CARDIAC REHAB | Age: 63
Setting detail: THERAPIES SERIES
Discharge: HOME | End: 2018-12-19
Payer: COMMERCIAL

## 2018-12-19 PROCEDURE — 93798 PHYS/QHP OP CAR RHAB W/ECG: CPT

## 2018-12-20 ENCOUNTER — TELEPHONE (OUTPATIENT)
Dept: CARDIOLOGY CLINIC | Age: 63
End: 2018-12-20

## 2018-12-20 RX ORDER — OMEPRAZOLE 20 MG/1
CAPSULE, DELAYED RELEASE ORAL
Qty: 60 CAPSULE | Refills: 2 | Status: SHIPPED | OUTPATIENT
Start: 2018-12-20 | End: 2018-12-28

## 2018-12-21 ENCOUNTER — HOSPITAL ENCOUNTER (OUTPATIENT)
Dept: CARDIAC REHAB | Age: 63
Setting detail: THERAPIES SERIES
Discharge: HOME | End: 2018-12-21
Payer: COMMERCIAL

## 2018-12-21 PROCEDURE — 93798 PHYS/QHP OP CAR RHAB W/ECG: CPT

## 2018-12-26 ENCOUNTER — HOSPITAL ENCOUNTER (OUTPATIENT)
Dept: CARDIAC REHAB | Age: 63
Setting detail: THERAPIES SERIES
Discharge: HOME | End: 2018-12-26
Payer: COMMERCIAL

## 2018-12-26 PROCEDURE — 93798 PHYS/QHP OP CAR RHAB W/ECG: CPT

## 2018-12-28 ENCOUNTER — HOSPITAL ENCOUNTER (OUTPATIENT)
Dept: CARDIAC REHAB | Age: 63
Setting detail: THERAPIES SERIES
Discharge: HOME | End: 2018-12-28
Payer: COMMERCIAL

## 2018-12-28 ENCOUNTER — OFFICE VISIT (OUTPATIENT)
Dept: FAMILY MEDICINE CLINIC | Age: 63
End: 2018-12-28
Payer: COMMERCIAL

## 2018-12-28 VITALS
HEART RATE: 71 BPM | OXYGEN SATURATION: 98 % | WEIGHT: 178 LBS | BODY MASS INDEX: 22.25 KG/M2 | SYSTOLIC BLOOD PRESSURE: 102 MMHG | DIASTOLIC BLOOD PRESSURE: 78 MMHG

## 2018-12-28 DIAGNOSIS — I25.119 CORONARY ARTERY DISEASE INVOLVING NATIVE CORONARY ARTERY OF NATIVE HEART WITH ANGINA PECTORIS (HCC): ICD-10-CM

## 2018-12-28 DIAGNOSIS — Z23 NEEDS FLU SHOT: ICD-10-CM

## 2018-12-28 DIAGNOSIS — F41.9 ANXIETY: Primary | ICD-10-CM

## 2018-12-28 LAB
CHOLESTEROL, FASTING: 181 MG/DL (ref 0–199)
HDLC SERPL-MCNC: 46 MG/DL (ref 40–60)
LDL CHOLESTEROL CALCULATED: 114 MG/DL
TRIGLYCERIDE, FASTING: 105 MG/DL (ref 0–150)
VLDLC SERPL CALC-MCNC: 21 MG/DL

## 2018-12-28 PROCEDURE — 90471 IMMUNIZATION ADMIN: CPT | Performed by: FAMILY MEDICINE

## 2018-12-28 PROCEDURE — 90682 RIV4 VACC RECOMBINANT DNA IM: CPT | Performed by: FAMILY MEDICINE

## 2018-12-28 PROCEDURE — 99213 OFFICE O/P EST LOW 20 MIN: CPT | Performed by: FAMILY MEDICINE

## 2018-12-28 PROCEDURE — 93798 PHYS/QHP OP CAR RHAB W/ECG: CPT

## 2019-01-02 ENCOUNTER — TELEPHONE (OUTPATIENT)
Dept: CARDIOLOGY CLINIC | Age: 64
End: 2019-01-02

## 2019-01-21 ENCOUNTER — TELEPHONE (OUTPATIENT)
Dept: CARDIOLOGY CLINIC | Age: 64
End: 2019-01-21

## 2019-01-31 DIAGNOSIS — E78.5 HYPERLIPIDEMIA, UNSPECIFIED HYPERLIPIDEMIA TYPE: Chronic | ICD-10-CM

## 2019-02-01 RX ORDER — PRAVASTATIN SODIUM 40 MG
TABLET ORAL
Qty: 30 TABLET | Refills: 10 | Status: SHIPPED | OUTPATIENT
Start: 2019-02-01 | End: 2019-02-12

## 2019-02-12 ENCOUNTER — OFFICE VISIT (OUTPATIENT)
Dept: CARDIOLOGY CLINIC | Age: 64
End: 2019-02-12
Payer: COMMERCIAL

## 2019-02-12 VITALS
WEIGHT: 185 LBS | OXYGEN SATURATION: 98 % | HEART RATE: 57 BPM | BODY MASS INDEX: 23 KG/M2 | SYSTOLIC BLOOD PRESSURE: 124 MMHG | DIASTOLIC BLOOD PRESSURE: 70 MMHG | HEIGHT: 75 IN

## 2019-02-12 DIAGNOSIS — I25.119 CORONARY ARTERY DISEASE INVOLVING NATIVE CORONARY ARTERY OF NATIVE HEART WITH ANGINA PECTORIS (HCC): Primary | ICD-10-CM

## 2019-02-12 DIAGNOSIS — E78.5 HYPERLIPIDEMIA, UNSPECIFIED HYPERLIPIDEMIA TYPE: Chronic | ICD-10-CM

## 2019-02-12 DIAGNOSIS — I10 ESSENTIAL HYPERTENSION: Chronic | ICD-10-CM

## 2019-02-12 DIAGNOSIS — I51.3 LEFT VENTRICULAR THROMBUS: ICD-10-CM

## 2019-02-12 PROCEDURE — 93000 ELECTROCARDIOGRAM COMPLETE: CPT | Performed by: INTERNAL MEDICINE

## 2019-02-12 PROCEDURE — 99214 OFFICE O/P EST MOD 30 MIN: CPT | Performed by: INTERNAL MEDICINE

## 2019-02-12 RX ORDER — ROSUVASTATIN CALCIUM 10 MG/1
10 TABLET, COATED ORAL DAILY
Qty: 90 TABLET | Refills: 2 | Status: SHIPPED | OUTPATIENT
Start: 2019-02-12 | End: 2019-04-23 | Stop reason: SDUPTHER

## 2019-02-12 RX ORDER — AMIODARONE HYDROCHLORIDE 200 MG/1
100 TABLET ORAL DAILY
Qty: 30 TABLET | Refills: 3
Start: 2019-02-12 | End: 2019-02-12

## 2019-03-13 RX ORDER — OMEPRAZOLE 20 MG/1
CAPSULE, DELAYED RELEASE ORAL
Qty: 60 CAPSULE | Refills: 1 | Status: SHIPPED | OUTPATIENT
Start: 2019-03-13 | End: 2019-05-13 | Stop reason: SDUPTHER

## 2019-03-14 ENCOUNTER — TELEPHONE (OUTPATIENT)
Dept: CARDIOLOGY CLINIC | Age: 64
End: 2019-03-14

## 2019-04-23 ENCOUNTER — TELEPHONE (OUTPATIENT)
Dept: CARDIOTHORACIC SURGERY | Age: 64
End: 2019-04-23

## 2019-04-23 RX ORDER — ROSUVASTATIN CALCIUM 10 MG/1
10 TABLET, COATED ORAL DAILY
Qty: 90 TABLET | Refills: 2 | Status: SHIPPED | OUTPATIENT
Start: 2019-04-23 | End: 2019-06-26 | Stop reason: SDUPTHER

## 2019-04-23 NOTE — TELEPHONE ENCOUNTER
I received a call from [employer] re refill for wellbutrin 75 mg. He has been released from our care s/p CABG 10/29/18 was started on Wellbutrin to help with smoking cessation. He attempted to get his PCP to refill this but so far has not been successful. I explained that Dr. Howard Dent has released him and although he would be happy of Mr. Millie Dong did not restart smoking he was not able to refill this. He ask if his cardiologist would refill, I said he could try but probably not. That it should be his PCP.

## 2019-05-02 RX ORDER — BUPROPION HYDROCHLORIDE 75 MG/1
75 TABLET ORAL 2 TIMES DAILY
Qty: 60 TABLET | Refills: 0 | Status: SHIPPED | OUTPATIENT
Start: 2019-05-02 | End: 2019-11-13

## 2019-05-02 NOTE — TELEPHONE ENCOUNTER
Patient called and requested refill. Refill was E-prescribed to pharmacy. He is up to date with OV. This was prescribed by Dr Gertrude Virk. He is no longer under his care and he is out of medication. He is taking this for smoking cessation.

## 2019-05-13 RX ORDER — OMEPRAZOLE 20 MG/1
CAPSULE, DELAYED RELEASE ORAL
Qty: 60 CAPSULE | Refills: 0 | Status: SHIPPED | OUTPATIENT
Start: 2019-05-13 | End: 2019-06-11 | Stop reason: SDUPTHER

## 2019-06-11 ENCOUNTER — TELEPHONE (OUTPATIENT)
Dept: FAMILY MEDICINE CLINIC | Age: 64
End: 2019-06-11

## 2019-06-11 RX ORDER — OMEPRAZOLE 20 MG/1
CAPSULE, DELAYED RELEASE ORAL
Qty: 90 CAPSULE | Refills: 2 | Status: SHIPPED | OUTPATIENT
Start: 2019-06-11 | End: 2019-11-14 | Stop reason: SDUPTHER

## 2019-06-11 NOTE — TELEPHONE ENCOUNTER
Pt notified, expressed understanding. Rx is due for refill, requested 90 day supply.     Medication and Quantity requested: omeprazole (PRILOSEC) 20 MG delayed release capsule  #90  Refills: 2    Last Visit  12/8/18    Pharmacy and phone number updated in EPIC:  yes

## 2019-06-25 DIAGNOSIS — E78.5 HYPERLIPIDEMIA, UNSPECIFIED HYPERLIPIDEMIA TYPE: Chronic | ICD-10-CM

## 2019-06-25 LAB
A/G RATIO: 1.8 (ref 1.1–2.2)
ALBUMIN SERPL-MCNC: 4.5 G/DL (ref 3.4–5)
ALP BLD-CCNC: 92 U/L (ref 40–129)
ALT SERPL-CCNC: 22 U/L (ref 10–40)
ANION GAP SERPL CALCULATED.3IONS-SCNC: 10 MMOL/L (ref 3–16)
AST SERPL-CCNC: 27 U/L (ref 15–37)
BILIRUB SERPL-MCNC: 0.4 MG/DL (ref 0–1)
BUN BLDV-MCNC: 17 MG/DL (ref 7–20)
CALCIUM SERPL-MCNC: 9.1 MG/DL (ref 8.3–10.6)
CHLORIDE BLD-SCNC: 110 MMOL/L (ref 99–110)
CHOLESTEROL, TOTAL: 104 MG/DL (ref 0–199)
CO2: 25 MMOL/L (ref 21–32)
CREAT SERPL-MCNC: 1 MG/DL (ref 0.8–1.3)
GFR AFRICAN AMERICAN: >60
GFR NON-AFRICAN AMERICAN: >60
GLOBULIN: 2.5 G/DL
GLUCOSE BLD-MCNC: 104 MG/DL (ref 70–99)
HDLC SERPL-MCNC: 33 MG/DL (ref 40–60)
LDL CHOLESTEROL CALCULATED: 54 MG/DL
POTASSIUM SERPL-SCNC: 4.3 MMOL/L (ref 3.5–5.1)
SODIUM BLD-SCNC: 145 MMOL/L (ref 136–145)
TOTAL PROTEIN: 7 G/DL (ref 6.4–8.2)
TRIGL SERPL-MCNC: 86 MG/DL (ref 0–150)
VLDLC SERPL CALC-MCNC: 17 MG/DL

## 2019-06-26 ENCOUNTER — OFFICE VISIT (OUTPATIENT)
Dept: CARDIOLOGY CLINIC | Age: 64
End: 2019-06-26
Payer: COMMERCIAL

## 2019-06-26 VITALS
WEIGHT: 205 LBS | SYSTOLIC BLOOD PRESSURE: 120 MMHG | DIASTOLIC BLOOD PRESSURE: 70 MMHG | BODY MASS INDEX: 25.49 KG/M2 | HEART RATE: 73 BPM | OXYGEN SATURATION: 95 % | HEIGHT: 75 IN

## 2019-06-26 DIAGNOSIS — R07.9 CHEST PAIN, UNSPECIFIED TYPE: ICD-10-CM

## 2019-06-26 DIAGNOSIS — E78.5 HYPERLIPIDEMIA, UNSPECIFIED HYPERLIPIDEMIA TYPE: Primary | Chronic | ICD-10-CM

## 2019-06-26 DIAGNOSIS — I10 ESSENTIAL HYPERTENSION: Chronic | ICD-10-CM

## 2019-06-26 DIAGNOSIS — I25.119 CORONARY ARTERY DISEASE INVOLVING NATIVE CORONARY ARTERY OF NATIVE HEART WITH ANGINA PECTORIS (HCC): ICD-10-CM

## 2019-06-26 PROCEDURE — 99214 OFFICE O/P EST MOD 30 MIN: CPT | Performed by: INTERNAL MEDICINE

## 2019-06-26 RX ORDER — ROSUVASTATIN CALCIUM 10 MG/1
10 TABLET, COATED ORAL DAILY
Qty: 90 TABLET | Refills: 2 | Status: SHIPPED | OUTPATIENT
Start: 2019-06-26 | End: 2020-08-31 | Stop reason: SDUPTHER

## 2019-06-26 NOTE — LETTER
cabg, artery-vein, three (N/A, 10/29/2018). Current Outpatient Medications   Medication Sig Dispense Refill    omeprazole (PRILOSEC) 20 MG delayed release capsule TAKE ONE CAPSULE BY MOUTH TWICE A DAY 90 capsule 2    buPROPion (WELLBUTRIN) 75 MG tablet Take 1 tablet by mouth 2 times daily Indications: Treatment to Stop Smoking 1 tablet by mouth 2 times daily. 60 tablet 0    rosuvastatin (CRESTOR) 10 MG tablet Take 1 tablet by mouth daily 90 tablet 2    metoprolol tartrate (LOPRESSOR) 25 MG tablet Take 1 tablet by mouth 2 times daily 60 tablet 3    sertraline (ZOLOFT) 50 MG tablet Take 1 tablet by mouth daily 30 tablet 5    aspirin 325 MG EC tablet Take 1 tablet by mouth daily 30 tablet 3    melatonin 1 MG tablet Take 1 mg by mouth nightly as needed for Sleep      omeprazole (PRILOSEC OTC) 20 MG tablet Take 1 tablet by mouth 2 times daily 60 tablet 3     No current facility-administered medications for this visit.         Social History:  Social History     Socioeconomic History    Marital status:      Spouse name: Not on file    Number of children: 2    Years of education: Not on file    Highest education level: Not on file   Occupational History    Not on file   Social Needs    Financial resource strain: Not on file    Food insecurity:     Worry: Not on file     Inability: Not on file    Transportation needs:     Medical: Not on file     Non-medical: Not on file   Tobacco Use    Smoking status: Former Smoker     Packs/day: 0.50     Years: 40.00     Pack years: 20.00     Types: Cigarettes     Last attempt to quit: 10/12/2018     Years since quittin.7    Smokeless tobacco: Never Used   Substance and Sexual Activity    Alcohol use: No    Drug use: No    Sexual activity: Yes   Lifestyle    Physical activity:     Days per week: Not on file     Minutes per session: Not on file    Stress: Not on file   Relationships    Social connections:     Talks on phone: Not on file

## 2019-06-26 NOTE — PROGRESS NOTES
daily. 60 tablet 0    rosuvastatin (CRESTOR) 10 MG tablet Take 1 tablet by mouth daily 90 tablet 2    metoprolol tartrate (LOPRESSOR) 25 MG tablet Take 1 tablet by mouth 2 times daily 60 tablet 3    sertraline (ZOLOFT) 50 MG tablet Take 1 tablet by mouth daily 30 tablet 5    aspirin 325 MG EC tablet Take 1 tablet by mouth daily 30 tablet 3    melatonin 1 MG tablet Take 1 mg by mouth nightly as needed for Sleep      omeprazole (PRILOSEC OTC) 20 MG tablet Take 1 tablet by mouth 2 times daily 60 tablet 3     No current facility-administered medications for this visit.         Social History:  Social History     Socioeconomic History    Marital status:      Spouse name: Not on file    Number of children: 2    Years of education: Not on file    Highest education level: Not on file   Occupational History    Not on file   Social Needs    Financial resource strain: Not on file    Food insecurity:     Worry: Not on file     Inability: Not on file    Transportation needs:     Medical: Not on file     Non-medical: Not on file   Tobacco Use    Smoking status: Former Smoker     Packs/day: 0.50     Years: 40.00     Pack years: 20.00     Types: Cigarettes     Last attempt to quit: 10/12/2018     Years since quittin.7    Smokeless tobacco: Never Used   Substance and Sexual Activity    Alcohol use: No    Drug use: No    Sexual activity: Yes   Lifestyle    Physical activity:     Days per week: Not on file     Minutes per session: Not on file    Stress: Not on file   Relationships    Social connections:     Talks on phone: Not on file     Gets together: Not on file     Attends Islam service: Not on file     Active member of club or organization: Not on file     Attends meetings of clubs or organizations: Not on file     Relationship status: Not on file    Intimate partner violence:     Fear of current or ex partner: Not on file     Emotionally abused: Not on file     Physically abused: Not on file Normal breath sounds without dullness  Cardiovascular:  · The apical impulses not displaced  · Heart is regular rate and rhythm with normal S1, S2  · PMI is normal  · The carotid upstroke is normal, no bruit noted   · JVP is not elevated  · Peripheral pulses are symmetrical  · There is no clubbing, cyanosis of the extremities  · No edema  · No varicosities  · Femoral Arteries: 2+ and equal without bruits  · Pedal Pulses: 2+ and equal   Abdomen:  · No masses or tenderness  · Aorta not palpable  · Normal bowel sounds  Neurological/Psychiatric:  · Alert and oriented x3  · Moves all extremities well  · Exhibits normal gait balance and coordination      Assessment/Plan  1. Coronary artery disease involving native coronary artery of native heart with angina pectoris ([facility] [address] -stable  CABG 10/29/18: x4 with LIMA-LAD, SVG that was sequenced from the first diagonal to the first OM and a SVG to the PDA   2. Essential hypertension -stable   3. Atrial fibrillation -EKG>sinus bradycardia, HR 56bpm, can stop Amio   4. Hyperlipidemia - Crestor 10mg daily. Labs 6/25/19: ; TRIG 86; HDL 33; LDL 54       PLAN:  Euna Lente appears stable. No med changes. Continue regular exercise. FU 6 months. Scribe's attestation: This note was scribed in the presence of Dr Grace Peña MD by Hue Vazquez RN. The scribe's documentation has been prepared under my direction and personally reviewed by me in its entirety. I confirm that the note above accurately reflects all work, treatment, procedures, and medical decision making performed by me. Thank you for allowing to me to participate in the care of Shaina Vasquez.

## 2019-11-13 ENCOUNTER — OFFICE VISIT (OUTPATIENT)
Dept: FAMILY MEDICINE CLINIC | Age: 64
End: 2019-11-13
Payer: COMMERCIAL

## 2019-11-13 VITALS
BODY MASS INDEX: 24.38 KG/M2 | WEIGHT: 190 LBS | RESPIRATION RATE: 14 BRPM | DIASTOLIC BLOOD PRESSURE: 80 MMHG | SYSTOLIC BLOOD PRESSURE: 128 MMHG | OXYGEN SATURATION: 98 % | HEART RATE: 72 BPM | HEIGHT: 74 IN

## 2019-11-13 DIAGNOSIS — F41.9 ANXIETY: Primary | ICD-10-CM

## 2019-11-13 DIAGNOSIS — R07.9 CHEST PAIN, UNSPECIFIED TYPE: ICD-10-CM

## 2019-11-13 PROCEDURE — 99214 OFFICE O/P EST MOD 30 MIN: CPT | Performed by: FAMILY MEDICINE

## 2019-11-13 PROCEDURE — 93000 ELECTROCARDIOGRAM COMPLETE: CPT | Performed by: FAMILY MEDICINE

## 2019-11-13 RX ORDER — BUSPIRONE HYDROCHLORIDE 5 MG/1
5 TABLET ORAL 3 TIMES DAILY
Qty: 90 TABLET | Refills: 0 | Status: SHIPPED | OUTPATIENT
Start: 2019-11-13 | End: 2019-12-17 | Stop reason: SDUPTHER

## 2019-11-13 ASSESSMENT — PATIENT HEALTH QUESTIONNAIRE - PHQ9
2. FEELING DOWN, DEPRESSED OR HOPELESS: 1
SUM OF ALL RESPONSES TO PHQ QUESTIONS 1-9: 2
SUM OF ALL RESPONSES TO PHQ QUESTIONS 1-9: 2
SUM OF ALL RESPONSES TO PHQ9 QUESTIONS 1 & 2: 2
1. LITTLE INTEREST OR PLEASURE IN DOING THINGS: 1

## 2019-11-14 ENCOUNTER — TELEPHONE (OUTPATIENT)
Dept: CARDIOLOGY CLINIC | Age: 64
End: 2019-11-14

## 2019-11-14 DIAGNOSIS — R94.31 ABNORMAL EKG: Primary | ICD-10-CM

## 2019-11-14 DIAGNOSIS — Z72.0 TOBACCO ABUSE: ICD-10-CM

## 2019-11-14 RX ORDER — OMEPRAZOLE 20 MG/1
CAPSULE, DELAYED RELEASE ORAL
Qty: 90 CAPSULE | Refills: 1 | Status: SHIPPED | OUTPATIENT
Start: 2019-11-14 | End: 2020-02-25

## 2019-11-20 ENCOUNTER — HOSPITAL ENCOUNTER (OUTPATIENT)
Dept: NON INVASIVE DIAGNOSTICS | Age: 64
Discharge: HOME | End: 2019-11-20
Payer: COMMERCIAL

## 2019-11-20 DIAGNOSIS — R94.31 ABNORMAL EKG: ICD-10-CM

## 2019-11-20 DIAGNOSIS — Z72.0 TOBACCO ABUSE: ICD-10-CM

## 2019-11-20 LAB
LV EF: 47 %
LVEF MODALITY: NORMAL

## 2019-11-20 PROCEDURE — 93017 CV STRESS TEST TRACING ONLY: CPT | Performed by: INTERNAL MEDICINE

## 2019-11-20 PROCEDURE — 3430000000 HC RX DIAGNOSTIC RADIOPHARMACEUTICAL: Performed by: INTERNAL MEDICINE

## 2019-11-20 PROCEDURE — A9502 TC99M TETROFOSMIN: HCPCS | Performed by: INTERNAL MEDICINE

## 2019-11-20 PROCEDURE — 78452 HT MUSCLE IMAGE SPECT MULT: CPT | Performed by: INTERNAL MEDICINE

## 2019-11-20 RX ADMIN — TETROFOSMIN 10 MILLICURIE: 1.38 INJECTION, POWDER, LYOPHILIZED, FOR SOLUTION INTRAVENOUS at 13:22

## 2019-11-20 RX ADMIN — TETROFOSMIN 30 MILLICURIE: 1.38 INJECTION, POWDER, LYOPHILIZED, FOR SOLUTION INTRAVENOUS at 14:12

## 2019-12-17 DIAGNOSIS — F41.9 ANXIETY: ICD-10-CM

## 2019-12-17 RX ORDER — BUSPIRONE HYDROCHLORIDE 5 MG/1
TABLET ORAL
Qty: 90 TABLET | Refills: 0 | Status: SHIPPED | OUTPATIENT
Start: 2019-12-17 | End: 2020-01-20

## 2020-01-08 ENCOUNTER — OFFICE VISIT (OUTPATIENT)
Dept: CARDIOLOGY CLINIC | Age: 65
End: 2020-01-08
Payer: COMMERCIAL

## 2020-01-08 VITALS
HEIGHT: 74 IN | OXYGEN SATURATION: 95 % | HEART RATE: 69 BPM | BODY MASS INDEX: 24.51 KG/M2 | DIASTOLIC BLOOD PRESSURE: 74 MMHG | WEIGHT: 191 LBS | SYSTOLIC BLOOD PRESSURE: 110 MMHG

## 2020-01-08 PROCEDURE — 99214 OFFICE O/P EST MOD 30 MIN: CPT | Performed by: INTERNAL MEDICINE

## 2020-01-08 RX ORDER — METOPROLOL SUCCINATE 50 MG/1
50 TABLET, EXTENDED RELEASE ORAL NIGHTLY
Qty: 90 TABLET | Refills: 3 | Status: SHIPPED | OUTPATIENT
Start: 2020-01-08 | End: 2021-01-13

## 2020-01-08 RX ORDER — NICOTINE 21 MG/24HR
1 PATCH, TRANSDERMAL 24 HOURS TRANSDERMAL DAILY
Qty: 14 PATCH | Refills: 2 | Status: SHIPPED | OUTPATIENT
Start: 2020-01-08 | End: 2020-07-01

## 2020-01-08 NOTE — PROGRESS NOTES
(BUSPAR) 5 MG tablet TAKE ONE TABLET BY MOUTH THREE TIMES A DAY 90 tablet 0    omeprazole (PRILOSEC) 20 MG delayed release capsule TAKE ONE CAPSULE BY MOUTH TWICE A DAY 90 capsule 1    rosuvastatin (CRESTOR) 10 MG tablet Take 1 tablet by mouth daily 90 tablet 2    metoprolol tartrate (LOPRESSOR) 25 MG tablet Take 1 tablet by mouth 2 times daily 180 tablet 3    sertraline (ZOLOFT) 50 MG tablet Take 1 tablet by mouth daily 30 tablet 5    aspirin 325 MG EC tablet Take 1 tablet by mouth daily 30 tablet 3    melatonin 1 MG tablet Take 1 mg by mouth nightly as needed for Sleep       No current facility-administered medications for this visit.         Social History:  Social History     Socioeconomic History    Marital status:      Spouse name: Not on file    Number of children: 2    Years of education: Not on file    Highest education level: Not on file   Occupational History    Not on file   Social Needs    Financial resource strain: Not on file    Food insecurity:     Worry: Not on file     Inability: Not on file    Transportation needs:     Medical: Not on file     Non-medical: Not on file   Tobacco Use    Smoking status: Former Smoker     Packs/day: 0.50     Years: 40.00     Pack years: 20.00     Types: Cigarettes     Last attempt to quit: 10/12/2018     Years since quittin.2    Smokeless tobacco: Never Used   Substance and Sexual Activity    Alcohol use: No    Drug use: No    Sexual activity: Yes   Lifestyle    Physical activity:     Days per week: Not on file     Minutes per session: Not on file    Stress: Not on file   Relationships    Social connections:     Talks on phone: Not on file     Gets together: Not on file     Attends Anglican service: Not on file     Active member of club or organization: Not on file     Attends meetings of clubs or organizations: Not on file     Relationship status: Not on file    Intimate partner violence:     Fear of current or ex partner: Not on excursion and expansion without use of accessory muscles  · Resp Auscultation: Normal breath sounds without dullness  Cardiovascular:  · The apical impulses not displaced  · Heart is regular rate and rhythm with normal S1, S2  · PMI is normal  · The carotid upstroke is normal, no bruit noted   · JVP is not elevated  · Peripheral pulses are symmetrical  · There is no clubbing, cyanosis of the extremities  · No edema  · No varicosities  · Femoral Arteries: 2+ and equal without bruits  · Pedal Pulses: 2+ and equal   Abdomen:  · No masses or tenderness  · Aorta not palpable  · Normal bowel sounds  Neurological/Psychiatric:  · Alert and oriented x3  · Moves all extremities well  · Exhibits normal gait balance and coordination      Assessment/Plan  1. Coronary artery disease involving native coronary artery of native heart with angina pectoris ([address] -stable  GXT 11/20/19: medium to large sized anteroseptal and apical fixed defect consistent with infarction in territory of LAD. EF 47%  CABG 10/29/18: x4 with LIMA-LAD, SVG that was sequenced from the first diagonal to the first OM and a SVG to the PDA   2. Essential hypertension -stable   3. Atrial fibrillation -on BB and asa- none today   4. Hyperlipidemia - Crestor 10mg daily. Labs 6/25/19: ; TRIG 86; HDL 33; LDL 54       PLAN: will prescribe nicoderm 14mg patch to wear daily. Encouraged to quit smoking and continue regular exercise. FU 6 months. Scribe's attestation: This note was scribed in the presence of Dr Faby Chandler MD by Brendan Duran RN. The scribe's documentation has been prepared under my direction and personally reviewed by me in its entirety. I confirm that the note above accurately reflects all work, treatment, procedures, and medical decision making performed by me. Thank you for allowing to me to participate in the care of Ankush Trent.

## 2020-01-20 RX ORDER — BUSPIRONE HYDROCHLORIDE 5 MG/1
TABLET ORAL
Qty: 90 TABLET | Refills: 0 | Status: SHIPPED | OUTPATIENT
Start: 2020-01-20 | End: 2020-02-19

## 2020-01-20 NOTE — TELEPHONE ENCOUNTER
Medication:   Requested Prescriptions     Pending Prescriptions Disp Refills    busPIRone (BUSPAR) 5 MG tablet [Pharmacy Med Name: busPIRone HCL 5 MG TABLET] 90 tablet 0     Sig: TAKE ONE TABLET BY MOUTH THREE TIMES A DAY        Last Filled:  12/17/19 #90, 0RF     Patient Phone Number: [phone] (home) [phone] (work)    Last appt: 11/13/2019 anxiety  Next appt: Visit date not found    Last OARRS: No flowsheet data found.

## 2020-02-18 NOTE — TELEPHONE ENCOUNTER
Medication:   Requested Prescriptions     Pending Prescriptions Disp Refills    busPIRone (BUSPAR) 5 MG tablet [Pharmacy Med Name: busPIRone HCL 5 MG TABLET] 90 tablet 0     Sig: TAKE ONE TABLET BY MOUTH THREE TIMES A DAY        Last Filled:  1/20/2020 #90 0rf    Patient Phone Number: [phone] (home) [phone] (work)    Last appt: 11/13/2019   Next appt: Visit date not found    Last OARRS: No flowsheet data found.

## 2020-02-19 RX ORDER — BUSPIRONE HYDROCHLORIDE 5 MG/1
TABLET ORAL
Qty: 90 TABLET | Refills: 0 | Status: SHIPPED | OUTPATIENT
Start: 2020-02-19 | End: 2020-03-30

## 2020-02-25 RX ORDER — OMEPRAZOLE 20 MG/1
CAPSULE, DELAYED RELEASE ORAL
Qty: 90 CAPSULE | Refills: 3 | Status: SHIPPED | OUTPATIENT
Start: 2020-02-25 | End: 2020-04-10 | Stop reason: SDUPTHER

## 2020-02-25 RX ORDER — OMEPRAZOLE 20 MG/1
CAPSULE, DELAYED RELEASE ORAL
Qty: 90 CAPSULE | Refills: 2 | Status: SHIPPED | OUTPATIENT
Start: 2020-02-25 | End: 2020-02-25 | Stop reason: SDUPTHER

## 2020-03-30 RX ORDER — BUSPIRONE HYDROCHLORIDE 5 MG/1
TABLET ORAL
Qty: 90 TABLET | Refills: 0 | Status: SHIPPED | OUTPATIENT
Start: 2020-03-30 | End: 2020-05-05

## 2020-03-30 NOTE — TELEPHONE ENCOUNTER
Medication:   Requested Prescriptions     Pending Prescriptions Disp Refills    busPIRone (BUSPAR) 5 MG tablet [Pharmacy Med Name: busPIRone HCL 5 MG TABLET] 90 tablet 0     Sig: TAKE ONE TABLET BY MOUTH THREE TIMES A DAY        Last Filled:  2/19/20 #90, 0 RF     Patient Phone Number: [phone] (home) [phone] (work)    Last appt: 11/13/2019  anxiety  Next appt: Visit date not found    Last OARRS: No flowsheet data found.

## 2020-04-10 RX ORDER — OMEPRAZOLE 20 MG/1
20 CAPSULE, DELAYED RELEASE ORAL 2 TIMES DAILY
Qty: 180 CAPSULE | Refills: 3 | Status: SHIPPED | OUTPATIENT
Start: 2020-04-10 | End: 2021-04-21

## 2020-04-10 NOTE — TELEPHONE ENCOUNTER
Pharmacy stated that pt has been taking the omeprazole (PRILOSEC) 20 MG delayed release capsule twice a day now. Wants to know if a new prescription can be written. Please advice.

## 2020-05-05 RX ORDER — BUSPIRONE HYDROCHLORIDE 5 MG/1
TABLET ORAL
Qty: 90 TABLET | Refills: 0 | Status: SHIPPED | OUTPATIENT
Start: 2020-05-05 | End: 2020-06-24

## 2020-05-12 ENCOUNTER — TELEPHONE (OUTPATIENT)
Dept: FAMILY MEDICINE CLINIC | Age: 65
End: 2020-05-12

## 2020-05-13 RX ORDER — CITALOPRAM 20 MG/1
20 TABLET ORAL DAILY
Qty: 30 TABLET | Refills: 0 | Status: SHIPPED | OUTPATIENT
Start: 2020-05-13 | End: 2021-05-19

## 2020-06-24 RX ORDER — BUSPIRONE HYDROCHLORIDE 5 MG/1
TABLET ORAL
Qty: 90 TABLET | Refills: 0 | Status: SHIPPED | OUTPATIENT
Start: 2020-06-24 | End: 2020-07-30

## 2020-06-24 RX ORDER — SERTRALINE HYDROCHLORIDE 100 MG/1
TABLET, FILM COATED ORAL
Qty: 30 TABLET | Refills: 3 | Status: SHIPPED | OUTPATIENT
Start: 2020-06-24 | End: 2021-05-19

## 2020-06-24 NOTE — TELEPHONE ENCOUNTER
1 [facility] sertraline (ZOLOFT) 50 MG tablet is not available, but 100 MG is.  They are requesting new Rx with instructions to take 1/2 tablet daily

## 2020-07-01 ENCOUNTER — OFFICE VISIT (OUTPATIENT)
Dept: CARDIOLOGY CLINIC | Age: 65
End: 2020-07-01
Payer: COMMERCIAL

## 2020-07-01 VITALS
DIASTOLIC BLOOD PRESSURE: 70 MMHG | HEART RATE: 68 BPM | HEIGHT: 74 IN | SYSTOLIC BLOOD PRESSURE: 126 MMHG | BODY MASS INDEX: 25.26 KG/M2 | WEIGHT: 196.8 LBS

## 2020-07-01 PROCEDURE — 99214 OFFICE O/P EST MOD 30 MIN: CPT | Performed by: INTERNAL MEDICINE

## 2020-07-01 RX ORDER — NICOTINE 21 MG/24HR
1 PATCH, TRANSDERMAL 24 HOURS TRANSDERMAL DAILY
Qty: 42 PATCH | Refills: 0 | Status: SHIPPED | OUTPATIENT
Start: 2020-07-01 | End: 2021-09-01

## 2020-07-01 NOTE — PROGRESS NOTES
[address]   Cardiac Evaluation      Patient: Ami Mcclelland  YOB: 1955         Chief Complaint   Patient presents with    Hypertension        Referring provider: Luiz Lee MD    History of Present Illness:   Mr Glennie Osler is seen today in follow up. He was hospitalized after found to have an abnormal CT scan showing a left ventricular thrombus.  His EKG was markedly abnormal and he underwent cardiac cath revealing 3V CAD. An incidental finding on the CT scan which was possibly consistent with malignancy and he required PET scanning and otherstudies to further identify the abnormality which turned out to be a benign cyst. . He is a very anxious individual. He underwent CABG X 4 on 10/29/2018. Tolu Mendoza had an uneventful postoperative course with the exception of a short bout of A-Fib. The patient was discharged on 11/07/2018 in NSR. Amio has been discontinued. Mr Glennie Osler states he has been ok. He has muscle pain on his chest from bypass surgery. He uses pedro-dickerson to relieve the discomfort. Adela Lee denies any exertional chest pain, palpitations, CHAVARRIA, dizziness, or edema. Adela Lee is physically active and works in his yard. Unfortunately, he continues to smoke \"off and on\". He attributes it to stress at work and would like to quit. Past Medical History:   has a past medical history of Bullous emphysema ([address] CAD (coronary artery disease), GERD (gastroesophageal reflux disease), HTN (hypertension), Hyperlipidemia, and Mediastinal cyst.    Surgical History:   has a past surgical history that includes Cardiac catheterization (09/21/2018); Upper gastrointestinal endoscopy (09/27/2017); Colonoscopy (09/27/2017); Upper gastrointestinal endoscopy (N/A, 9/26/2018); and pr cabg, artery-vein, three (N/A, 10/29/2018).      Current Outpatient Medications   Medication Sig Dispense Refill    busPIRone (BUSPAR) 5 MG tablet TAKE ONE TABLET BY MOUTH THREE TIMES A DAY 90 tablet 0    sertraline (ZOLOFT) 100 MG tablet TAKE HALF TABLET BY MOUTH DAILY 30 tablet 3    omeprazole (PRILOSEC) 20 MG delayed release capsule Take 1 capsule by mouth 2 times daily 180 capsule 3    nicotine (NICODERM CQ) 14 MG/24HR Place 1 patch onto the skin daily for 14 days 14 patch 2    metoprolol succinate (TOPROL XL) 50 MG extended release tablet Take 1 tablet by mouth nightly 90 tablet 3    rosuvastatin (CRESTOR) 10 MG tablet Take 1 tablet by mouth daily 90 tablet 2    aspirin 325 MG EC tablet Take 1 tablet by mouth daily 30 tablet 3    melatonin 1 MG tablet Take 1 mg by mouth nightly as needed for Sleep      citalopram (CELEXA) 20 MG tablet Take 1 tablet by mouth daily (Patient not taking: Reported on 2020) 30 tablet 0     No current facility-administered medications for this visit.         Social History:  Social History     Socioeconomic History    Marital status:      Spouse name: Not on file    Number of children: 2    Years of education: Not on file    Highest education level: Not on file   Occupational History    Not on file   Social Needs    Financial resource strain: Not on file    Food insecurity     Worry: Not on file     Inability: Not on file   [employer] needs     Medical: Not on file     Non-medical: Not on file   Tobacco Use    Smoking status: Former Smoker     Packs/day: 0.50     Years: 40.00     Pack years: 20.00     Types: Cigarettes     Last attempt to quit: 10/12/2018     Years since quittin.7    Smokeless tobacco: Never Used   Substance and Sexual Activity    Alcohol use: No    Drug use: No    Sexual activity: Yes   Lifestyle    Physical activity     Days per week: Not on file     Minutes per session: Not on file    Stress: Not on file   Relationships    Social connections     Talks on phone: Not on file     Gets together: Not on file     Attends Gnosticist service: Not on file     Active member of club or organization: Not on file     Attends meetings of clubs or organizations: Not on file     Relationship status: Not on file    Intimate partner violence     Fear of current or ex partner: Not on file     Emotionally abused: Not on file     Physically abused: Not on file     Forced sexual activity: Not on file   Other Topics Concern    Not on file   Social History Narrative    Not on file       Family History:  family history includes Heart Disease in his mother; Heart Disease (age of onset: 27) in his brother. Allergies:  Patient has no known allergies. Review of Systems:   · Constitutional: there has been no unanticipated weight loss. No change in energy or activity level   · Eyes: No visual changes   · ENT: No Headaches, hearing loss or vertigo. No mouth sores or sore throat. · Cardiovascular: Reviewed in HPI  · Respiratory: No cough or wheezing, no sputum production. · Gastrointestinal: No abdominal pain, appetite loss, blood in stools. No change in bowel or bladder habits. · Genitourinary: No nocturia, dysuria, trouble voiding  · Musculoskeletal:  No gait disturbance, weakness or joint complaints. · Integumentary: No rash or pruritis. · Neurological: No headache, change in muscle strength, numbness or tingling. No change in gait, balance, coordination, mood, affect, memory, mentation, behavior. · Psychiatric: No anxiety or depression  · Endocrine: No malaise or fever  · Hematologic/Lymphatic: No abnormal bruising or bleeding, blood clots or swollen lymph nodes. · Allergic/Immunologic: No nasal congestion or hives. Physical Examination:    Vitals:    07/01/20 1557   BP: 126/70   Site: Left Upper Arm   Position: Sitting   Cuff Size: Medium Adult   Pulse: 68   Weight: 196 lb 12.8 oz (89.3 kg)   Height: 6' 2\" (1.88 m)     Body mass index is 25.27 kg/m².      Wt Readings from Last 3 Encounters:   07/01/20 196 lb 12.8 oz (89.3 kg)   01/08/20 191 lb (86.6 kg)   11/13/19 190 lb (86.2 kg)      BP Readings from Last 3 Encounters:   07/01/20 126/70   01/08/20 110/74 11/13/19 128/80      Constitutional and General Appearance:  appears stated age  Eyes - no xanthelasma  Respiratory:  · Normal excursion and expansion without use of accessory muscles  · Resp Auscultation: Normal breath sounds without dullness  Cardiovascular:  · The apical impulses not displaced  · Heart is regular rate and rhythm with normal S1, S2  · PMI is normal  · The carotid upstroke is normal, no bruit noted   · JVP is not elevated  · Peripheral pulses are symmetrical  · There is no clubbing, cyanosis of the extremities  · No edema  · No varicosities  · Femoral Arteries: 2+ and equal without bruits  · Pedal Pulses: 2+ and equal   Abdomen:  · No masses or tenderness  · Aorta not palpable  · Normal bowel sounds  Neurological/Psychiatric:  · Alert and oriented x3  · Moves all extremities well  · Exhibits normal gait balance and coordination      Assessment/Plan  1. Coronary artery disease involving native coronary artery of native heart with angina pectoris ([address] -stable  GXT 11/20/19: medium to large sized anteroseptal and apical fixed defect consistent with infarction in territory of LAD. EF 47%  CABG 10/29/18: x4 with LIMA-LAD, SVG that was sequenced from the first diagonal to the first OM and a SVG to the PDA   2. Essential hypertension -stable   3. Atrial fibrillation -on BB and asa- regular rhythm today   4. Hyperlipidemia - Crestor 10mg daily. Labs 6/25/19: ; TRIG 86; HDL 33; LDL 54       PLAN: Repeat lipids and CMP. Will give him Rx for Nicoderm 21mg patches. Smoking cessation discussed at length. FU 6 months. Scribe's attestation: This note was scribed in the presence of Dr La White MD by Nikolas Miranda, ESTUARDO. The scribe's documentation has been prepared under my direction and personally reviewed by me in its entirety. I confirm that the note above accurately reflects all work, treatment, procedures, and medical decision making performed by me.      Thank you for allowing to me to participate in the care of Latasha Mancia.

## 2020-07-13 DIAGNOSIS — E78.5 HYPERLIPIDEMIA, UNSPECIFIED HYPERLIPIDEMIA TYPE: Chronic | ICD-10-CM

## 2020-07-13 LAB
A/G RATIO: 1.7 (ref 1.1–2.2)
ALBUMIN SERPL-MCNC: 4.3 G/DL (ref 3.4–5)
ALP BLD-CCNC: 89 U/L (ref 40–129)
ALT SERPL-CCNC: 17 U/L (ref 10–40)
ANION GAP SERPL CALCULATED.3IONS-SCNC: 14 MMOL/L (ref 3–16)
AST SERPL-CCNC: 22 U/L (ref 15–37)
BILIRUB SERPL-MCNC: 0.4 MG/DL (ref 0–1)
BUN BLDV-MCNC: 13 MG/DL (ref 7–20)
CALCIUM SERPL-MCNC: 9.4 MG/DL (ref 8.3–10.6)
CHLORIDE BLD-SCNC: 109 MMOL/L (ref 99–110)
CHOLESTEROL, TOTAL: 112 MG/DL (ref 0–199)
CO2: 24 MMOL/L (ref 21–32)
CREAT SERPL-MCNC: 1 MG/DL (ref 0.8–1.3)
GFR AFRICAN AMERICAN: >60
GFR NON-AFRICAN AMERICAN: >60
GLOBULIN: 2.5 G/DL
GLUCOSE BLD-MCNC: 113 MG/DL (ref 70–99)
HDLC SERPL-MCNC: 33 MG/DL (ref 40–60)
LDL CHOLESTEROL CALCULATED: 66 MG/DL
POTASSIUM SERPL-SCNC: 4.9 MMOL/L (ref 3.5–5.1)
SODIUM BLD-SCNC: 147 MMOL/L (ref 136–145)
TOTAL PROTEIN: 6.8 G/DL (ref 6.4–8.2)
TRIGL SERPL-MCNC: 64 MG/DL (ref 0–150)
VLDLC SERPL CALC-MCNC: 13 MG/DL

## 2020-07-14 NOTE — CONSULTS
[address]                    [facility], [address]                                 CONSULTATION    PATIENT NAME: Lisbet Bello                      :         1955  MED REC NO:   [id number]                          ROOM:       [id number]  ACCOUNT NO:   [de-identified]                           ADMIT DATE:  10/25/2018  PROVIDER:     Alondra Torres MD    CARDIOLOGY CONSULTATION    CONSULT DATE:  10/26/2018    REFERRING PHYSICIAN:  Hospitalist.    REASON FOR CONSULTATION:  Chest pain. HISTORY OF PRESENT ILLNESS:  The patient is a 28-year-old white male  who was recently hospitalized here after he was found to have an  abnormal CT scan showing a left ventricular thrombus. The patient's  electrocardiogram was markedly abnormal and he underwent cardiac  catheterization revealing three vessel coronary artery disease. He  was seen in consultation for CT surgery. On that admission, there was  an incidental finding on the CT scan which was possibly consistent  with malignancy and the patient required PET scanning and other  studies to further identify the abnormality. He is scheduled for  elective coronary artery bypass grafting on Monday. On the day of  admission, the patient called my office stating that he was having  very frequent episodes of chest pain relieved with nitroglycerin that  he had run out of nitroglycerin and used 20 tablets over the past  week. It was unclear whether he was having severe unstable angina  from his description. He was instructed to come to the emergency  room. In the emergency room, electrocardiogram was performed which  continues to show some T wave changes in the anterior leads; however,  they are somewhat improved over the previous EKG. His troponins are  negative.   The patient is a very anxious individual and states that he  has not been able to sleep and has had frequent episodes of chest pain  relieved with Pt's PCP referred pt to Ob Gyn for well woman exam and to check pelvic area due to having fecal incontinence.  Forwarded msg to PSR to call pt back and sched for NPT with Dr Silverman.

## 2020-08-31 RX ORDER — ROSUVASTATIN CALCIUM 10 MG/1
10 TABLET, COATED ORAL DAILY
Qty: 90 TABLET | Refills: 3 | Status: SHIPPED | OUTPATIENT
Start: 2020-08-31 | End: 2021-10-25

## 2020-12-29 ENCOUNTER — OFFICE VISIT (OUTPATIENT)
Dept: CARDIOLOGY CLINIC | Age: 65
End: 2020-12-29
Payer: COMMERCIAL

## 2020-12-29 VITALS
HEART RATE: 60 BPM | DIASTOLIC BLOOD PRESSURE: 70 MMHG | BODY MASS INDEX: 25.54 KG/M2 | SYSTOLIC BLOOD PRESSURE: 152 MMHG | WEIGHT: 199 LBS | HEIGHT: 74 IN

## 2020-12-29 PROCEDURE — 99214 OFFICE O/P EST MOD 30 MIN: CPT | Performed by: INTERNAL MEDICINE

## 2020-12-29 NOTE — PROGRESS NOTES
[address]   Cardiac Evaluation      Patient: Diony Pearson  YOB: 1955         Chief Complaint   Patient presents with    Coronary Artery Disease        Referring provider: Anna Michael MD    History of Present Illness:   Mr Mary Thayer is seen today in follow up. He was hospitalized after found to have an abnormal CT scan showing a left ventricular thrombus.  His EKG was markedly abnormal and he underwent cardiac cath revealing 3V CAD. An incidental finding on the CT scan which was possibly consistent with malignancy and he required PET scanning and otherstudies to further identify the abnormality which turned out to be a benign cyst. . He is a very anxious individual. He underwent CABG X 4 on 10/29/2018. Jelani Fallon had an uneventful postoperative course with the exception of a short bout of A-Fib. The patient was discharged on 11/07/2018 in NSR. Amio has been discontinued. Mr Mary Thayer states he has been ok. He has muscle pain on his chest from bypass surgery. Opal Leyva denies any exertional chest pain, palpitations, CHAVARRIA, dizziness, or edema. Unfortunately, he continues to smoke \"off and on\". He says some days he smoked and some days he does not. Past Medical History:   has a past medical history of Bullous emphysema ([address] CAD (coronary artery disease), GERD (gastroesophageal reflux disease), HTN (hypertension), Hyperlipidemia, and Mediastinal cyst.    Surgical History:   has a past surgical history that includes Cardiac catheterization (09/21/2018); Upper gastrointestinal endoscopy (09/27/2017); Colonoscopy (09/27/2017); Upper gastrointestinal endoscopy (N/A, 9/26/2018); and pr cabg, artery-vein, three (N/A, 10/29/2018).      Current Outpatient Medications   Medication Sig Dispense Refill    rosuvastatin (CRESTOR) 10 MG tablet Take 1 tablet by mouth daily 90 tablet 3    busPIRone (BUSPAR) 5 MG tablet TAKE ONE TABLET BY MOUTH THREE TIMES A DAY 90 tablet 3    nicotine (NICODERM CQ) 21 MG/24HR Place 1 patch onto the skin daily 42 patch 0    sertraline (ZOLOFT) 100 MG tablet TAKE HALF TABLET BY MOUTH DAILY 30 tablet 3    citalopram (CELEXA) 20 MG tablet Take 1 tablet by mouth daily (Patient not taking: Reported on 2020) 30 tablet 0    omeprazole (PRILOSEC) 20 MG delayed release capsule Take 1 capsule by mouth 2 times daily 180 capsule 3    metoprolol succinate (TOPROL XL) 50 MG extended release tablet Take 1 tablet by mouth nightly 90 tablet 3    aspirin 325 MG EC tablet Take 1 tablet by mouth daily 30 tablet 3    melatonin 1 MG tablet Take 1 mg by mouth nightly as needed for Sleep       No current facility-administered medications for this visit.         Social History:  Social History     Socioeconomic History    Marital status:      Spouse name: Not on file    Number of children: 2    Years of education: Not on file    Highest education level: Not on file   Occupational History    Not on file   Social Needs    Financial resource strain: Not on file    Food insecurity     Worry: Not on file     Inability: Not on file   [employer] needs     Medical: Not on file     Non-medical: Not on file   Tobacco Use    Smoking status: Former Smoker     Packs/day: 0.50     Years: 40.00     Pack years: 20.00     Types: Cigarettes     Quit date: 10/12/2018     Years since quittin.2    Smokeless tobacco: Never Used   Substance and Sexual Activity    Alcohol use: No    Drug use: No    Sexual activity: Yes   Lifestyle    Physical activity     Days per week: Not on file     Minutes per session: Not on file    Stress: Not on file   Relationships    Social connections     Talks on phone: Not on file     Gets together: Not on file     Attends Gnosticist service: Not on file     Active member of club or organization: Not on file     Attends meetings of clubs or organizations: Not on file     Relationship status: Not on file    Intimate partner violence     Fear of current or ex partner: Not on file     Emotionally abused: Not on file     Physically abused: Not on file     Forced sexual activity: Not on file   Other Topics Concern    Not on file   Social History Narrative    Not on file       Family History:  family history includes Heart Disease in his mother; Heart Disease (age of onset: 27) in his brother. Allergies:  Patient has no known allergies. Review of Systems:   · Constitutional: there has been no unanticipated weight loss. No change in energy or activity level   · Eyes: No visual changes   · ENT: No Headaches, hearing loss or vertigo. No mouth sores or sore throat. · Cardiovascular: Reviewed in HPI  · Respiratory: No cough or wheezing, no sputum production. · Gastrointestinal: No abdominal pain, appetite loss, blood in stools. No change in bowel or bladder habits. · Genitourinary: No nocturia, dysuria, trouble voiding  · Musculoskeletal:  No gait disturbance, weakness or joint complaints. · Integumentary: No rash or pruritis. · Neurological: No headache, change in muscle strength, numbness or tingling. No change in gait, balance, coordination, mood, affect, memory, mentation, behavior. · Psychiatric: No anxiety or depression  · Endocrine: No malaise or fever  · Hematologic/Lymphatic: No abnormal bruising or bleeding, blood clots or swollen lymph nodes. · Allergic/Immunologic: No nasal congestion or hives. Physical Examination:    Vitals:    12/29/20 1358 12/29/20 1403   BP: (!) 152/70 (!) 152/70   Site: Right Upper Arm    Position: Sitting    Pulse: 60    Weight: 199 lb (90.3 kg)    Height: 6' 2\" (1.88 m)      Body mass index is 25.55 kg/m².      Wt Readings from Last 3 Encounters:   12/29/20 199 lb (90.3 kg)   07/01/20 196 lb 12.8 oz (89.3 kg)   01/08/20 191 lb (86.6 kg)      BP Readings from Last 3 Encounters:   12/29/20 (!) 152/70   07/01/20 126/70   01/08/20 110/74      Constitutional and General Appearance:  appears stated age  Eyes - no xanthelasma performed by me. Thank you for allowing to me to participate in the care of Aftab Sahu.

## 2021-01-13 RX ORDER — METOPROLOL SUCCINATE 50 MG/1
TABLET, EXTENDED RELEASE ORAL
Qty: 90 TABLET | Refills: 3 | Status: SHIPPED | OUTPATIENT
Start: 2021-01-13 | End: 2021-12-23

## 2021-04-21 DIAGNOSIS — R10.13 EPIGASTRIC PAIN: ICD-10-CM

## 2021-04-22 RX ORDER — OMEPRAZOLE 20 MG/1
CAPSULE, DELAYED RELEASE ORAL
Qty: 180 CAPSULE | Refills: 0 | Status: SHIPPED | OUTPATIENT
Start: 2021-04-22 | End: 2021-07-15

## 2021-05-13 DIAGNOSIS — F41.9 ANXIETY: ICD-10-CM

## 2021-05-13 RX ORDER — BUSPIRONE HYDROCHLORIDE 5 MG/1
TABLET ORAL
Qty: 30 TABLET | Refills: 0 | Status: SHIPPED | OUTPATIENT
Start: 2021-05-13 | End: 2021-06-04

## 2021-05-13 NOTE — TELEPHONE ENCOUNTER
Medication:   Requested Prescriptions     Pending Prescriptions Disp Refills    busPIRone (BUSPAR) 5 MG tablet [Pharmacy Med Name: BUSPIRONE HCL 5 MG TABLET] 90 tablet 2     Sig: TAKE ONE TABLET BY MOUTH THREE TIMES A DAY        Last Filled:  7/30/20 #90, 3 RF     Patient Phone Number: [phone] (home) [phone] (work)    Last appt: 11/13/2019 anxiety   Next appt: Visit date not found - patient scheduled for annual physical 5/19/21

## 2021-05-19 ENCOUNTER — OFFICE VISIT (OUTPATIENT)
Dept: FAMILY MEDICINE CLINIC | Age: 66
End: 2021-05-19
Payer: COMMERCIAL

## 2021-05-19 VITALS
OXYGEN SATURATION: 98 % | WEIGHT: 199 LBS | SYSTOLIC BLOOD PRESSURE: 128 MMHG | HEART RATE: 76 BPM | BODY MASS INDEX: 25.55 KG/M2 | DIASTOLIC BLOOD PRESSURE: 70 MMHG

## 2021-05-19 DIAGNOSIS — Z23 NEED FOR VACCINATION: ICD-10-CM

## 2021-05-19 DIAGNOSIS — R10.12 LUQ PAIN: ICD-10-CM

## 2021-05-19 DIAGNOSIS — I25.119 CORONARY ARTERY DISEASE INVOLVING NATIVE CORONARY ARTERY OF NATIVE HEART WITH ANGINA PECTORIS (HCC): ICD-10-CM

## 2021-05-19 DIAGNOSIS — I21.02 ST ELEVATION MYOCARDIAL INFARCTION INVOLVING LEFT ANTERIOR DESCENDING (LAD) CORONARY ARTERY (HCC): ICD-10-CM

## 2021-05-19 DIAGNOSIS — Z72.0 TOBACCO USE: Primary | ICD-10-CM

## 2021-05-19 DIAGNOSIS — Z00.00 ANNUAL PHYSICAL EXAM: ICD-10-CM

## 2021-05-19 LAB
BASOPHILS ABSOLUTE: 0.1 K/UL (ref 0–0.2)
BASOPHILS RELATIVE PERCENT: 0.7 %
EOSINOPHILS ABSOLUTE: 0.1 K/UL (ref 0–0.6)
EOSINOPHILS RELATIVE PERCENT: 1.1 %
HCT VFR BLD CALC: 43.9 % (ref 40.5–52.5)
HEMOGLOBIN: 15 G/DL (ref 13.5–17.5)
LYMPHOCYTES ABSOLUTE: 1.4 K/UL (ref 1–5.1)
LYMPHOCYTES RELATIVE PERCENT: 19.1 %
MCH RBC QN AUTO: 31.9 PG (ref 26–34)
MCHC RBC AUTO-ENTMCNC: 34.2 G/DL (ref 31–36)
MCV RBC AUTO: 93.3 FL (ref 80–100)
MONOCYTES ABSOLUTE: 0.5 K/UL (ref 0–1.3)
MONOCYTES RELATIVE PERCENT: 6.4 %
NEUTROPHILS ABSOLUTE: 5.4 K/UL (ref 1.7–7.7)
NEUTROPHILS RELATIVE PERCENT: 72.7 %
PDW BLD-RTO: 13.9 % (ref 12.4–15.4)
PLATELET # BLD: 212 K/UL (ref 135–450)
PMV BLD AUTO: 8.9 FL (ref 5–10.5)
RBC # BLD: 4.7 M/UL (ref 4.2–5.9)
WBC # BLD: 7.4 K/UL (ref 4–11)

## 2021-05-19 PROCEDURE — 90750 HZV VACC RECOMBINANT IM: CPT | Performed by: FAMILY MEDICINE

## 2021-05-19 PROCEDURE — 90472 IMMUNIZATION ADMIN EACH ADD: CPT | Performed by: FAMILY MEDICINE

## 2021-05-19 PROCEDURE — 90471 IMMUNIZATION ADMIN: CPT | Performed by: FAMILY MEDICINE

## 2021-05-19 PROCEDURE — 90732 PPSV23 VACC 2 YRS+ SUBQ/IM: CPT | Performed by: FAMILY MEDICINE

## 2021-05-19 PROCEDURE — 99397 PER PM REEVAL EST PAT 65+ YR: CPT | Performed by: FAMILY MEDICINE

## 2021-05-19 SDOH — ECONOMIC STABILITY: FOOD INSECURITY: WITHIN THE PAST 12 MONTHS, YOU WORRIED THAT YOUR FOOD WOULD RUN OUT BEFORE YOU GOT MONEY TO BUY MORE.: NEVER TRUE

## 2021-05-19 SDOH — ECONOMIC STABILITY: FOOD INSECURITY: WITHIN THE PAST 12 MONTHS, THE FOOD YOU BOUGHT JUST DIDN'T LAST AND YOU DIDN'T HAVE MONEY TO GET MORE.: NEVER TRUE

## 2021-05-19 SDOH — ECONOMIC STABILITY: INCOME INSECURITY: IN THE LAST 12 MONTHS, WAS THERE A TIME WHEN YOU WERE NOT ABLE TO PAY THE MORTGAGE OR RENT ON TIME?: NO

## 2021-05-19 SDOH — ECONOMIC STABILITY: TRANSPORTATION INSECURITY
IN THE PAST 12 MONTHS, HAS THE LACK OF TRANSPORTATION KEPT YOU FROM MEDICAL APPOINTMENTS OR FROM GETTING MEDICATIONS?: NO

## 2021-05-19 SDOH — ECONOMIC STABILITY: TRANSPORTATION INSECURITY
IN THE PAST 12 MONTHS, HAS LACK OF TRANSPORTATION KEPT YOU FROM MEETINGS, WORK, OR FROM GETTING THINGS NEEDED FOR DAILY LIVING?: NO

## 2021-05-19 SDOH — ECONOMIC STABILITY: HOUSING INSECURITY
IN THE LAST 12 MONTHS, WAS THERE A TIME WHEN YOU DID NOT HAVE A STEADY PLACE TO SLEEP OR SLEPT IN A SHELTER (INCLUDING NOW)?: NO

## 2021-05-19 ASSESSMENT — PATIENT HEALTH QUESTIONNAIRE - PHQ9
SUM OF ALL RESPONSES TO PHQ QUESTIONS 1-9: 0
SUM OF ALL RESPONSES TO PHQ QUESTIONS 1-9: 0
1. LITTLE INTEREST OR PLEASURE IN DOING THINGS: 0
SUM OF ALL RESPONSES TO PHQ9 QUESTIONS 1 & 2: 0

## 2021-05-19 NOTE — PROGRESS NOTES
[address] Note    Date: 5/19/2021                                               Subjective/Objective:     Chief Complaint   Patient presents with    Annual Exam    GI Problem     pains in stomach        HPI   Patient is here for annual exam.  Last colonoscopy 4 years ago, was told to follow-up in 5 years. Last Tdap 2014. Has never had pneumonia vaccine. Patient has history of anxiety. Currently BuSpar. Reports that symptoms are well controlled. Patient has history of CAD status post CABG 3 years ago. Denies chest pain or shortness of breath. Currently takes metoprolol and Crestor. Sees cardiology. LUQ pain starting 3-4 months ago. Intermittent. Not there every day. Mostly feels it when lying down. Takes prilosec for GERD. No n/v.           Patient Active Problem List    Diagnosis Date Noted    Coronary artery disease involving native coronary artery of native heart with angina pectoris (HCC)     ST elevation myocardial infarction involving left anterior descending (LAD) coronary artery ([facility] [address] 09/24/2018    Tobacco use 09/24/2018    Enlarged lymph node 09/24/2018    Left ventricular thrombus 09/19/2018    Mediastinal cyst 09/01/2018    Essential hypertension 03/14/2016    GERD (gastroesophageal reflux disease) 03/09/2015    Hyperlipemia 09/08/2014    Chest pain 09/23/2013       Past Medical History:   Diagnosis Date    Bullous emphysema (HCC)     CAD (coronary artery disease)     GERD (gastroesophageal reflux disease)     HTN (hypertension)     Hyperlipidemia     Mediastinal cyst 09/2018       Current Outpatient Medications   Medication Sig Dispense Refill    busPIRone (BUSPAR) 5 MG tablet TAKE ONE TABLET BY MOUTH THREE TIMES A DAY 30 tablet 0    omeprazole (PRILOSEC) 20 MG delayed release capsule TAKE ONE CAPSULE BY MOUTH TWICE A  capsule 0    metoprolol succinate (TOPROL XL) 50 MG extended release tablet TAKE ONE TABLET BY MOUTH ONCE NIGHTLY 90 tablet 3    rosuvastatin (CRESTOR) 10 MG tablet Take 1 tablet by mouth daily 90 tablet 3    aspirin 325 MG EC tablet Take 1 tablet by mouth daily 30 tablet 3    melatonin 1 MG tablet Take 1 mg by mouth nightly as needed for Sleep      nicotine (NICODERM CQ) 21 MG/24HR Place 1 patch onto the skin daily 42 patch 0     No current facility-administered medications for this visit. No Known Allergies    Review of Systems   No CP, no SOB, no rash, no bruise, no HA, no vision change, no ankle swelling, no hearing problems, no LAD      Vitals:  /70   Pulse 76   Wt 199 lb (90.3 kg)   SpO2 98%   BMI 25.55 kg/m²     Physical Exam   General:  Well-appearing, NAD, alert, non-toxic  HEENT:  Normocephalic, atraumatic. Pupils equal and round. TMs pearly with good landmarks. Moist mucous membranes. Normal dentition  NECK:  Supple, normal range of motion, no LAD, no meningeal signs, no JVD, nontender  CHEST/LUNGS: CTAB, no crackles, no wheeze, no rhonchi. Symmetric rise  CARDIOVASCULAR: RRR,  no murmur, no rub  ABDOMEN: Soft, non-tender, non-distended. No masses  EXTREMETIES: Normal movement of all extremities. No edema. No joint swelling. SKIN:  No rash, no cellulitis, no bruising, no petechiae/purpura/vesicles/pustules/abscess  PSYCH:  A+O x 3; normal affect  NEURO:  GCS 15, CN2-12 grossly intact, no focal motor/sensory deficits, no cerebellar deficits, normal gait, normal speech      Assessment/Plan     61-year-old male here for annual exam. Colonoscopy is up-to-date. Check routine labs. Update Pneumovax and Shingrix today. Vital signs stable. Check AAA screen given smoking history. Smoking cessation discussed. Patient has history of hypertension and CAD. Blood pressure is at goal. Continue Crestor. Follow-up with cardiology as scheduled. Patient has anxiety. Symptoms are well controlled on BuSpar. Continue present management. Patient reports left upper quadrant abdominal pain, intermittent.  This of unclear etiology. Will check CT scan to rule out intra-abdominal process. Seems unrelated to GERD. Continue omeprazole as needed. Discussed with patient medication/s dosage, instructions, potential S/E, A/R and Drug Interaction  Tylenol or Motrin as needed for pain or fever  Advise to return here if worse or go to nearest ER  Encourage fluids  Pt discharged in stable condition at 14:37    1. Tobacco use    - US SCREENING FOR AAA; Future    2. Annual physical exam    - CBC Auto Differential; Future  - Hemoglobin A1C; Future  - TSH with Reflex; Future  - Comprehensive Metabolic Panel; Future    3. Coronary artery disease involving native coronary artery of native heart with angina pectoris ([facility] [address]      4. ST elevation myocardial infarction involving left anterior descending (LAD) coronary artery ([facility] [address]      5. LUQ pain    - CT ABDOMEN PELVIS W IV CONTRAST Additional Contrast? None; Future    6. Need for vaccination    - PNEUMOVAX 23 subcutaneous/IM (Pneumococcal polysaccharide vaccine 23-valent >= 1yo)  - Zoster recombinant [facility])         Orders Placed This Encounter   Procedures   [address] [facility] AAA     This procedure can be scheduled via [facility]. Access your [facility] account by visiting [email or web address].      Standing Status:   Future     Standing Expiration Date:   5/19/2022    CT ABDOMEN PELVIS W IV CONTRAST Additional Contrast? None     Standing Status:   Future     Standing Expiration Date:   5/19/2022     Order Specific Question:   Additional Contrast?     Answer:   None    PNEUMOVAX 23 subcutaneous/IM (Pneumococcal polysaccharide vaccine 23-valent >= 1yo)    Zoster recombinant (SHINGRIX)    CBC Auto Differential     Standing Status:   Future     Number of Occurrences:   1     Standing Expiration Date:   5/19/2022    Hemoglobin A1C     Standing Status:   Future     Number of Occurrences:   1     Standing Expiration Date:   5/19/2022    TSH with Reflex     Standing Status:   Future     Number of Occurrences:   1     Standing Expiration Date:   5/19/2022    Comprehensive Metabolic Panel     Standing Status:   Future     Number of Occurrences:   1     Standing Expiration Date:   5/19/2022       Return in about 6 months (around 11/19/2021) for blood pressure check.     Ralph Carney MD, MD    5/19/2021  3:05 PM

## 2021-05-19 NOTE — PATIENT INSTRUCTIONS
daily aspirin for another health problem. When should you call for help? Call 911 anytime you think you may need emergency care. For example, call if:    · You passed out (lost consciousness).     · You pass maroon or very bloody stools.     · You vomit blood or what looks like coffee grounds.     · You have new, severe belly pain. Call your doctor now or seek immediate medical care if:    · Your pain gets worse, especially if it becomes focused in one area of your belly.     · You have a new or higher fever.     · Your stools are black and look like tar, or they have streaks of blood.     · You have unexpected vaginal bleeding.     · You have symptoms of a urinary tract infection. These may include:  ? Pain when you urinate. ? Urinating more often than usual.  ? Blood in your urine.     · You are dizzy or lightheaded, or you feel like you may faint. Watch closely for changes in your health, and be sure to contact your doctor if:    · You are not getting better after 1 day (24 hours). Where can you learn more? Go to [email or web address]://[employer].Stitcher. org and sign in to your [facility] account. Enter O843 in the [employer] box to learn more about \"Abdominal Pain: Care Instructions. \"     If you do not have an account, please click on the \"Sign Up Now\" link. Current as of: February 26, 2020               Content Version: 12.8  © 2551-9632 Healthwise, Incorporated. Care instructions adapted under license by [facility] ([facility]). If you have questions about a medical condition or this instruction, always ask your healthcare professional. [place] [address] any warranty or liability for your use of this information.

## 2021-05-20 ENCOUNTER — TELEPHONE (OUTPATIENT)
Dept: FAMILY MEDICINE CLINIC | Age: 66
End: 2021-05-20

## 2021-05-20 LAB
A/G RATIO: 1.4 (ref 1.1–2.2)
ALBUMIN SERPL-MCNC: 4.2 G/DL (ref 3.4–5)
ALP BLD-CCNC: 76 U/L (ref 40–129)
ALT SERPL-CCNC: 12 U/L (ref 10–40)
ANION GAP SERPL CALCULATED.3IONS-SCNC: 12 MMOL/L (ref 3–16)
AST SERPL-CCNC: 27 U/L (ref 15–37)
BILIRUB SERPL-MCNC: 0.5 MG/DL (ref 0–1)
BUN BLDV-MCNC: 11 MG/DL (ref 7–20)
CALCIUM SERPL-MCNC: 9.3 MG/DL (ref 8.3–10.6)
CHLORIDE BLD-SCNC: 103 MMOL/L (ref 99–110)
CO2: 24 MMOL/L (ref 21–32)
CREAT SERPL-MCNC: 0.9 MG/DL (ref 0.8–1.3)
ESTIMATED AVERAGE GLUCOSE: 119.8 MG/DL
GFR AFRICAN AMERICAN: >60
GFR NON-AFRICAN AMERICAN: >60
GLOBULIN: 2.9 G/DL
GLUCOSE BLD-MCNC: 97 MG/DL (ref 70–99)
HBA1C MFR BLD: 5.8 %
POTASSIUM SERPL-SCNC: 4.4 MMOL/L (ref 3.5–5.1)
SODIUM BLD-SCNC: 139 MMOL/L (ref 136–145)
TOTAL PROTEIN: 7.1 G/DL (ref 6.4–8.2)
TSH REFLEX: 1.33 UIU/ML (ref 0.27–4.2)

## 2021-05-20 NOTE — TELEPHONE ENCOUNTER
Patient is asking for lab results  Labs done yesterday  Review and send patient message on My chart w/results

## 2021-05-20 NOTE — TELEPHONE ENCOUNTER
Tell patient that his labs look great, except that his blood sugar went up just a little bit. He is in the prediabetic range at the low end. No need for medications at this time. If you lose 5 to 10 pounds, his blood sugar probably come down to normal.  Recheck again in 6 months.

## 2021-05-27 ENCOUNTER — TELEPHONE (OUTPATIENT)
Dept: FAMILY MEDICINE CLINIC | Age: 66
End: 2021-05-27

## 2021-05-27 NOTE — TELEPHONE ENCOUNTER
Pt has orders to get the CT and US, but too expensive at [facility]. Pt is asking fort suggestions where to go?

## 2021-05-28 NOTE — TELEPHONE ENCOUNTER
He doesn't need to be taking anything for the LUQ pain right now. If the CT comes back normal, we may treat him with something for irritable bowel syndrome. He should let us know if his pain is severe.

## 2021-05-28 NOTE — TELEPHONE ENCOUNTER
Spoke with pt let him know Dr. Morrison Rosa message. CT Scan 06/29/2021 Us 06/10/2021  Pt got Milk of mag 30-60 mL over the counter and wants to know if that is okay to take.  Please Advise

## 2021-06-03 ENCOUNTER — OFFICE VISIT (OUTPATIENT)
Dept: CARDIOLOGY CLINIC | Age: 66
End: 2021-06-03
Payer: COMMERCIAL

## 2021-06-03 VITALS
HEART RATE: 63 BPM | OXYGEN SATURATION: 96 % | HEIGHT: 74 IN | BODY MASS INDEX: 25.54 KG/M2 | WEIGHT: 199 LBS | SYSTOLIC BLOOD PRESSURE: 130 MMHG | DIASTOLIC BLOOD PRESSURE: 74 MMHG

## 2021-06-03 DIAGNOSIS — I25.119 CORONARY ARTERY DISEASE INVOLVING NATIVE CORONARY ARTERY OF NATIVE HEART WITH ANGINA PECTORIS (HCC): Primary | ICD-10-CM

## 2021-06-03 DIAGNOSIS — E78.5 HYPERLIPIDEMIA, UNSPECIFIED HYPERLIPIDEMIA TYPE: Chronic | ICD-10-CM

## 2021-06-03 DIAGNOSIS — I10 ESSENTIAL HYPERTENSION: Chronic | ICD-10-CM

## 2021-06-03 PROCEDURE — 99214 OFFICE O/P EST MOD 30 MIN: CPT | Performed by: INTERNAL MEDICINE

## 2021-06-03 NOTE — PROGRESS NOTES
[address]   Cardiac Evaluation      Patient: Bob Cisneros  YOB: 1955         Chief Complaint   Patient presents with    Coronary Artery Disease    Hypertension        Referring provider: Jo Ann De La Rosa MD    History of Present Illness:   Mr Nolvia Zheng is seen today in follow up. He was hospitalized after found to have an abnormal CT scan showing a left ventricular thrombus.  His EKG was markedly abnormal and he underwent cardiac cath revealing 3V CAD. An incidental finding on the CT scan which was possibly consistent with malignancy and he required PET scanning and otherstudies to further identify the abnormality which turned out to be a benign cyst. . He is a very anxious individual. He underwent CABG X 4 on 10/29/2018. [facility] had an uneventful postoperative course with the exception of a short bout of A-Fib. The patient was discharged on 11/07/2018 in [facility]. Amiodarone has been discontinued. Mr Nolvia Zheng states he has been fine. Ephraim Barker denies exertional chest pain, palpitations, CHAVARRIA, dizziness, or edema. He is physically active and takes care of his yard. He has some numbness of his left heel after an ankle injury. Past Medical History:   has a past medical history of Bullous emphysema ([address] CAD (coronary artery disease), GERD (gastroesophageal reflux disease), HTN (hypertension), Hyperlipidemia, and Mediastinal cyst.    Surgical History:   has a past surgical history that includes Cardiac catheterization (09/21/2018); Upper gastrointestinal endoscopy (09/27/2017); Colonoscopy (09/27/2017); Upper gastrointestinal endoscopy (N/A, 9/26/2018); and pr cabg, artery-vein, three (N/A, 10/29/2018).      Current Outpatient Medications   Medication Sig Dispense Refill    busPIRone (BUSPAR) 5 MG tablet TAKE ONE TABLET BY MOUTH THREE TIMES A DAY 30 tablet 0    omeprazole (PRILOSEC) 20 MG delayed release capsule TAKE ONE CAPSULE BY MOUTH TWICE A  capsule 0    metoprolol succinate Status:    Intimate Partner Violence:     Fear of Current or Ex-Partner:     Emotionally Abused:     Physically Abused:     Sexually Abused:        Family History:  family history includes Heart Disease in his mother; Heart Disease (age of onset: 27) in his brother. Allergies:  Patient has no known allergies. Review of Systems:   · Constitutional: there has been no unanticipated weight loss. No change in energy or activity level   · Eyes: No visual changes   · ENT: No Headaches, hearing loss or vertigo. No mouth sores or sore throat. · Cardiovascular: Reviewed in HPI  · Respiratory: No cough or wheezing, no sputum production. · Gastrointestinal: No abdominal pain, appetite loss, blood in stools. No change in bowel or bladder habits. · Genitourinary: No nocturia, dysuria, trouble voiding  · Musculoskeletal:  No gait disturbance, weakness or joint complaints. · Integumentary: No rash or pruritis. · Neurological: No headache, change in muscle strength, numbness or tingling. No change in gait, balance, coordination, mood, affect, memory, mentation, behavior. · Psychiatric: No anxiety or depression  · Endocrine: No malaise or fever  · Hematologic/Lymphatic: No abnormal bruising or bleeding, blood clots or swollen lymph nodes. · Allergic/Immunologic: No nasal congestion or hives. Physical Examination:    Vitals:    06/03/21 1355   BP: 130/74   Site: Left Upper Arm   Position: Sitting   Cuff Size: Medium Adult   Pulse: 63   SpO2: 96%   Weight: 199 lb (90.3 kg)   Height: 6' 2\" (1.88 m)     Body mass index is 25.55 kg/m².      Wt Readings from Last 3 Encounters:   06/03/21 199 lb (90.3 kg)   05/19/21 199 lb (90.3 kg)   12/29/20 199 lb (90.3 kg)      BP Readings from Last 3 Encounters:   06/03/21 130/74   05/19/21 128/70   12/29/20 (!) 152/70      Constitutional and General Appearance:  appears stated age  Eyes - no xanthelasma  Respiratory:  · Normal excursion and expansion without use of accessory muscles  · Resp Auscultation: Normal breath sounds without dullness  Cardiovascular:  · The apical impulses not displaced  · Heart is regular rate and rhythm with normal S1, S2  · PMI is normal  · The carotid upstroke is normal, no bruit noted   · JVP is not elevated  · Peripheral pulses are symmetrical  · There is no clubbing, cyanosis of the extremities  · No edema  · No varicosities  · Femoral Arteries: 2+ and equal without bruits  · Pedal Pulses: 2+ and equal   Abdomen:  · No masses or tenderness  · Aorta not palpable  · Normal bowel sounds  Neurological/Psychiatric:  · Alert and oriented x3  · Moves all extremities well  · Exhibits normal gait balance and coordination      Assessment/Plan  1. Coronary artery disease involving native coronary artery of native heart with angina pectoris ([address] -stable  GXT 11/20/19: medium to large sized anteroseptal and apical fixed defect consistent with infarction in territory of LAD. EF 47%  CABG 10/29/18: x4 with LIMA-LAD, SVG that was sequenced from the first diagonal to the first OM and a SVG to the PDA   2. Essential hypertension - stable    3. Atrial fibrillation, postop -on BB and asa- regular rhythm today   4. Hyperlipidemia - Crestor 10mg daily. Labs 7/13/20: ; TRIG 64; HDL 33; LDL 66   5. Tobacco abuse - he states some days he smokes and some days he does not; I have told him to quit, again. PLAN:  Repeat lipids. Encouraged to continue regular exercise. FU 6 months. Scribe's attestation: This note was scribed in the presence of Dr Rocky Valenzuela MD by Hailey Pederson RN. The scribe's documentation has been prepared under my direction and personally reviewed by me in its entirety. I confirm that the note above accurately reflects all work, treatment, procedures, and medical decision making performed by me. Thank you for allowing to me to participate in the care of Lynn Frederick.

## 2021-06-04 DIAGNOSIS — F41.9 ANXIETY: ICD-10-CM

## 2021-06-04 DIAGNOSIS — E78.5 HYPERLIPIDEMIA, UNSPECIFIED HYPERLIPIDEMIA TYPE: Chronic | ICD-10-CM

## 2021-06-04 LAB
CHOLESTEROL, TOTAL: 150 MG/DL (ref 0–199)
HDLC SERPL-MCNC: 32 MG/DL (ref 40–60)
LDL CHOLESTEROL CALCULATED: 99 MG/DL
TRIGL SERPL-MCNC: 94 MG/DL (ref 0–150)
VLDLC SERPL CALC-MCNC: 19 MG/DL

## 2021-06-04 RX ORDER — BUSPIRONE HYDROCHLORIDE 5 MG/1
TABLET ORAL
Qty: 90 TABLET | Refills: 1 | Status: SHIPPED | OUTPATIENT
Start: 2021-06-04 | End: 2021-10-06

## 2021-06-25 ENCOUNTER — TELEPHONE (OUTPATIENT)
Dept: FAMILY MEDICINE CLINIC | Age: 66
End: 2021-06-25

## 2021-06-25 NOTE — TELEPHONE ENCOUNTER
Gila Ayers is requesting notes from the pt's visit on 5/19/21 to be faxed over.     Fax# [phone]

## 2021-06-28 NOTE — TELEPHONE ENCOUNTER
[facility] would like a return call in regards to CT Abdomen Pelvis. Pt has morning appt.       Please call asap

## 2021-06-29 ENCOUNTER — TELEPHONE (OUTPATIENT)
Dept: FAMILY MEDICINE CLINIC | Age: 66
End: 2021-06-29

## 2021-06-29 NOTE — TELEPHONE ENCOUNTER
Called and spoke with Dr. Cher Young, who approved the study. Please call pt and give him this authorization number: [id number]. Pt can call [facility] to schedule his CT scan.

## 2021-06-29 NOTE — TELEPHONE ENCOUNTER
The number for Segundo is not a valid number. Please find out the right number to call for the peer to peer.

## 2021-06-30 NOTE — TELEPHONE ENCOUNTER
I called and spoke to pt he said that he got the CT done yesterday. Lucius Heath He is asking about the US reuslts. . It is in media where we scanned it in. . Please review those so I can call and let him know his uS results,

## 2021-07-02 DIAGNOSIS — R16.1 SPLENOMEGALY: Primary | ICD-10-CM

## 2021-07-02 DIAGNOSIS — R10.12 LUQ PAIN: ICD-10-CM

## 2021-07-14 ENCOUNTER — OFFICE VISIT (OUTPATIENT)
Dept: SURGERY | Age: 66
End: 2021-07-14
Payer: COMMERCIAL

## 2021-07-14 VITALS — WEIGHT: 200 LBS | BODY MASS INDEX: 25.68 KG/M2 | DIASTOLIC BLOOD PRESSURE: 80 MMHG | SYSTOLIC BLOOD PRESSURE: 130 MMHG

## 2021-07-14 DIAGNOSIS — R16.1 SPLENOMEGALY: ICD-10-CM

## 2021-07-14 PROCEDURE — 99203 OFFICE O/P NEW LOW 30 MIN: CPT | Performed by: SURGERY

## 2021-07-14 ASSESSMENT — ENCOUNTER SYMPTOMS
EYE ITCHING: 0
EYE DISCHARGE: 0
COLOR CHANGE: 0
BACK PAIN: 0
CHEST TIGHTNESS: 0
ABDOMINAL PAIN: 1
APNEA: 0

## 2021-07-14 NOTE — PATIENT INSTRUCTIONS
1.  We will not able to review images, only mild splenomegaly noted on CT report and unable to appreciate splenomegaly on exam.  Do not suspect splenomegaly as etiology for abdominal symptoms and do not recommend splenectomy at this point  2. Will refer to hematology for work-up and evaluation of blood disorder possibly causing splenomegaly  3. Recommend follow-up with gastroenterology regarding continued reflux and possible discontinuation of Prilosec  4.   Follow general surgery as needed

## 2021-07-14 NOTE — PROGRESS NOTES
[facility]  SUBJECTIVE:    Chief Complaint: splenomegaly    Gracie Gilmore   1955   73 y.o. male presents with more than 10-year history of left upper quadrant pain. The pain is sharp mostly at night when laying down and after meals occasionally. Last for several minutes and then completely resolves. Not related to activity. Nothing makes it better or worse otherwise and does not radiate. Was started on omeprazole when pain began for possible reflux by cardiology at the time. No evidence of reflux was visualized on last endoscopy in 2018. Patient does take aspirin. No personal or family history of hematologic disorders. Work-up has included [facility] that showed mild splenomegaly and patient referred to general surgery for evaluation. Does not complain of easy bruising, fevers chills, weight loss nausea vomiting jaundice dysuria change in appetite weight bowel movements or other complaints    Review of Systems   Constitutional: Negative for activity change and appetite change. HENT: Negative for congestion and dental problem. Eyes: Negative for discharge and itching. Respiratory: Negative for apnea and chest tightness. Cardiovascular: Negative for chest pain and leg swelling. Gastrointestinal: Positive for abdominal pain. Endocrine: Negative for cold intolerance and heat intolerance. Genitourinary: Negative for difficulty urinating and dysuria. Musculoskeletal: Negative for arthralgias and back pain. Skin: Negative for color change and pallor. Allergic/Immunologic: Negative for environmental allergies and food allergies. Neurological: Negative for dizziness and facial asymmetry. Hematological: Negative for adenopathy. Does not bruise/bleed easily. Psychiatric/Behavioral: Negative for agitation and behavioral problems.        Past Medical History:   Diagnosis Date    Bullous emphysema ([address]     CAD (coronary artery disease)  GERD (gastroesophageal reflux disease)     HTN (hypertension)     Hyperlipidemia     Mediastinal cyst 2018     Past Surgical History:   Procedure Laterality Date    CARDIAC CATHETERIZATION  2018    Dr. Lainey Buckner  2017    Dr. Ruth Valladares - transverse colon polyps x2 (4mm, 5mm), tubular adenoma & hyperplastic    [place] CABG, ARTERY-VEIN, THREE N/A 10/29/2018    CISCO, TOTAL CPB, AORTO-CABG X 3 USING ENDOSCOPICALLY HARVESTED LEFT SVG, LIMA GRAFT X 1, LEFT INTERNAL MAMMARY ARTERY LYMPH NODE BIOPSY, DOPPLER FLOW VERIFICATION, BILATERAL INTERCOSTAL NERVE BLOCK performed by Rodolfo Gutierrez MD at [place] [address]  2017    Dr. Ruth Valladares - erosive gastritis    UPPER GASTROINTESTINAL ENDOSCOPY N/A 2018    Dr. Desiere Mathias - w/EUS & needle biopsy     Social History     Socioeconomic History    Marital status:      Spouse name: Not on file    Number of children: 2    Years of education: Not on file    Highest education level: Not on file   Occupational History    Not on file   Tobacco Use    Smoking status: Former Smoker     Packs/day: 0.50     Years: 40.00     Pack years: 20.00     Types: Cigarettes     Quit date: 10/12/2018     Years since quittin.7    Smokeless tobacco: Never Used   Substance and Sexual Activity    Alcohol use: No    Drug use: No    Sexual activity: Yes   Other Topics Concern    Not on file   Social History Narrative    Not on file     Social Determinants of Health     Financial Resource Strain: Low Risk     Difficulty of Paying Living Expenses: Not hard at all   Food Insecurity: No Food Insecurity    Worried About Running Out of Food in the Last Year: Never true    Vasquez of Food in the Last Year: Never true   Transportation Needs: No Transportation Needs    Lack of Transportation (Medical): No    Lack of Transportation (Non-Medical):  No   Physical Activity:     Days of Exercise per Week:     Minutes of Exercise per Session:    Stress:     Feeling of Stress :    Social Connections:     Frequency of Communication with Friends and Family:     Frequency of Social Gatherings with Friends and Family:     Attends Rastafari Services:     Active Member of Clubs or Organizations:     Attends Club or Organization Meetings:     Marital Status:    Intimate Partner Violence:     Fear of Current or Ex-Partner:     Emotionally Abused:     Physically Abused:     Sexually Abused:       Family History   Problem Relation Age of Onset    Heart Disease Brother 30        CABG x 2    Heart Disease Mother         CABG x 2     Current Outpatient Medications   Medication Sig Dispense Refill    busPIRone (BUSPAR) 5 MG tablet TAKE ONE TABLET BY MOUTH THREE TIMES A DAY 90 tablet 1    omeprazole (PRILOSEC) 20 MG delayed release capsule TAKE ONE CAPSULE BY MOUTH TWICE A  capsule 0    metoprolol succinate (TOPROL XL) 50 MG extended release tablet TAKE ONE TABLET BY MOUTH ONCE NIGHTLY 90 tablet 3    rosuvastatin (CRESTOR) 10 MG tablet Take 1 tablet by mouth daily 90 tablet 3    aspirin 325 MG EC tablet Take 1 tablet by mouth daily 30 tablet 3    melatonin 1 MG tablet Take 1 mg by mouth nightly as needed for Sleep      nicotine (NICODERM CQ) 21 MG/24HR Place 1 patch onto the skin daily 42 patch 0     No current facility-administered medications for this visit. No Known Allergies     OBJECTIVE:  Wt 200 lb (90.7 kg)   BMI 25.68 kg/m²    Physical Exam  Constitutional:       General: He is not in acute distress. Appearance: He is well-developed. He is not diaphoretic. HENT:      Head: Normocephalic and atraumatic. Eyes:      Conjunctiva/sclera: Conjunctivae normal.      Pupils: Pupils are equal, round, and reactive to light. Neck:      Vascular: No JVD. Cardiovascular:      Rate and Rhythm: Normal rate and regular rhythm. Heart sounds: Normal heart sounds.    Pulmonary:      Effort: Pulmonary effort is normal. No respiratory distress. Breath sounds: Normal breath sounds. No wheezing. Abdominal:      General: Bowel sounds are normal. There is no distension. Palpations: Abdomen is soft. There is no mass. Tenderness: There is no abdominal tenderness. There is no guarding. Musculoskeletal:      Cervical back: Normal range of motion and neck supple. Lymphadenopathy:      Cervical: No cervical adenopathy. Skin:     General: Skin is warm and dry. Findings: No erythema or rash. Neurological:      Mental Status: He is alert and oriented to person, place, and time. Psychiatric:         Behavior: Behavior normal.         Thought Content: Thought content normal.         Judgment: Judgment normal.          Labs:  Orders Only on 06/04/2021   Component Date Value Ref Range Status    Cholesterol, Total 06/04/2021 150  0 - 199 mg/dL Final    Triglycerides 06/04/2021 94  0 - 150 mg/dL Final    HDL 06/04/2021 32* 40 - 60 mg/dL Final    LDL Calculated 06/04/2021 99  <100 mg/dL Final    VLDL Cholesterol Calculated 06/04/2021 19  Not Established mg/dL Final       Imaging:  No results found. ASSESSMENT:  Splenomegaly  GERD    PLAN:  1. We will not able to review images, only mild splenomegaly noted on CT report and unable to appreciate splenomegaly on exam.  Do not suspect splenomegaly as etiology for abdominal symptoms and do not recommend splenectomy at this point  2. Will refer to hematology for work-up and evaluation of blood disorder possibly causing splenomegaly  3. Recommend follow-up with gastroenterology regarding continued reflux and possible discontinuation of Prilosec  4. Follow general surgery as needed    Ernesto Andrade MD, FACS  7/14/2021  2:58 PM

## 2021-07-15 DIAGNOSIS — R10.13 EPIGASTRIC PAIN: ICD-10-CM

## 2021-07-15 RX ORDER — OMEPRAZOLE 20 MG/1
CAPSULE, DELAYED RELEASE ORAL
Qty: 180 CAPSULE | Refills: 0 | Status: SHIPPED | OUTPATIENT
Start: 2021-07-15 | End: 2021-10-06

## 2021-09-01 ENCOUNTER — OFFICE VISIT (OUTPATIENT)
Dept: PULMONOLOGY | Age: 66
End: 2021-09-01
Payer: COMMERCIAL

## 2021-09-01 VITALS
WEIGHT: 199.6 LBS | HEART RATE: 67 BPM | OXYGEN SATURATION: 97 % | SYSTOLIC BLOOD PRESSURE: 138 MMHG | BODY MASS INDEX: 25.62 KG/M2 | DIASTOLIC BLOOD PRESSURE: 78 MMHG | HEIGHT: 74 IN

## 2021-09-01 DIAGNOSIS — Z72.0 TOBACCO USE: ICD-10-CM

## 2021-09-01 DIAGNOSIS — Q34.1 MEDIASTINAL CYST: Primary | ICD-10-CM

## 2021-09-01 PROCEDURE — 99204 OFFICE O/P NEW MOD 45 MIN: CPT | Performed by: INTERNAL MEDICINE

## 2021-09-01 PROCEDURE — 99407 BEHAV CHNG SMOKING > 10 MIN: CPT | Performed by: INTERNAL MEDICINE

## 2021-09-01 NOTE — PROGRESS NOTES
PULMONARY OFFICE NEW PATIENT VISIT    CONSULTING PHYSICIAN:  Jennifer Clark MD     REASON FOR VISIT:   Chief Complaint   Patient presents with    New Patient     Ref: Dr. Tonya Best, Mediastinal mass       DATE OF VISIT: 9/1/2021    HISTORY OF PRESENT ILLNESS: 73y.o. year old male comes into the pulmonary office for the first time. Patient does not have any respiratory symptoms at this time. Denies any shortness of breath, cough, discolored expectoration, dysphagia, orthopnea or paroxysmal nocturnal dyspnea. Denies any anorexia or weight loss. Patient is known to have a mediastinal cyst which was seen in 2018 when he underwent a three-vessel CABG. A PET CT scan done at that time did not show any activity in this mediastinal cyst.  At that time decision was made not to excise the cyst.  Over time the size of the cyst has increased. It still not producing any significant pressure symptoms. Patient continues to smoke about half pack per day. Has been smoking for last 40 to 50 years. Is motivated to quit. REVIEW OF SYSTEMS:   CONSTITUTIONAL SYMPTOMS: The patient denies fever, fatigue, night sweats, weight loss or weight gain. HEENT: No vision changes. No tinnitus, Denies sinus pain. No hoarseness, or dysphagia. NECK: Patient denies swelling in the neck. CARDIOVASCULAR: Denies chest pain, palpitation, syncope. RESPIRATORY: Denies shortness of breath or cough. GASTROINTESTINAL: Denies nausea, abdominal pain or change in bowel function. GENITOURINARY: Denies obstructive symptoms. No history of incontinence. BREASTS: No masses or lumps in the breasts. SKIN: No rashes or itching. MUSCULOSKELETAL: Denies weakness or bone pain. NEUROLOGICAL: No headaches or seizures. PSYCHIATRIC: Denies mood swings or depression. ENDOCRINE: Denies heat or cold intolerance or excessive thirst.  HEMATOLOGIC/LYMPHATIC: Denies easy bruising or lymph node swelling.   ALLERGIC/IMMUNOLOGIC: No environmental allergies.     PAST MEDICAL HISTORY:   Past Medical History:   Diagnosis Date    Bullous emphysema ([address]     CAD (coronary artery disease)     GERD (gastroesophageal reflux disease)     HTN (hypertension)     Hyperlipidemia     Mediastinal cyst 2018       PAST SURGICAL HISTORY:   Past Surgical History:   Procedure Laterality Date    CARDIAC CATHETERIZATION  2018    Dr. Tay Wong  2017    Dr. Laurita Harrell - transverse colon polyps x2 (4mm, 5mm), tubular adenoma & hyperplastic    SD CABG, ARTERY-VEIN, THREE N/A 10/29/2018    CISCO, TOTAL CPB, AORTO-CABG X 3 USING ENDOSCOPICALLY HARVESTED LEFT SVG, LIMA GRAFT X 1, LEFT INTERNAL MAMMARY ARTERY LYMPH NODE BIOPSY, DOPPLER FLOW VERIFICATION, BILATERAL INTERCOSTAL NERVE BLOCK performed by Jaclyn Hamm MD at [address]  2017    Dr. Laurita Harrell - erosive gastritis    UPPER GASTROINTESTINAL ENDOSCOPY N/A 2018    Dr. Ginger Sunshine - w/EUS & needle biopsy       SOCIAL HISTORY:   Social History     Tobacco Use    Smoking status: Current Every Day Smoker     Packs/day: 0.50     Years: 40.00     Pack years: 20.00     Types: Cigarettes     Last attempt to quit: 10/12/2018     Years since quittin.8    Smokeless tobacco: Never Used   Substance Use Topics    Alcohol use: No    Drug use: No       FAMILY HISTORY:   Family History   Problem Relation Age of Onset    Heart Disease Brother 27        CABG x 2    Heart Disease Mother         CABG x 2       MEDICATIONS:     Current Outpatient Medications on File Prior to Visit   Medication Sig Dispense Refill    sertraline (ZOLOFT) 50 MG tablet TAKE ONE TABLET BY MOUTH DAILY 90 tablet 1    omeprazole (PRILOSEC) 20 MG delayed release capsule TAKE ONE CAPSULE BY MOUTH TWICE A  capsule 0    busPIRone (BUSPAR) 5 MG tablet TAKE ONE TABLET BY MOUTH THREE TIMES A DAY 90 tablet 1    metoprolol succinate (TOPROL XL) 50 MG extended release tablet TAKE ONE TABLET BY MOUTH ONCE NIGHTLY 90 tablet 3    rosuvastatin (CRESTOR) 10 MG tablet Take 1 tablet by mouth daily 90 tablet 3    aspirin 325 MG EC tablet Take 1 tablet by mouth daily 30 tablet 3     No current facility-administered medications on file prior to visit. ALLERGIES:   Allergies as of 09/01/2021    (No Known Allergies)      OBJECTIVE:   height is 6' 2\" (1.88 m) and weight is 199 lb 9.6 oz (90.5 kg). His blood pressure is 138/78 and his pulse is 67. His oxygen saturation is 97%. PHYSICAL EXAM:    CONSTITUTIONAL: He is a 73y.o.-year-old who appears his stated age. He is alert and oriented x 3 and in no acute distress. HEENT: PERRLA, EOMI. No scleral icterus. No thrush, atraumatic, normocephalic. NECK: Supple, without cervical or supraclavicular lymphadenopathy:  CARDIOVASCULAR: S1 S2 RRR. Without murmer  RESPIRATORY & CHEST: Lungs are clear to auscultation and percussion. No wheezing, no crackles. Good air movement  GASTROINTESTINAL & ABDOMEN: Soft, nontender, positive bowel sounds in all quadrants, non-distended, without hepatosplenomegaly. GENITOURINARY: Deferred. MUSCULOSKELETAL: No tenderness to palpation of the axial skeleton. There is no clubbing. No cyanosis. No edema of the lower extremities. SKIN OF BODY: No rash or jaundice. PSYCHIATRIC EVALUATION: Normal affect. Patient answers questions appropriately. HEMATOLOGIC/LYMPHATIC/ IMMUNOLOGIC: No palpable lymphadenopathy. NEUROLOGIC: Alert and oriented x 3. Groslly non-focal. Motor strength is 5+/5 in all muscle groups. The patient has a normal sensorium globally.       LABS:    Lab Summary Latest Ref Rng & Units 6/4/2021 5/19/2021 7/13/2020   WBC 4.0 - 11.0 K/uL - 7.4 -   Hgb 13.5 - 17.5 g/dL - 15.0 -   Hct 40.5 - 52.5 % - 43.9 -   Platelets 572 - 666 K/uL - 212 -   Sodium 136 - 145 mmol/L - 139 147(H)   Potassium 3.5 - 5.1 mmol/L - 4.4 4.9   BUN 7 - 20 mg/dL - 11 13   Creatinine 0.8 - 1.3 mg/dL - 0.9 1.0   Glucose 70 - 99 mg/dL - 97 113(H)   Calcium 8.3 - 10.6 mg/dL - 9.3 9.4   Alk Phos 40 - 129 U/L - 76 89   Albumin 3.4 - 5.0 g/dL - 4.2 4.3   Bilirubin 0.0 - 1.0 mg/dL - 0.5 0.4   AST 15 - 37 U/L - 27 22   ALT 10 - 40 U/L - 12 17   HDL cholesterol 40 - 60 mg/dL 32(L) - 33(L)   Triglycerides 0 - 150 mg/dL 94 - 64   LDL calc <100 mg/dL 99 - 66   VLDL calc Not Established mg/dL 19 - 13   TSH 0.27 - 4.20 uIU/mL - 1.33 -   Some recent data might be hidden       All labs were personally reviewed by me any my interpretation is: CBC is normal. Chem 8 is dyslipidemia    IMAGING:    Media Information          Document Information        Pulmonary Functions Testing Results:          IMPRESSION AND RECOMMENDATIONS:     1. Mediastinal cyst  -Patient has a mediastinal cystic mass which has been slowly growing.  -This was PET negative in the previous imaging.  -As of now patient is not having any pressure symptoms such as dysphagia, wheezing or chest pain. -Performing an EBUS guided transbronchial needle aspiration can lead to leakage of cystic contents into the mediastinum.  -Since the cystic mass is slowly growing in size, I will refer the patient to Dr. Mela Saunders for evaluation.  - Ambulatory referral to Cardiovascular Surgery    2. Tobacco use  -Patient counselled on the dangers of tobacco use and urged to quit. Also discussed the importance of a supportive environment and helped identify them. Discussed possibility of various Nicotine replacement therapies if experiencing prolonged craving or withdrawal symptoms. Discussed the possibility of negative mood or depression after quitting. Reassured that some weight gain after smoking is common and dietary, exercise, or lifestyle changes will be able to control it. Time spent counseling was >10mins. Thank you Dr. Guilford Fleischer  for letting me take care of this very pleasant patient. Return if symptoms worsen or fail to improve.          Laurie Altamirano MD  Pulmonary Critical Care and Sleep Medicine  9/1/2021, 9:10 AM    This note was completed using dragon medical speech recognition software. Grammatical errors, random word insertions, pronoun errors and incomplete sentences are occasional consequences of this technology due to software limitations. If there are questions or concerns about the content of this note of information contained within the body of this dictation they should be addressed with the provider for clarification.

## 2021-09-16 ENCOUNTER — OFFICE VISIT (OUTPATIENT)
Dept: CARDIOTHORACIC SURGERY | Age: 66
End: 2021-09-16
Payer: COMMERCIAL

## 2021-09-16 VITALS
BODY MASS INDEX: 25.15 KG/M2 | HEIGHT: 74 IN | DIASTOLIC BLOOD PRESSURE: 78 MMHG | SYSTOLIC BLOOD PRESSURE: 130 MMHG | WEIGHT: 196 LBS | HEART RATE: 103 BPM | OXYGEN SATURATION: 94 % | TEMPERATURE: 98 F

## 2021-09-16 DIAGNOSIS — Q34.1 MEDIASTINAL CYST: ICD-10-CM

## 2021-09-16 DIAGNOSIS — K29.60 EROSIVE GASTRITIS: Primary | ICD-10-CM

## 2021-09-16 PROCEDURE — 99214 OFFICE O/P EST MOD 30 MIN: CPT | Performed by: THORACIC SURGERY (CARDIOTHORACIC VASCULAR SURGERY)

## 2021-09-16 NOTE — PROGRESS NOTES
Mr. Roxana Shah is here to discuss his mediastinal cyst, has increased in size. Has seen PCP, ONC, Pulm, general surgeon. Review of Systems:  Constitutional:  No night sweats, headaches, weight loss. Eyes:  No glaucoma, cataracts removed, L eye, wears glasses  ENMT:  No nosebleeds,no  deviated septum. Occasion ringing in ears  Cardiac:  No arrhythmias. Vascular:  No claudication, no varicosities. GI:  No PUD, + heartburn. :  No kidney stones, no requent UTIs  Musculoskeletal:  + arthritis knee,  wears knee brace, no gout  Respiratory:  No SOB, no  emphysema, no asthma. Integumentary:  No dermatitis, itching,no  rash. Neurological:  No stroke, TIAs,no  seizures. Psychiatric:  + depression, anxiety. Endocrine: No diabetes, no thyroid issues. Hematologic:  No bleeding, + easy bruising. Immunologic:  No known cancer,no  steroid therapies.

## 2021-09-22 NOTE — PROGRESS NOTES
Progress Note    CC:  Epigastric pain especially at night    Subjective: Mr. Juan José Zuleta has had an extensive work-up for epigastric pain during which he had a repeat CT scan chest that shows a largely stable cyst that was PET negative on my first assessment of him. He is still smoking. His pain occurs frequently at night and he has a h/o erosive gastritis. Vital Signs:                                                 /78 (Site: Left Upper Arm)   Pulse 103   Temp 98 °F (36.7 °C) (Infrared)   Ht 6' 2\" (1.88 m)   Wt 196 lb (88.9 kg)   SpO2 94%   BMI 25.16 kg/m²        CV:   RRR  Pulm: CTAB, diminished at apices  Abdomen:    Soft, NTND, NABS  Ext: no C/C/E      Assessment/Plan:    Stable, benign mediastinal cyst that is unlikely causing epigastric pain. I reviewed the patient's last 2 CT scans of his chest with him and answered all his questions. He has marked bullous lung disease bilaterally, so we talked about smoking cessation at length. I have referred Mr. Bonner to Dr. Charley Barboza for further evaluation of his stomach. I will get a non-contrast CT scan of the patient's chest in  1 year to follow the cyst.  I spent 25 minutes reviewing films and discussing and executing plan of care with the patient.     Thank you,    She Singh MD

## 2021-10-06 DIAGNOSIS — F41.9 ANXIETY: ICD-10-CM

## 2021-10-06 DIAGNOSIS — R10.13 EPIGASTRIC PAIN: ICD-10-CM

## 2021-10-06 RX ORDER — BUSPIRONE HYDROCHLORIDE 5 MG/1
TABLET ORAL
Qty: 90 TABLET | Refills: 3 | Status: SHIPPED | OUTPATIENT
Start: 2021-10-06 | End: 2022-03-22

## 2021-10-06 RX ORDER — OMEPRAZOLE 20 MG/1
CAPSULE, DELAYED RELEASE ORAL
Qty: 180 CAPSULE | Refills: 0 | Status: SHIPPED | OUTPATIENT
Start: 2021-10-06 | End: 2021-12-29 | Stop reason: SDUPTHER

## 2021-10-11 ENCOUNTER — OFFICE VISIT (OUTPATIENT)
Dept: ORTHOPEDIC SURGERY | Age: 66
End: 2021-10-11
Payer: COMMERCIAL

## 2021-10-11 VITALS — HEIGHT: 74 IN | BODY MASS INDEX: 25.15 KG/M2 | WEIGHT: 196 LBS

## 2021-10-11 DIAGNOSIS — M25.562 CHRONIC PAIN OF LEFT KNEE: Primary | ICD-10-CM

## 2021-10-11 DIAGNOSIS — G89.29 CHRONIC PAIN OF LEFT KNEE: Primary | ICD-10-CM

## 2021-10-11 PROCEDURE — 99203 OFFICE O/P NEW LOW 30 MIN: CPT | Performed by: PHYSICIAN ASSISTANT

## 2021-10-11 NOTE — PROGRESS NOTES
This dictation was done with Dragon dictation and may contain mechanical errors related to translation. I have today reviewed with Vi Sommers the clinically relevant, past medical history, medications, allergies, family history, social history, and Review Of Systems form the patients most recent history form & I have documented any details relevant to today's presenting complaints in my history below. Mr. Marylen Reap Roth's self-reported past medical history, medications, allergies, family history, social history, and Review Of Systems form has been scanned into the chart under the \"Media\" tab. Subjective:  Vi Sommers is a 73 y.o. who is here complaining of pain in his left knee. His long history of knee problems dating back to playing basketball having a meniscus injury where he fell on it in college. 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 He feels that in the give out when he walks he has a varus thrust and overall genuine varus alignment. He has severe advanced osteoarthritis and we talked about his risk factors for surgery. He is negative for diabetes but he is on a blood thinner and he smokes 1/2 pack a day.   His BMI is 25 and he does not take any pain pills he was sent for x-rays including an AP lateral and a sunrise view of his left knee      Patient Active Problem List   Diagnosis    Hyperlipemia    GERD (gastroesophageal reflux disease)    Essential hypertension    Left ventricular thrombus    ST elevation myocardial infarction involving left anterior descending (LAD) coronary artery (HCC)    Tobacco use    Enlarged lymph node    Chest pain    Mediastinal cyst    Coronary artery disease involving native coronary artery of native heart with angina pectoris ([address]    Splenomegaly           Current Outpatient Medications on File Prior to Visit   Medication Sig Dispense Refill    omeprazole (PRILOSEC) 20 MG delayed release capsule TAKE ONE CAPSULE BY MOUTH TWICE A  capsule 0    busPIRone (BUSPAR) 5 MG tablet TAKE ONE TABLET BY MOUTH THREE TIMES A DAY 90 tablet 3    sertraline (ZOLOFT) 50 MG tablet TAKE ONE TABLET BY MOUTH DAILY 90 tablet 1    metoprolol succinate (TOPROL XL) 50 MG extended release tablet TAKE ONE TABLET BY MOUTH ONCE NIGHTLY 90 tablet 3    rosuvastatin (CRESTOR) 10 MG tablet Take 1 tablet by mouth daily 90 tablet 3    aspirin 325 MG EC tablet Take 1 tablet by mouth daily 30 tablet 3     No current facility-administered medications on file prior to visit. Objective:   Height 6' 2\" (1.88 m), weight 196 lb (88.9 kg). On examination is a pleasant 30-year-old gentleman in no acute distress he is alert and oriented x3 extraocular walk well without antalgia and likely said he has a varus thrust with a genuine varus deformity but significant on the left and mild on the right. He is got good range of motion of 0-140 he is got good quad tone good quad strength good dorsiflexion plantarflexion strength. Neuro exam grossly intact both lower extremities. Intact sensation to light touch. Motor exam 4+ to 5/5 in all major motor groups. Negative Reed's sign. Skin is warm, dry and intact with out erythema or significant increased temperature around the knee joint(s). There are no cutaneous lesions or lymphadenopathy present. X-RAYS:  X-rays taken the office today show bone-on-bone osteoarthritis in the medial compartment of the left knee with a genuine varus deformity and depression of both the tibial plateau and the femoral condyle on the medial side consistent with chronic genu varus osteoarthritis      Assessment:  Knee DJD    Plan:  During today's visit, there was approximately 45 minutes of face-to-face discussion in regards to the patient's current condition and treatment options. More than 50 % of the time was counseling and coordination of care as indicated above.   We talked about short long-term expectations given his age activity level were looking to do a total knee replacement. We did an estimation of his out-of-pocket he is can talk to his wife and contact us to schedule this at some point in the near future      PROCEDURE NOTE:   X-rays examination and discussion . At this visit the X-rays, presenting symptoms, and chief complaint were presented to Nicky Allred MD.  He then evaluated the patient. He spent several minutes discussing face to face with the patient the clinical diagnosis and medical course options,from conservative to aggressive including risks and benefits.         They will schedule a follow up in as needed

## 2021-10-25 RX ORDER — ROSUVASTATIN CALCIUM 10 MG/1
TABLET, COATED ORAL
Qty: 90 TABLET | Refills: 3 | Status: SHIPPED | OUTPATIENT
Start: 2021-10-25 | End: 2021-12-08

## 2021-12-08 ENCOUNTER — OFFICE VISIT (OUTPATIENT)
Dept: CARDIOLOGY CLINIC | Age: 66
End: 2021-12-08
Payer: COMMERCIAL

## 2021-12-08 VITALS
HEART RATE: 68 BPM | SYSTOLIC BLOOD PRESSURE: 138 MMHG | BODY MASS INDEX: 25.15 KG/M2 | OXYGEN SATURATION: 97 % | WEIGHT: 196 LBS | HEIGHT: 74 IN | DIASTOLIC BLOOD PRESSURE: 80 MMHG

## 2021-12-08 DIAGNOSIS — E78.5 HYPERLIPIDEMIA, UNSPECIFIED HYPERLIPIDEMIA TYPE: Chronic | ICD-10-CM

## 2021-12-08 DIAGNOSIS — I10 ESSENTIAL HYPERTENSION: Chronic | ICD-10-CM

## 2021-12-08 DIAGNOSIS — I25.119 CORONARY ARTERY DISEASE INVOLVING NATIVE CORONARY ARTERY OF NATIVE HEART WITH ANGINA PECTORIS (HCC): Primary | ICD-10-CM

## 2021-12-08 PROCEDURE — 99214 OFFICE O/P EST MOD 30 MIN: CPT | Performed by: INTERNAL MEDICINE

## 2021-12-08 RX ORDER — ROSUVASTATIN CALCIUM 20 MG/1
20 TABLET, COATED ORAL DAILY
Qty: 90 TABLET | Refills: 3 | Status: SHIPPED | OUTPATIENT
Start: 2021-12-08 | End: 2022-06-29 | Stop reason: SDUPTHER

## 2021-12-08 NOTE — PROGRESS NOTES
[facility], [place], operated by [facility]   Cardiac Evaluation      Patient: Park Geronimo  YOB: 1955         Chief Complaint   Patient presents with    Coronary Artery Disease    Hyperlipidemia    Hypertension        Referring provider: Quique Ortiz MD    History of Present Illness:   Mr Mohamud Rojo is seen today in follow up. He was hospitalized after found to have an abnormal CT scan showing a left ventricular thrombus.  His EKG was markedly abnormal and he underwent cardiac cath revealing 3V CAD. An incidental finding on the CT scan which was possibly consistent with malignancy and he required PET scanning and otherstudies to further identify the abnormality which turned out to be a benign cyst. . He is a very anxious individual. He underwent CABG X 4 on 10/29/2018. [facility] had an uneventful postoperative course with the exception of a short bout of A-Fib. The patient was discharged on 11/07/2018 in [facility]. Amiodarone has been discontinued. Mr Mohamud Rojo states he has been well. He denies any chest pain, palpitations, CHAVARRIA, dizziness, or edema. He has been having abdominal pain that radiates up his chest on occasion. He had a CT at [address] [place] [address] and then saw Dr Kari Barajas and Dr Krissy Rebolledo. He was also referred to hematology and GI for splenomegaly but then found that he did not have splenomegaly. Liana Martinez Unfortunately, he continues to smoke. Past Medical History:   has a past medical history of Bullous emphysema ([address] CAD (coronary artery disease), GERD (gastroesophageal reflux disease), HTN (hypertension), Hyperlipidemia, and Mediastinal cyst.    Surgical History:   has a past surgical history that includes Cardiac catheterization (09/21/2018); Upper gastrointestinal endoscopy (09/27/2017); Colonoscopy (09/27/2017); Upper gastrointestinal endoscopy (N/A, 9/26/2018); and pr cabg, artery-vein, three (N/A, 10/29/2018).      Current Outpatient Medications   Medication Sig Dispense Refill    rosuvastatin (CRESTOR) 10 MG tablet TAKE ONE on file    Frequency of Social Gatherings with Friends and Family: Not on file    Attends Judaism Services: Not on file    Active Member of Clubs or Organizations: Not on file    Attends Club or Organization Meetings: Not on file    Marital Status: Not on file   Intimate Partner Violence:     Fear of Current or Ex-Partner: Not on file    Emotionally Abused: Not on file    Physically Abused: Not on file    Sexually Abused: Not on file   Housing Stability: Unknown    Unable to Pay for Housing in the Last Year: No    Number of [place] in the Last Year: Not on file    Unstable Housing in the Last Year: No       Family History:  family history includes Heart Disease in his mother; Heart Disease (age of onset: 27) in his brother. Allergies:  Patient has no known allergies. Review of Systems:   · Constitutional: there has been no unanticipated weight loss. No change in energy or activity level   · Eyes: No visual changes   · ENT: No Headaches, hearing loss or vertigo. No mouth sores or sore throat. · Cardiovascular: Reviewed in HPI  · Respiratory: No cough or wheezing, no sputum production. · Gastrointestinal: No abdominal pain, appetite loss, blood in stools. No change in bowel or bladder habits. · Genitourinary: No nocturia, dysuria, trouble voiding  · Musculoskeletal:  No gait disturbance, weakness or joint complaints. · Integumentary: No rash or pruritis. · Neurological: No headache, change in muscle strength, numbness or tingling. No change in gait, balance, coordination, mood, affect, memory, mentation, behavior. · Psychiatric: No anxiety or depression  · Endocrine: No malaise or fever  · Hematologic/Lymphatic: No abnormal bruising or bleeding, blood clots or swollen lymph nodes. · Allergic/Immunologic: No nasal congestion or hives.     Physical Examination:    Vitals:    12/08/21 1341 12/08/21 1344   BP: (!) 146/80 138/80   Site: Left Upper Arm Left Upper Arm   Position: Sitting Sitting   Cuff Size: Medium Adult Medium Adult   Pulse: 68    SpO2: 97%    Weight: 196 lb (88.9 kg)    Height: 6' 2\" (1.88 m)      Body mass index is 25.16 kg/m². Wt Readings from Last 3 Encounters:   12/08/21 196 lb (88.9 kg)   10/11/21 196 lb (88.9 kg)   09/16/21 196 lb (88.9 kg)      BP Readings from Last 3 Encounters:   12/08/21 138/80   09/16/21 130/78   09/01/21 138/78      Constitutional and General Appearance:  appears stated age  Eyes - no xanthelasma  Respiratory:  · Normal excursion and expansion without use of accessory muscles  · Resp Auscultation: Normal breath sounds without dullness  Cardiovascular:  · The apical impulses not displaced  · Heart is regular rate and rhythm with normal S1, S2  · PMI is normal  · The carotid upstroke is normal, no bruit noted   · JVP is not elevated  · Peripheral pulses are symmetrical  · There is no clubbing, cyanosis of the extremities  · No edema  · No varicosities  · Femoral Arteries: 2+ and equal without bruits  · Pedal Pulses: 2+ and equal   Abdomen:  · No masses or tenderness  · Aorta not palpable  · Normal bowel sounds  Neurological/Psychiatric:  · Alert and oriented x3  · Moves all extremities well  · Exhibits normal gait balance and coordination      Assessment/Plan  1. Coronary artery disease involving native coronary artery of native heart with angina pectoris ([facility] [address] -stable  GXT 11/20/19: medium to large sized anteroseptal and apical fixed defect consistent with infarction in territory of LAD. EF 47%  CABG 10/29/18: x4 with LIMA-LAD, SVG that was sequenced from the first diagonal to the first OM and a SVG to the PDA   2. Essential hypertension - stable    3. Atrial fibrillation, postop -on BB and asa- regular rhythm today   4. Hyperlipidemia - Crestor 10mg daily. Labs 6/4/21: ; TRIG 94; HDL 32; LDL 99   5. Tobacco abuse - he states some days he smokes and some days he does not; I have told him to quit, again.         PLAN:  Increase Crestor to 20mg daily. Smoking cessation discussed at length. Regular exercise encouraged. FU 6 months. Scribe's attestation: This note was scribed in the presence of Dr Agusto Ruth MD by Ally Loomis RN. The scribe's documentation has been prepared under my direction and personally reviewed by me in its entirety. I confirm that the note above accurately reflects all work, treatment, procedures, and medical decision making performed by me. Thank you for allowing to me to participate in the care of Maddie Lew.

## 2021-12-23 RX ORDER — METOPROLOL SUCCINATE 50 MG/1
TABLET, EXTENDED RELEASE ORAL
Qty: 90 TABLET | Refills: 3 | Status: SHIPPED | OUTPATIENT
Start: 2021-12-23

## 2021-12-29 DIAGNOSIS — R10.13 EPIGASTRIC PAIN: ICD-10-CM

## 2021-12-29 RX ORDER — OMEPRAZOLE 20 MG/1
CAPSULE, DELAYED RELEASE ORAL
Qty: 180 CAPSULE | Refills: 0 | Status: SHIPPED | OUTPATIENT
Start: 2021-12-29 | End: 2022-06-29

## 2021-12-29 NOTE — TELEPHONE ENCOUNTER
Medication and Quantity requested: omeprazole (PRILOSEC) 20 MG delayed release capsule            Last Visit  5/19/21    Pharmacy and phone number updated in EPIC:  Yes        [address]

## 2022-01-17 ENCOUNTER — TELEPHONE (OUTPATIENT)
Dept: FAMILY MEDICINE CLINIC | Age: 67
End: 2022-01-17

## 2022-01-17 DIAGNOSIS — Z11.52 ENCOUNTER FOR SCREENING FOR COVID-19: Primary | ICD-10-CM

## 2022-01-17 NOTE — TELEPHONE ENCOUNTER
----- Message from Santosh Yash sent at 1/17/2022 10:22 AM EST -----  Subject: Message to Provider    QUESTIONS  Information for Provider? Pt is requesting a order be send to [facility]   station labs to get a covid test done, they advised it can go into [facility]   and they can view it there. A good number is [phone]  ---------------------------------------------------------------------------  --------------  CALL BACK INFO  What is the best way for the office to contact you? OK to leave message on   voicemail  Preferred Call Back Phone Number? [phone]  ---------------------------------------------------------------------------  --------------  SCRIPT ANSWERS  Relationship to Patient?  Self

## 2022-01-18 DIAGNOSIS — Z11.52 ENCOUNTER FOR SCREENING FOR COVID-19: ICD-10-CM

## 2022-01-19 LAB — SARS-COV-2: NOT DETECTED

## 2022-03-09 DIAGNOSIS — E78.5 HYPERLIPIDEMIA, UNSPECIFIED HYPERLIPIDEMIA TYPE: Chronic | ICD-10-CM

## 2022-03-09 LAB
A/G RATIO: 1.9 (ref 1.1–2.2)
ALBUMIN SERPL-MCNC: 4.3 G/DL (ref 3.4–5)
ALP BLD-CCNC: 82 U/L (ref 40–129)
ALT SERPL-CCNC: 13 U/L (ref 10–40)
ANION GAP SERPL CALCULATED.3IONS-SCNC: 13 MMOL/L (ref 3–16)
AST SERPL-CCNC: 18 U/L (ref 15–37)
BILIRUB SERPL-MCNC: 0.5 MG/DL (ref 0–1)
BUN BLDV-MCNC: 16 MG/DL (ref 7–20)
CALCIUM SERPL-MCNC: 9.4 MG/DL (ref 8.3–10.6)
CHLORIDE BLD-SCNC: 103 MMOL/L (ref 99–110)
CHOLESTEROL, TOTAL: 107 MG/DL (ref 0–199)
CO2: 25 MMOL/L (ref 21–32)
CREAT SERPL-MCNC: 1 MG/DL (ref 0.8–1.3)
GFR AFRICAN AMERICAN: >60
GFR NON-AFRICAN AMERICAN: >60
GLUCOSE BLD-MCNC: 106 MG/DL (ref 70–99)
HDLC SERPL-MCNC: 33 MG/DL (ref 40–60)
LDL CHOLESTEROL CALCULATED: 58 MG/DL
POTASSIUM SERPL-SCNC: 4.8 MMOL/L (ref 3.5–5.1)
SODIUM BLD-SCNC: 141 MMOL/L (ref 136–145)
TOTAL PROTEIN: 6.6 G/DL (ref 6.4–8.2)
TRIGL SERPL-MCNC: 78 MG/DL (ref 0–150)
VLDLC SERPL CALC-MCNC: 16 MG/DL

## 2022-03-22 DIAGNOSIS — F41.9 ANXIETY: ICD-10-CM

## 2022-03-22 RX ORDER — BUSPIRONE HYDROCHLORIDE 5 MG/1
TABLET ORAL
Qty: 90 TABLET | Refills: 3 | Status: SHIPPED | OUTPATIENT
Start: 2022-03-22 | End: 2022-08-31

## 2022-03-22 NOTE — TELEPHONE ENCOUNTER
Medication:   Requested Prescriptions     Pending Prescriptions Disp Refills    busPIRone (BUSPAR) 5 MG tablet [Pharmacy Med Name: busPIRone HCL 5 MG TABLET] 90 tablet 3     Sig: TAKE ONE TABLET BY MOUTH THREE TIMES A DAY        Last Filled:  10/6/2021    Patient Phone Number: [phone] (home) [phone] (work)    Last appt: 5/19/2021   Next appt: Visit date not found    Last OARRS: No flowsheet data found.

## 2022-05-05 DIAGNOSIS — F41.9 ANXIETY: ICD-10-CM

## 2022-06-10 DIAGNOSIS — F41.9 ANXIETY: ICD-10-CM

## 2022-06-10 NOTE — TELEPHONE ENCOUNTER
Medication:   Requested Prescriptions     Pending Prescriptions Disp Refills    sertraline (ZOLOFT) 50 MG tablet 30 tablet 0     Sig: TAKE ONE TABLET BY MOUTH DAILY        Last Filled:  5/5/22    Patient Phone Number: [phone] (home) [phone] (work)    Last appt: 5/19/2021   Next appt: Visit date not found    Last OARRS: No flowsheet data found.

## 2022-06-10 NOTE — TELEPHONE ENCOUNTER
Medication and Quantity requested: sertraline (ZOLOFT) 50 MG tablet          Last Visit  5/19/21    Pharmacy and phone number updated in [facility]:  yes

## 2022-06-16 NOTE — PROGRESS NOTES
Procedure complete, balloon off and intact. 22 acquire needle x2 passes. aspirin 81 mg oral tablet: orally once a day  atorvastatin 40 mg oral tablet: 1 tab(s) orally once a day (at bedtime)  Depakote  mg oral tablet, extended release: 2 tab(s) orally once a day (at bedtime)  lamoTRIgine 100 mg oral tablet, extended release: 1 tab(s) orally 2 times a day  levothyroxine 50 mcg (0.05 mg) oral tablet: 1 tab(s) orally once a day  meclizine 25 mg oral tablet: 0.5 tab(s) orally 2 times a day, As Needed - vertigo -vertigo - for dizziness   metFORMIN 500 mg oral tablet, extended release: 1 tab(s) orally once a day  metoprolol tartrate 25 mg oral tablet: 1 tab(s) orally 2 times a day  Multiple Vitamins oral tablet: 1 tab(s) orally once a day  OLANZapine 10 mg oral tablet: 1 tab(s) orally once a day (at bedtime)   aspirin 81 mg oral tablet: orally once a day  atorvastatin 40 mg oral tablet: 1 tab(s) orally once a day (at bedtime)  Depakote  mg oral tablet, extended release: 2 tab(s) orally once a day (at bedtime)  lamoTRIgine 100 mg oral tablet, extended release: 1 tab(s) orally 2 times a day  levothyroxine 50 mcg (0.05 mg) oral tablet: 1 tab(s) orally once a day  meclizine 25 mg oral tablet: 0.5 tab(s) orally 2 times a day, As Needed - vertigo -vertigo - for dizziness   metFORMIN 500 mg oral tablet, extended release: 1 tab(s) orally once a day  metoprolol tartrate 25 mg oral tablet: 1 tab(s) orally 2 times a day  Multiple Vitamins oral tablet: 1 tab(s) orally once a day  OLANZapine 10 mg oral tablet: 1 tab(s) orally once a day (at bedtime)  polyethylene glycol 3350 oral powder for reconstitution: 17 gram(s) orally 2 times a day

## 2022-06-24 NOTE — PROGRESS NOTES
[address]   Cardiac Evaluation      Patient: Diane Summers  YOB: 1955         Chief Complaint   Patient presents with    Coronary Artery Disease    Hypertension        Referring provider: Issa Magallanes MD    History of Present Illness:   Mr Xander Bird is seen today in follow up. He was hospitalized after found to have an abnormal CT scan showing a left ventricular thrombus.  His EKG was markedly abnormal and he underwent cardiac cath revealing 3V CAD. An incidental finding on the CT scan which was possibly consistent with malignancy and he required PET scanning and otherstudies to further identify the abnormality which turned out to be a benign cyst.  He is a very anxious individual. He underwent CABG X 4 on 10/29/2018. Daniel Jones had an uneventful postoperative course with the exception of a short bout of A-Fib. The patient was discharged on 11/07/2018 in NSR. Amiodarone has been discontinued. He had a CT at [address] [place] [address] and then saw Dr Raven Andino and Dr Shaylee Centeno. He was also referred to hematology and GI for splenomegaly but then found that he did not have splenomegaly. Today, Mr Xander Bird states he has been feeling fine. He denies chest pain, palpitations, CHAVARRIA, dizziness, or edema. He monitors his b/p at home with readings mostly <497 systolic. He quit smoking 1 month ago. Past Medical History:   has a past medical history of Bullous emphysema ([address] CAD (coronary artery disease), GERD (gastroesophageal reflux disease), HTN (hypertension), Hyperlipidemia, and Mediastinal cyst.    Surgical History:   has a past surgical history that includes Cardiac catheterization (09/21/2018); Upper gastrointestinal endoscopy (09/27/2017); Colonoscopy (09/27/2017); Upper gastrointestinal endoscopy (N/A, 9/26/2018); and pr cabg, artery-vein, three (N/A, 10/29/2018).      Current Outpatient Medications   Medication Sig Dispense Refill    pantoprazole (PROTONIX) 20 MG tablet Take 1 tablet by mouth every morning (before breakfast) 90 tablet 1    rosuvastatin (CRESTOR) 20 MG tablet Take 1 tablet by mouth daily 90 tablet 3    sertraline (ZOLOFT) 50 MG tablet TAKE ONE TABLET BY MOUTH DAILY 90 tablet 0    busPIRone (BUSPAR) 5 MG tablet TAKE ONE TABLET BY MOUTH THREE TIMES A DAY 90 tablet 3    metoprolol succinate (TOPROL XL) 50 MG extended release tablet TAKE ONE TABLET BY MOUTH ONCE NIGHTLY 90 tablet 3    aspirin 325 MG EC tablet Take 1 tablet by mouth daily 30 tablet 3     No current facility-administered medications for this visit. Social History:  Social History     Socioeconomic History    Marital status:      Spouse name: Not on file    Number of children: 2    Years of education: Not on file    Highest education level: Not on file   Occupational History    Not on file   Tobacco Use    Smoking status: Former Smoker     Packs/day: 0.50     Years: 40.00     Pack years: 20.00     Types: Cigarettes    Smokeless tobacco: Never Used   Vaping Use    Vaping Use: Never used   Substance and Sexual Activity    Alcohol use: No    Drug use: No    Sexual activity: Yes   Other Topics Concern    Not on file   Social History Narrative    Not on file     Social Determinants of Health     Financial Resource Strain:     Difficulty of Paying Living Expenses: Not on file   Food Insecurity:     Worried About Running Out of Food in the Last Year: Not on file    [place] of Food in the Last Year: Not on file   Transportation Needs:     Lack of Transportation (Medical): Not on file    Lack of Transportation (Non-Medical):  Not on file   Physical Activity:     Days of Exercise per Week: Not on file    Minutes of Exercise per Session: Not on file   Stress:     Feeling of Stress : Not on file   Social Connections:     Frequency of Communication with Friends and Family: Not on file    Frequency of Social Gatherings with Friends and Family: Not on file    Attends Muslim Services: Not on file   Hemant Arredondo Member of Clubs or Organizations: Not on file    Attends Club or Organization Meetings: Not on file    Marital Status: Not on file   Intimate Partner Violence:     Fear of Current or Ex-Partner: Not on file    Emotionally Abused: Not on file    Physically Abused: Not on file    Sexually Abused: Not on file   Housing Stability:     Unable to Pay for Housing in the Last Year: Not on file    Number of [place] in the Last Year: Not on file    Unstable Housing in the Last Year: Not on file       Family History:  family history includes Heart Disease in his mother; Heart Disease (age of onset: 27) in his brother. Allergies:  Patient has no known allergies. Review of Systems:   · Constitutional: there has been no unanticipated weight loss. No change in energy or activity level   · Eyes: No visual changes   · ENT: No Headaches, hearing loss or vertigo. No mouth sores or sore throat. · Cardiovascular: Reviewed in HPI  · Respiratory: No cough or wheezing, no sputum production. · Gastrointestinal: No abdominal pain, appetite loss, blood in stools. No change in bowel or bladder habits. · Genitourinary: No nocturia, dysuria, trouble voiding  · Musculoskeletal:  No gait disturbance, weakness or joint complaints. · Integumentary: No rash or pruritis. · Neurological: No headache, change in muscle strength, numbness or tingling. No change in gait, balance, coordination, mood, affect, memory, mentation, behavior. · Psychiatric: No anxiety or depression  · Endocrine: No malaise or fever  · Hematologic/Lymphatic: No abnormal bruising or bleeding, blood clots or swollen lymph nodes. · Allergic/Immunologic: No nasal congestion or hives.     Physical Examination:    Vitals:    06/29/22 1415 06/29/22 1421   BP: (!) 144/80 (!) 144/80   Site:  Left Upper Arm   Position:  Sitting   Cuff Size:  Medium Adult   Pulse: 73    SpO2: 95%    Weight: 202 lb (91.6 kg)    Height: 6' 2\" (1.88 m)      Body mass index is 25.94 kg/m². Wt Readings from Last 3 Encounters:   06/29/22 202 lb (91.6 kg)   12/08/21 196 lb (88.9 kg)   10/11/21 196 lb (88.9 kg)      BP Readings from Last 3 Encounters:   06/29/22 (!) 144/80   12/08/21 138/80   09/16/21 130/78      Constitutional and General Appearance:  appears stated age  Eyes - no xanthelasma  Respiratory:  · Normal excursion and expansion without use of accessory muscles  · Resp Auscultation: Normal breath sounds without dullness  Cardiovascular:  · The apical impulses not displaced  · Heart is regular rate and rhythm with normal S1, S2  · PMI is normal  · The carotid upstroke is normal, no bruit noted   · JVP is not elevated  · Peripheral pulses are symmetrical  · There is no clubbing, cyanosis of the extremities  · No edema  · No varicosities  · Femoral Arteries: 2+ and equal without bruits  · Pedal Pulses: 2+ and equal   Abdomen:  · No masses or tenderness  · Aorta not palpable  · Normal bowel sounds  Neurological/Psychiatric:  · Alert and oriented x3  · Moves all extremities well  · Exhibits normal gait balance and coordination      Assessment/Plan  1. Coronary artery disease involving native coronary artery of native heart with angina pectoris ([address] -stable  GXT 11/20/19: medium to large sized anteroseptal and apical fixed defect consistent with infarction in territory of LAD. EF 47%  CABG 10/29/18: x4 with LIMA-LAD, SVG that was sequenced from the first diagonal to the first OM and a SVG to the PDA   2. Essential hypertension - stable, readings from home    3. Atrial fibrillation, postop -on BB and asa- regular rhythm today   4. Hyperlipidemia - Crestor 20mg daily. Labs 3/9/22: ; TRIG 78; HDL 33; LDL 58   5. Tobacco abuse - he states he quit smoking 1 month ago, May 2022      PLAN:  Wilbur Paz appears stable. No med changes. Congratulated him on quitting smoking. Encouraged to continue regular exercise. FU 6 months.  ( He fixed our printer while he was here today)    Sakshi attestation: This note was scribed in the presence of Dr Maine Palacios MD by Brown Rizo RN. The scribe's documentation has been prepared under my direction and personally reviewed by me in its entirety. I confirm that the note above accurately reflects all work, treatment, procedures, and medical decision making performed by me. Thank you for allowing to me to participate in the care of Lizette Matamoros.

## 2022-06-29 ENCOUNTER — OFFICE VISIT (OUTPATIENT)
Dept: CARDIOLOGY CLINIC | Age: 67
End: 2022-06-29
Payer: COMMERCIAL

## 2022-06-29 VITALS
SYSTOLIC BLOOD PRESSURE: 144 MMHG | HEIGHT: 74 IN | DIASTOLIC BLOOD PRESSURE: 80 MMHG | OXYGEN SATURATION: 95 % | BODY MASS INDEX: 25.93 KG/M2 | HEART RATE: 73 BPM | WEIGHT: 202 LBS

## 2022-06-29 DIAGNOSIS — I10 ESSENTIAL HYPERTENSION: Chronic | ICD-10-CM

## 2022-06-29 DIAGNOSIS — E78.5 HYPERLIPIDEMIA, UNSPECIFIED HYPERLIPIDEMIA TYPE: Chronic | ICD-10-CM

## 2022-06-29 DIAGNOSIS — I25.119 CORONARY ARTERY DISEASE INVOLVING NATIVE CORONARY ARTERY OF NATIVE HEART WITH ANGINA PECTORIS (HCC): Primary | ICD-10-CM

## 2022-06-29 DIAGNOSIS — Z72.0 TOBACCO USE: ICD-10-CM

## 2022-06-29 PROCEDURE — 1123F ACP DISCUSS/DSCN MKR DOCD: CPT | Performed by: INTERNAL MEDICINE

## 2022-06-29 PROCEDURE — 99214 OFFICE O/P EST MOD 30 MIN: CPT | Performed by: INTERNAL MEDICINE

## 2022-06-29 RX ORDER — PANTOPRAZOLE SODIUM 20 MG/1
20 TABLET, DELAYED RELEASE ORAL
Qty: 90 TABLET | Refills: 1
Start: 2022-06-29

## 2022-06-29 RX ORDER — ROSUVASTATIN CALCIUM 20 MG/1
20 TABLET, COATED ORAL DAILY
Qty: 90 TABLET | Refills: 3 | Status: SHIPPED | OUTPATIENT
Start: 2022-06-29

## 2022-08-09 DIAGNOSIS — F41.9 ANXIETY: ICD-10-CM

## 2022-08-09 NOTE — TELEPHONE ENCOUNTER
Medication:   Requested Prescriptions     Pending Prescriptions Disp Refills    sertraline (ZOLOFT) 50 MG tablet [Pharmacy Med Name: SERTRALINE HCL 50 MG TABLET] 90 tablet 0     Sig: TAKE ONE TABLET BY MOUTH DAILY        Last Filled:  6/10/2022    Patient Phone Number: [phone] (home) [phone] (work)    Last appt: 5/19/2021   Next appt: Visit date not found    Last OARRS: No flowsheet data found.

## 2022-08-30 DIAGNOSIS — F41.9 ANXIETY: ICD-10-CM

## 2022-08-31 RX ORDER — BUSPIRONE HYDROCHLORIDE 5 MG/1
TABLET ORAL
Qty: 90 TABLET | Refills: 3 | Status: SHIPPED | OUTPATIENT
Start: 2022-08-31

## 2022-09-19 ENCOUNTER — TELEPHONE (OUTPATIENT)
Dept: CARDIOTHORACIC SURGERY | Age: 67
End: 2022-09-19

## 2022-09-19 NOTE — TELEPHONE ENCOUNTER
LM for patient regarding schedule of CT chest without contrast for assessment of his mediastinal cyst.

## 2022-10-20 DIAGNOSIS — F41.9 ANXIETY: ICD-10-CM

## 2022-10-20 NOTE — TELEPHONE ENCOUNTER
Medication and Quantity requested: sertraline (ZOLOFT) 50 MG tablet       Last Visit  5/19/22    Pharmacy and phone number updated in EPIC:  yes arely

## 2022-12-13 LAB
AVERAGE GLUCOSE: NORMAL
CHOLESTEROL, TOTAL: 66 MG/DL
CHOLESTEROL/HDL RATIO: ABNORMAL
HBA1C MFR BLD: 6.2 %
HDLC SERPL-MCNC: 22 MG/DL (ref 35–70)
LDL CHOLESTEROL CALCULATED: 32 MG/DL (ref 0–160)
NONHDLC SERPL-MCNC: ABNORMAL MG/DL
TRIGL SERPL-MCNC: 58 MG/DL
VLDLC SERPL CALC-MCNC: ABNORMAL MG/DL

## 2022-12-16 ENCOUNTER — TELEPHONE (OUTPATIENT)
Dept: CARDIOLOGY CLINIC | Age: 67
End: 2022-12-16

## 2022-12-16 RX ORDER — METOPROLOL SUCCINATE 50 MG/1
TABLET, EXTENDED RELEASE ORAL
Qty: 90 TABLET | Refills: 3 | Status: SHIPPED | OUTPATIENT
Start: 2022-12-16

## 2022-12-16 NOTE — TELEPHONE ENCOUNTER
Pt called to check on when will [facility] call him back in reference to the 1st message.    Please advise

## 2022-12-16 NOTE — TELEPHONE ENCOUNTER
Pt called to speak with Delisa Napoles was taken to [facility] DR LSIA HAQ on 12/12 w/lightheadiness, dizziness, Pt tested Positive for the flu, and dx him w/a-fib, he was prescribed 2 meds Eliquis and Entresto. Pt is wanting to know if he should take this med. Pt is requesting a call back from the office.   Please advise

## 2022-12-16 NOTE — TELEPHONE ENCOUNTER
Pt was in the [facility] 12/12/2022.    6/29/2022 last OV    12/21/2022 [facility] apt. No changes were made with Metoprolol.

## 2022-12-19 NOTE — PROGRESS NOTES
[address]   Cardiac Evaluation      Patient: Loulou Fulton  YOB: 1955         Chief Complaint   Patient presents with    Atrial Fibrillation    Hypertension    Coronary Artery Disease          Referring provider: Lisa Youssef MD    History of Present Illness:   Mr Sy Linton is seen today in follow up. He was hospitalized after found to have an abnormal CT scan showing a left ventricular thrombus. His EKG was markedly abnormal and he underwent cardiac cath revealing 3V CAD. An incidental finding on the CT scan which was possibly consistent with malignancy and he required PET scanning and otherstudies to further identify the abnormality which turned out to be a benign cyst.  He is a very anxious individual. He underwent CABG X 4 on 10/29/2018. He had an uneventful postoperative course with the exception of a short bout of A-Fib. The patient was discharged on 11/07/2018 in NSR. Amiodarone has been discontinued. He had a CT at [address] [place] [address] and then saw Dr Shivam Young and Dr Nazario Baker. He was also referred to hematology and GI for splenomegaly but then found that he did not have splenomegaly. Mr Sy Linton was hospitalized at [facility] 12/12/22 after falling in his bathroom. He was treated for the Flu and found to have urinary retention. He was found to be in atrial flutter and Eliquis was initiated. He also had an echo that revealed EF 40-45%, so Entresto was initiated as well. Today, Mr yS Linton states he has an appt with urology tomorrow. Past Medical History:   has a past medical history of Bullous emphysema ([address] CAD (coronary artery disease), GERD (gastroesophageal reflux disease), HTN (hypertension), Hyperlipidemia, and Mediastinal cyst.    Surgical History:   has a past surgical history that includes Cardiac catheterization (09/21/2018); Upper gastrointestinal endoscopy (09/27/2017); Colonoscopy (09/27/2017);  Upper gastrointestinal endoscopy (N/A, 9/26/2018); and pr cabg, artery-vein, three (N/A, 10/29/2018). Current Outpatient Medications   Medication Sig Dispense Refill    apixaban (ELIQUIS) 5 MG TABS tablet Take 5 mg by mouth 2 times daily      sacubitril-valsartan (ENTRESTO) 24-26 MG per tablet Take 0.5 tablets by mouth 2 times daily      metoprolol succinate (TOPROL XL) 50 MG extended release tablet TAKE ONE TABLET BY MOUTH ONCE NIGHTLY 90 tablet 3    sertraline (ZOLOFT) 50 MG tablet Take 1 tab by mouth daily 90 tablet 2    busPIRone (BUSPAR) 5 MG tablet TAKE ONE TABLET BY MOUTH THREE TIMES A DAY 90 tablet 3    pantoprazole (PROTONIX) 20 MG tablet Take 1 tablet by mouth every morning (before breakfast) (Patient taking differently: Take 40 mg by mouth every morning (before breakfast)) 90 tablet 1    rosuvastatin (CRESTOR) 20 MG tablet Take 1 tablet by mouth daily 90 tablet 3    aspirin 325 MG EC tablet Take 1 tablet by mouth daily 30 tablet 3     No current facility-administered medications for this visit.        Social History:  Social History     Socioeconomic History    Marital status:      Spouse name: Not on file    Number of children: 2    Years of education: Not on file    Highest education level: Not on file   Occupational History    Not on file   Tobacco Use    Smoking status: Former     Packs/day: 0.50     Years: 40.00     Pack years: 20.00     Types: Cigarettes    Smokeless tobacco: Never   Vaping Use    Vaping Use: Never used   Substance and Sexual Activity    Alcohol use: No    Drug use: No    Sexual activity: Yes   Other Topics Concern    Not on file   Social History Narrative    Not on file     Social Determinants of Health     Financial Resource Strain: Not on file   Food Insecurity: Not on file   Transportation Needs: Not on file   Physical Activity: Not on file   Stress: Not on file   Social Connections: Not on file   Intimate Partner Violence: Not on file   Housing Stability: Not on file       Family History:  family history includes Heart Disease in his mother; Heart Disease (age of onset: 27) in his brother. Allergies:  Patient has no known allergies. Review of Systems:   Constitutional: there has been no unanticipated weight loss. No change in energy or activity level   Eyes: No visual changes   ENT: No Headaches, hearing loss or vertigo. No mouth sores or sore throat. Cardiovascular: Reviewed in HPI  Respiratory: No cough or wheezing, no sputum production. Gastrointestinal: No abdominal pain, appetite loss, blood in stools. No change in bowel or bladder habits. Genitourinary: No nocturia, dysuria, trouble voiding  Musculoskeletal:  No gait disturbance, weakness or joint complaints. Integumentary: No rash or pruritis. Neurological: No headache, change in muscle strength, numbness or tingling. No change in gait, balance, coordination, mood, affect, memory, mentation, behavior. Psychiatric: No anxiety or depression  Endocrine: No malaise or fever  Hematologic/Lymphatic: No abnormal bruising or bleeding, blood clots or swollen lymph nodes. Allergic/Immunologic: No nasal congestion or hives. Physical Examination:    Vitals:    12/21/22 1031   BP: 138/84   Site: Left Upper Arm   Position: Sitting   Cuff Size: Medium Adult   SpO2: 98%   Weight: 194 lb (88 kg)   Height: 6' 2\" (1.88 m)       Body mass index is 24.91 kg/m². Wt Readings from Last 3 Encounters:   12/21/22 194 lb (88 kg)   06/29/22 202 lb (91.6 kg)   12/08/21 196 lb (88.9 kg)      BP Readings from Last 3 Encounters:   12/21/22 138/84   06/29/22 (!) 144/80   12/08/21 138/80      Constitutional and General Appearance:  appears stated age  Eyes - no xanthelasma  Respiratory:  Normal excursion and expansion without use of accessory muscles  Resp Auscultation: Normal breath sounds without dullness  Cardiovascular:   The apical impulses not displaced  Heart is regular rate and rhythm with normal S1, S2  PMI is normal  The carotid upstroke is normal, no bruit noted   JVP is not elevated  Peripheral pulses are symmetrical  There is no clubbing, cyanosis of the extremities  No edema  No varicosities  Femoral Arteries: 2+ and equal without bruits  Pedal Pulses: 2+ and equal   Abdomen:  No masses or tenderness  Aorta not palpable  Normal bowel sounds  Neurological/Psychiatric:  Alert and oriented x3  Moves all extremities well  Exhibits normal gait balance and coordination      Assessment/Plan  1. Coronary artery disease involving native coronary artery of native heart with angina pectoris ([address] -stable  Echo 12/13/22: EF 40-45%, Mild hypokinesis of the mid-apicalanteroseptal, inferoseptal, and apical myocardium. The study is not technically sufficient to allow evaluation of LV diastolic function. Aortic valve: Mild calcification. Mild thickening. Mild regurgitation. Mitral valve: Mildly calcified annulus. Mild thickening. Mild regurgitation. Right ventricle: Systolic function was mildly reduced by objective interpretation. Right atrium: The atrium is mildly dilated. Tricuspid valve: Mild-moderate regurgitation. Pulmonary arteries: Systolic pressure was mildly increased, estimated to be 45mm Hg assuming that the right atrial pressure was 3 mmHg. GXT 11/20/19: medium to large sized anteroseptal and apical fixed defect consistent with infarction in territory of LAD. EF 47%  CABG 10/29/18: x4 with LIMA-LAD, SVG that was sequenced from the first diagonal to the first OM and a SVG to the PDA   2. Essential hypertension -  stable    3. Atrial flutter -on BB and asa- regular rhythm today  Recent admission to [facility], had the flu found to be in atrial flutter and started on Eliquis   4. Hyperlipidemia - Crestor 20mg daily. Labs 12/13/22: TC 66; TRIG 58; HDL 22; LDL 32   5. Tobacco abuse - he states he quit smoking May 2022    PLAN:   Plan to do cardioversion 12/30/22. Advised to continue meds as they are. Scribe's attestation: This note was scribed in the presence of Dr Rosa Trimble MD by Hema Roth RN. The petra's documentation has been prepared under my direction and personally reviewed by me in its entirety. I confirm that the note above accurately reflects all work, treatment, procedures, and medical decision making performed by me. Thank you for allowing to me to participate in the care of Loulou Fulton.

## 2022-12-21 ENCOUNTER — OFFICE VISIT (OUTPATIENT)
Dept: CARDIOLOGY CLINIC | Age: 67
End: 2022-12-21
Payer: COMMERCIAL

## 2022-12-21 VITALS
OXYGEN SATURATION: 98 % | SYSTOLIC BLOOD PRESSURE: 138 MMHG | BODY MASS INDEX: 24.9 KG/M2 | WEIGHT: 194 LBS | HEIGHT: 74 IN | DIASTOLIC BLOOD PRESSURE: 84 MMHG

## 2022-12-21 DIAGNOSIS — E78.5 HYPERLIPIDEMIA, UNSPECIFIED HYPERLIPIDEMIA TYPE: ICD-10-CM

## 2022-12-21 DIAGNOSIS — I48.92 ATRIAL FLUTTER, UNSPECIFIED TYPE (HCC): ICD-10-CM

## 2022-12-21 DIAGNOSIS — I10 ESSENTIAL HYPERTENSION: ICD-10-CM

## 2022-12-21 DIAGNOSIS — I25.119 CORONARY ARTERY DISEASE INVOLVING NATIVE CORONARY ARTERY OF NATIVE HEART WITH ANGINA PECTORIS (HCC): Primary | ICD-10-CM

## 2022-12-21 PROBLEM — I48.0 PAF (PAROXYSMAL ATRIAL FIBRILLATION) (HCC): Status: ACTIVE | Noted: 2022-12-21

## 2022-12-21 PROCEDURE — 99214 OFFICE O/P EST MOD 30 MIN: CPT | Performed by: INTERNAL MEDICINE

## 2022-12-21 PROCEDURE — 3078F DIAST BP <80 MM HG: CPT | Performed by: INTERNAL MEDICINE

## 2022-12-21 PROCEDURE — 1123F ACP DISCUSS/DSCN MKR DOCD: CPT | Performed by: INTERNAL MEDICINE

## 2022-12-21 PROCEDURE — 3074F SYST BP LT 130 MM HG: CPT | Performed by: INTERNAL MEDICINE

## 2022-12-21 PROCEDURE — 93000 ELECTROCARDIOGRAM COMPLETE: CPT | Performed by: INTERNAL MEDICINE

## 2022-12-30 ENCOUNTER — HOSPITAL ENCOUNTER (OUTPATIENT)
Dept: CARDIAC CATH/INVASIVE PROCEDURES | Age: 67
Discharge: HOME | End: 2022-12-30
Attending: INTERNAL MEDICINE | Admitting: INTERNAL MEDICINE
Payer: COMMERCIAL

## 2022-12-30 VITALS
RESPIRATION RATE: 16 BRPM | BODY MASS INDEX: 24.9 KG/M2 | DIASTOLIC BLOOD PRESSURE: 99 MMHG | HEIGHT: 74 IN | HEART RATE: 92 BPM | SYSTOLIC BLOOD PRESSURE: 154 MMHG | WEIGHT: 194 LBS | TEMPERATURE: 98.5 F

## 2022-12-30 PROBLEM — I48.3 TYPICAL ATRIAL FLUTTER (HCC): Status: ACTIVE | Noted: 2022-12-30

## 2022-12-30 LAB
EKG ATRIAL RATE: 78 BPM
EKG ATRIAL RATE: 92 BPM
EKG DIAGNOSIS: NORMAL
EKG DIAGNOSIS: NORMAL
EKG P AXIS: 228 DEGREES
EKG P AXIS: 69 DEGREES
EKG P-R INTERVAL: 170 MS
EKG P-R INTERVAL: 176 MS
EKG Q-T INTERVAL: 382 MS
EKG Q-T INTERVAL: 392 MS
EKG QRS DURATION: 102 MS
EKG QRS DURATION: 108 MS
EKG QTC CALCULATION (BAZETT): 446 MS
EKG QTC CALCULATION (BAZETT): 472 MS
EKG R AXIS: 73 DEGREES
EKG R AXIS: 84 DEGREES
EKG T AXIS: 102 DEGREES
EKG T AXIS: 131 DEGREES
EKG VENTRICULAR RATE: 78 BPM
EKG VENTRICULAR RATE: 92 BPM

## 2022-12-30 PROCEDURE — 7100000010 HC PHASE II RECOVERY - FIRST 15 MIN

## 2022-12-30 PROCEDURE — 92960 CARDIOVERSION ELECTRIC EXT: CPT

## 2022-12-30 PROCEDURE — 93010 ELECTROCARDIOGRAM REPORT: CPT | Performed by: INTERNAL MEDICINE

## 2022-12-30 PROCEDURE — 93005 ELECTROCARDIOGRAM TRACING: CPT | Performed by: INTERNAL MEDICINE

## 2022-12-30 PROCEDURE — 92960 CARDIOVERSION ELECTRIC EXT: CPT | Performed by: INTERNAL MEDICINE

## 2022-12-30 NOTE — H&P
[address]   Cardiac Evaluation      Patient: Brody Lackey  YOB: 1955         CC:AFL         Referring provider: Fiorella Ernst MD    History of Present Illness:   Mr Aby Oglesby is seen today for CV for AFL. He was hospitalized after found to have an abnormal CT scan showing a left ventricular thrombus. His EKG was markedly abnormal and he underwent cardiac cath revealing 3V CAD. An incidental finding on the CT scan which was possibly consistent with malignancy and he required PET scanning and otherstudies to further identify the abnormality which turned out to be a benign cyst.  He is a very anxious individual. He underwent CABG X 4 on 10/29/2018. He had an uneventful postoperative course with the exception of a short bout of A-Fib. The patient was discharged on 11/07/2018 in NSR. Amiodarone has been discontinued. He had a CT at [address] [place] [address] and then saw Dr Danial Mahan and Dr Stephen Brunner. He was also referred to hematology and GI for splenomegaly but then found that he did not have splenomegaly. Mr Aby Oglesby was hospitalized at [facility] 12/12/22 after falling in his bathroom. He was treated for the Flu and found to have urinary retention. He was found to be in atrial flutter and Eliquis was initiated. He also had an echo that revealed EF 40-45%, so Entresto was initiated as well. Past Medical History:   has a past medical history of Bullous emphysema ([address] CAD (coronary artery disease), GERD (gastroesophageal reflux disease), HTN (hypertension), Hyperlipidemia, and Mediastinal cyst.    Surgical History:   has a past surgical history that includes Cardiac catheterization (09/21/2018); Upper gastrointestinal endoscopy (09/27/2017); Colonoscopy (09/27/2017); Upper gastrointestinal endoscopy (N/A, 9/26/2018); and pr coronary artery byp w/vein & artery graft 3 vein (N/A, 10/29/2018). No current facility-administered medications for this encounter.        Social History:  Social History Socioeconomic History    Marital status:      Spouse name: Not on file    Number of children: 2    Years of education: Not on file    Highest education level: Not on file   Occupational History    Not on file   Tobacco Use    Smoking status: Former     Packs/day: 0.50     Years: 40.00     Pack years: 20.00     Types: Cigarettes    Smokeless tobacco: Never   Vaping Use    Vaping Use: Never used   Substance and Sexual Activity    Alcohol use: No    Drug use: No    Sexual activity: Yes   Other Topics Concern    Not on file   Social History Narrative    Not on file     Social Determinants of Health     Financial Resource Strain: Not on file   Food Insecurity: Not on file   Transportation Needs: Not on file   Physical Activity: Not on file   Stress: Not on file   Social Connections: Not on file   Intimate Partner Violence: Not on file   Housing Stability: Not on file       Family History:  family history includes Heart Disease in his mother; Heart Disease (age of onset: 27) in his brother. Allergies:  Patient has no known allergies. Review of Systems:   Constitutional: there has been no unanticipated weight loss. No change in energy or activity level   Eyes: No visual changes   ENT: No Headaches, hearing loss or vertigo. No mouth sores or sore throat. Cardiovascular: Reviewed in HPI  Respiratory: No cough or wheezing, no sputum production. Gastrointestinal: No abdominal pain, appetite loss, blood in stools. No change in bowel or bladder habits. Genitourinary: No nocturia, dysuria, trouble voiding  Musculoskeletal:  No gait disturbance, weakness or joint complaints. Integumentary: No rash or pruritis. Neurological: No headache, change in muscle strength, numbness or tingling. No change in gait, balance, coordination, mood, affect, memory, mentation, behavior.   Psychiatric: No anxiety or depression  Endocrine: No malaise or fever  Hematologic/Lymphatic: No abnormal bruising or bleeding, blood clots or swollen lymph nodes. Allergic/Immunologic: No nasal congestion or hives. Physical Examination:    Vitals:    12/30/22 0745   BP: (!) 154/99   Pulse: 92   Resp: 16   Temp: 98.5 °F (36.9 °C)   Weight: 194 lb (88 kg)   Height: 6' 2\" (1.88 m)       Body mass index is 24.91 kg/m². Wt Readings from Last 3 Encounters:   12/30/22 194 lb (88 kg)   12/21/22 194 lb (88 kg)   06/29/22 202 lb (91.6 kg)      BP Readings from Last 3 Encounters:   12/30/22 (!) 154/99   12/21/22 138/84   06/29/22 (!) 144/80      Constitutional and General Appearance:  appears stated age  Eyes - no xanthelasma  Respiratory:  Normal excursion and expansion without use of accessory muscles  Resp Auscultation: Normal breath sounds without dullness  Cardiovascular: The apical impulses not displaced  Heart is regular rate and rhythm with normal S1, S2  PMI is normal  The carotid upstroke is normal, no bruit noted   JVP is not elevated  Peripheral pulses are symmetrical  There is no clubbing, cyanosis of the extremities  No edema  No varicosities  Femoral Arteries: 2+ and equal without bruits  Pedal Pulses: 2+ and equal   Abdomen:  No masses or tenderness  Aorta not palpable  Normal bowel sounds  Neurological/Psychiatric:  Alert and oriented x3  Moves all extremities well  Exhibits normal gait balance and coordination      Assessment/Plan  1. Coronary artery disease involving native coronary artery of native heart with angina pectoris ([address] -stable  Echo 12/13/22: EF 40-45%, Mild hypokinesis of the mid-apicalanteroseptal, inferoseptal, and apical myocardium. The study is not technically sufficient to allow evaluation of LV diastolic function. Aortic valve: Mild calcification. Mild thickening. Mild regurgitation. Mitral valve: Mildly calcified annulus. Mild thickening. Mild regurgitation. Right ventricle: Systolic function was mildly reduced by objective interpretation. Right atrium: The atrium is mildly dilated.   Tricuspid valve: Mild-moderate regurgitation. Pulmonary arteries: Systolic pressure was mildly increased, estimated to be 45mm Hg assuming that the right atrial pressure was 3 mmHg. GXT 11/20/19: medium to large sized anteroseptal and apical fixed defect consistent with infarction in territory of LAD. EF 47%  CABG 10/29/18: x4 with LIMA-LAD, SVG that was sequenced from the first diagonal to the first OM and a SVG to the PDA   2. Essential hypertension -  stable    3. Atrial flutter -on BB and asa- regular rhythm today  Recent admission to [facility], had the flu found to be in atrial flutter and started on Eliquis   4. Hyperlipidemia - Crestor 20mg daily. Labs 12/13/22: TC 66; TRIG 58; HDL 22; LDL 32   5. Tobacco abuse - he states he quit smoking May 2022    PLAN:   CV today procedure and risks explained and pt agrees. Thank you for allowing to me to participate in the care of Maddie Lew.

## 2022-12-30 NOTE — OP NOTE
Patient:  Juan Carrasco   :   1955    A pre-sedation re-evaluation was performed immediately prior to beginning of  the procedure. Procedure: CV  Medications: Procedural sedation with minimal conscious sedation  Complications: None  Estimated Blood Loss: none  Specimens: Were not obtained    Pt was sedated with 40 mg of brevitol and CV with 100 joules of biphasic energy to SR from AFL. There were no adverse hemodynamic or neurologic sequelae. Continue eliquis and toprol. Kely Medication and Procedural Reconciliation:  I agree that the documented medications and procedures performed are true. The medications were given under my order. The procedures were performed under my direct supervision.

## 2022-12-30 NOTE — DISCHARGE INSTRUCTIONS
CARDIOVERSION DISCHARGE INSTRUCTIONS    No driving for 24 hours. We strongly recommend that a responsible adult stay with you for the next 24 hours. Continue Eliquis    Hydrocortisone 1% cream to reddened areas as needed.        Phone: [phone]

## 2023-01-06 ENCOUNTER — OFFICE VISIT (OUTPATIENT)
Dept: CARDIOLOGY CLINIC | Age: 68
End: 2023-01-06
Payer: COMMERCIAL

## 2023-01-06 VITALS
HEART RATE: 68 BPM | WEIGHT: 197 LBS | OXYGEN SATURATION: 99 % | DIASTOLIC BLOOD PRESSURE: 80 MMHG | HEIGHT: 74 IN | BODY MASS INDEX: 25.28 KG/M2 | SYSTOLIC BLOOD PRESSURE: 140 MMHG

## 2023-01-06 DIAGNOSIS — E78.5 HYPERLIPIDEMIA, UNSPECIFIED HYPERLIPIDEMIA TYPE: ICD-10-CM

## 2023-01-06 DIAGNOSIS — I25.119 CORONARY ARTERY DISEASE INVOLVING NATIVE CORONARY ARTERY OF NATIVE HEART WITH ANGINA PECTORIS (HCC): ICD-10-CM

## 2023-01-06 DIAGNOSIS — I10 ESSENTIAL HYPERTENSION: ICD-10-CM

## 2023-01-06 DIAGNOSIS — I48.3 TYPICAL ATRIAL FLUTTER (HCC): Primary | ICD-10-CM

## 2023-01-06 PROBLEM — I48.92 ATRIAL FLUTTER (HCC): Status: ACTIVE | Noted: 2022-12-30

## 2023-01-06 PROCEDURE — 3077F SYST BP >= 140 MM HG: CPT | Performed by: INTERNAL MEDICINE

## 2023-01-06 PROCEDURE — 1123F ACP DISCUSS/DSCN MKR DOCD: CPT | Performed by: INTERNAL MEDICINE

## 2023-01-06 PROCEDURE — 3079F DIAST BP 80-89 MM HG: CPT | Performed by: INTERNAL MEDICINE

## 2023-01-06 PROCEDURE — 99214 OFFICE O/P EST MOD 30 MIN: CPT | Performed by: INTERNAL MEDICINE

## 2023-01-06 PROCEDURE — 93000 ELECTROCARDIOGRAM COMPLETE: CPT | Performed by: INTERNAL MEDICINE

## 2023-01-06 RX ORDER — ASPIRIN 81 MG/1
81 TABLET ORAL DAILY
Qty: 90 TABLET | Refills: 1
Start: 2023-01-06

## 2023-01-06 RX ORDER — VALSARTAN 80 MG/1
80 TABLET ORAL DAILY
Qty: 90 TABLET | Refills: 3 | Status: SHIPPED | OUTPATIENT
Start: 2023-01-06

## 2023-01-06 NOTE — PROGRESS NOTES
[address]   Cardiac Evaluation      Patient: Roshan Saxena  YOB: 1955         Chief Complaint   Patient presents with    Atrial Fibrillation    Hypertension            Referring provider: Virginia Muniz MD    History of Present Illness:   Mr Menard is seen today following CV. He was hospitalized after found to have an abnormal CT scan showing a left ventricular thrombus. His EKG was markedly abnormal and he underwent cardiac cath revealing 3V CAD. An incidental finding on the CT scan which was possibly consistent with malignancy and he required PET scanning and otherstudies to further identify the abnormality which turned out to be a benign cyst.  He is a very anxious individual. He underwent CABG X 4 on 10/29/2018. He had an uneventful postoperative course with the exception of a short bout of A-Fib. The patient was discharged on 11/07/2018 in NSR. Amiodarone has been discontinued. He had a CT at [address] [place] [address] and then saw Dr Janessa Brumfield and Dr Alena Maher. He was also referred to hematology and GI for splenomegaly but then found that he did not have splenomegaly. Mr Menard was hospitalized at [facility] 12/12/22 after falling in his bathroom. He was treated for the Flu and found to have urinary retention. He was found to be in atrial flutter and Eliquis was initiated. He also had an echo that revealed EF 40-45%, so Entresto was initiated as well. He had a CV last Friday for AFL and was shocked to SR with 100 joules. No complications. Today his EKG shows SR with an old anterior mi and t inversion in the anterolateral leads. Past Medical History:   has a past medical history of Bullous emphysema ([address] CAD (coronary artery disease), GERD (gastroesophageal reflux disease), HTN (hypertension), Hyperlipidemia, and Mediastinal cyst.    Surgical History:   has a past surgical history that includes Cardiac catheterization (09/21/2018); Upper gastrointestinal endoscopy (09/27/2017);  Colonoscopy (09/27/2017); Upper gastrointestinal endoscopy (N/A, 9/26/2018); and pr coronary artery byp w/vein & artery graft 3 vein (N/A, 10/29/2018). Current Outpatient Medications   Medication Sig Dispense Refill    apixaban (ELIQUIS) 5 MG TABS tablet Take 5 mg by mouth 2 times daily      sacubitril-valsartan (ENTRESTO) 24-26 MG per tablet Take 0.5 tablets by mouth 2 times daily (Patient not taking: Reported on 1/6/2023)      metoprolol succinate (TOPROL XL) 50 MG extended release tablet TAKE ONE TABLET BY MOUTH ONCE NIGHTLY 90 tablet 3    sertraline (ZOLOFT) 50 MG tablet Take 1 tab by mouth daily 90 tablet 2    busPIRone (BUSPAR) 5 MG tablet TAKE ONE TABLET BY MOUTH THREE TIMES A DAY 90 tablet 3    pantoprazole (PROTONIX) 20 MG tablet Take 1 tablet by mouth every morning (before breakfast) (Patient taking differently: Take 40 mg by mouth every morning (before breakfast)) 90 tablet 1    rosuvastatin (CRESTOR) 20 MG tablet Take 1 tablet by mouth daily 90 tablet 3    aspirin 325 MG EC tablet Take 1 tablet by mouth daily 30 tablet 3     No current facility-administered medications for this visit.        Social History:  Social History     Socioeconomic History    Marital status:      Spouse name: Not on file    Number of children: 2    Years of education: Not on file    Highest education level: Not on file   Occupational History    Not on file   Tobacco Use    Smoking status: Former     Packs/day: 0.50     Years: 40.00     Pack years: 20.00     Types: Cigarettes    Smokeless tobacco: Never   Vaping Use    Vaping Use: Never used   Substance and Sexual Activity    Alcohol use: No    Drug use: No    Sexual activity: Yes   Other Topics Concern    Not on file   Social History Narrative    Not on file     Social Determinants of Health     Financial Resource Strain: Not on file   Food Insecurity: Not on file   Transportation Needs: Not on file   Physical Activity: Not on file   Stress: Not on file   Social Connections: Not on file   Intimate Partner Violence: Not on file   Housing Stability: Not on file       Family History:  family history includes Heart Disease in his mother; Heart Disease (age of onset: 27) in his brother. Allergies:  Patient has no known allergies. Review of Systems:   Constitutional: there has been no unanticipated weight loss. No change in energy or activity level   Eyes: No visual changes   ENT: No Headaches, hearing loss or vertigo. No mouth sores or sore throat. Cardiovascular: Reviewed in HPI  Respiratory: No cough or wheezing, no sputum production. Gastrointestinal: No abdominal pain, appetite loss, blood in stools. No change in bowel or bladder habits. Genitourinary: No nocturia, dysuria, trouble voiding  Musculoskeletal:  No gait disturbance, weakness or joint complaints. Integumentary: No rash or pruritis. Neurological: No headache, change in muscle strength, numbness or tingling. No change in gait, balance, coordination, mood, affect, memory, mentation, behavior. Psychiatric: No anxiety or depression  Endocrine: No malaise or fever  Hematologic/Lymphatic: No abnormal bruising or bleeding, blood clots or swollen lymph nodes. Allergic/Immunologic: No nasal congestion or hives. Physical Examination:    Vitals:    01/06/23 0850 01/06/23 0855   BP: (!) 150/80 (!) 140/80   Site: Left Upper Arm Left Upper Arm   Position: Sitting Sitting   Cuff Size: Medium Adult Medium Adult   Pulse: 68    SpO2: 99%    Weight: 197 lb (89.4 kg)    Height: 6' 2\" (1.88 m)          Body mass index is 25.29 kg/m².      Wt Readings from Last 3 Encounters:   01/06/23 197 lb (89.4 kg)   12/30/22 194 lb (88 kg)   12/21/22 194 lb (88 kg)      BP Readings from Last 3 Encounters:   01/06/23 (!) 140/80   12/30/22 (!) 154/99   12/21/22 138/84      Constitutional and General Appearance:  appears stated age  Eyes - no xanthelasma  Respiratory:  Normal excursion and expansion without use of accessory muscles  Resp Auscultation: Normal breath sounds without dullness  Cardiovascular: The apical impulses not displaced  Heart is regular rate and rhythm with normal S1, S2  PMI is normal  The carotid upstroke is normal, no bruit noted   JVP is not elevated  Peripheral pulses are symmetrical  There is no clubbing, cyanosis of the extremities  No edema  No varicosities  Femoral Arteries: 2+ and equal without bruits  Pedal Pulses: 2+ and equal   Abdomen:  No masses or tenderness  Aorta not palpable  Normal bowel sounds  Neurological/Psychiatric:  Alert and oriented x3  Moves all extremities well  Exhibits normal gait balance and coordination      Assessment/Plan  1. Coronary artery disease involving native coronary artery of native heart with angina pectoris ([address] -stable; He was started on Entresto but cannot afford it. His EF is stable since his CABG and I will change him to valsartan 80 mg daily. Echo 12/13/22: EF 40-45%, Mild hypokinesis of the mid-apicalanteroseptal, inferoseptal, and apical myocardium. The study is not technically sufficient to allow evaluation of LV diastolic function. Aortic valve: Mild calcification. Mild thickening. Mild regurgitation. Mitral valve: Mildly calcified annulus. Mild thickening. Mild regurgitation. Right ventricle: Systolic function was mildly reduced by objective interpretation. Right atrium: The atrium is mildly dilated. Tricuspid valve: Mild-moderate regurgitation. Pulmonary arteries: Systolic pressure was mildly increased, estimated to be 45mm Hg assuming that the right atrial pressure was 3 mmHg. GXT 11/20/19: medium to large sized anteroseptal and apical fixed defect consistent with infarction in territory of LAD. EF 47%  CABG 10/29/18: x4 with LIMA-LAD, SVG that was sequenced from the first diagonal to the first OM and a SVG to the PDA   2. Essential hypertension -  stable    3.  Atrial flutter -on BB, asa, and Eliquis  CV 12/30/22 to SR, EKG done and interpreted by me today>SR, HR 64bpm  Recent admission to [facility], had the flu found to be in atrial flutter and started on Eliquis   4. Hyperlipidemia - Crestor 20mg daily. Labs 12/13/22: TC 66; TRIG 58; HDL 22; LDL 32   5. Tobacco abuse - he states he quit smoking May 2022      PLAN:   He remains in SR. Will start Valsartan 80mg daily. Continue all other meds. FU 2 months. Scribe's attestation: This note was scribed in the presence of Dr Soco Flores MD by Phil Halsted, RN. The scribe's documentation has been prepared under my direction and personally reviewed by me in its entirety. I confirm that the note above accurately reflects all work, treatment, procedures, and medical decision making performed by me. Thank you for allowing to me to participate in the care of Juan Vera

## 2023-01-12 ENCOUNTER — TELEPHONE (OUTPATIENT)
Dept: CARDIOLOGY CLINIC | Age: 68
End: 2023-01-12

## 2023-01-12 NOTE — TELEPHONE ENCOUNTER
Pt called in asking that Bunny Climes give him a call he has some questions regarding medication apixaban (ELIQUIS) 5 MG TABS tablet

## 2023-01-13 NOTE — TELEPHONE ENCOUNTER
Pt will need a refill of Eliqis and will try using is co-pay card to get it cheaper. He is concerned about the cost. He will call if he has any trouble affording it.

## 2023-02-02 DIAGNOSIS — F41.9 ANXIETY: ICD-10-CM

## 2023-02-02 RX ORDER — BUSPIRONE HYDROCHLORIDE 5 MG/1
TABLET ORAL
Qty: 90 TABLET | Refills: 0 | Status: SHIPPED | OUTPATIENT
Start: 2023-02-02

## 2023-03-01 DIAGNOSIS — F41.9 ANXIETY: ICD-10-CM

## 2023-03-01 RX ORDER — BUSPIRONE HYDROCHLORIDE 5 MG/1
TABLET ORAL
Qty: 90 TABLET | Refills: 0 | OUTPATIENT
Start: 2023-03-01

## 2023-03-10 ENCOUNTER — TELEPHONE (OUTPATIENT)
Dept: PULMONOLOGY | Age: 68
End: 2023-03-10

## 2023-03-10 NOTE — TELEPHONE ENCOUNTER
Left message for patient to call the office. Looking to see where he had CT done that he was coming in about.

## 2023-03-15 ENCOUNTER — OFFICE VISIT (OUTPATIENT)
Dept: CARDIOLOGY CLINIC | Age: 68
End: 2023-03-15
Payer: COMMERCIAL

## 2023-03-15 VITALS
DIASTOLIC BLOOD PRESSURE: 84 MMHG | WEIGHT: 195.2 LBS | OXYGEN SATURATION: 98 % | SYSTOLIC BLOOD PRESSURE: 136 MMHG | BODY MASS INDEX: 25.05 KG/M2 | HEIGHT: 74 IN | HEART RATE: 67 BPM

## 2023-03-15 DIAGNOSIS — I10 ESSENTIAL HYPERTENSION: ICD-10-CM

## 2023-03-15 DIAGNOSIS — E78.2 MIXED HYPERLIPIDEMIA: ICD-10-CM

## 2023-03-15 DIAGNOSIS — I25.119 CORONARY ARTERY DISEASE INVOLVING NATIVE CORONARY ARTERY OF NATIVE HEART WITH ANGINA PECTORIS (HCC): Primary | ICD-10-CM

## 2023-03-15 DIAGNOSIS — Z72.0 TOBACCO USE: ICD-10-CM

## 2023-03-15 DIAGNOSIS — I48.92 ATRIAL FLUTTER, UNSPECIFIED TYPE (HCC): ICD-10-CM

## 2023-03-15 PROCEDURE — 1123F ACP DISCUSS/DSCN MKR DOCD: CPT | Performed by: NURSE PRACTITIONER

## 2023-03-15 PROCEDURE — 3079F DIAST BP 80-89 MM HG: CPT | Performed by: NURSE PRACTITIONER

## 2023-03-15 PROCEDURE — 99214 OFFICE O/P EST MOD 30 MIN: CPT | Performed by: NURSE PRACTITIONER

## 2023-03-15 PROCEDURE — 3075F SYST BP GE 130 - 139MM HG: CPT | Performed by: NURSE PRACTITIONER

## 2023-03-15 RX ORDER — TAMSULOSIN HYDROCHLORIDE 0.4 MG/1
0.8 CAPSULE ORAL NIGHTLY
COMMUNITY
Start: 2023-02-09 | End: 2024-02-04

## 2023-03-15 NOTE — PROGRESS NOTES
[address]   Cardiac Evaluation      Patient: Gabriel Madrid  YOB: 1955         Chief Complaint   Patient presents with    Coronary Artery Disease     Occasional pain, bypass surgery    Follow-up     2 mo            Referring provider: Ofelia Rajput MD    History of Present Illness:   Mr Anjelica Palacios is seen today following CV. He was hospitalized after found to have an abnormal CT scan showing a left ventricular thrombus. His EKG was markedly abnormal and he underwent cardiac cath revealing 3V CAD. An incidental finding on the CT scan which was possibly consistent with malignancy and he required PET scanning and otherstudies to further identify the abnormality which turned out to be a benign cyst.  He is a very anxious individual. He underwent CABG X 4 on 10/29/2018. He had an uneventful postoperative course with the exception of a short bout of A-Fib. The patient was discharged on 11/07/2018 in NSR. Amiodarone has been discontinued. He had a CT at [address] [place] [address] and then saw Dr Denver Peck and Dr Mae Metz. He was also referred to hematology and GI for splenomegaly but then found that he did not have splenomegaly. Mr Anjelica Palacios was hospitalized at [facility] 12/12/22 after falling in his bathroom. He was treated for the Flu and found to have urinary retention. He was found to be in atrial flutter and Eliquis was initiated. He also had an echo that revealed EF 40-45%, so Entresto was initiated as well. He had a CV last Friday for AFL and was shocked to SR with 100 joules. No complications. Today, Mr Anjelica Palacios says he is very stressed out about work and his health. He is also out of his anxiety medications. He continues to have a pinpoint pain in his left chest since his CABG which is unchanged. No significant shortness of breath, dizziness, palpitations, or swelling. Says he is seeing Dr Neomia Romberg 3/24 for his abnormal CT. Admits to smoking about 10 cigarettes/day.      Past Medical History:   has a past medical history of Bullous emphysema ([facility] [address] CAD (coronary artery disease), GERD (gastroesophageal reflux disease), HTN (hypertension), Hyperlipidemia, and Mediastinal cyst.    Surgical History:   has a past surgical history that includes Cardiac catheterization (09/21/2018); Upper gastrointestinal endoscopy (09/27/2017); Colonoscopy (09/27/2017); Upper gastrointestinal endoscopy (N/A, 9/26/2018); and pr coronary artery byp w/vein & artery graft 3 vein (N/A, 10/29/2018). Current Outpatient Medications   Medication Sig Dispense Refill    tamsulosin (FLOMAX) 0.4 MG capsule Take 0.8 mg by mouth nightly 2 caps nightly      apixaban (ELIQUIS) 5 MG TABS tablet Take 1 tablet by mouth 2 times daily 60 tablet 5    valsartan (DIOVAN) 80 MG tablet Take 1 tablet by mouth daily 90 tablet 3    aspirin EC 81 MG EC tablet Take 1 tablet by mouth daily 90 tablet 1    metoprolol succinate (TOPROL XL) 50 MG extended release tablet TAKE ONE TABLET BY MOUTH ONCE NIGHTLY 90 tablet 3    pantoprazole (PROTONIX) 20 MG tablet Take 1 tablet by mouth every morning (before breakfast) (Patient taking differently: Take 40 mg by mouth every morning (before breakfast)) 90 tablet 1    rosuvastatin (CRESTOR) 20 MG tablet Take 1 tablet by mouth daily 90 tablet 3    busPIRone (BUSPAR) 5 MG tablet TAKE ONE TABLET BY MOUTH THREE TIMES A DAY (Patient not taking: Reported on 3/15/2023) 90 tablet 0    sertraline (ZOLOFT) 50 MG tablet Take 1 tab by mouth daily (Patient not taking: Reported on 3/15/2023) 90 tablet 2     No current facility-administered medications for this visit.        Social History:  Social History     Socioeconomic History    Marital status:      Spouse name: Not on file    Number of children: 2    Years of education: Not on file    Highest education level: Not on file   Occupational History    Not on file   Tobacco Use    Smoking status: Former     Packs/day: 0.50     Years: 40.00     Pack years: 20.00     Types: Cigarettes Smokeless tobacco: Never   Vaping Use    Vaping Use: Never used   Substance and Sexual Activity    Alcohol use: No    Drug use: No    Sexual activity: Yes   Other Topics Concern    Not on file   Social History Narrative    Not on file     Social Determinants of Health     Financial Resource Strain: Not on file   Food Insecurity: Not on file   Transportation Needs: Not on file   Physical Activity: Not on file   Stress: Not on file   Social Connections: Not on file   Intimate Partner Violence: Not on file   Housing Stability: Not on file       Family History:  family history includes Heart Disease in his mother; Heart Disease (age of onset: 27) in his brother. Allergies:  Patient has no known allergies. Review of Systems:   Constitutional: there has been no unanticipated weight loss. No change in energy or activity level   Eyes: No visual changes   ENT: No Headaches, hearing loss or vertigo. No mouth sores or sore throat. Cardiovascular: Reviewed in HPI  Respiratory: No cough or wheezing, no sputum production. Gastrointestinal: No abdominal pain, appetite loss, blood in stools. No change in bowel or bladder habits. Genitourinary: No nocturia, dysuria, trouble voiding  Musculoskeletal:  No gait disturbance, weakness or joint complaints. Integumentary: No rash or pruritis. Neurological: No headache, change in muscle strength, numbness or tingling. No change in gait, balance, coordination, mood, affect, memory, mentation, behavior. Psychiatric: No anxiety or depression  Endocrine: No malaise or fever  Hematologic/Lymphatic: No abnormal bruising or bleeding, blood clots or swollen lymph nodes. Allergic/Immunologic: No nasal congestion or hives.     Physical Examination:    Vitals:    03/15/23 0859 03/15/23 0901   BP: (!) 182/104 (!) 158/90   Site: Right Upper Arm Right Upper Arm   Position: Sitting Sitting   Cuff Size: Medium Adult Medium Adult   Pulse: 67    SpO2: 98%    Weight: 195 lb 3.2 oz (88.5 kg) Height: 6' 2\" (1.88 m)            There is no height or weight on file to calculate BMI. Wt Readings from Last 3 Encounters:   03/15/23 195 lb 3.2 oz (88.5 kg)   01/06/23 197 lb (89.4 kg)   12/30/22 194 lb (88 kg)      BP Readings from Last 3 Encounters:   03/15/23 136/84   01/06/23 (!) 140/80   12/30/22 (!) 154/99      Constitutional and General Appearance:  appears stated age  Eyes - no xanthelasma  Respiratory:  Normal excursion and expansion without use of accessory muscles  Resp Auscultation: Normal breath sounds without dullness  Cardiovascular: The apical impulses not displaced  Heart is regular rate and rhythm with normal S1, S2  PMI is normal  The carotid upstroke is normal, no bruit noted   JVP is not elevated  Peripheral pulses are symmetrical  There is no clubbing, cyanosis of the extremities  No edema  No varicosities  Femoral Arteries: 2+ and equal without bruits  Pedal Pulses: 2+ and equal   Abdomen:  No masses or tenderness  Aorta not palpable  Normal bowel sounds  Neurological/Psychiatric:  Alert and oriented x3  Moves all extremities well  Exhibits normal gait balance and coordination      Assessment/Plan  1. Coronary artery disease involving native coronary artery of native heart with angina pectoris ([address] -stable; He was started on Entresto but cannot afford it. His EF is stable since his CABG He is now on valsartan 80 mg daily. Echo 12/13/22: EF 40-45%, Mild hypokinesis of the mid-apicalanteroseptal, inferoseptal, and apical myocardium. The study is not technically sufficient to allow evaluation of LV diastolic function. Aortic valve: Mild calcification. Mild thickening. Mild regurgitation. Mitral valve: Mildly calcified annulus. Mild thickening. Mild regurgitation. Right ventricle: Systolic function was mildly reduced by objective interpretation. Right atrium: The atrium is mildly dilated. Tricuspid valve: Mild-moderate regurgitation.   Pulmonary arteries: Systolic pressure was mildly increased, estimated to be 45mm Hg assuming that the right atrial pressure was 3 mmHg. GXT 11/20/19: medium to large sized anteroseptal and apical fixed defect consistent with infarction in territory of LAD. EF 47%  CABG 10/29/18: x4 with LIMA-LAD, SVG that was sequenced from the first diagonal to the first OM and a SVG to the PDA   2. Essential hypertension -  stable on third check   3. Atrial flutter -on BB, asa, and Eliquis. Regular on exam today  CV 12/30/22 to SR, EKG done and interpreted by me today>SR, HR 64bpm  Recent admission to [facility], had the flu found to be in atrial flutter and started on Eliquis   4. Hyperlipidemia - Crestor 20mg daily. Labs 12/13/22: TC 66; TRIG 58; HDL 22; LDL 32   5. Tobacco abuse - smoking 10 cigarettes/day       PLAN: BP stable since starting valsartan. No changes to medications. Encouraged smoking cessation. RTO in 6 months. Thank you for allowing to me to participate in the care of Alda Shirley.

## 2023-03-24 ENCOUNTER — OFFICE VISIT (OUTPATIENT)
Dept: PULMONOLOGY | Age: 68
End: 2023-03-24
Payer: COMMERCIAL

## 2023-03-24 ENCOUNTER — TELEPHONE (OUTPATIENT)
Dept: PULMONOLOGY | Age: 68
End: 2023-03-24

## 2023-03-24 VITALS
OXYGEN SATURATION: 97 % | WEIGHT: 192.4 LBS | HEART RATE: 75 BPM | SYSTOLIC BLOOD PRESSURE: 130 MMHG | BODY MASS INDEX: 24.7 KG/M2 | DIASTOLIC BLOOD PRESSURE: 75 MMHG

## 2023-03-24 DIAGNOSIS — F17.200 CURRENT EVERY DAY SMOKER: ICD-10-CM

## 2023-03-24 DIAGNOSIS — K21.9 GASTROESOPHAGEAL REFLUX DISEASE WITHOUT ESOPHAGITIS: Primary | ICD-10-CM

## 2023-03-24 DIAGNOSIS — Q34.1 MEDIASTINAL CYST: ICD-10-CM

## 2023-03-24 PROCEDURE — 3075F SYST BP GE 130 - 139MM HG: CPT | Performed by: INTERNAL MEDICINE

## 2023-03-24 PROCEDURE — 3078F DIAST BP <80 MM HG: CPT | Performed by: INTERNAL MEDICINE

## 2023-03-24 PROCEDURE — 99204 OFFICE O/P NEW MOD 45 MIN: CPT | Performed by: INTERNAL MEDICINE

## 2023-03-24 PROCEDURE — 1123F ACP DISCUSS/DSCN MKR DOCD: CPT | Performed by: INTERNAL MEDICINE

## 2023-03-24 RX ORDER — PANTOPRAZOLE SODIUM 20 MG/1
40 TABLET, DELAYED RELEASE ORAL
Qty: 90 TABLET | Refills: 3 | Status: SHIPPED | OUTPATIENT
Start: 2023-03-24

## 2023-03-24 RX ORDER — NICOTINE 21 MG/24HR
1 PATCH, TRANSDERMAL 24 HOURS TRANSDERMAL DAILY
Qty: 42 PATCH | Refills: 0 | Status: SHIPPED | OUTPATIENT
Start: 2023-03-24 | End: 2023-05-05

## 2023-03-24 RX ORDER — POLYETHYLENE GLYCOL 3350 17 G
4 POWDER IN PACKET (EA) ORAL PRN
Qty: 100 EACH | Refills: 3 | Status: SHIPPED | OUTPATIENT
Start: 2023-03-24

## 2023-03-24 NOTE — PROGRESS NOTES
09/27/2017    Dr. Jeri Wise - erosive gastritis    UPPER GASTROINTESTINAL ENDOSCOPY N/A 9/26/2018    Dr. Ed Pavon - w/EUS & needle biopsy        SOCIAL HISTORY:   Social History     Tobacco Use    Smoking status: Every Day     Packs/day: 0.50     Years: 40.00     Pack years: 20.00     Types: Cigarettes    Smokeless tobacco: Never   Vaping Use    Vaping Use: Never used   Substance Use Topics    Alcohol use: No    Drug use: No       FAMILY HISTORY:   Family History   Problem Relation Age of Onset    Heart Disease Brother 27        CABG x 2    Heart Disease Mother         CABG x 2       MEDICATIONS:     Current Outpatient Medications on File Prior to Visit   Medication Sig Dispense Refill    tamsulosin (FLOMAX) 0.4 MG capsule Take 0.8 mg by mouth nightly 2 caps nightly      apixaban (ELIQUIS) 5 MG TABS tablet Take 1 tablet by mouth 2 times daily 60 tablet 5    valsartan (DIOVAN) 80 MG tablet Take 1 tablet by mouth daily 90 tablet 3    aspirin EC 81 MG EC tablet Take 1 tablet by mouth daily 90 tablet 1    metoprolol succinate (TOPROL XL) 50 MG extended release tablet TAKE ONE TABLET BY MOUTH ONCE NIGHTLY 90 tablet 3    rosuvastatin (CRESTOR) 20 MG tablet Take 1 tablet by mouth daily 90 tablet 3    busPIRone (BUSPAR) 5 MG tablet TAKE ONE TABLET BY MOUTH THREE TIMES A DAY (Patient not taking: Reported on 3/15/2023) 90 tablet 0    sertraline (ZOLOFT) 50 MG tablet Take 1 tab by mouth daily (Patient not taking: Reported on 3/15/2023) 90 tablet 2     No current facility-administered medications on file prior to visit. ALLERGIES:   Allergies as of 03/24/2023    (No Known Allergies)      OBJECTIVE:   weight is 192 lb 6.4 oz (87.3 kg). His blood pressure is 130/75 and his pulse is 75. His oxygen saturation is 97%. PHYSICAL EXAM:    CONSTITUTIONAL: He is a 80y.o.-year-old who appears his stated age. He is alert and oriented x 3 and in no acute distress. HEENT: PERRL. No scleral icterus.  No thrush,

## 2023-03-24 NOTE — TELEPHONE ENCOUNTER
[address] left VM to give call did not say what it was regarding.      [address]: [phone]

## 2023-03-28 ENCOUNTER — TELEPHONE (OUTPATIENT)
Dept: CARDIOTHORACIC SURGERY | Age: 68
End: 2023-03-28

## 2023-03-28 NOTE — TELEPHONE ENCOUNTER
I spoke with  Richardson Tao regarding scheduling an office appointment with Dr. Kulwinder Ham for follow-up of his mediastinal cyst.  He had previously seen Dr. Dior Gaines. He wants to think about seeing a surgeon at this time because the cyst has been stable. He will call us back after he discusses with his wife. He has our office telephone number.

## 2023-04-18 ENCOUNTER — TELEPHONE (OUTPATIENT)
Dept: CARDIOTHORACIC SURGERY | Age: 68
End: 2023-04-18

## 2023-04-18 NOTE — TELEPHONE ENCOUNTER
Spoke with patient regarding scheduling an appointment with Dr. Kat Hernandez regarding his mediastinal cyst.  The patient does not want to make an appointment. He will call us if he changes his mind.

## 2023-06-19 ENCOUNTER — COMMUNITY OUTREACH (OUTPATIENT)
Dept: FAMILY MEDICINE CLINIC | Age: 68
End: 2023-06-19

## 2023-06-19 NOTE — PROGRESS NOTES
Care Everywhere audit shows patient had recent labs done. [facility] has been updated with A1c, lipid panel and Psa results.

## 2023-08-12 ENCOUNTER — TELEPHONE (OUTPATIENT)
Dept: CARDIOLOGY CLINIC | Age: 68
End: 2023-08-12

## 2023-08-12 NOTE — TELEPHONE ENCOUNTER
Called and left vm for pt to callback and change appt, when pt call back please offer dates listed below      [facility]  8/23, 8/30, 9/6

## 2023-08-30 ENCOUNTER — OFFICE VISIT (OUTPATIENT)
Dept: CARDIOLOGY CLINIC | Age: 68
End: 2023-08-30
Payer: COMMERCIAL

## 2023-08-30 VITALS
HEIGHT: 74 IN | OXYGEN SATURATION: 95 % | WEIGHT: 197.8 LBS | BODY MASS INDEX: 25.38 KG/M2 | DIASTOLIC BLOOD PRESSURE: 74 MMHG | HEART RATE: 72 BPM | SYSTOLIC BLOOD PRESSURE: 126 MMHG

## 2023-08-30 DIAGNOSIS — I25.119 CORONARY ARTERY DISEASE INVOLVING NATIVE CORONARY ARTERY OF NATIVE HEART WITH ANGINA PECTORIS (HCC): Primary | ICD-10-CM

## 2023-08-30 DIAGNOSIS — Z72.0 TOBACCO USE: ICD-10-CM

## 2023-08-30 DIAGNOSIS — E78.2 MIXED HYPERLIPIDEMIA: ICD-10-CM

## 2023-08-30 DIAGNOSIS — I10 ESSENTIAL HYPERTENSION: ICD-10-CM

## 2023-08-30 PROCEDURE — 1123F ACP DISCUSS/DSCN MKR DOCD: CPT | Performed by: NURSE PRACTITIONER

## 2023-08-30 PROCEDURE — 3078F DIAST BP <80 MM HG: CPT | Performed by: NURSE PRACTITIONER

## 2023-08-30 PROCEDURE — 3074F SYST BP LT 130 MM HG: CPT | Performed by: NURSE PRACTITIONER

## 2023-08-30 PROCEDURE — 99214 OFFICE O/P EST MOD 30 MIN: CPT | Performed by: NURSE PRACTITIONER

## 2023-08-30 RX ORDER — ROSUVASTATIN CALCIUM 20 MG/1
20 TABLET, COATED ORAL DAILY
Qty: 90 TABLET | Refills: 3 | Status: SHIPPED | OUTPATIENT
Start: 2023-08-30

## 2023-08-30 NOTE — PROGRESS NOTES
[facility]   Cardiac Evaluation      Patient: Frederick Barajas  YOB: 1955         Chief Complaint   Patient presents with    Coronary Artery Disease     Sometime pain up toward LT shoulder    6 Month Follow-Up            Referring provider: Kenyon Del Rio MD    History of Present Illness:   Mr Jonnie Lopes is seen today following CV. He was hospitalized after found to have an abnormal CT scan showing a left ventricular thrombus. His EKG was markedly abnormal and he underwent cardiac cath revealing 3V CAD. An incidental finding on the CT scan which was possibly consistent with malignancy and he required PET scanning and otherstudies to further identify the abnormality which turned out to be a benign cyst.  He is a very anxious individual. He underwent CABG X 4 on 10/29/2018. He had an uneventful postoperative course with the exception of a short bout of A-Fib. The patient was discharged on 11/07/2018 in NSR. Amiodarone has been discontinued. He had a CT at [address] [place] [address], [address] and then saw Dr Rock You and Dr Lona Quick. He was also referred to hematology and GI for splenomegaly but then found that he did not have splenomegaly. Mr Jonnie Lopes was hospitalized at [facility] 12/12/22 after falling in his bathroom. He was treated for the Flu and found to have urinary retention. He was found to be in atrial flutter and Eliquis was initiated. He also had an echo that revealed EF 40-45%, so Entresto was initiated as well. He had a CV last Friday for AFL and was shocked to SR with 100 joules. No complications. Today, Mr Jonnie Lopes continues to have a little pain in left chest wall since CABG. It has not increased in frequency or intensity. Pain is relieved with bengay cream. Continues to smoke 1/2 PPD. He saw Dr Lupe Kapadia 3/24 and she referred him to Dr Marilu Watkins for mediastinal cyst which has slightly increased in size. He has not followed up with surgeon yet.       Past Medical History:   has a past medical history of Bullous

## 2023-09-06 DIAGNOSIS — E78.2 MIXED HYPERLIPIDEMIA: ICD-10-CM

## 2023-09-07 LAB
ALBUMIN SERPL-MCNC: 4.3 G/DL (ref 3.4–5)
ALBUMIN/GLOB SERPL: 1.9 {RATIO} (ref 1.1–2.2)
ALP SERPL-CCNC: 77 U/L (ref 40–129)
ALT SERPL-CCNC: 18 U/L (ref 10–40)
ANION GAP SERPL CALCULATED.3IONS-SCNC: 12 MMOL/L (ref 3–16)
AST SERPL-CCNC: 27 U/L (ref 15–37)
BILIRUB SERPL-MCNC: 0.5 MG/DL (ref 0–1)
BUN SERPL-MCNC: 22 MG/DL (ref 7–20)
CALCIUM SERPL-MCNC: 9.3 MG/DL (ref 8.3–10.6)
CHLORIDE SERPL-SCNC: 109 MMOL/L (ref 99–110)
CO2 SERPL-SCNC: 23 MMOL/L (ref 21–32)
CREAT SERPL-MCNC: 1.1 MG/DL (ref 0.8–1.3)
GFR SERPLBLD CREATININE-BSD FMLA CKD-EPI: >60 ML/MIN/{1.73_M2}
GLUCOSE SERPL-MCNC: 93 MG/DL (ref 70–99)
POTASSIUM SERPL-SCNC: 4.3 MMOL/L (ref 3.5–5.1)
PROT SERPL-MCNC: 6.6 G/DL (ref 6.4–8.2)
SODIUM SERPL-SCNC: 144 MMOL/L (ref 136–145)

## 2023-09-08 ENCOUNTER — TELEPHONE (OUTPATIENT)
Dept: CARDIOLOGY CLINIC | Age: 68
End: 2023-09-08

## 2023-09-08 NOTE — TELEPHONE ENCOUNTER
----- Message from DELPHINE Monsalve CNP sent at 9/8/2023  9:39 AM EDT -----  Please let pt know blood work is stable

## 2023-10-09 SDOH — HEALTH STABILITY: PHYSICAL HEALTH: ON AVERAGE, HOW MANY DAYS PER WEEK DO YOU ENGAGE IN MODERATE TO STRENUOUS EXERCISE (LIKE A BRISK WALK)?: 6 DAYS

## 2023-10-09 SDOH — HEALTH STABILITY: PHYSICAL HEALTH: ON AVERAGE, HOW MANY MINUTES DO YOU ENGAGE IN EXERCISE AT THIS LEVEL?: 130 MIN

## 2023-10-12 ENCOUNTER — TELEPHONE (OUTPATIENT)
Dept: ORTHOPEDIC SURGERY | Age: 68
End: 2023-10-12

## 2023-10-12 ENCOUNTER — TELEPHONE (OUTPATIENT)
Dept: CARDIOLOGY CLINIC | Age: 68
End: 2023-10-12

## 2023-10-12 ENCOUNTER — OFFICE VISIT (OUTPATIENT)
Dept: ORTHOPEDIC SURGERY | Age: 68
End: 2023-10-12
Payer: COMMERCIAL

## 2023-10-12 VITALS — HEIGHT: 74 IN | BODY MASS INDEX: 24.95 KG/M2 | WEIGHT: 194.4 LBS

## 2023-10-12 DIAGNOSIS — M25.562 LEFT KNEE PAIN, UNSPECIFIED CHRONICITY: Primary | ICD-10-CM

## 2023-10-12 PROCEDURE — 99204 OFFICE O/P NEW MOD 45 MIN: CPT | Performed by: ORTHOPAEDIC SURGERY

## 2023-10-12 PROCEDURE — 1123F ACP DISCUSS/DSCN MKR DOCD: CPT | Performed by: ORTHOPAEDIC SURGERY

## 2023-10-12 NOTE — TELEPHONE ENCOUNTER
I spoke to pt and gave instructions and faxed a letter electronically to the surgeon's office.
Knee surgery is scheduled for Nov 6th    Dr. Reyes Montoya is on Eliquis 5 mg     Fax [phone]    Phone [phone]    I let pt know we are in [place] and not to worry about dropping the form off.
Pt has stable CAD and may have knee surgery. He should hold the Eliquis 2 days prior to surgery. He should stay on the aspirin.
Pt wanted us to know he is dropping off a form for cardiac clearance sx he is having.
sudden onset/worsening

## 2023-10-12 NOTE — TELEPHONE ENCOUNTER
General Question     Subject: patient call and he would like to know when he should get his blood work done 2 weeks before his surgery or 3 days before. He just need to clarify this matter. Please Advise.   Patient Dannielle Hunter  Contact Number: [phone]

## 2023-10-13 NOTE — TELEPHONE ENCOUNTER
Spoke with patient. Lab work needs to be done 30 days before surgery or it can be two weeks before surgery. Patient has scheduled appointment with his PCP and the cardiologist is going to be sending us a letter for clearance. Patient also had questions about using the soap which is 5 days leading up to the day of surgery. Patient understood all information.

## 2023-10-16 NOTE — PROGRESS NOTES
injury, need for further surgery, deep vein thrombus, pulmonary embolism. The patient has verbalized understanding of these risks and wishes to proceed.      ?___________________________   Von Saldivar MD  ?   ??cc: Fco Rhodes MD

## 2023-10-23 DIAGNOSIS — M25.562 LEFT KNEE PAIN, UNSPECIFIED CHRONICITY: Primary | ICD-10-CM

## 2023-10-23 PROCEDURE — MISCCOLD COLD THERAPY UNIT AND PAD: Performed by: ORTHOPAEDIC SURGERY

## 2023-10-26 ENCOUNTER — TELEPHONE (OUTPATIENT)
Dept: ORTHOPEDIC SURGERY | Age: 68
End: 2023-10-26

## 2023-10-26 NOTE — TELEPHONE ENCOUNTER
I left a VM letting the pt know that Dr Melissa Juárez has given him a parking sticker for 6 months. I have sent it to the pt's mychart, he can find it under LETTERS to print .  He can come to the office to pick it up or I can mail it him

## 2023-11-06 DIAGNOSIS — M25.562 LEFT KNEE PAIN, UNSPECIFIED CHRONICITY: ICD-10-CM

## 2023-11-06 LAB
25(OH)D3 SERPL-MCNC: 23.1 NG/ML
ALBUMIN SERPL-MCNC: 4.3 G/DL (ref 3.4–5)
ALBUMIN/GLOB SERPL: 2 {RATIO} (ref 1.1–2.2)
ALP SERPL-CCNC: 77 U/L (ref 40–129)
ALT SERPL-CCNC: 17 U/L (ref 10–40)
ANION GAP SERPL CALCULATED.3IONS-SCNC: 11 MMOL/L (ref 3–16)
APTT BLD: 33.2 SEC (ref 22.7–35.9)
AST SERPL-CCNC: 28 U/L (ref 15–37)
BILIRUB SERPL-MCNC: 0.6 MG/DL (ref 0–1)
BUN SERPL-MCNC: 17 MG/DL (ref 7–20)
CALCIUM SERPL-MCNC: 9.1 MG/DL (ref 8.3–10.6)
CHLORIDE SERPL-SCNC: 106 MMOL/L (ref 99–110)
CO2 SERPL-SCNC: 26 MMOL/L (ref 21–32)
CREAT SERPL-MCNC: 1 MG/DL (ref 0.8–1.3)
GFR SERPLBLD CREATININE-BSD FMLA CKD-EPI: >60 ML/MIN/{1.73_M2}
GLUCOSE SERPL-MCNC: 101 MG/DL (ref 70–99)
INR PPP: 1.27 (ref 0.84–1.16)
POTASSIUM SERPL-SCNC: 4.4 MMOL/L (ref 3.5–5.1)
PROT SERPL-MCNC: 6.5 G/DL (ref 6.4–8.2)
PROTHROMBIN TIME: 15.9 SEC (ref 11.5–14.8)
SODIUM SERPL-SCNC: 143 MMOL/L (ref 136–145)

## 2023-11-07 ENCOUNTER — TELEPHONE (OUTPATIENT)
Dept: ORTHOPEDIC SURGERY | Age: 68
End: 2023-11-07

## 2023-11-07 LAB
BASOPHILS # BLD: 0 K/UL (ref 0–0.2)
BASOPHILS NFR BLD: 0 %
BURR CELLS BLD QL SMEAR: ABNORMAL
DEPRECATED RDW RBC AUTO: 15 % (ref 12.4–15.4)
EOSINOPHIL # BLD: 0.2 K/UL (ref 0–0.6)
EOSINOPHIL NFR BLD: 3 %
EST. AVERAGE GLUCOSE BLD GHB EST-MCNC: 116.9 MG/DL
HBA1C MFR BLD: 5.7 %
HCT VFR BLD AUTO: 45.6 % (ref 40.5–52.5)
HGB BLD-MCNC: 15.7 G/DL (ref 13.5–17.5)
LYMPHOCYTES # BLD: 0.8 K/UL (ref 1–5.1)
LYMPHOCYTES NFR BLD: 11 %
MCH RBC QN AUTO: 32.9 PG (ref 26–34)
MCHC RBC AUTO-ENTMCNC: 34.5 G/DL (ref 31–36)
MCV RBC AUTO: 95.4 FL (ref 80–100)
MONOCYTES # BLD: 0.2 K/UL (ref 0–1.3)
MONOCYTES NFR BLD: 3 %
NEUTROPHILS # BLD: 6 K/UL (ref 1.7–7.7)
NEUTROPHILS NFR BLD: 75 %
NEUTS BAND NFR BLD MANUAL: 8 % (ref 0–7)
PLATELET # BLD AUTO: 138 K/UL (ref 135–450)
PMV BLD AUTO: 9.1 FL (ref 5–10.5)
RBC # BLD AUTO: 4.79 M/UL (ref 4.2–5.9)
SLIDE REVIEW: ABNORMAL
WBC # BLD AUTO: 7.2 K/UL (ref 4–11)

## 2023-11-07 RX ORDER — ERGOCALCIFEROL 1.25 MG/1
CAPSULE ORAL
Qty: 12 CAPSULE | Refills: 1 | Status: SHIPPED | OUTPATIENT
Start: 2023-11-07

## 2023-11-07 NOTE — TELEPHONE ENCOUNTER
Spoke with patient about his vitamin D is low. He understands that it is once a week for 12 weeks. Katy Cope.

## 2023-11-08 ENCOUNTER — TELEPHONE (OUTPATIENT)
Dept: ORTHOPEDIC SURGERY | Age: 68
End: 2023-11-08

## 2023-11-08 ENCOUNTER — HOSPITAL ENCOUNTER (OUTPATIENT)
Dept: PHYSICAL THERAPY | Age: 68
Setting detail: THERAPIES SERIES
Discharge: HOME | End: 2023-11-08
Payer: COMMERCIAL

## 2023-11-08 DIAGNOSIS — M25.562 LEFT KNEE PAIN, UNSPECIFIED CHRONICITY: Primary | ICD-10-CM

## 2023-11-08 DIAGNOSIS — M25.662 DECREASED RANGE OF MOTION (ROM) OF LEFT KNEE: ICD-10-CM

## 2023-11-08 DIAGNOSIS — R29.898 LEFT LEG WEAKNESS: ICD-10-CM

## 2023-11-08 LAB — MRSA SPEC QL CULT: NORMAL

## 2023-11-08 PROCEDURE — 97110 THERAPEUTIC EXERCISES: CPT

## 2023-11-08 PROCEDURE — 97530 THERAPEUTIC ACTIVITIES: CPT

## 2023-11-08 PROCEDURE — 97161 PT EVAL LOW COMPLEX 20 MIN: CPT

## 2023-11-08 NOTE — TELEPHONE ENCOUNTER
Patient came over from PT , inquiring about [employer] , wants to know if they will contact him or will you all contact them after sx, for them to come out and access after sx, please call

## 2023-11-08 NOTE — FLOWSHEET NOTE
[address] [address], [address] [facility], [address], [address] office: [phone] fax: [phone]      Physical Therapy: TREATMENT/PROGRESS NOTE   Patient: Lucian Adams (38 y.o. male)   Treatment Date: 2023   :  1955 MRN: [id number]   Visit #: [facility] [x]Yes    []No    Insurance: Payor: Alcides Sessions / Plan: Alcides Sessions - [place] PPO / Product Type: *No Product type* /   Insurance ID: [id number] - ([facility])  Secondary Insurance (if applicable):    Treatment Diagnosis:     ICD-10-CM    1. Left knee pain, unspecified chronicity  M25.562       2. Decreased range of motion (ROM) of left knee  M25.662       3.  Left leg weakness  R29.898          Medical Diagnosis:    Left knee pain, unspecified chronicity [M25.562]   Referring Physician: Marla Steven MD  PCP: Francesca Rosa MD                             Plan of care signed (Y/N): Y    Date of Patient follow up with Physician: SURGERY 23     Progress Report/POC: EVAL today  POC update due: (10 visits /OR [address], whichever is less) 2023     Precautions/ Contra-indications: Prior history of CABG 2018, HTN    Preferred Language for Healthcare:   [x]English       []other:    SUBJECTIVE EXAMINATION     Patient Report/Comments: see eval     Test used Initial score  2023   Pain Summary VAS 2-5/10    Functional questionnaire WOMAC 29/96    Other:                OBJECTIVE EXAMINATION     Observation:     Test measurements: see eval    Exercises/Interventions:   Therapeutic Ex (95381)   15' resistance Sets/time Reps Notes/Cues/Progressions          Heel slides  5\" x10    SLR  10\" x5    SL hip abd  1 x10 Several cues needed to correct from and position patient properly   Bridge   1 x10    SAQ 5#  x10    LAQ 5#  x10                                Manual Intervention ([id number])  TIME                                        NMR re-education (69824) resistance

## 2023-11-08 NOTE — TELEPHONE ENCOUNTER
I let the pt know that that [facility] should be calling them to set up an in home eval as it closer to the sx date. I asked him to please reach back out to us is he has not heard from them  by the last week in November.  That way we can follow up with [facility]      Pt understood and all questions were answered

## 2023-11-09 NOTE — CARE COORDINATION
[facility] Out of Service Area for [facility]. [employer] [facility] has accepted referral for service post discharge. [employer] [facility] rep will pull documents from Epic. Electronically signed by Garrison Holter, LPN on 66/3/04 at 69:05 PM EST

## 2023-11-13 ENCOUNTER — OFFICE VISIT (OUTPATIENT)
Dept: FAMILY MEDICINE CLINIC | Age: 68
End: 2023-11-13

## 2023-11-13 VITALS
BODY MASS INDEX: 24.9 KG/M2 | DIASTOLIC BLOOD PRESSURE: 65 MMHG | RESPIRATION RATE: 16 BRPM | OXYGEN SATURATION: 97 % | HEIGHT: 74 IN | TEMPERATURE: 97.3 F | SYSTOLIC BLOOD PRESSURE: 130 MMHG | HEART RATE: 74 BPM | WEIGHT: 194 LBS

## 2023-11-13 DIAGNOSIS — Z00.00 ANNUAL PHYSICAL EXAM: ICD-10-CM

## 2023-11-13 DIAGNOSIS — Z01.818 PRE-OP EXAM: Primary | ICD-10-CM

## 2023-11-13 DIAGNOSIS — H93.8X2 EAR CANAL MASS, LEFT: ICD-10-CM

## 2023-11-13 SDOH — ECONOMIC STABILITY: FOOD INSECURITY: WITHIN THE PAST 12 MONTHS, YOU WORRIED THAT YOUR FOOD WOULD RUN OUT BEFORE YOU GOT MONEY TO BUY MORE.: NEVER TRUE

## 2023-11-13 SDOH — ECONOMIC STABILITY: FOOD INSECURITY: WITHIN THE PAST 12 MONTHS, THE FOOD YOU BOUGHT JUST DIDN'T LAST AND YOU DIDN'T HAVE MONEY TO GET MORE.: NEVER TRUE

## 2023-11-13 SDOH — ECONOMIC STABILITY: INCOME INSECURITY: HOW HARD IS IT FOR YOU TO PAY FOR THE VERY BASICS LIKE FOOD, HOUSING, MEDICAL CARE, AND HEATING?: NOT HARD AT ALL

## 2023-11-13 ASSESSMENT — PATIENT HEALTH QUESTIONNAIRE - PHQ9
SUM OF ALL RESPONSES TO PHQ QUESTIONS 1-9: 0
SUM OF ALL RESPONSES TO PHQ QUESTIONS 1-9: 0
2. FEELING DOWN, DEPRESSED OR HOPELESS: 0
SUM OF ALL RESPONSES TO PHQ QUESTIONS 1-9: 0
SUM OF ALL RESPONSES TO PHQ9 QUESTIONS 1 & 2: 0
SUM OF ALL RESPONSES TO PHQ QUESTIONS 1-9: 0
1. LITTLE INTEREST OR PLEASURE IN DOING THINGS: 0

## 2023-11-13 NOTE — PROGRESS NOTES
[facility] Service Note    Date: 11/13/2023                                               María Elliott:     Chief Complaint   Patient presents with    Pre-op Exam     Knee surgery next month        HPI  Patient is here for preop exam.  Is having knee surgery next month with  [facility] [place]. Patient can walk a good distance without chest pain or shortness of breath. Is limited only by his knee pain. Can climb stairs and do moderate housework. Denies any personal or family history of blood clots or reactions to anesthesia. Patient has significant heart history including atrial fibrillation and hypertension and CAD status post stents. Sees cardiology. L sided decrease in hearing starting 3 days ago. States that it's \"echoing\". +mild amount of blood came out that night after picking at it. No pain. Patient Active Problem List    Diagnosis Date Noted    Atrial flutter ([address]) 12/30/2022    PAF (paroxysmal atrial fibrillation) ([address]) 12/21/2022    Splenomegaly 07/14/2021    Coronary artery disease involving native coronary artery of native heart with angina pectoris ([address])     ST elevation myocardial infarction involving left anterior descending (LAD) coronary artery ([address]) 09/24/2018    Tobacco use 09/24/2018    Enlarged lymph node 09/24/2018    Left ventricular thrombus 09/19/2018    Mediastinal cyst 09/01/2018    Essential hypertension 03/14/2016    GERD (gastroesophageal reflux disease) 03/09/2015    Hyperlipidemia 09/08/2014    Chest pain 09/23/2013       Past Medical History:   Diagnosis Date    Bullous emphysema (HCC)     CAD (coronary artery disease)     GERD (gastroesophageal reflux disease)     HTN (hypertension)     Hyperlipidemia     Mediastinal cyst 09/2018       Current Outpatient Medications   Medication Sig Dispense Refill    vitamin D (ERGOCALCIFEROL) 1.25 MG (54328 UT) CAPS capsule Take once a week by mouth for 12 weeks.  12 capsule 1    rosuvastatin (CRESTOR) 20 MG tablet Stationary ECG Study

                              [facility]

                                       

                                       Test Date:    2017

Pat Name:     SAULO CHARLES            Department:   

Patient ID:   [id number]                 Room:         [place] [address]

Gender:       M                        Technician:   NATALIA

:          1946               Requested By: SHAHZAD JONES

Order Number: [id number]     Reading MD:   Gerhard Cantrell

                                 Measurements

Intervals                              Axis          

Rate:         83                       P:            66

NY:           146                      QRS:          29

QRSD:         88                       T:            43

QT:           374                                    

QTc:          442                                    

                           Interpretive Statements

Normal sinus rhythm

Low QRS complex voltage in the limb leads

Nonspecific T wave abnormality

No significant change when compared to prior tracing of 2017

Electronically Signed On 2017 8:47:45 EST by Gerhard Cantrell

## 2023-11-15 ENCOUNTER — TELEPHONE (OUTPATIENT)
Dept: ORTHOPEDIC SURGERY | Age: 68
End: 2023-11-15

## 2023-11-15 ENCOUNTER — HOSPITAL ENCOUNTER (OUTPATIENT)
Dept: PHYSICAL THERAPY | Age: 68
Setting detail: THERAPIES SERIES
Discharge: HOME | End: 2023-11-15
Payer: COMMERCIAL

## 2023-11-15 PROCEDURE — 97140 MANUAL THERAPY 1/> REGIONS: CPT | Performed by: PHYSICAL THERAPIST

## 2023-11-15 PROCEDURE — 97110 THERAPEUTIC EXERCISES: CPT | Performed by: PHYSICAL THERAPIST

## 2023-11-15 NOTE — FLOWSHEET NOTE
[address] [address] [facility], [facility], [address] office: [phone] fax: [phone]      Physical Therapy: TREATMENT/PROGRESS NOTE   Patient: Monique Arango (35 y.o. male)   Treatment Date: 11/15/2023   :  1955 MRN: [id number]   Visit #: 2   Insurance Allowable Auth Needed   4 AUTH through 23 [x]Yes    []No    Insurance: Payor: [employer] / Plan: [employer] - [place] PPO / Product Type: *No Product type* /   Insurance ID: [id number] - ([address] [place] [address])  Secondary Insurance (if applicable):    Treatment Diagnosis:     ICD-10-CM    1. Left knee pain, unspecified chronicity  M25.562       2. Decreased range of motion (ROM) of left knee  M25.662       3. Left leg weakness  R29.898          Medical Diagnosis:    No admission diagnoses are documented for this encounter. Referring Physician: Jace Meredith MD  PCP: Demetra Khan MD                             Plan of care signed (Y/N): Y    Date of Patient follow up with Physician: SURGERY 23     Progress Report/POC: NO  POC update due: (10 visits /OR [address], whichever is less) 2023     Precautions/ Contra-indications: Prior history of CABG 2018, HTN    Preferred Language for Healthcare:   [x]English       []other:    SUBJECTIVE EXAMINATION     Patient Report/Comments: Pt. Reports that his knee is feeling a little better. He did exercises most days but was limited over the weekend due to an ear problem.      Test used Initial score  11/8/23 11/15/2023   Pain Summary VAS 2-5/10 4/10   Functional questionnaire WOMAC 29/96    Other:                OBJECTIVE EXAMINATION     Observation:     Test measurements: see eval    Exercises/Interventions:   Therapeutic Ex (28967)    resistance Sets/time Reps Notes/Cues/Progressions   Recumbent bike  5'     Heel slides  5\" x10    Prone quad stretch  30\" x5    SLR  3 x10    SL hip abd  2 x10    Bridge w/ BS  1 x15    SAQ 5#  x15    LAQ 5# 2 x10

## 2023-11-17 ENCOUNTER — TELEPHONE (OUTPATIENT)
Dept: ORTHOPEDIC SURGERY | Age: 68
End: 2023-11-17

## 2023-11-17 NOTE — TELEPHONE ENCOUNTER
Auth: # [id number]  Date Range: 12/06/23-02/02/24  Type of SX:  outpatient  Location: [facility]  CPT: 59763   DX Code: M17.12  SX area: left knee  Insurance: BC BS

## 2023-11-22 ENCOUNTER — HOSPITAL ENCOUNTER (OUTPATIENT)
Dept: PHYSICAL THERAPY | Age: 68
Setting detail: THERAPIES SERIES
Discharge: HOME | End: 2023-11-22
Payer: COMMERCIAL

## 2023-11-22 PROCEDURE — 97110 THERAPEUTIC EXERCISES: CPT

## 2023-11-22 PROCEDURE — 97140 MANUAL THERAPY 1/> REGIONS: CPT

## 2023-11-22 NOTE — FLOWSHEET NOTE
[address] [address] [facility], [address], [address] office: [phone] fax: [phone]      Physical Therapy: TREATMENT/PROGRESS NOTE   Patient: Carter Bazan (89 y.o. male)   Treatment Date: 2023   :  1955 MRN: [id number]   Visit #: 3   Insurance Allowable Auth Needed   4 AUTH through 23 [x]Yes    []No    Insurance: Payor: [employer] Safendpatti / Plan: Patient [employer] - [place] PPO / Product Type: *No Product type* /   Insurance ID: [id number] - ([address] [place] [address])  Secondary Insurance (if applicable):    Treatment Diagnosis:     ICD-10-CM    1. Left knee pain, unspecified chronicity  M25.562       2. Decreased range of motion (ROM) of left knee  M25.662       3. Left leg weakness  R29.898          Medical Diagnosis:    Pain in left knee [M25.562]   Referring Physician: Mick Hemphill MD  PCP: Dajuan Leigh MD                             Plan of care signed (Y/N): Y    Date of Patient follow up with Physician: SURGERY 23     Progress Report/POC: NO  POC update due: (10 visits /OR [address], whichever is less) 2023     Precautions/ Contra-indications: Prior history of CABG 2018, HTN    Preferred Language for Healthcare:   [x]English       []other:    SUBJECTIVE EXAMINATION     Patient Report/Comments: Patient reports compliance with home exercises.      Test used Initial score  2023   Pain Summary VAS 2-5/10 4/10   Functional questionnaire WOMAC 29/96    Other:                OBJECTIVE EXAMINATION     Observation:     Test measurements: see eval    Exercises/Interventions:   Therapeutic Ex (20108)   35' resistance Sets/time Reps Notes/Cues/Progressions   Recumbent bike  5'     Heel slides  10\" x10    Prone quad stretch  30\" x3    SLR 3# 3 x10    SL hip abd 3# 2 x10    Bridge w/ BS  1 x15    Bridge w/ hip abduction    Bridge--> hold--> then hip abduction   SAQ 7# 1 x20    LAQ 5# 2 x10    Standing knee flexion 5# 2 x10    Machine HS

## 2023-11-25 SDOH — HEALTH STABILITY: PHYSICAL HEALTH: ON AVERAGE, HOW MANY DAYS PER WEEK DO YOU ENGAGE IN MODERATE TO STRENUOUS EXERCISE (LIKE A BRISK WALK)?: 7 DAYS

## 2023-11-25 SDOH — HEALTH STABILITY: PHYSICAL HEALTH: ON AVERAGE, HOW MANY MINUTES DO YOU ENGAGE IN EXERCISE AT THIS LEVEL?: 60 MIN

## 2023-11-25 ASSESSMENT — LIFESTYLE VARIABLES
HOW OFTEN DO YOU HAVE A DRINK CONTAINING ALCOHOL: 1
HOW MANY STANDARD DRINKS CONTAINING ALCOHOL DO YOU HAVE ON A TYPICAL DAY: PATIENT DOES NOT DRINK
HOW OFTEN DO YOU HAVE A DRINK CONTAINING ALCOHOL: NEVER
HOW OFTEN DO YOU HAVE SIX OR MORE DRINKS ON ONE OCCASION: 1
HOW MANY STANDARD DRINKS CONTAINING ALCOHOL DO YOU HAVE ON A TYPICAL DAY: 0

## 2023-11-25 ASSESSMENT — PATIENT HEALTH QUESTIONNAIRE - PHQ9
SUM OF ALL RESPONSES TO PHQ QUESTIONS 1-9: 0
1. LITTLE INTEREST OR PLEASURE IN DOING THINGS: 0
SUM OF ALL RESPONSES TO PHQ9 QUESTIONS 1 & 2: 0
2. FEELING DOWN, DEPRESSED OR HOPELESS: 0
SUM OF ALL RESPONSES TO PHQ QUESTIONS 1-9: 0

## 2023-11-28 ENCOUNTER — OFFICE VISIT (OUTPATIENT)
Dept: FAMILY MEDICINE CLINIC | Age: 68
End: 2023-11-28
Payer: COMMERCIAL

## 2023-11-28 VITALS
HEART RATE: 61 BPM | BODY MASS INDEX: 25.54 KG/M2 | SYSTOLIC BLOOD PRESSURE: 128 MMHG | HEIGHT: 74 IN | RESPIRATION RATE: 16 BRPM | WEIGHT: 199 LBS | OXYGEN SATURATION: 96 % | DIASTOLIC BLOOD PRESSURE: 74 MMHG | TEMPERATURE: 97.1 F

## 2023-11-28 DIAGNOSIS — D68.69 SECONDARY HYPERCOAGULABLE STATE (HCC): ICD-10-CM

## 2023-11-28 DIAGNOSIS — I48.0 PAF (PAROXYSMAL ATRIAL FIBRILLATION) (HCC): ICD-10-CM

## 2023-11-28 DIAGNOSIS — Z00.00 ANNUAL PHYSICAL EXAM: ICD-10-CM

## 2023-11-28 DIAGNOSIS — Z12.11 COLON CANCER SCREENING: ICD-10-CM

## 2023-11-28 DIAGNOSIS — I25.119 CORONARY ARTERY DISEASE INVOLVING NATIVE CORONARY ARTERY OF NATIVE HEART WITH ANGINA PECTORIS (HCC): ICD-10-CM

## 2023-11-28 DIAGNOSIS — Z01.818 PRE-OP EXAM: Primary | ICD-10-CM

## 2023-11-28 DIAGNOSIS — E78.2 MIXED HYPERLIPIDEMIA: ICD-10-CM

## 2023-11-28 DIAGNOSIS — I10 HYPERTENSION, ESSENTIAL: ICD-10-CM

## 2023-11-28 DIAGNOSIS — Z23 NEED FOR VACCINATION: ICD-10-CM

## 2023-11-28 DIAGNOSIS — Z87.891 HISTORY OF TOBACCO USE: ICD-10-CM

## 2023-11-28 PROBLEM — I48.92 ATRIAL FLUTTER (HCC): Status: RESOLVED | Noted: 2022-12-30 | Resolved: 2023-11-28

## 2023-11-28 PROBLEM — I21.02 ST ELEVATION MYOCARDIAL INFARCTION INVOLVING LEFT ANTERIOR DESCENDING (LAD) CORONARY ARTERY (HCC): Status: RESOLVED | Noted: 2018-09-24 | Resolved: 2023-11-28

## 2023-11-28 LAB
CHOLEST SERPL-MCNC: 120 MG/DL (ref 0–199)
HDLC SERPL-MCNC: 32 MG/DL (ref 40–60)
LDL CHOLESTEROL CALCULATED: 75 MG/DL
TRIGL SERPL-MCNC: 67 MG/DL (ref 0–150)
VLDLC SERPL CALC-MCNC: 13 MG/DL

## 2023-11-28 PROCEDURE — 99397 PER PM REEVAL EST PAT 65+ YR: CPT | Performed by: FAMILY MEDICINE

## 2023-11-28 PROCEDURE — 93000 ELECTROCARDIOGRAM COMPLETE: CPT | Performed by: FAMILY MEDICINE

## 2023-11-28 PROCEDURE — 3074F SYST BP LT 130 MM HG: CPT | Performed by: FAMILY MEDICINE

## 2023-11-28 PROCEDURE — 90471 IMMUNIZATION ADMIN: CPT | Performed by: FAMILY MEDICINE

## 2023-11-28 PROCEDURE — 90750 HZV VACC RECOMBINANT IM: CPT | Performed by: FAMILY MEDICINE

## 2023-11-28 PROCEDURE — 99213 OFFICE O/P EST LOW 20 MIN: CPT | Performed by: FAMILY MEDICINE

## 2023-11-28 PROCEDURE — 3078F DIAST BP <80 MM HG: CPT | Performed by: FAMILY MEDICINE

## 2023-11-28 NOTE — CARE COORDINATION
Roxanne out of network for [facility]. [facility] [place], [facility]. will service at discharge. Electronically signed by Romulo Armstrong LPN on 65/05/52 at 1:15 AM EST

## 2023-11-28 NOTE — PROGRESS NOTES
C-scope, will refer to GI. He is due for AAA screening, will order ultrasound today. He is due for lung cancer screening given history of smoking, will order low-dose CT. Patient advised to continue to avoid tobacco.    Patient has history of hypertension, atrial fibrillation, CAD, hyperlipidemia. Is currently asymptomatic. Continue current medicines. Follow-up cardiology as scheduled. Patient is here for preop exam.  No sign of heart failure. No sign of active infection. Vital signs are stable. EKG is nonacute. He is cleared for the surgery pending approval from cardiology given his significant cardiac history. Discharged in stable condition at 9:38 AM.    1. Pre-op exam  -     EKG 12 Lead - Clinic Performed; Future  2. Colon cancer screening  -     AFL - Go, Simi Beaver MD, Gastroenterology, [address]  3. History of tobacco use  -     US SCREENING FOR AAA; Future  -     CT Lung Screen (Initial or Annual); Future  4. Annual physical exam  5. Hypertension, essential  6. PAF (paroxysmal atrial fibrillation) ([address])  7. Secondary hypercoagulable state ([address])  8. Coronary artery disease involving native coronary artery of native heart with angina pectoris ([address])  9. Mixed hyperlipidemia       Orders Placed This Encounter   Procedures    US SCREENING FOR AAA     This procedure can be scheduled via [employer]. Access your [employer] account by visiting [email or web address]. Standing Status:   Future     Standing Expiration Date:   11/28/2024    CT Lung Screen (Initial or Annual)     Age: 80 y.o.    Smoking History:   Social History    Tobacco Use      Smoking status: Every Day        Packs/day: 0.50        Years: 40.00        Additional pack years: 0.00        Total pack years: 20.00        Types: Cigarettes      Smokeless tobacco: Never      Tobacco comments: Trying to quit    Vaping Use      Vaping Use: Never used    Alcohol use: No    Drug use: No    Pack years: 20  No previous lung cancer screening exam

## 2023-11-29 ENCOUNTER — HOSPITAL ENCOUNTER (OUTPATIENT)
Dept: PHYSICAL THERAPY | Age: 68
Setting detail: THERAPIES SERIES
Discharge: HOME | End: 2023-11-29
Payer: COMMERCIAL

## 2023-11-29 PROCEDURE — 97110 THERAPEUTIC EXERCISES: CPT

## 2023-11-29 PROCEDURE — 97530 THERAPEUTIC ACTIVITIES: CPT

## 2023-11-29 NOTE — FLOWSHEET NOTE
stretch  30\" x5    SLR  1 x10    SL hip abd  1 x10    Bridge  1 x10    Bridge w/ BS  1 x15    Bridge w/ hip abduction    Bridge--> hold--> then hip abduction   SAQ 7# 1 x20    LAQ 5# 2 x10    Standing knee flexion 5# 2 x10    Machine knee ext 10# 2 x15    Machine HS curl 25-30# 2 x15                  Manual Intervention (31617)    TIME            Patellar glides  3'     Tib/fem mobs  5'  Flexion bias                 NMR re-education (26352) resistance Sets/time Reps CUES NEEDED                                      Therapeutic Activity (76204)  8'  Sets/time     Educated on post operative guidelines, activity modification, suggestions on icing, walking and swelling management  8'                                   Modalities:    No modalities applied this session    Education  11/8/23: Evaluation - Patient education regarding PT assessment and plan of care. Extensive portion of visit was focused on establishing HEP, discussing normal course of post operative treatment, activity modification acutely after surgery and addressing patient questioning. reviewed stair ambulation and gait with AD. discussed healing time frames, swelling management, return to work expectations and protocol progression. Provided patient time for questions with answers provided when possible. Home Exercise Program:   Access Code: [email or web address]  URL: [email or web address]  Date: 11/29/2023  Prepared by:  Bunny Fu    Exercises  - Seated Table Hamstring Stretch  - 2-3 x daily - 7 x weekly - 3 reps - 30 seconds hold  - Quad Setting and Stretching  - 5 x daily - 7 x weekly - 1 sets - 20 reps - 10 seconds hold  - Supine Active Straight Leg Raise  - 2 x daily - 7 x weekly - 1 sets - 10 reps - 10 seconds hold  - Short Arc Quad with Ankle Weight  - 2 x daily - 7 x weekly - 2 sets - 15 reps - 5 seconds hold  - Supine Heel Slide with Strap  - 2-3 x daily - 7 x weekly - 10 reps - 5 second hold  - Supine Bridge  - 2 x daily - 7 x weekly - 2 sets -

## 2023-11-29 NOTE — DISCHARGE INSTR - COC
in orthopedic clinic in about 2 weeks from day of surgery, call [phone] to make an appointment      Patient's personal belongings (please select all that are sent with patient):  {NAEL DME Belongings:[id number]}    RN SIGNATURE:  {Esignature:[id number]}    CASE MANAGEMENT/SOCIAL WORK SECTION    Inpatient Status Date: ***    Readmission Risk Assessment Score:  Readmission Risk              Risk of Unplanned Readmission:  0           Discharging to Facility/ Agency   [facility]  Address:  Phone:  Fax:    Dialysis Facility (if applicable)   Name:  Address:  Dialysis Schedule:  Phone:  Fax:    / signature: {Esignature:[id number]}    PHYSICIAN SECTION    Prognosis: Good    Condition at Discharge: Stable    Rehab Potential (if transferring to Rehab): Good    Recommended Labs or Other Treatments After Discharge: ***    Physician Certification: I certify the above information and transfer of Curtis Farias  is necessary for the continuing treatment of the diagnosis listed and that he requires [address] [place] [address] for less 30 days.      Update Admission H&P: Changes in H&P as follows - S/p TKA    PHYSICIAN SIGNATURE:  Electronically signed by DELPHINE Garzon CNP on 12/5/23 at 1:53 PM EST

## 2023-11-30 ENCOUNTER — OFFICE VISIT (OUTPATIENT)
Dept: ORTHOPEDIC SURGERY | Age: 68
End: 2023-11-30
Payer: COMMERCIAL

## 2023-11-30 VITALS — HEIGHT: 74 IN | WEIGHT: 199 LBS | BODY MASS INDEX: 25.54 KG/M2

## 2023-11-30 DIAGNOSIS — M17.12 PRIMARY OSTEOARTHRITIS OF LEFT KNEE: Primary | ICD-10-CM

## 2023-11-30 PROCEDURE — 99214 OFFICE O/P EST MOD 30 MIN: CPT | Performed by: ORTHOPAEDIC SURGERY

## 2023-11-30 PROCEDURE — 1123F ACP DISCUSS/DSCN MKR DOCD: CPT | Performed by: ORTHOPAEDIC SURGERY

## 2023-11-30 NOTE — PROGRESS NOTES
AROM: L: 5-110 Deg             Positive patellofemoral crepitus             Positive medial joint line tenderness    Radiographs: Standing AP/PA/lateral/Sunrise LEFT Knee: Imaging was reviewed with the patient. There are severe degenerative changes of the LEFT knee with joint space narrowing, subchondral sclerosis, and osteophyte formation. There is no radiographic evidence of AVN, fracture, or dislocation. Labs: I personally reviewed her recently ordered laboratory work including CBC, CMP, PT/INR, A1C, Vitamin D. Elective LEFT TKA     Assessment and Plan?: The patient has functionally disabling knee pain with advanced degenerative changes of the LEFT knee    Will proceed with PT for him however he would also like to secure a surgical date before the end of the year. We discussed the diagnosis and treatment options in detail with the patient. Patient has tried conservative treatment including anti-inflammatories, activity modification, physical therapy, use of cane, injections. Given the failure of these conservative measures the patient is a good candidate for an elective total knee arthroplasty  We discussed the surgical procedure, recovery period, and protocol for pain management, expected post-operative course in detail. We discussed the potential benefits of the surgery including pain relief and improved range of motion as well as the inherent risks including but are not limited to incomplete resolution of symptoms, infection, dislocation, leg length inequality requiring shoe lift, fracture, implant loosening, hardware failure, nerve or vascular injury, need for further surgery, deep vein thrombus, pulmonary embolism. Upon review of the patient's preoperative labs and PCP clearance note, there does not appear to be any information that precludes them from surgery. The patient can proceed with surgery without further intervention.      ?___________________________   Eddie Stallworth MD  ?   ??cc:

## 2023-12-05 NOTE — DISCHARGE INSTRUCTIONS
Dr. Juan Rizzo Total KNEE Replacement Instructions     Follow-up with Dr. Juan Rizzo in 2 weeks; [phone] ([address])    Incision care  Should your incision start to drain let our office know. Continue rufus hose on the operative leg (this should be thigh high) during the day, can remove at night. Okay to shower tomorrow, as long as your post-op waterproof dressing is still in place. Do not tub bathe or submerge your incision in water. Should your incision start to drain, let our office know. You may remove your Therabond dressing on post-op day 7. Prior to any wound care please wash and dry your hands. During the day, continue to wear your RUFUS stocking on the operative leg. Take the stocking off at night. You do not need to wear a stocking on your non-operative leg. You should wear the compression stocking until your post-op visit, this keeps lower extremity swelling to a minimum and reduces joint stiffness. Exercise  Physical therapy will be started right away and should be set up prior to your surgery. PT will be 2-3 times a week for 4-6 weeks. Some patients will start this at home for two weeks then transition to outpatient PT, while others will go straight to outpatient PT the day after surgery. Unless told otherwise, your operative leg is \"weight bearing as tolerated\". This means you can put all of your weight on your surgery leg. You may progress off support as tolerated. DO NOT place a pillow under your knee. To elevate the operative leg, elevate the ENTIRE leg. The knee should not rest in a bent position. Dr. Juan Rizzo wants his total joint patients to walk for 5-10 minutes every hour while awake. Ask your surgeon's office for  FMLA paperwork or a return to work note. Ice  For pain and swelling, put ice or a cold pack on the area for 10 to 20 minutes at a time. Put a thin cloth between the ice and your skin such as a clean pillow case or thin towel.     Diet  Resume your home

## 2023-12-06 ENCOUNTER — ANESTHESIA (OUTPATIENT)
Dept: OPERATING ROOM | Age: 68
End: 2023-12-06
Payer: COMMERCIAL

## 2023-12-06 ENCOUNTER — HOSPITAL ENCOUNTER (OUTPATIENT)
Age: 68
Setting detail: OUTPATIENT SURGERY
Discharge: HOME | End: 2023-12-06
Attending: ORTHOPAEDIC SURGERY | Admitting: ORTHOPAEDIC SURGERY
Payer: COMMERCIAL

## 2023-12-06 ENCOUNTER — APPOINTMENT (OUTPATIENT)
Dept: GENERAL RADIOLOGY | Age: 68
End: 2023-12-06
Attending: ORTHOPAEDIC SURGERY
Payer: COMMERCIAL

## 2023-12-06 ENCOUNTER — ANESTHESIA EVENT (OUTPATIENT)
Dept: OPERATING ROOM | Age: 68
End: 2023-12-06
Payer: COMMERCIAL

## 2023-12-06 VITALS
DIASTOLIC BLOOD PRESSURE: 90 MMHG | TEMPERATURE: 98.2 F | OXYGEN SATURATION: 94 % | BODY MASS INDEX: 24.64 KG/M2 | HEIGHT: 74 IN | WEIGHT: 192 LBS | HEART RATE: 59 BPM | RESPIRATION RATE: 15 BRPM | SYSTOLIC BLOOD PRESSURE: 137 MMHG

## 2023-12-06 DIAGNOSIS — Z96.659 STATUS POST TOTAL KNEE REPLACEMENT, UNSPECIFIED LATERALITY: Primary | ICD-10-CM

## 2023-12-06 LAB
ABO + RH BLD: NORMAL
BLD GP AB SCN SERPL QL: NORMAL
GLUCOSE BLD-MCNC: 100 MG/DL (ref 70–99)
PERFORMED ON: ABNORMAL

## 2023-12-06 PROCEDURE — 97535 SELF CARE MNGMENT TRAINING: CPT

## 2023-12-06 PROCEDURE — 2709999900 HC NON-CHARGEABLE SUPPLY: Performed by: ORTHOPAEDIC SURGERY

## 2023-12-06 PROCEDURE — 6370000000 HC RX 637 (ALT 250 FOR IP): Performed by: ANESTHESIOLOGY

## 2023-12-06 PROCEDURE — 6360000002 HC RX W HCPCS: Performed by: NURSE ANESTHETIST, CERTIFIED REGISTERED

## 2023-12-06 PROCEDURE — 6360000002 HC RX W HCPCS: Performed by: ORTHOPAEDIC SURGERY

## 2023-12-06 PROCEDURE — 7100000001 HC PACU RECOVERY - ADDTL 15 MIN: Performed by: ORTHOPAEDIC SURGERY

## 2023-12-06 PROCEDURE — 3600000004 HC SURGERY LEVEL 4 BASE: Performed by: ORTHOPAEDIC SURGERY

## 2023-12-06 PROCEDURE — 6370000000 HC RX 637 (ALT 250 FOR IP): Performed by: ORTHOPAEDIC SURGERY

## 2023-12-06 PROCEDURE — C1713 ANCHOR/SCREW BN/BN,TIS/BN: HCPCS | Performed by: ORTHOPAEDIC SURGERY

## 2023-12-06 PROCEDURE — 36415 COLL VENOUS BLD VENIPUNCTURE: CPT

## 2023-12-06 PROCEDURE — 7100000011 HC PHASE II RECOVERY - ADDTL 15 MIN: Performed by: ORTHOPAEDIC SURGERY

## 2023-12-06 PROCEDURE — 97116 GAIT TRAINING THERAPY: CPT

## 2023-12-06 PROCEDURE — 86850 RBC ANTIBODY SCREEN: CPT

## 2023-12-06 PROCEDURE — 2500000003 HC RX 250 WO HCPCS: Performed by: ORTHOPAEDIC SURGERY

## 2023-12-06 PROCEDURE — 3700000001 HC ADD 15 MINUTES (ANESTHESIA): Performed by: ORTHOPAEDIC SURGERY

## 2023-12-06 PROCEDURE — 2580000003 HC RX 258: Performed by: ORTHOPAEDIC SURGERY

## 2023-12-06 PROCEDURE — 86900 BLOOD TYPING SEROLOGIC ABO: CPT

## 2023-12-06 PROCEDURE — 2500000003 HC RX 250 WO HCPCS: Performed by: NURSE ANESTHETIST, CERTIFIED REGISTERED

## 2023-12-06 PROCEDURE — 2720000010 HC SURG SUPPLY STERILE: Performed by: ORTHOPAEDIC SURGERY

## 2023-12-06 PROCEDURE — 3600000014 HC SURGERY LEVEL 4 ADDTL 15MIN: Performed by: ORTHOPAEDIC SURGERY

## 2023-12-06 PROCEDURE — 3700000000 HC ANESTHESIA ATTENDED CARE: Performed by: ORTHOPAEDIC SURGERY

## 2023-12-06 PROCEDURE — 73560 X-RAY EXAM OF KNEE 1 OR 2: CPT

## 2023-12-06 PROCEDURE — 86901 BLOOD TYPING SEROLOGIC RH(D): CPT

## 2023-12-06 PROCEDURE — 97161 PT EVAL LOW COMPLEX 20 MIN: CPT

## 2023-12-06 PROCEDURE — C1776 JOINT DEVICE (IMPLANTABLE): HCPCS | Performed by: ORTHOPAEDIC SURGERY

## 2023-12-06 PROCEDURE — 97165 OT EVAL LOW COMPLEX 30 MIN: CPT

## 2023-12-06 PROCEDURE — 7100000010 HC PHASE II RECOVERY - FIRST 15 MIN: Performed by: ORTHOPAEDIC SURGERY

## 2023-12-06 PROCEDURE — 7100000000 HC PACU RECOVERY - FIRST 15 MIN: Performed by: ORTHOPAEDIC SURGERY

## 2023-12-06 DEVICE — IMPLANTABLE DEVICE
Type: IMPLANTABLE DEVICE | Site: KNEE | Status: FUNCTIONAL
Brand: NEXGEN® TRABECULAR METAL®

## 2023-12-06 DEVICE — IMPLANTABLE DEVICE
Type: IMPLANTABLE DEVICE | Site: KNEE | Status: FUNCTIONAL
Brand: PERSONA® NATURAL TIBIA® TRABECULAR METAL®

## 2023-12-06 DEVICE — IMPLANTABLE DEVICE
Type: IMPLANTABLE DEVICE | Site: KNEE | Status: FUNCTIONAL
Brand: PERSONA® TRABECULAR METAL®

## 2023-12-06 DEVICE — IMPLANTABLE DEVICE: Type: IMPLANTABLE DEVICE | Site: KNEE | Status: FUNCTIONAL

## 2023-12-06 RX ORDER — ONDANSETRON 2 MG/ML
4 INJECTION INTRAMUSCULAR; INTRAVENOUS
Status: DISCONTINUED | OUTPATIENT
Start: 2023-12-06 | End: 2023-12-06 | Stop reason: HOSPADM

## 2023-12-06 RX ORDER — MAGNESIUM HYDROXIDE 1200 MG/15ML
LIQUID ORAL CONTINUOUS PRN
Status: COMPLETED | OUTPATIENT
Start: 2023-12-06 | End: 2023-12-06

## 2023-12-06 RX ORDER — SODIUM CHLORIDE 0.9 % (FLUSH) 0.9 %
5-40 SYRINGE (ML) INJECTION PRN
Status: CANCELLED | OUTPATIENT
Start: 2023-12-06

## 2023-12-06 RX ORDER — ACETAMINOPHEN 500 MG
1000 TABLET ORAL ONCE
Status: COMPLETED | OUTPATIENT
Start: 2023-12-06 | End: 2023-12-06

## 2023-12-06 RX ORDER — SODIUM CHLORIDE 0.9 % (FLUSH) 0.9 %
5-40 SYRINGE (ML) INJECTION EVERY 12 HOURS SCHEDULED
Status: DISCONTINUED | OUTPATIENT
Start: 2023-12-06 | End: 2023-12-06 | Stop reason: HOSPADM

## 2023-12-06 RX ORDER — KETOROLAC TROMETHAMINE 30 MG/ML
15 INJECTION, SOLUTION INTRAMUSCULAR; INTRAVENOUS ONCE
Status: COMPLETED | OUTPATIENT
Start: 2023-12-06 | End: 2023-12-06

## 2023-12-06 RX ORDER — DEXAMETHASONE SODIUM PHOSPHATE 4 MG/ML
10 INJECTION, SOLUTION INTRA-ARTICULAR; INTRALESIONAL; INTRAMUSCULAR; INTRAVENOUS; SOFT TISSUE ONCE
Status: COMPLETED | OUTPATIENT
Start: 2023-12-06 | End: 2023-12-06

## 2023-12-06 RX ORDER — LIDOCAINE HYDROCHLORIDE 10 MG/ML
0.5 INJECTION, SOLUTION EPIDURAL; INFILTRATION; INTRACAUDAL; PERINEURAL ONCE
Status: DISCONTINUED | OUTPATIENT
Start: 2023-12-06 | End: 2023-12-06 | Stop reason: HOSPADM

## 2023-12-06 RX ORDER — MEPERIDINE HYDROCHLORIDE 25 MG/ML
12.5 INJECTION INTRAMUSCULAR; INTRAVENOUS; SUBCUTANEOUS EVERY 5 MIN PRN
Status: DISCONTINUED | OUTPATIENT
Start: 2023-12-06 | End: 2023-12-06 | Stop reason: HOSPADM

## 2023-12-06 RX ORDER — CELECOXIB 200 MG/1
400 CAPSULE ORAL ONCE
Status: COMPLETED | OUTPATIENT
Start: 2023-12-06 | End: 2023-12-06

## 2023-12-06 RX ORDER — ACETAMINOPHEN 650 MG
TABLET, EXTENDED RELEASE ORAL
Status: COMPLETED | OUTPATIENT
Start: 2023-12-06 | End: 2023-12-06

## 2023-12-06 RX ORDER — OXYCODONE HYDROCHLORIDE 5 MG/1
5 TABLET ORAL PRN
Status: COMPLETED | OUTPATIENT
Start: 2023-12-06 | End: 2023-12-06

## 2023-12-06 RX ORDER — PHENYLEPHRINE HCL IN 0.9% NACL 1 MG/10 ML
SYRINGE (ML) INTRAVENOUS PRN
Status: DISCONTINUED | OUTPATIENT
Start: 2023-12-06 | End: 2023-12-06 | Stop reason: SDUPTHER

## 2023-12-06 RX ORDER — OXYCODONE HYDROCHLORIDE 5 MG/1
10 TABLET ORAL PRN
Status: COMPLETED | OUTPATIENT
Start: 2023-12-06 | End: 2023-12-06

## 2023-12-06 RX ORDER — MAGNESIUM HYDROXIDE 1200 MG/15ML
LIQUID ORAL
Status: COMPLETED | OUTPATIENT
Start: 2023-12-06 | End: 2023-12-06

## 2023-12-06 RX ORDER — FENTANYL CITRATE 50 UG/ML
INJECTION, SOLUTION INTRAMUSCULAR; INTRAVENOUS PRN
Status: DISCONTINUED | OUTPATIENT
Start: 2023-12-06 | End: 2023-12-06 | Stop reason: SDUPTHER

## 2023-12-06 RX ORDER — TRANEXAMIC ACID 10 MG/ML
1000 INJECTION, SOLUTION INTRAVENOUS
Status: COMPLETED | OUTPATIENT
Start: 2023-12-06 | End: 2023-12-06

## 2023-12-06 RX ORDER — FENTANYL CITRATE 50 UG/ML
50 INJECTION, SOLUTION INTRAMUSCULAR; INTRAVENOUS EVERY 5 MIN PRN
Status: DISCONTINUED | OUTPATIENT
Start: 2023-12-06 | End: 2023-12-06 | Stop reason: HOSPADM

## 2023-12-06 RX ORDER — DEXAMETHASONE SODIUM PHOSPHATE 4 MG/ML
10 INJECTION, SOLUTION INTRA-ARTICULAR; INTRALESIONAL; INTRAMUSCULAR; INTRAVENOUS; SOFT TISSUE ONCE
Status: DISCONTINUED | OUTPATIENT
Start: 2023-12-06 | End: 2023-12-06 | Stop reason: HOSPADM

## 2023-12-06 RX ORDER — MELOXICAM 15 MG/1
15 TABLET ORAL DAILY
Qty: 14 TABLET | Refills: 0 | Status: SHIPPED | OUTPATIENT
Start: 2023-12-06 | End: 2023-12-20

## 2023-12-06 RX ORDER — HYDROMORPHONE HYDROCHLORIDE 2 MG/ML
0.5 INJECTION, SOLUTION INTRAMUSCULAR; INTRAVENOUS; SUBCUTANEOUS EVERY 5 MIN PRN
Status: DISCONTINUED | OUTPATIENT
Start: 2023-12-06 | End: 2023-12-06 | Stop reason: HOSPADM

## 2023-12-06 RX ORDER — SODIUM CHLORIDE 9 MG/ML
INJECTION, SOLUTION INTRAVENOUS PRN
Status: CANCELLED | OUTPATIENT
Start: 2023-12-06

## 2023-12-06 RX ORDER — OXYCODONE HYDROCHLORIDE 5 MG/1
5 TABLET ORAL EVERY 4 HOURS PRN
Qty: 42 TABLET | Refills: 0 | Status: SHIPPED | OUTPATIENT
Start: 2023-12-06 | End: 2023-12-13

## 2023-12-06 RX ORDER — PROPOFOL 10 MG/ML
INJECTION, EMULSION INTRAVENOUS PRN
Status: DISCONTINUED | OUTPATIENT
Start: 2023-12-06 | End: 2023-12-06 | Stop reason: SDUPTHER

## 2023-12-06 RX ORDER — SODIUM CHLORIDE 0.9 % (FLUSH) 0.9 %
5-40 SYRINGE (ML) INJECTION PRN
Status: DISCONTINUED | OUTPATIENT
Start: 2023-12-06 | End: 2023-12-06 | Stop reason: HOSPADM

## 2023-12-06 RX ORDER — MIDAZOLAM HYDROCHLORIDE 1 MG/ML
INJECTION INTRAMUSCULAR; INTRAVENOUS PRN
Status: DISCONTINUED | OUTPATIENT
Start: 2023-12-06 | End: 2023-12-06 | Stop reason: SDUPTHER

## 2023-12-06 RX ORDER — EPHEDRINE SULFATE/0.9% NACL/PF 50 MG/5 ML
SYRINGE (ML) INTRAVENOUS PRN
Status: DISCONTINUED | OUTPATIENT
Start: 2023-12-06 | End: 2023-12-06 | Stop reason: SDUPTHER

## 2023-12-06 RX ORDER — SODIUM CHLORIDE 0.9 % (FLUSH) 0.9 %
5-40 SYRINGE (ML) INJECTION EVERY 12 HOURS SCHEDULED
Status: CANCELLED | OUTPATIENT
Start: 2023-12-06

## 2023-12-06 RX ORDER — SODIUM CHLORIDE 9 MG/ML
INJECTION, SOLUTION INTRAVENOUS PRN
Status: DISCONTINUED | OUTPATIENT
Start: 2023-12-06 | End: 2023-12-06 | Stop reason: HOSPADM

## 2023-12-06 RX ORDER — SENNOSIDES A AND B 8.6 MG/1
1 TABLET, FILM COATED ORAL 2 TIMES DAILY
Qty: 28 TABLET | Refills: 0 | Status: SHIPPED | OUTPATIENT
Start: 2023-12-06 | End: 2023-12-20

## 2023-12-06 RX ORDER — ACETAMINOPHEN 500 MG
1000 TABLET ORAL 3 TIMES DAILY
Qty: 84 TABLET | Refills: 0 | Status: SHIPPED | OUTPATIENT
Start: 2023-12-06 | End: 2023-12-20

## 2023-12-06 RX ORDER — VANCOMYCIN HYDROCHLORIDE 1 G/20ML
INJECTION, POWDER, LYOPHILIZED, FOR SOLUTION INTRAVENOUS
Status: COMPLETED | OUTPATIENT
Start: 2023-12-06 | End: 2023-12-06

## 2023-12-06 RX ORDER — LIDOCAINE HYDROCHLORIDE 20 MG/ML
INJECTION, SOLUTION EPIDURAL; INFILTRATION; INTRACAUDAL; PERINEURAL PRN
Status: DISCONTINUED | OUTPATIENT
Start: 2023-12-06 | End: 2023-12-06 | Stop reason: SDUPTHER

## 2023-12-06 RX ORDER — ONDANSETRON 2 MG/ML
INJECTION INTRAMUSCULAR; INTRAVENOUS PRN
Status: DISCONTINUED | OUTPATIENT
Start: 2023-12-06 | End: 2023-12-06 | Stop reason: SDUPTHER

## 2023-12-06 RX ORDER — SODIUM CHLORIDE, SODIUM LACTATE, POTASSIUM CHLORIDE, CALCIUM CHLORIDE 600; 310; 30; 20 MG/100ML; MG/100ML; MG/100ML; MG/100ML
INJECTION, SOLUTION INTRAVENOUS CONTINUOUS
Status: DISCONTINUED | OUTPATIENT
Start: 2023-12-06 | End: 2023-12-06 | Stop reason: HOSPADM

## 2023-12-06 RX ADMIN — MIDAZOLAM 1 MG: 1 INJECTION INTRAMUSCULAR; INTRAVENOUS at 06:57

## 2023-12-06 RX ADMIN — MEPIVACAINE HYDROCHLORIDE 50 MG: 20 INJECTION, SOLUTION EPIDURAL; INFILTRATION at 07:10

## 2023-12-06 RX ADMIN — ONDANSETRON 4 MG: 2 INJECTION INTRAMUSCULAR; INTRAVENOUS at 07:15

## 2023-12-06 RX ADMIN — OXYCODONE 5 MG: 5 TABLET ORAL at 11:53

## 2023-12-06 RX ADMIN — DEXAMETHASONE SODIUM PHOSPHATE 10 MG: 4 INJECTION, SOLUTION INTRAMUSCULAR; INTRAVENOUS at 06:38

## 2023-12-06 RX ADMIN — Medication 5 MG: at 07:54

## 2023-12-06 RX ADMIN — FENTANYL CITRATE 50 MCG: 50 INJECTION, SOLUTION INTRAMUSCULAR; INTRAVENOUS at 06:57

## 2023-12-06 RX ADMIN — TRANEXAMIC ACID 1000 MG: 10 INJECTION, SOLUTION INTRAVENOUS at 07:15

## 2023-12-06 RX ADMIN — ACETAMINOPHEN 1000 MG: 500 TABLET ORAL at 06:38

## 2023-12-06 RX ADMIN — CELECOXIB 400 MG: 200 CAPSULE ORAL at 06:38

## 2023-12-06 RX ADMIN — KETOROLAC TROMETHAMINE 15 MG: 30 INJECTION, SOLUTION INTRAMUSCULAR; INTRAVENOUS at 09:22

## 2023-12-06 RX ADMIN — LIDOCAINE HYDROCHLORIDE 80 MG: 20 INJECTION, SOLUTION EPIDURAL; INFILTRATION; INTRACAUDAL; PERINEURAL at 07:11

## 2023-12-06 RX ADMIN — Medication 50 MCG: at 07:54

## 2023-12-06 RX ADMIN — SODIUM CHLORIDE, POTASSIUM CHLORIDE, SODIUM LACTATE AND CALCIUM CHLORIDE: 600; 310; 30; 20 INJECTION, SOLUTION INTRAVENOUS at 06:57

## 2023-12-06 RX ADMIN — PROPOFOL 20 MG: 10 INJECTION, EMULSION INTRAVENOUS at 07:11

## 2023-12-06 RX ADMIN — CEFAZOLIN 2000 MG: 2 INJECTION, POWDER, FOR SOLUTION INTRAMUSCULAR; INTRAVENOUS at 06:49

## 2023-12-06 RX ADMIN — FENTANYL CITRATE 50 MCG: 50 INJECTION, SOLUTION INTRAMUSCULAR; INTRAVENOUS at 08:29

## 2023-12-06 RX ADMIN — PROPOFOL 100 MCG/KG/MIN: 10 INJECTION, EMULSION INTRAVENOUS at 07:12

## 2023-12-06 RX ADMIN — Medication 5 MG: at 07:55

## 2023-12-06 ASSESSMENT — PAIN SCALES - GENERAL: PAINLEVEL_OUTOF10: 0

## 2023-12-06 ASSESSMENT — LIFESTYLE VARIABLES: SMOKING_STATUS: 1

## 2023-12-06 ASSESSMENT — PAIN - FUNCTIONAL ASSESSMENT: PAIN_FUNCTIONAL_ASSESSMENT: 0-10

## 2023-12-06 NOTE — ANESTHESIA PROCEDURE NOTES
Spinal Block    Patient location during procedure: OR  End time: 12/6/2023 7:10 AM  Reason for block: primary anesthetic and at surgeon's request  Staffing  Performed: anesthesiologist and resident/CRNA   Anesthesiologist: Vishal Wilson MD  Resident/CRNA: DELPHINE Casarez CRNA  Performed by: DELPHINE Casarez CRNA  Authorized by: Vishal Wilson MD    Spinal Block  Patient position: sitting  Prep: ChloraPrep  Patient monitoring: cardiac monitor, continuous pulse ox and frequent blood pressure checks  Approach: midline  Location: L2/L3  Provider prep: mask and sterile gloves  Local infiltration: lidocaine  Needle  Needle type: Pencan   Needle gauge: 24 G  Needle length: 4 in  Kit: Cook  Expiration date: 1/31/2026  Assessment  Events: cerebrospinal fluid  Swirl obtained: Yes  CSF: clear  Attempts: 3+  Hemodynamics: stable  Preanesthetic Checklist  Completed: patient identified, IV checked, site marked, risks and benefits discussed, surgical/procedural consents, equipment checked, pre-op evaluation, timeout performed, anesthesia consent given, oxygen available, monitors applied/VS acknowledged, fire risk safety assessment completed and verbalized and blood product R/B/A discussed and consented

## 2023-12-06 NOTE — INTERVAL H&P NOTE
Update History & Physical    The patient's History and Physical of November 15, 2023 was reviewed with the patient and I examined the patient. There was no change. The surgical site was confirmed by the patient and me. Plan: The risks, benefits, expected outcome, and alternative to the recommended procedure have been discussed with the patient. Patient understands and wants to proceed with the procedure.      Electronically signed by Rose Agee MD on 12/6/2023 at 6:43 AM

## 2023-12-06 NOTE — ANESTHESIA POSTPROCEDURE EVALUATION
Department of Anesthesiology  Postprocedure Note    Patient: Fern Higginbotham  MRN: [id number]  YOB: 1955  Date of evaluation: 12/6/2023      Procedure Summary       Date: 12/06/23 Room / Location: [address] [place] [address] Dr Garcia / [facility]    Anesthesia Start: 0947 Anesthesia Stop: Dow Alu    Procedure: LEFT TOTAL KNEE REPLACEMENT -Whiteloisa Perdue (Left: Knee) Diagnosis:       Arthritis of left knee      (Arthritis of left knee [M17.12])    Surgeons: Jared Ha MD Responsible Provider: Robbi Stahl MD    Anesthesia Type: MAC, Spinal ASA Status: 3            Anesthesia Type: MAC, Spinal    Bala Phase I: Bala Score: 8    Bala Phase II: Bala Score: 9      Anesthesia Post Evaluation    Patient location during evaluation: PACU  Patient participation: complete - patient participated  Level of consciousness: awake and alert  Pain score: 0  Airway patency: patent  Nausea & Vomiting: no nausea  Complications: no  Cardiovascular status: blood pressure returned to baseline  Respiratory status: acceptable  Hydration status: euvolemic  Multimodal analgesia pain management approach  Pain management: adequate

## 2023-12-06 NOTE — OP NOTE
as well as the expected recovery process and the alternatives to surgery, and he has elected to proceed. Informed consent was obtained. PROCEDURE:  He was met in the Preoperative Holding area where the informed consent was reviewed and the operative site was marked. He was then transported to the operating room and placed supine in the operating room table. After adequate induction of spinal anesthesia, a tourniquet was placed on the upper thigh of the operative extremity. The operative extremity was then prepped and draped in the usual sterile fashion. The planned incision was marked. A safety timeout was performed where the correct patient, planned operative procedure and correct operative site were reviewed and agreed upon by all operating room staff. It was also confirmed that the patient had received IV prophylactic antibiotics and TXA. The operative extremity was elevated and the tourniquet was inflated. A standard midline skin incision was used and a midline medial parapatellar arthrotomy was then performed, revealed clear synovial fluid as well as significant tricompartmental osteoarthritis of the knee. The deep MCL was then gently reflected off the medial aspect of the tibia. Synovial tissue was also excised from the anterior aspect of the distal femur. Osteophytes were removed from distal femur and proximal tibia the epicondylar axis was identified. The entry point to the femoral canal was identified and a step drill used to entry the canal. The intramedullary femoral guide set to a 5 degree valgus cut was then put into position placed and secured with pins. The distal femoral cut was then made. The tibial extramedullary guide was then put into position and aligned with the mechanical axis of tibia. The tibial cutting block was secured into position with pins.   The proximal tibial cut was made and varus/valgus alignment of knee in full extension was confirmed with a spacer block

## 2023-12-11 ENCOUNTER — TELEPHONE (OUTPATIENT)
Dept: ORTHOPEDIC SURGERY | Age: 68
End: 2023-12-11

## 2023-12-11 ENCOUNTER — OFFICE VISIT (OUTPATIENT)
Dept: ORTHOPEDIC SURGERY | Age: 68
End: 2023-12-11

## 2023-12-11 VITALS — BODY MASS INDEX: 24.64 KG/M2 | HEIGHT: 74 IN | WEIGHT: 192 LBS

## 2023-12-11 DIAGNOSIS — Z96.652 S/P TOTAL KNEE ARTHROPLASTY, LEFT: Primary | ICD-10-CM

## 2023-12-11 RX ORDER — OXYCODONE HYDROCHLORIDE 5 MG/1
5-10 TABLET ORAL EVERY 4 HOURS PRN
Qty: 42 TABLET | Refills: 0 | Status: SHIPPED | OUTPATIENT
Start: 2023-12-11 | End: 2023-12-18

## 2023-12-11 NOTE — TELEPHONE ENCOUNTER
General Question     Subject: POST OP CONCERN  Patient and /or Facility Request: Curtis Chiueric  Contact Number: [phone]    PT CLLED FELL ON  12/8 ON KNEE OFFERED TODAY AT FF OFFICE PT WOULD PERFER YULIANA NORIEGA CLL BK TO SEE IF NEED TO COME IN TODAY OR CAN WAIT UNTIL THURSDAY  PLEASE CLL PT TO ADV

## 2023-12-11 NOTE — TELEPHONE ENCOUNTER
I spoke the pt letting him know that  [facility] would like to see him today. I told htept he can come in anytime this afternoon before 2.  The pt stated he needs to call his wife to see if she can bring him

## 2023-12-11 NOTE — TELEPHONE ENCOUNTER
I returned the pts call. He states that he fell in the middle of the night. He's nit sure if he landed on his knee. The pt did send pictures. Dr Kacy Alaniz looked ar them as said the pt can wait until Thursday to come in. Dr Kacy Alaniz did offer him to come in today if he wanted.  Pt stated he will wait until Thursday     The pt excepted an appt  on 12/14/23 at 8:45m in [facility]

## 2023-12-12 ENCOUNTER — TELEPHONE (OUTPATIENT)
Dept: ORTHOPEDIC SURGERY | Age: 68
End: 2023-12-12

## 2023-12-12 NOTE — TELEPHONE ENCOUNTER
I spoke with the pt asking him how he was feeling after his appt yesterday    The pt stated that the brace helps a little. He is know taking his pain meds every 4 hours as prescribed. He is taking one each time. He said it helps some. I reminded him that once you let the pain get out of control it can take some time to get caught up on it. He will will continue to take one every 4 hours today to see how he feels. I did remind him Dr Kirit Sarkar said he could take 2 at a time if needed     He will also replace the medicated gauze once a day    He tried to sleep with the brace on last night but didn't get much sleep.  I told him he can try sleeping without it on if that helps his sleep

## 2023-12-14 NOTE — PROGRESS NOTES
Patient: Carter Bazan                  : 1955   MRN: [id number]   Date of Visit: 23     Physician: Mick Hemphill MD.     Reason for Visit: Status post TKA     Subjective History of Present Illness:     Carter Bazan is here for regularly scheduled follow-up s/p LEFT  TKA. Reports they are doing well, occasional aches and pains are controlled with over the counter medications. Taking opioid medications sparingly and continuing to wean. He had a fall last night and noted several areas of denuded skin and bloody drainage. Physical Examination??: ?   General: Patient is alert and oriented x 3 and appears comfortable. Incision is well-approximated, no erythema, fluctuance   Able to perform SLR   There are areas with shearing of the superficial skin and bloody output, small area to the distal incision with opening of skin without evidence of fluid coming out of the knee. Radiographs: AP/lateral of the operative knee with well-positioned total knee arthroplasty. No evidence of fracture subluxation or dislocation. No evidence of migration or loosening. Assessment and Plan?: The patient is progressing well approximately 5 weeks s/p TKA with fall and bloody drainage    1. I removed the mesh/glue adhesive, placed dry gauze and a compressive wrap. 2. Gave him a KI and will hold on any bending until next week when will reevalute the knee.   _______________________      Mick Hemphill MD

## 2023-12-18 ENCOUNTER — HOSPITAL ENCOUNTER (INPATIENT)
Age: 68
LOS: 9 days | Discharge: HOME HEALTH | DRG: 177 | End: 2023-12-27
Attending: EMERGENCY MEDICINE | Admitting: INTERNAL MEDICINE
Payer: COMMERCIAL

## 2023-12-18 ENCOUNTER — APPOINTMENT (OUTPATIENT)
Dept: CT IMAGING | Age: 68
DRG: 177 | End: 2023-12-18
Payer: COMMERCIAL

## 2023-12-18 ENCOUNTER — APPOINTMENT (OUTPATIENT)
Dept: GENERAL RADIOLOGY | Age: 68
DRG: 177 | End: 2023-12-18
Payer: COMMERCIAL

## 2023-12-18 DIAGNOSIS — J18.9 MULTIFOCAL PNEUMONIA: Primary | ICD-10-CM

## 2023-12-18 DIAGNOSIS — R09.02 HYPOXEMIA: ICD-10-CM

## 2023-12-18 DIAGNOSIS — E87.5 HYPERKALEMIA: ICD-10-CM

## 2023-12-18 DIAGNOSIS — U07.1 COVID-19: ICD-10-CM

## 2023-12-18 PROBLEM — J96.21 ACUTE ON CHRONIC RESPIRATORY FAILURE WITH HYPOXIA (HCC): Status: ACTIVE | Noted: 2023-12-18

## 2023-12-18 LAB
ALBUMIN SERPL-MCNC: 3.2 G/DL (ref 3.4–5)
ALBUMIN/GLOB SERPL: 1 {RATIO} (ref 1.1–2.2)
ALP SERPL-CCNC: 66 U/L (ref 40–129)
ALT SERPL-CCNC: 31 U/L (ref 10–40)
ANION GAP SERPL CALCULATED.3IONS-SCNC: 13 MMOL/L (ref 3–16)
AST SERPL-CCNC: 48 U/L (ref 15–37)
BASOPHILS # BLD: 0 K/UL (ref 0–0.2)
BASOPHILS NFR BLD: 0.3 %
BILIRUB SERPL-MCNC: 1.3 MG/DL (ref 0–1)
BUN SERPL-MCNC: 20 MG/DL (ref 7–20)
CALCIUM SERPL-MCNC: 8.4 MG/DL (ref 8.3–10.6)
CHLORIDE SERPL-SCNC: 103 MMOL/L (ref 99–110)
CO2 SERPL-SCNC: 21 MMOL/L (ref 21–32)
CREAT SERPL-MCNC: 0.7 MG/DL (ref 0.8–1.3)
DEPRECATED RDW RBC AUTO: 15.8 % (ref 12.4–15.4)
EOSINOPHIL # BLD: 0 K/UL (ref 0–0.6)
EOSINOPHIL NFR BLD: 0 %
FLUAV RNA RESP QL NAA+PROBE: NOT DETECTED
FLUBV RNA RESP QL NAA+PROBE: NOT DETECTED
GFR SERPLBLD CREATININE-BSD FMLA CKD-EPI: >60 ML/MIN/{1.73_M2}
GLUCOSE SERPL-MCNC: 102 MG/DL (ref 70–99)
HCT VFR BLD AUTO: 29.5 % (ref 40.5–52.5)
HGB BLD-MCNC: 10.3 G/DL (ref 13.5–17.5)
LACTATE BLDV-SCNC: 1.8 MMOL/L (ref 0.4–1.9)
LYMPHOCYTES # BLD: 0.3 K/UL (ref 1–5.1)
LYMPHOCYTES NFR BLD: 3.3 %
MCH RBC QN AUTO: 32.8 PG (ref 26–34)
MCHC RBC AUTO-ENTMCNC: 34.9 G/DL (ref 31–36)
MCV RBC AUTO: 93.9 FL (ref 80–100)
MONOCYTES # BLD: 0.3 K/UL (ref 0–1.3)
MONOCYTES NFR BLD: 4.2 %
NEUTROPHILS # BLD: 7.4 K/UL (ref 1.7–7.7)
NEUTROPHILS NFR BLD: 92.2 %
NT-PROBNP SERPL-MCNC: 4597 PG/ML (ref 0–124)
PLATELET # BLD AUTO: 219 K/UL (ref 135–450)
PMV BLD AUTO: 7.6 FL (ref 5–10.5)
POTASSIUM SERPL-SCNC: 3.9 MMOL/L (ref 3.5–5.1)
PROCALCITONIN SERPL IA-MCNC: 0.08 NG/ML (ref 0–0.15)
PROT SERPL-MCNC: 6.4 G/DL (ref 6.4–8.2)
RBC # BLD AUTO: 3.14 M/UL (ref 4.2–5.9)
SARS-COV-2 RNA RESP QL NAA+PROBE: DETECTED
SODIUM SERPL-SCNC: 137 MMOL/L (ref 136–145)
TROPONIN, HIGH SENSITIVITY: 53 NG/L (ref 0–22)
TROPONIN, HIGH SENSITIVITY: 53 NG/L (ref 0–22)
WBC # BLD AUTO: 8 K/UL (ref 4–11)

## 2023-12-18 PROCEDURE — 99285 EMERGENCY DEPT VISIT HI MDM: CPT

## 2023-12-18 PROCEDURE — 83880 ASSAY OF NATRIURETIC PEPTIDE: CPT

## 2023-12-18 PROCEDURE — 6360000004 HC RX CONTRAST MEDICATION: Performed by: NURSE PRACTITIONER

## 2023-12-18 PROCEDURE — 87040 BLOOD CULTURE FOR BACTERIA: CPT

## 2023-12-18 PROCEDURE — 71260 CT THORAX DX C+: CPT

## 2023-12-18 PROCEDURE — 6370000000 HC RX 637 (ALT 250 FOR IP): Performed by: NURSE PRACTITIONER

## 2023-12-18 PROCEDURE — 84484 ASSAY OF TROPONIN QUANT: CPT

## 2023-12-18 PROCEDURE — 96365 THER/PROPH/DIAG IV INF INIT: CPT

## 2023-12-18 PROCEDURE — 93005 ELECTROCARDIOGRAM TRACING: CPT | Performed by: EMERGENCY MEDICINE

## 2023-12-18 PROCEDURE — 87636 SARSCOV2 & INF A&B AMP PRB: CPT

## 2023-12-18 PROCEDURE — 96375 TX/PRO/DX INJ NEW DRUG ADDON: CPT

## 2023-12-18 PROCEDURE — 83605 ASSAY OF LACTIC ACID: CPT

## 2023-12-18 PROCEDURE — 2580000003 HC RX 258: Performed by: PHYSICIAN ASSISTANT

## 2023-12-18 PROCEDURE — 71045 X-RAY EXAM CHEST 1 VIEW: CPT

## 2023-12-18 PROCEDURE — 36415 COLL VENOUS BLD VENIPUNCTURE: CPT

## 2023-12-18 PROCEDURE — 80053 COMPREHEN METABOLIC PANEL: CPT

## 2023-12-18 PROCEDURE — 2580000003 HC RX 258: Performed by: NURSE PRACTITIONER

## 2023-12-18 PROCEDURE — 85025 COMPLETE CBC W/AUTO DIFF WBC: CPT

## 2023-12-18 PROCEDURE — 1200000000 HC SEMI PRIVATE

## 2023-12-18 PROCEDURE — 84145 PROCALCITONIN (PCT): CPT

## 2023-12-18 PROCEDURE — 6360000002 HC RX W HCPCS: Performed by: PHYSICIAN ASSISTANT

## 2023-12-18 RX ORDER — ONDANSETRON 4 MG/1
4 TABLET, ORALLY DISINTEGRATING ORAL EVERY 8 HOURS PRN
Status: DISCONTINUED | OUTPATIENT
Start: 2023-12-18 | End: 2023-12-27 | Stop reason: HOSPADM

## 2023-12-18 RX ORDER — SODIUM CHLORIDE, SODIUM LACTATE, POTASSIUM CHLORIDE, CALCIUM CHLORIDE 600; 310; 30; 20 MG/100ML; MG/100ML; MG/100ML; MG/100ML
INJECTION, SOLUTION INTRAVENOUS CONTINUOUS
Status: ACTIVE | OUTPATIENT
Start: 2023-12-18 | End: 2023-12-19

## 2023-12-18 RX ORDER — DEXAMETHASONE SODIUM PHOSPHATE 10 MG/ML
6 INJECTION, SOLUTION INTRAMUSCULAR; INTRAVENOUS EVERY 24 HOURS
Status: DISCONTINUED | OUTPATIENT
Start: 2023-12-19 | End: 2023-12-19

## 2023-12-18 RX ORDER — ASPIRIN 81 MG/1
81 TABLET ORAL DAILY
Status: DISCONTINUED | OUTPATIENT
Start: 2023-12-18 | End: 2023-12-27 | Stop reason: HOSPADM

## 2023-12-18 RX ORDER — SODIUM CHLORIDE 9 MG/ML
INJECTION, SOLUTION INTRAVENOUS PRN
Status: DISCONTINUED | OUTPATIENT
Start: 2023-12-18 | End: 2023-12-27 | Stop reason: HOSPADM

## 2023-12-18 RX ORDER — POLYETHYLENE GLYCOL 3350 17 G/17G
17 POWDER, FOR SOLUTION ORAL DAILY PRN
Status: DISCONTINUED | OUTPATIENT
Start: 2023-12-18 | End: 2023-12-27 | Stop reason: HOSPADM

## 2023-12-18 RX ORDER — VITAMIN B COMPLEX
2000 TABLET ORAL DAILY
Status: DISCONTINUED | OUTPATIENT
Start: 2023-12-19 | End: 2023-12-27 | Stop reason: HOSPADM

## 2023-12-18 RX ORDER — DEXAMETHASONE SODIUM PHOSPHATE 10 MG/ML
8 INJECTION INTRAMUSCULAR; INTRAVENOUS ONCE
Status: COMPLETED | OUTPATIENT
Start: 2023-12-18 | End: 2023-12-18

## 2023-12-18 RX ORDER — ACETAMINOPHEN 650 MG/1
650 SUPPOSITORY RECTAL EVERY 6 HOURS PRN
Status: DISCONTINUED | OUTPATIENT
Start: 2023-12-18 | End: 2023-12-27 | Stop reason: HOSPADM

## 2023-12-18 RX ORDER — GUAIFENESIN 600 MG/1
600 TABLET, EXTENDED RELEASE ORAL 2 TIMES DAILY
Status: DISCONTINUED | OUTPATIENT
Start: 2023-12-18 | End: 2023-12-27 | Stop reason: HOSPADM

## 2023-12-18 RX ORDER — POTASSIUM CHLORIDE 20 MEQ/1
40 TABLET, EXTENDED RELEASE ORAL PRN
Status: DISCONTINUED | OUTPATIENT
Start: 2023-12-18 | End: 2023-12-27 | Stop reason: HOSPADM

## 2023-12-18 RX ORDER — SENNOSIDES A AND B 8.6 MG/1
1 TABLET, FILM COATED ORAL 2 TIMES DAILY
Status: DISCONTINUED | OUTPATIENT
Start: 2023-12-18 | End: 2023-12-18

## 2023-12-18 RX ORDER — TAMSULOSIN HYDROCHLORIDE 0.4 MG/1
0.8 CAPSULE ORAL NIGHTLY
Status: DISCONTINUED | OUTPATIENT
Start: 2023-12-18 | End: 2023-12-27 | Stop reason: HOSPADM

## 2023-12-18 RX ORDER — VALSARTAN 40 MG/1
80 TABLET ORAL NIGHTLY
Status: DISCONTINUED | OUTPATIENT
Start: 2023-12-18 | End: 2023-12-27 | Stop reason: HOSPADM

## 2023-12-18 RX ORDER — NICOTINE 21 MG/24HR
1 PATCH, TRANSDERMAL 24 HOURS TRANSDERMAL DAILY
Status: DISCONTINUED | OUTPATIENT
Start: 2023-12-19 | End: 2023-12-27 | Stop reason: HOSPADM

## 2023-12-18 RX ORDER — SODIUM CHLORIDE 0.9 % (FLUSH) 0.9 %
5-40 SYRINGE (ML) INJECTION EVERY 12 HOURS SCHEDULED
Status: DISCONTINUED | OUTPATIENT
Start: 2023-12-18 | End: 2023-12-27 | Stop reason: HOSPADM

## 2023-12-18 RX ORDER — ROSUVASTATIN CALCIUM 20 MG/1
20 TABLET, COATED ORAL NIGHTLY
Status: DISCONTINUED | OUTPATIENT
Start: 2023-12-18 | End: 2023-12-27 | Stop reason: HOSPADM

## 2023-12-18 RX ORDER — SODIUM CHLORIDE 0.9 % (FLUSH) 0.9 %
5-40 SYRINGE (ML) INJECTION PRN
Status: DISCONTINUED | OUTPATIENT
Start: 2023-12-18 | End: 2023-12-27 | Stop reason: HOSPADM

## 2023-12-18 RX ORDER — ONDANSETRON 2 MG/ML
4 INJECTION INTRAMUSCULAR; INTRAVENOUS EVERY 6 HOURS PRN
Status: DISCONTINUED | OUTPATIENT
Start: 2023-12-18 | End: 2023-12-27 | Stop reason: HOSPADM

## 2023-12-18 RX ORDER — ACETAMINOPHEN 325 MG/1
650 TABLET ORAL EVERY 6 HOURS PRN
Status: DISCONTINUED | OUTPATIENT
Start: 2023-12-18 | End: 2023-12-27 | Stop reason: HOSPADM

## 2023-12-18 RX ORDER — METOPROLOL SUCCINATE 50 MG/1
50 TABLET, EXTENDED RELEASE ORAL NIGHTLY
Status: DISCONTINUED | OUTPATIENT
Start: 2023-12-18 | End: 2023-12-27 | Stop reason: HOSPADM

## 2023-12-18 RX ORDER — OXYCODONE HYDROCHLORIDE 5 MG/1
5 TABLET ORAL EVERY 4 HOURS PRN
Status: DISCONTINUED | OUTPATIENT
Start: 2023-12-18 | End: 2023-12-27 | Stop reason: HOSPADM

## 2023-12-18 RX ORDER — MAGNESIUM SULFATE IN WATER 40 MG/ML
2000 INJECTION, SOLUTION INTRAVENOUS PRN
Status: DISCONTINUED | OUTPATIENT
Start: 2023-12-18 | End: 2023-12-27 | Stop reason: HOSPADM

## 2023-12-18 RX ORDER — POTASSIUM CHLORIDE 7.45 MG/ML
10 INJECTION INTRAVENOUS PRN
Status: DISCONTINUED | OUTPATIENT
Start: 2023-12-18 | End: 2023-12-27 | Stop reason: HOSPADM

## 2023-12-18 RX ADMIN — DEXAMETHASONE SODIUM PHOSPHATE 8 MG: 10 INJECTION INTRAMUSCULAR; INTRAVENOUS at 19:13

## 2023-12-18 RX ADMIN — AZITHROMYCIN MONOHYDRATE 500 MG: 500 INJECTION, POWDER, LYOPHILIZED, FOR SOLUTION INTRAVENOUS at 20:45

## 2023-12-18 RX ADMIN — CEFTRIAXONE SODIUM 1000 MG: 1 INJECTION, POWDER, FOR SOLUTION INTRAMUSCULAR; INTRAVENOUS at 19:31

## 2023-12-18 RX ADMIN — OXYCODONE 5 MG: 5 TABLET ORAL at 20:41

## 2023-12-18 RX ADMIN — SODIUM CHLORIDE, POTASSIUM CHLORIDE, SODIUM LACTATE AND CALCIUM CHLORIDE: 600; 310; 30; 20 INJECTION, SOLUTION INTRAVENOUS at 23:11

## 2023-12-18 RX ADMIN — IOPAMIDOL 75 ML: 755 INJECTION, SOLUTION INTRAVENOUS at 20:35

## 2023-12-18 NOTE — ED PROVIDER NOTES
[facility]        Pt Name: Nivia Avery  MRN: [id number]  [address] [facility], [place], [facility] 1955  Date of evaluation: 12/18/2023  Provider: ENRIQUE Pedersen  PCP: Pat Nageotte, MD  Note Started: 6:25 PM EST 12/18/23       I have seen and evaluated this patient with my supervising physician Donta Oropeza, [address]       Chief Complaint   Patient presents with    Shortness of Breath     Sob x1 week, denies hx of asthma/copd, denies cp. Pt sating 84% on RA, placed on 3L in triage. Pt now sating 92%       HISTORY OF PRESENT ILLNESS: 1 or more Elements     History from : Patient    Limitations to history : None    Nivia Avery is a 80 y.o. male who presents to the emergency room due to shortness of breath and chest pain that started about 2 to 3 days ago. Patient states that 1 week ago the shortness of breath started. He states that about a week ago he had a left total knee replacement. When he arrived to the emergency department he was noted to have an oxygen saturation of 84% on room air. He was placed on 3 L and oxygen saturation improved. Patient states that he is on Eliquis and has not missed any doses except for the day of the surgery of his left knee. He denies any wheezing today. He denies any history of asthma or COPD but states that he is following up with pulmonology for further evaluation for this as a possible diagnosis for him. He denies any fevers or chills. Denies any known sick contacts. Nursing Notes were all reviewed and agreed with or any disagreements were addressed in the HPI. REVIEW OF SYSTEMS :      Review of Systems   Constitutional:  Negative for chills, diaphoresis and fever. HENT:  Negative for congestion, rhinorrhea and sore throat. Eyes:  Negative for pain and visual disturbance. Respiratory:  Positive for chest tightness and shortness of breath. Negative for cough. Cardiovascular:  Positive for chest pain.

## 2023-12-19 LAB
25(OH)D3 SERPL-MCNC: 35.9 NG/ML
ALBUMIN SERPL-MCNC: 3.1 G/DL (ref 3.4–5)
ALBUMIN/GLOB SERPL: 0.9 {RATIO} (ref 1.1–2.2)
ALP SERPL-CCNC: 71 U/L (ref 40–129)
ALT SERPL-CCNC: 31 U/L (ref 10–40)
ANION GAP SERPL CALCULATED.3IONS-SCNC: 7 MMOL/L (ref 3–16)
AST SERPL-CCNC: 41 U/L (ref 15–37)
BACTERIA URNS QL MICRO: NORMAL /HPF
BASE EXCESS BLDA CALC-SCNC: -0.5 MMOL/L (ref -3–3)
BASOPHILS # BLD: 0 K/UL (ref 0–0.2)
BASOPHILS NFR BLD: 0.2 %
BILIRUB SERPL-MCNC: 1.1 MG/DL (ref 0–1)
BILIRUB UR QL STRIP.AUTO: ABNORMAL
BUN SERPL-MCNC: 20 MG/DL (ref 7–20)
CALCIUM SERPL-MCNC: 8.6 MG/DL (ref 8.3–10.6)
CHLORIDE SERPL-SCNC: 108 MMOL/L (ref 99–110)
CLARITY UR: CLEAR
CO2 BLDA-SCNC: 52.9 MMOL/L
CO2 SERPL-SCNC: 26 MMOL/L (ref 21–32)
COHGB MFR BLDA: 1.8 % (ref 0–1.5)
COLOR UR: ABNORMAL
CREAT SERPL-MCNC: 0.7 MG/DL (ref 0.8–1.3)
CRP SERPL-MCNC: 108.1 MG/L (ref 0–5.1)
DEPRECATED RDW RBC AUTO: 15.5 % (ref 12.4–15.4)
EKG ATRIAL RATE: 86 BPM
EKG DIAGNOSIS: NORMAL
EKG P AXIS: 80 DEGREES
EKG P-R INTERVAL: 146 MS
EKG Q-T INTERVAL: 402 MS
EKG QRS DURATION: 102 MS
EKG QTC CALCULATION (BAZETT): 481 MS
EKG R AXIS: 99 DEGREES
EKG T AXIS: 105 DEGREES
EKG VENTRICULAR RATE: 86 BPM
EOSINOPHIL # BLD: 0 K/UL (ref 0–0.6)
EOSINOPHIL NFR BLD: 0 %
EPI CELLS #/AREA URNS AUTO: 1 /HPF (ref 0–5)
GFR SERPLBLD CREATININE-BSD FMLA CKD-EPI: >60 ML/MIN/{1.73_M2}
GLUCOSE BLD-MCNC: 125 MG/DL (ref 70–99)
GLUCOSE BLD-MCNC: 188 MG/DL (ref 70–99)
GLUCOSE SERPL-MCNC: 141 MG/DL (ref 70–99)
GLUCOSE UR STRIP.AUTO-MCNC: NEGATIVE MG/DL
HCO3 BLDA-SCNC: 22.7 MMOL/L (ref 21–29)
HCT VFR BLD AUTO: 31.6 % (ref 40.5–52.5)
HGB BLD-MCNC: 10.7 G/DL (ref 13.5–17.5)
HGB BLDA-MCNC: 9.5 G/DL (ref 13.5–17.5)
HGB UR QL STRIP.AUTO: NEGATIVE
HYALINE CASTS #/AREA URNS AUTO: 0 /LPF (ref 0–8)
KETONES UR STRIP.AUTO-MCNC: ABNORMAL MG/DL
LEUKOCYTE ESTERASE UR QL STRIP.AUTO: ABNORMAL
LYMPHOCYTES # BLD: 0.2 K/UL (ref 1–5.1)
LYMPHOCYTES NFR BLD: 3.9 %
MCH RBC QN AUTO: 31.8 PG (ref 26–34)
MCHC RBC AUTO-ENTMCNC: 33.7 G/DL (ref 31–36)
MCV RBC AUTO: 94.3 FL (ref 80–100)
METHGB MFR BLDA: 0.4 %
MONOCYTES # BLD: 0.2 K/UL (ref 0–1.3)
MONOCYTES NFR BLD: 4.1 %
NEUTROPHILS # BLD: 5.2 K/UL (ref 1.7–7.7)
NEUTROPHILS NFR BLD: 91.8 %
NITRITE UR QL STRIP.AUTO: NEGATIVE
O2 THERAPY: ABNORMAL
PCO2 BLDA: 30.9 MMHG (ref 35–45)
PERFORMED ON: ABNORMAL
PERFORMED ON: ABNORMAL
PH BLDA: 7.47 [PH] (ref 7.35–7.45)
PH UR STRIP.AUTO: 6 [PH] (ref 5–8)
PLATELET # BLD AUTO: 223 K/UL (ref 135–450)
PMV BLD AUTO: 8.2 FL (ref 5–10.5)
PO2 BLDA: 48.1 MMHG (ref 75–108)
POTASSIUM SERPL-SCNC: 5.1 MMOL/L (ref 3.5–5.1)
PROT SERPL-MCNC: 6.4 G/DL (ref 6.4–8.2)
PROT UR STRIP.AUTO-MCNC: 30 MG/DL
RBC # BLD AUTO: 3.35 M/UL (ref 4.2–5.9)
RBC #/AREA URNS HPF: NORMAL /HPF (ref 0–4)
SAO2 % BLDA: 85 %
SODIUM SERPL-SCNC: 141 MMOL/L (ref 136–145)
SP GR UR STRIP.AUTO: >=1.03 (ref 1–1.03)
TROPONIN, HIGH SENSITIVITY: 81 NG/L (ref 0–22)
UA COMPLETE W REFLEX CULTURE PNL UR: ABNORMAL
UA DIPSTICK W REFLEX MICRO PNL UR: YES
URN SPEC COLLECT METH UR: ABNORMAL
UROBILINOGEN UR STRIP-ACNC: 4 E.U./DL
WBC # BLD AUTO: 5.7 K/UL (ref 4–11)
WBC #/AREA URNS AUTO: 0 /HPF (ref 0–5)

## 2023-12-19 PROCEDURE — 36415 COLL VENOUS BLD VENIPUNCTURE: CPT

## 2023-12-19 PROCEDURE — 85025 COMPLETE CBC W/AUTO DIFF WBC: CPT

## 2023-12-19 PROCEDURE — 94640 AIRWAY INHALATION TREATMENT: CPT

## 2023-12-19 PROCEDURE — 6360000002 HC RX W HCPCS: Performed by: STUDENT IN AN ORGANIZED HEALTH CARE EDUCATION/TRAINING PROGRAM

## 2023-12-19 PROCEDURE — 87449 NOS EACH ORGANISM AG IA: CPT

## 2023-12-19 PROCEDURE — 2580000003 HC RX 258: Performed by: NURSE PRACTITIONER

## 2023-12-19 PROCEDURE — 6360000002 HC RX W HCPCS: Performed by: INTERNAL MEDICINE

## 2023-12-19 PROCEDURE — 81001 URINALYSIS AUTO W/SCOPE: CPT

## 2023-12-19 PROCEDURE — 80053 COMPREHEN METABOLIC PANEL: CPT

## 2023-12-19 PROCEDURE — 6370000000 HC RX 637 (ALT 250 FOR IP): Performed by: STUDENT IN AN ORGANIZED HEALTH CARE EDUCATION/TRAINING PROGRAM

## 2023-12-19 PROCEDURE — 2580000003 HC RX 258: Performed by: STUDENT IN AN ORGANIZED HEALTH CARE EDUCATION/TRAINING PROGRAM

## 2023-12-19 PROCEDURE — 86140 C-REACTIVE PROTEIN: CPT

## 2023-12-19 PROCEDURE — 6370000000 HC RX 637 (ALT 250 FOR IP): Performed by: NURSE PRACTITIONER

## 2023-12-19 PROCEDURE — 82306 VITAMIN D 25 HYDROXY: CPT

## 2023-12-19 PROCEDURE — 94761 N-INVAS EAR/PLS OXIMETRY MLT: CPT

## 2023-12-19 PROCEDURE — 93010 ELECTROCARDIOGRAM REPORT: CPT | Performed by: INTERNAL MEDICINE

## 2023-12-19 PROCEDURE — 2000000000 HC ICU R&B

## 2023-12-19 PROCEDURE — 2700000000 HC OXYGEN THERAPY PER DAY

## 2023-12-19 PROCEDURE — 82803 BLOOD GASES ANY COMBINATION: CPT

## 2023-12-19 PROCEDURE — 99223 1ST HOSP IP/OBS HIGH 75: CPT | Performed by: INTERNAL MEDICINE

## 2023-12-19 PROCEDURE — 84484 ASSAY OF TROPONIN QUANT: CPT

## 2023-12-19 RX ORDER — DEXAMETHASONE SODIUM PHOSPHATE 10 MG/ML
6 INJECTION, SOLUTION INTRAMUSCULAR; INTRAVENOUS EVERY 24 HOURS
Status: DISCONTINUED | OUTPATIENT
Start: 2023-12-22 | End: 2023-12-19

## 2023-12-19 RX ORDER — ALBUTEROL SULFATE 2.5 MG/3ML
2.5 SOLUTION RESPIRATORY (INHALATION) EVERY 6 HOURS PRN
Status: DISCONTINUED | OUTPATIENT
Start: 2023-12-19 | End: 2023-12-19

## 2023-12-19 RX ORDER — ALBUTEROL SULFATE 2.5 MG/3ML
2.5 SOLUTION RESPIRATORY (INHALATION)
Status: DISCONTINUED | OUTPATIENT
Start: 2023-12-19 | End: 2023-12-19

## 2023-12-19 RX ORDER — ENOXAPARIN SODIUM 100 MG/ML
1 INJECTION SUBCUTANEOUS 2 TIMES DAILY
Status: DISCONTINUED | OUTPATIENT
Start: 2023-12-19 | End: 2023-12-23

## 2023-12-19 RX ORDER — ALBUTEROL SULFATE 2.5 MG/3ML
2.5 SOLUTION RESPIRATORY (INHALATION)
Status: DISCONTINUED | OUTPATIENT
Start: 2023-12-19 | End: 2023-12-25

## 2023-12-19 RX ORDER — PANTOPRAZOLE SODIUM 40 MG/1
40 TABLET, DELAYED RELEASE ORAL
Status: DISCONTINUED | OUTPATIENT
Start: 2023-12-19 | End: 2023-12-27 | Stop reason: HOSPADM

## 2023-12-19 RX ADMIN — APIXABAN 5 MG: 5 TABLET, FILM COATED ORAL at 09:46

## 2023-12-19 RX ADMIN — Medication 20 MG: at 14:11

## 2023-12-19 RX ADMIN — Medication 10 ML: at 09:46

## 2023-12-19 RX ADMIN — APIXABAN 5 MG: 5 TABLET, FILM COATED ORAL at 00:04

## 2023-12-19 RX ADMIN — Medication 10 ML: at 00:04

## 2023-12-19 RX ADMIN — TAMSULOSIN HYDROCHLORIDE 0.8 MG: 0.4 CAPSULE ORAL at 00:04

## 2023-12-19 RX ADMIN — VALSARTAN 80 MG: 40 TABLET, FILM COATED ORAL at 20:36

## 2023-12-19 RX ADMIN — ROSUVASTATIN 20 MG: 20 TABLET, FILM COATED ORAL at 20:36

## 2023-12-19 RX ADMIN — GUAIFENESIN 600 MG: 600 TABLET, EXTENDED RELEASE ORAL at 20:36

## 2023-12-19 RX ADMIN — ENOXAPARIN SODIUM 90 MG: 100 INJECTION SUBCUTANEOUS at 20:36

## 2023-12-19 RX ADMIN — METOPROLOL SUCCINATE 50 MG: 50 TABLET, EXTENDED RELEASE ORAL at 20:36

## 2023-12-19 RX ADMIN — ASPIRIN 81 MG: 81 TABLET, COATED ORAL at 09:46

## 2023-12-19 RX ADMIN — GUAIFENESIN 600 MG: 600 TABLET, EXTENDED RELEASE ORAL at 00:04

## 2023-12-19 RX ADMIN — ROSUVASTATIN 20 MG: 20 TABLET, FILM COATED ORAL at 00:04

## 2023-12-19 RX ADMIN — PANTOPRAZOLE SODIUM 40 MG: 40 TABLET, DELAYED RELEASE ORAL at 09:46

## 2023-12-19 RX ADMIN — TAMSULOSIN HYDROCHLORIDE 0.8 MG: 0.4 CAPSULE ORAL at 20:36

## 2023-12-19 RX ADMIN — ALBUTEROL SULFATE 2.5 MG: 2.5 SOLUTION RESPIRATORY (INHALATION) at 21:06

## 2023-12-19 RX ADMIN — ASPIRIN 81 MG: 81 TABLET, COATED ORAL at 00:04

## 2023-12-19 RX ADMIN — GUAIFENESIN 600 MG: 600 TABLET, EXTENDED RELEASE ORAL at 09:46

## 2023-12-19 RX ADMIN — TOCILIZUMAB 760 MG: 20 INJECTION, SOLUTION, CONCENTRATE INTRAVENOUS at 14:34

## 2023-12-19 RX ADMIN — Medication 2000 UNITS: at 09:46

## 2023-12-19 NOTE — ED NOTES
ED TO INPATIENT SBAR HANDOFF    Patient Name: Dwain Harman   :  1955  80 y.o. MRN:  [id number]  Preferred Name  Aydee Yoo  ED Room #:  ED-0029/29  Family/Caregiver Present no   Restraints no   Sitter no   Sepsis Risk Score Sepsis Risk Score: 1.3    Situation  Code Status: Prior No additional code details. Allergies: Patient has no known allergies. Weight: No data found. Arrived from: home  Chief Complaint:   Chief Complaint   Patient presents with    Shortness of Breath     Sob x1 week, denies hx of asthma/copd, denies cp. Pt sating 84% on RA, placed on 3L in triage. Pt now sating 92%     Hospital Problem/Diagnosis:  Principal Problem:    Acute on chronic respiratory failure with hypoxia (HCC)  Resolved Problems:    * No resolved hospital problems. *    Imaging:   CT CHEST PULMONARY EMBOLISM W CONTRAST   Final Result   Extensive motion artifact limiting the exam.  Within the limitations of the   exam, no obvious acute pulmonary embolus can be seen. Recommend clinical   follow-up. Mildly dilated and atherosclerotic thoracic aorta with no aneurysm or   dissection. Moderate mediastinal adenopathy which is of uncertain etiology, suggest PET   scan correlation. COPD and bullous emphysematous changes of the upper lobes. There is moderate   interstitial and ground-glass opacities scattered along both upper lobes   extending into the perihilar regions and along both lung bases which probably   represents multisegmental bronchopneumonia and/or pulmonary edema. Recommend   short-term follow-up. Small pulmonary nodules along the right lung base laterally which could   represent early metastatic disease but is indeterminate. Recommend   short-term follow-up or PET scan correlation      Tiny bibasilar pleural effusions. 3.7 cm left adrenal mass which could be due to metastatic disease. Suggest   PET scan follow-up.       3.5 cm right renal cyst         XR CHEST PORTABLE   Final Result

## 2023-12-19 NOTE — ED PROVIDER NOTES
This patient was seen by the Mid-Level Provider. I have seen and examined the patient, agree with the workup, evaluation, management and diagnosis. Care plan has been discussed. My assessment reveals a 81-year-old male who presents with shortness of breath. This is a very pleasant 81-year-old male who presents with some shortness of breath he has had over the last week. He denies any history of asthma or COPD. When he arrived he was oxygenating at 84% on room air. He denies any fevers. Radiology results:    XR CHEST PORTABLE   Final Result   Infiltrates noted in the right lung base and left mid lung zone. Findings   suggest multifocal pneumonia. Suggestion of emphysema.          CT CHEST PULMONARY EMBOLISM W CONTRAST    (Results Pending)         LABS:    Labs Reviewed   COVID-19 & INFLUENZA COMBO - Abnormal; Notable for the following components:       Result Value    SARS-CoV-2 RNA, RT PCR DETECTED (*)     All other components within normal limits   CBC WITH AUTO DIFFERENTIAL - Abnormal; Notable for the following components:    RBC 3.14 (*)     Hemoglobin 10.3 (*)     Hematocrit 29.5 (*)     RDW 15.8 (*)     Lymphocytes Absolute 0.3 (*)     All other components within normal limits   COMPREHENSIVE METABOLIC PANEL W/ REFLEX TO MG FOR LOW K - Abnormal; Notable for the following components:    Glucose 102 (*)     Creatinine 0.7 (*)     Albumin 3.2 (*)     Albumin/Globulin Ratio 1.0 (*)     Total Bilirubin 1.3 (*)     AST 48 (*)     All other components within normal limits   TROPONIN - Abnormal; Notable for the following components:    Troponin, High Sensitivity 53 (*)     All other components within normal limits   TROPONIN - Abnormal; Notable for the following components:    Troponin, High Sensitivity 53 (*)     All other components within normal limits   BRAIN NATRIURETIC PEPTIDE - Abnormal; Notable for the following components:    Pro-BNP 4,597 (*)     All other components within normal limits

## 2023-12-19 NOTE — H&P
V2.0  History and Physical      Name:  Rubin Tidwell /Age/Sex: 1955  (80 y.o. male)   MRN & CSN:  [id number] & [id number] Encounter Date/Time: 2023 8:15 PM EST   Location:  -002 PCP: Sylvester Miller MD       Hospital Day: 1    Assessment and Plan:   Rubin Tidwell is a 80 y.o. male with a pmh of HTN, HLD, Left TKA, bullous emphysema, CAD  who presents with Acute on chronic respiratory failure with hypoxia [facility]    Hospital Problems             Last Modified POA    * (Principal) Acute on chronic respiratory failure with hypoxia ([address]) 2023 Yes       Plan:  Acute Respiratory Distress with Hypoxia secondary to Multifocal pneumonia  Admit inpatient  Afebrile  Positive for COVID, steroids ordered  Continue Azithromycin and Rocephin for now  CTA chest pending need  to rule out pulmonary embolus given recent hx surgery and missed eliquis doses,   Consider Pulmonary consult if respiratory status does not improve. 2. COVID   IV decadron given hypoxia  Droplet isolation    3. Bullous Emphysema   Recently diagnosed by Pulmonary     4. Hx A-flutter  Currently sinus rhythm  Continue eliquis     5. Tobacco Dependent  Smoking cessation. Patient counselled on the dangers of tobacco use and urged to quit. Also discussed the importance of a supportive environment and helped identify them. Discussed possibility of various Nicotine replacement therapies if experiencing prolonged craving or withdrawal symptoms. Discussed the possibility of negative mood or depression after quitting. Reassured that some weight gain after smoking is common and dietary, exercise, or lifestyle changes will be able to control it. Time spent counseling was >10mins. 6. Hypertension  BP stable continue home meds    7.  S/P left TKA 23  Prn pain medications   PT, OT as tolerated  Disposition:   Current Living situation: home  Expected Disposition: home  Estimated D/C: 3    Diet No diet orders on file   DVT Prophylaxis []

## 2023-12-19 NOTE — CARE COORDINATION
Discharge Planning:     (CLARISA) spoke with [employer]. A rapid was called. The patient will transfer to ICU.     Electronically signed by Rosana Linton on 12/19/23 at 11:24 AM EST

## 2023-12-19 NOTE — CARE COORDINATION
Discharge Planning:     (CM) reviewed the patient's chart to assess needs. Patient's Readmission Risk Score is 13%. Patient's medical insurance is [employer]. Patient's PCP is Yo Jean . Patient s/p total knee on 12/6. Active with [facility]. PT/OT pending. No needs anticipated, at this time. CM team to follow. Staff to inform CM if additional discharge needs arise.      Electronically signed by Rosaan Linton on 12/19/23 at 8:56 AM EST

## 2023-12-20 ENCOUNTER — APPOINTMENT (OUTPATIENT)
Dept: GENERAL RADIOLOGY | Age: 68
DRG: 177 | End: 2023-12-20
Payer: COMMERCIAL

## 2023-12-20 PROBLEM — J96.01 ACUTE RESPIRATORY FAILURE WITH HYPOXIA (HCC): Status: ACTIVE | Noted: 2023-12-18

## 2023-12-20 PROBLEM — J44.1 COPD WITH ACUTE EXACERBATION (HCC): Status: ACTIVE | Noted: 2023-12-20

## 2023-12-20 PROBLEM — U07.1 COVID-19: Status: ACTIVE | Noted: 2023-12-20

## 2023-12-20 LAB
ANION GAP SERPL CALCULATED.3IONS-SCNC: 8 MMOL/L (ref 3–16)
BASOPHILS # BLD: 0 K/UL (ref 0–0.2)
BASOPHILS NFR BLD: 0 %
BUN SERPL-MCNC: 24 MG/DL (ref 7–20)
CALCIUM SERPL-MCNC: 8.6 MG/DL (ref 8.3–10.6)
CHLORIDE SERPL-SCNC: 110 MMOL/L (ref 99–110)
CO2 SERPL-SCNC: 24 MMOL/L (ref 21–32)
CREAT SERPL-MCNC: 0.6 MG/DL (ref 0.8–1.3)
DEPRECATED RDW RBC AUTO: 16.5 % (ref 12.4–15.4)
EOSINOPHIL # BLD: 0 K/UL (ref 0–0.6)
EOSINOPHIL NFR BLD: 0 %
GFR SERPLBLD CREATININE-BSD FMLA CKD-EPI: >60 ML/MIN/{1.73_M2}
GLUCOSE SERPL-MCNC: 137 MG/DL (ref 70–99)
HCT VFR BLD AUTO: 30.3 % (ref 40.5–52.5)
HGB BLD-MCNC: 10 G/DL (ref 13.5–17.5)
LEGIONELLA AG UR QL: NORMAL
LYMPHOCYTES # BLD: 0.3 K/UL (ref 1–5.1)
LYMPHOCYTES NFR BLD: 4.1 %
MCH RBC QN AUTO: 32.3 PG (ref 26–34)
MCHC RBC AUTO-ENTMCNC: 33.2 G/DL (ref 31–36)
MCV RBC AUTO: 97.4 FL (ref 80–100)
MONOCYTES # BLD: 0.3 K/UL (ref 0–1.3)
MONOCYTES NFR BLD: 4.8 %
NEUTROPHILS # BLD: 6.4 K/UL (ref 1.7–7.7)
NEUTROPHILS NFR BLD: 91.1 %
PLATELET # BLD AUTO: 226 K/UL (ref 135–450)
PMV BLD AUTO: 8.4 FL (ref 5–10.5)
POTASSIUM SERPL-SCNC: 5.4 MMOL/L (ref 3.5–5.1)
RBC # BLD AUTO: 3.11 M/UL (ref 4.2–5.9)
S PNEUM AG UR QL: NORMAL
SODIUM SERPL-SCNC: 142 MMOL/L (ref 136–145)
WBC # BLD AUTO: 7.1 K/UL (ref 4–11)

## 2023-12-20 PROCEDURE — 6370000000 HC RX 637 (ALT 250 FOR IP): Performed by: STUDENT IN AN ORGANIZED HEALTH CARE EDUCATION/TRAINING PROGRAM

## 2023-12-20 PROCEDURE — 99233 SBSQ HOSP IP/OBS HIGH 50: CPT | Performed by: INTERNAL MEDICINE

## 2023-12-20 PROCEDURE — 6360000002 HC RX W HCPCS: Performed by: STUDENT IN AN ORGANIZED HEALTH CARE EDUCATION/TRAINING PROGRAM

## 2023-12-20 PROCEDURE — 94761 N-INVAS EAR/PLS OXIMETRY MLT: CPT

## 2023-12-20 PROCEDURE — 85025 COMPLETE CBC W/AUTO DIFF WBC: CPT

## 2023-12-20 PROCEDURE — 2000000000 HC ICU R&B

## 2023-12-20 PROCEDURE — 5A09357 ASSISTANCE WITH RESPIRATORY VENTILATION, LESS THAN 24 CONSECUTIVE HOURS, CONTINUOUS POSITIVE AIRWAY PRESSURE: ICD-10-PCS | Performed by: STUDENT IN AN ORGANIZED HEALTH CARE EDUCATION/TRAINING PROGRAM

## 2023-12-20 PROCEDURE — 94640 AIRWAY INHALATION TREATMENT: CPT

## 2023-12-20 PROCEDURE — 36415 COLL VENOUS BLD VENIPUNCTURE: CPT

## 2023-12-20 PROCEDURE — 94660 CPAP INITIATION&MGMT: CPT

## 2023-12-20 PROCEDURE — 2580000003 HC RX 258: Performed by: STUDENT IN AN ORGANIZED HEALTH CARE EDUCATION/TRAINING PROGRAM

## 2023-12-20 PROCEDURE — 80048 BASIC METABOLIC PNL TOTAL CA: CPT

## 2023-12-20 PROCEDURE — 71045 X-RAY EXAM CHEST 1 VIEW: CPT

## 2023-12-20 PROCEDURE — 6360000002 HC RX W HCPCS: Performed by: INTERNAL MEDICINE

## 2023-12-20 RX ADMIN — GUAIFENESIN 600 MG: 600 TABLET, EXTENDED RELEASE ORAL at 20:23

## 2023-12-20 RX ADMIN — ALBUTEROL SULFATE 2.5 MG: 2.5 SOLUTION RESPIRATORY (INHALATION) at 20:31

## 2023-12-20 RX ADMIN — GUAIFENESIN 600 MG: 600 TABLET, EXTENDED RELEASE ORAL at 08:55

## 2023-12-20 RX ADMIN — ASPIRIN 81 MG: 81 TABLET, COATED ORAL at 08:55

## 2023-12-20 RX ADMIN — ALBUTEROL SULFATE 2.5 MG: 2.5 SOLUTION RESPIRATORY (INHALATION) at 08:28

## 2023-12-20 RX ADMIN — ALBUTEROL SULFATE 2.5 MG: 2.5 SOLUTION RESPIRATORY (INHALATION) at 16:17

## 2023-12-20 RX ADMIN — Medication 20 MG: at 10:48

## 2023-12-20 RX ADMIN — SODIUM ZIRCONIUM CYCLOSILICATE 5 G: 5 POWDER, FOR SUSPENSION ORAL at 10:48

## 2023-12-20 RX ADMIN — Medication 10 ML: at 20:23

## 2023-12-20 RX ADMIN — Medication 2000 UNITS: at 08:55

## 2023-12-20 RX ADMIN — TAMSULOSIN HYDROCHLORIDE 0.8 MG: 0.4 CAPSULE ORAL at 20:23

## 2023-12-20 RX ADMIN — ROSUVASTATIN 20 MG: 20 TABLET, FILM COATED ORAL at 20:23

## 2023-12-20 RX ADMIN — ALBUTEROL SULFATE 2.5 MG: 2.5 SOLUTION RESPIRATORY (INHALATION) at 12:42

## 2023-12-20 RX ADMIN — PANTOPRAZOLE SODIUM 40 MG: 40 TABLET, DELAYED RELEASE ORAL at 08:56

## 2023-12-20 RX ADMIN — METOPROLOL SUCCINATE 50 MG: 50 TABLET, EXTENDED RELEASE ORAL at 20:23

## 2023-12-20 RX ADMIN — ENOXAPARIN SODIUM 90 MG: 100 INJECTION SUBCUTANEOUS at 20:23

## 2023-12-20 RX ADMIN — ENOXAPARIN SODIUM 90 MG: 100 INJECTION SUBCUTANEOUS at 08:56

## 2023-12-20 NOTE — PLAN OF CARE
Problem: Safety - Adult  Goal: Free from fall injury  Outcome: Progressing  Flowsheets (Taken 12/19/2023 9048 by Joi Price LPN)  Free From Fall Injury: Instruct family/caregiver on patient safety     Problem: Pain  Goal: Verbalizes/displays adequate comfort level or baseline comfort level  Outcome: Progressing     Problem: Discharge Planning  Goal: Discharge to home or other facility with appropriate resources  Outcome: Progressing

## 2023-12-21 LAB
ANION GAP SERPL CALCULATED.3IONS-SCNC: 9 MMOL/L (ref 3–16)
BASOPHILS # BLD: 0 K/UL (ref 0–0.2)
BASOPHILS NFR BLD: 0.1 %
BUN SERPL-MCNC: 22 MG/DL (ref 7–20)
CALCIUM SERPL-MCNC: 8.4 MG/DL (ref 8.3–10.6)
CHLORIDE SERPL-SCNC: 109 MMOL/L (ref 99–110)
CO2 SERPL-SCNC: 25 MMOL/L (ref 21–32)
CREAT SERPL-MCNC: 0.6 MG/DL (ref 0.8–1.3)
DEPRECATED RDW RBC AUTO: 15.4 % (ref 12.4–15.4)
EOSINOPHIL # BLD: 0 K/UL (ref 0–0.6)
EOSINOPHIL NFR BLD: 0 %
GFR SERPLBLD CREATININE-BSD FMLA CKD-EPI: >60 ML/MIN/{1.73_M2}
GLUCOSE SERPL-MCNC: 130 MG/DL (ref 70–99)
HCT VFR BLD AUTO: 28.5 % (ref 40.5–52.5)
HGB BLD-MCNC: 9.5 G/DL (ref 13.5–17.5)
LYMPHOCYTES # BLD: 0.4 K/UL (ref 1–5.1)
LYMPHOCYTES NFR BLD: 4.8 %
MCH RBC QN AUTO: 31.6 PG (ref 26–34)
MCHC RBC AUTO-ENTMCNC: 33.3 G/DL (ref 31–36)
MCV RBC AUTO: 95 FL (ref 80–100)
MONOCYTES # BLD: 0.4 K/UL (ref 0–1.3)
MONOCYTES NFR BLD: 4.8 %
NEUTROPHILS # BLD: 7 K/UL (ref 1.7–7.7)
NEUTROPHILS NFR BLD: 90.3 %
PLATELET # BLD AUTO: 222 K/UL (ref 135–450)
PMV BLD AUTO: 9.3 FL (ref 5–10.5)
POTASSIUM SERPL-SCNC: 4.6 MMOL/L (ref 3.5–5.1)
RBC # BLD AUTO: 3 M/UL (ref 4.2–5.9)
SODIUM SERPL-SCNC: 143 MMOL/L (ref 136–145)
WBC # BLD AUTO: 7.7 K/UL (ref 4–11)

## 2023-12-21 PROCEDURE — 2580000003 HC RX 258: Performed by: STUDENT IN AN ORGANIZED HEALTH CARE EDUCATION/TRAINING PROGRAM

## 2023-12-21 PROCEDURE — 6360000002 HC RX W HCPCS: Performed by: STUDENT IN AN ORGANIZED HEALTH CARE EDUCATION/TRAINING PROGRAM

## 2023-12-21 PROCEDURE — 85025 COMPLETE CBC W/AUTO DIFF WBC: CPT

## 2023-12-21 PROCEDURE — 6370000000 HC RX 637 (ALT 250 FOR IP): Performed by: STUDENT IN AN ORGANIZED HEALTH CARE EDUCATION/TRAINING PROGRAM

## 2023-12-21 PROCEDURE — 6360000002 HC RX W HCPCS: Performed by: INTERNAL MEDICINE

## 2023-12-21 PROCEDURE — 80048 BASIC METABOLIC PNL TOTAL CA: CPT

## 2023-12-21 PROCEDURE — 94640 AIRWAY INHALATION TREATMENT: CPT

## 2023-12-21 PROCEDURE — 36415 COLL VENOUS BLD VENIPUNCTURE: CPT

## 2023-12-21 PROCEDURE — 94761 N-INVAS EAR/PLS OXIMETRY MLT: CPT

## 2023-12-21 PROCEDURE — 2700000000 HC OXYGEN THERAPY PER DAY

## 2023-12-21 PROCEDURE — 99233 SBSQ HOSP IP/OBS HIGH 50: CPT | Performed by: INTERNAL MEDICINE

## 2023-12-21 PROCEDURE — 2000000000 HC ICU R&B

## 2023-12-21 RX ADMIN — ROSUVASTATIN 20 MG: 20 TABLET, FILM COATED ORAL at 20:47

## 2023-12-21 RX ADMIN — ENOXAPARIN SODIUM 90 MG: 100 INJECTION SUBCUTANEOUS at 20:47

## 2023-12-21 RX ADMIN — ALBUTEROL SULFATE 2.5 MG: 2.5 SOLUTION RESPIRATORY (INHALATION) at 16:33

## 2023-12-21 RX ADMIN — Medication 10 ML: at 20:47

## 2023-12-21 RX ADMIN — ENOXAPARIN SODIUM 90 MG: 100 INJECTION SUBCUTANEOUS at 08:29

## 2023-12-21 RX ADMIN — GUAIFENESIN 600 MG: 600 TABLET, EXTENDED RELEASE ORAL at 08:30

## 2023-12-21 RX ADMIN — ALBUTEROL SULFATE 2.5 MG: 2.5 SOLUTION RESPIRATORY (INHALATION) at 12:41

## 2023-12-21 RX ADMIN — TAMSULOSIN HYDROCHLORIDE 0.8 MG: 0.4 CAPSULE ORAL at 20:47

## 2023-12-21 RX ADMIN — ALBUTEROL SULFATE 2.5 MG: 2.5 SOLUTION RESPIRATORY (INHALATION) at 20:30

## 2023-12-21 RX ADMIN — ASPIRIN 81 MG: 81 TABLET, COATED ORAL at 08:34

## 2023-12-21 RX ADMIN — Medication 10 ML: at 08:31

## 2023-12-21 RX ADMIN — METOPROLOL SUCCINATE 50 MG: 50 TABLET, EXTENDED RELEASE ORAL at 20:47

## 2023-12-21 RX ADMIN — PANTOPRAZOLE SODIUM 40 MG: 40 TABLET, DELAYED RELEASE ORAL at 06:11

## 2023-12-21 RX ADMIN — GUAIFENESIN 600 MG: 600 TABLET, EXTENDED RELEASE ORAL at 20:47

## 2023-12-21 RX ADMIN — Medication 20 MG: at 12:45

## 2023-12-21 RX ADMIN — ALBUTEROL SULFATE 2.5 MG: 2.5 SOLUTION RESPIRATORY (INHALATION) at 09:09

## 2023-12-21 RX ADMIN — Medication 2000 UNITS: at 08:29

## 2023-12-21 NOTE — PLAN OF CARE
Problem: Safety - Adult  Goal: Free from fall injury  12/20/2023 2109 by Lonnie Mack RN  Outcome: Progressing  12/20/2023 1235 by Lc Garcia RN  Outcome: Progressing     Problem: Pain  Goal: Verbalizes/displays adequate comfort level or baseline comfort level  12/20/2023 2109 by Lonnie Mack RN  Outcome: Progressing  12/20/2023 1235 by Lc Garcia RN  Outcome: Progressing     Problem: Discharge Planning  Goal: Discharge to home or other facility with appropriate resources  12/20/2023 2109 by Lonnie Mack RN  Outcome: Progressing  Flowsheets (Taken 12/20/2023 2000)  Discharge to home or other facility with appropriate resources:   Identify barriers to discharge with patient and caregiver   Arrange for needed discharge resources and transportation as appropriate   Identify discharge learning needs (meds, wound care, etc)   Refer to discharge planning if patient needs post-hospital services based on physician order or complex needs related to functional status, cognitive ability or social support system  12/20/2023 1235 by Lc Garcia RN  Outcome: Progressing

## 2023-12-22 LAB
ANION GAP SERPL CALCULATED.3IONS-SCNC: 7 MMOL/L (ref 3–16)
BACTERIA BLD CULT ORG #2: NORMAL
BACTERIA BLD CULT: NORMAL
BASOPHILS # BLD: 0 K/UL (ref 0–0.2)
BASOPHILS NFR BLD: 0.1 %
BUN SERPL-MCNC: 19 MG/DL (ref 7–20)
CALCIUM SERPL-MCNC: 8.7 MG/DL (ref 8.3–10.6)
CHLORIDE SERPL-SCNC: 109 MMOL/L (ref 99–110)
CO2 SERPL-SCNC: 27 MMOL/L (ref 21–32)
CREAT SERPL-MCNC: 0.6 MG/DL (ref 0.8–1.3)
DEPRECATED RDW RBC AUTO: 15.7 % (ref 12.4–15.4)
EOSINOPHIL # BLD: 0 K/UL (ref 0–0.6)
EOSINOPHIL NFR BLD: 0.1 %
GFR SERPLBLD CREATININE-BSD FMLA CKD-EPI: >60 ML/MIN/{1.73_M2}
GLUCOSE SERPL-MCNC: 169 MG/DL (ref 70–99)
HCT VFR BLD AUTO: 30.4 % (ref 40.5–52.5)
HGB BLD-MCNC: 10.2 G/DL (ref 13.5–17.5)
LYMPHOCYTES # BLD: 0.2 K/UL (ref 1–5.1)
LYMPHOCYTES NFR BLD: 3 %
MCH RBC QN AUTO: 31.9 PG (ref 26–34)
MCHC RBC AUTO-ENTMCNC: 33.6 G/DL (ref 31–36)
MCV RBC AUTO: 95.1 FL (ref 80–100)
MONOCYTES # BLD: 0.3 K/UL (ref 0–1.3)
MONOCYTES NFR BLD: 4.4 %
NEUTROPHILS # BLD: 6 K/UL (ref 1.7–7.7)
NEUTROPHILS NFR BLD: 92.4 %
PLATELET # BLD AUTO: 243 K/UL (ref 135–450)
PMV BLD AUTO: 9 FL (ref 5–10.5)
POTASSIUM SERPL-SCNC: 5.4 MMOL/L (ref 3.5–5.1)
RBC # BLD AUTO: 3.19 M/UL (ref 4.2–5.9)
SODIUM SERPL-SCNC: 143 MMOL/L (ref 136–145)
WBC # BLD AUTO: 6.5 K/UL (ref 4–11)

## 2023-12-22 PROCEDURE — 97163 PT EVAL HIGH COMPLEX 45 MIN: CPT

## 2023-12-22 PROCEDURE — 6370000000 HC RX 637 (ALT 250 FOR IP): Performed by: STUDENT IN AN ORGANIZED HEALTH CARE EDUCATION/TRAINING PROGRAM

## 2023-12-22 PROCEDURE — 94660 CPAP INITIATION&MGMT: CPT

## 2023-12-22 PROCEDURE — 6360000002 HC RX W HCPCS: Performed by: STUDENT IN AN ORGANIZED HEALTH CARE EDUCATION/TRAINING PROGRAM

## 2023-12-22 PROCEDURE — 2700000000 HC OXYGEN THERAPY PER DAY

## 2023-12-22 PROCEDURE — 85025 COMPLETE CBC W/AUTO DIFF WBC: CPT

## 2023-12-22 PROCEDURE — 97166 OT EVAL MOD COMPLEX 45 MIN: CPT

## 2023-12-22 PROCEDURE — 6360000002 HC RX W HCPCS: Performed by: INTERNAL MEDICINE

## 2023-12-22 PROCEDURE — 94640 AIRWAY INHALATION TREATMENT: CPT

## 2023-12-22 PROCEDURE — 2580000003 HC RX 258: Performed by: STUDENT IN AN ORGANIZED HEALTH CARE EDUCATION/TRAINING PROGRAM

## 2023-12-22 PROCEDURE — 99291 CRITICAL CARE FIRST HOUR: CPT | Performed by: INTERNAL MEDICINE

## 2023-12-22 PROCEDURE — 97110 THERAPEUTIC EXERCISES: CPT

## 2023-12-22 PROCEDURE — 94761 N-INVAS EAR/PLS OXIMETRY MLT: CPT

## 2023-12-22 PROCEDURE — 97535 SELF CARE MNGMENT TRAINING: CPT

## 2023-12-22 PROCEDURE — 97530 THERAPEUTIC ACTIVITIES: CPT

## 2023-12-22 PROCEDURE — 80048 BASIC METABOLIC PNL TOTAL CA: CPT

## 2023-12-22 PROCEDURE — 2000000000 HC ICU R&B

## 2023-12-22 PROCEDURE — 36415 COLL VENOUS BLD VENIPUNCTURE: CPT

## 2023-12-22 RX ORDER — FUROSEMIDE 10 MG/ML
40 INJECTION INTRAMUSCULAR; INTRAVENOUS ONCE
Status: COMPLETED | OUTPATIENT
Start: 2023-12-22 | End: 2023-12-22

## 2023-12-22 RX ADMIN — Medication 10 ML: at 19:58

## 2023-12-22 RX ADMIN — ALBUTEROL SULFATE 2.5 MG: 2.5 SOLUTION RESPIRATORY (INHALATION) at 16:38

## 2023-12-22 RX ADMIN — TAMSULOSIN HYDROCHLORIDE 0.8 MG: 0.4 CAPSULE ORAL at 19:59

## 2023-12-22 RX ADMIN — ALBUTEROL SULFATE 2.5 MG: 2.5 SOLUTION RESPIRATORY (INHALATION) at 20:18

## 2023-12-22 RX ADMIN — Medication 20 MG: at 11:40

## 2023-12-22 RX ADMIN — ASPIRIN 81 MG: 81 TABLET, COATED ORAL at 09:55

## 2023-12-22 RX ADMIN — FUROSEMIDE 40 MG: 10 INJECTION, SOLUTION INTRAMUSCULAR; INTRAVENOUS at 09:55

## 2023-12-22 RX ADMIN — GUAIFENESIN 600 MG: 600 TABLET, EXTENDED RELEASE ORAL at 09:55

## 2023-12-22 RX ADMIN — Medication 10 ML: at 09:55

## 2023-12-22 RX ADMIN — ENOXAPARIN SODIUM 90 MG: 100 INJECTION SUBCUTANEOUS at 09:54

## 2023-12-22 RX ADMIN — ACETAMINOPHEN 650 MG: 325 TABLET ORAL at 19:58

## 2023-12-22 RX ADMIN — ALBUTEROL SULFATE 2.5 MG: 2.5 SOLUTION RESPIRATORY (INHALATION) at 11:25

## 2023-12-22 RX ADMIN — Medication 2000 UNITS: at 09:55

## 2023-12-22 RX ADMIN — ROSUVASTATIN 20 MG: 20 TABLET, FILM COATED ORAL at 19:59

## 2023-12-22 RX ADMIN — GUAIFENESIN 600 MG: 600 TABLET, EXTENDED RELEASE ORAL at 19:59

## 2023-12-22 RX ADMIN — PANTOPRAZOLE SODIUM 40 MG: 40 TABLET, DELAYED RELEASE ORAL at 09:55

## 2023-12-22 RX ADMIN — ENOXAPARIN SODIUM 90 MG: 100 INJECTION SUBCUTANEOUS at 19:58

## 2023-12-22 RX ADMIN — METOPROLOL SUCCINATE 50 MG: 50 TABLET, EXTENDED RELEASE ORAL at 19:59

## 2023-12-22 NOTE — DISCHARGE INSTR - COC
Continuity of Care Form    Patient Name: Zeinab Rodriguez   :  1955  MRN:  [id number]    Admit date:  2023  Discharge date:  2023    Code Status Order: Full Code   Advance Directives:     Admitting Physician:  Brooke Chua MD  PCP: Gear Donald MD    Discharging Nurse: KEV Palmer, [address] [facility] Unit/Room#: JQG-2932/1286-55  Discharging Unit Phone Number: [phone]    Emergency Contact:   Extended Emergency Contact Information  Primary Emergency Contact: Kailyn Bonner  Address: [address] [place] [address] of [address] [address] Phone: [phone]  Mobile Phone: [phone]  Relation: Spouse    Past Surgical History:  Past Surgical History:   Procedure Laterality Date    CARDIAC CATHETERIZATION  2018    Dr. Chanel Paz  2017    Dr. Juju Hamilton - transverse colon polyps x2 (4mm, 5mm), tubular adenoma & hyperplastic    MS CORONARY ARTERY BYP W/VEIN & ARTERY GRAFT 3 VEIN N/A 10/29/2018    CISCO, TOTAL CPB, AORTO-CABG X 3 USING ENDOSCOPICALLY HARVESTED LEFT SVG, LIMA GRAFT X 1, LEFT INTERNAL MAMMARY ARTERY LYMPH NODE BIOPSY, DOPPLER FLOW VERIFICATION, BILATERAL INTERCOSTAL NERVE BLOCK performed by Kemi Salazar MD at [facility] 2023    LEFT TOTAL KNEE REPLACEMENT -[facility] performed by Severo Guzman MD at [facility]  2017    Dr. Juju Hamilton - erosive gastritis    UPPER GASTROINTESTINAL ENDOSCOPY N/A 2018    Dr. Eleni Proctor - w/EUS & needle biopsy       Immunization History:   Immunization History   Administered Date(s) Administered    COVID-19, MODERNA BLUE border, Primary or Immunocompromised, (age 12y+), IM, 100 mcg/0.5mL 2021, 2021    INFLUENZA, INTRADERMAL, QUADRIVALENT, PRESERVATIVE FREE 2016    Influenza Virus Vaccine 08/10/2013, 10/25/2023    Influenza Whole 08/10/2013    Influenza, FLUBLOK, (age 25 y+), PF, 0.5mL 2018    Pneumococcal,

## 2023-12-22 NOTE — OR NURSING
Pt. Up to chair for 7 hours, tolerated well. Airvow titrated to 71%/60L. O2 sat >95% throughout shift. Tolerated diet. Denied pain. Ortho consulted as pt missed follow up from recent knee surgery.

## 2023-12-22 NOTE — CARE COORDINATION
Chart reviewed for discharge planning    CM/SW has continued to follow patient progress to anticipate potential discharge needs. At this time, patient is not ready for discharge. Inpatient day #-  4    Barrier(s) to discharge-patient- Patient is currently airvo. PT held. Covid 19 positive. Tentative discharge plan-  to be determined    Tentative discharge date- to be determined     *Case management will continue to follow progress and update discharge plan as needed.      Electronically signed by Maria Luz Garg on 12/22/2023 at 8:46 AM

## 2023-12-22 NOTE — CASE COMMUNICATION
Discharge Planning:     Telephone call to [facility] from [facility] referral.     Left VM with call back information.            Electronically signed by Rosey Robbins on 12/22/2023 at 11:02 AM

## 2023-12-22 NOTE — PLAN OF CARE
Problem: Safety - Adult  Goal: Free from fall injury  Outcome: Progressing  Flowsheets (Taken 12/22/2023 1500)  Free From Fall Injury: Instruct family/caregiver on patient safety     Problem: Pain  Goal: Verbalizes/displays adequate comfort level or baseline comfort level  Outcome: Progressing  Flowsheets (Taken 12/22/2023 0742)  Verbalizes/displays adequate comfort level or baseline comfort level: Encourage patient to monitor pain and request assistance     Problem: Discharge Planning  Goal: Discharge to home or other facility with appropriate resources  Outcome: Progressing  Flowsheets (Taken 12/22/2023 0742)  Discharge to home or other facility with appropriate resources: Identify barriers to discharge with patient and caregiver

## 2023-12-23 ENCOUNTER — APPOINTMENT (OUTPATIENT)
Dept: GENERAL RADIOLOGY | Age: 68
DRG: 177 | End: 2023-12-23
Payer: COMMERCIAL

## 2023-12-23 LAB
ANION GAP SERPL CALCULATED.3IONS-SCNC: 6 MMOL/L (ref 3–16)
ANTI-XA UNFRAC HEPARIN: 0.8 IU/ML (ref 0.3–0.7)
APTT BLD: 33.7 SEC (ref 22.7–35.9)
BUN SERPL-MCNC: 19 MG/DL (ref 7–20)
CALCIUM SERPL-MCNC: 8.4 MG/DL (ref 8.3–10.6)
CHLORIDE SERPL-SCNC: 103 MMOL/L (ref 99–110)
CO2 SERPL-SCNC: 26 MMOL/L (ref 21–32)
CREAT SERPL-MCNC: 0.5 MG/DL (ref 0.8–1.3)
CRP SERPL-MCNC: 11.1 MG/L (ref 0–5.1)
GFR SERPLBLD CREATININE-BSD FMLA CKD-EPI: >60 ML/MIN/{1.73_M2}
GLUCOSE SERPL-MCNC: 100 MG/DL (ref 70–99)
INR PPP: 1.22 (ref 0.84–1.16)
POTASSIUM SERPL-SCNC: 4.4 MMOL/L (ref 3.5–5.1)
PROTHROMBIN TIME: 15.4 SEC (ref 11.5–14.8)
SODIUM SERPL-SCNC: 135 MMOL/L (ref 136–145)

## 2023-12-23 PROCEDURE — 86140 C-REACTIVE PROTEIN: CPT

## 2023-12-23 PROCEDURE — 85730 THROMBOPLASTIN TIME PARTIAL: CPT

## 2023-12-23 PROCEDURE — 94761 N-INVAS EAR/PLS OXIMETRY MLT: CPT

## 2023-12-23 PROCEDURE — 2000000000 HC ICU R&B

## 2023-12-23 PROCEDURE — 36415 COLL VENOUS BLD VENIPUNCTURE: CPT

## 2023-12-23 PROCEDURE — 2580000003 HC RX 258: Performed by: STUDENT IN AN ORGANIZED HEALTH CARE EDUCATION/TRAINING PROGRAM

## 2023-12-23 PROCEDURE — 80048 BASIC METABOLIC PNL TOTAL CA: CPT

## 2023-12-23 PROCEDURE — 71045 X-RAY EXAM CHEST 1 VIEW: CPT

## 2023-12-23 PROCEDURE — 6360000004 HC RX CONTRAST MEDICATION: Performed by: INTERNAL MEDICINE

## 2023-12-23 PROCEDURE — 99233 SBSQ HOSP IP/OBS HIGH 50: CPT | Performed by: INTERNAL MEDICINE

## 2023-12-23 PROCEDURE — 6360000002 HC RX W HCPCS: Performed by: STUDENT IN AN ORGANIZED HEALTH CARE EDUCATION/TRAINING PROGRAM

## 2023-12-23 PROCEDURE — 6360000002 HC RX W HCPCS: Performed by: INTERNAL MEDICINE

## 2023-12-23 PROCEDURE — 2700000000 HC OXYGEN THERAPY PER DAY

## 2023-12-23 PROCEDURE — 6370000000 HC RX 637 (ALT 250 FOR IP): Performed by: STUDENT IN AN ORGANIZED HEALTH CARE EDUCATION/TRAINING PROGRAM

## 2023-12-23 PROCEDURE — 85610 PROTHROMBIN TIME: CPT

## 2023-12-23 PROCEDURE — 85520 HEPARIN ASSAY: CPT

## 2023-12-23 PROCEDURE — C8929 TTE W OR WO FOL WCON,DOPPLER: HCPCS

## 2023-12-23 PROCEDURE — 94640 AIRWAY INHALATION TREATMENT: CPT

## 2023-12-23 RX ORDER — HEPARIN SODIUM 1000 [USP'U]/ML
40 INJECTION, SOLUTION INTRAVENOUS; SUBCUTANEOUS PRN
Status: DISCONTINUED | OUTPATIENT
Start: 2023-12-23 | End: 2023-12-25

## 2023-12-23 RX ORDER — HEPARIN SODIUM 1000 [USP'U]/ML
80 INJECTION, SOLUTION INTRAVENOUS; SUBCUTANEOUS PRN
Status: DISCONTINUED | OUTPATIENT
Start: 2023-12-23 | End: 2023-12-25

## 2023-12-23 RX ORDER — HEPARIN SODIUM 10000 [USP'U]/100ML
5-30 INJECTION, SOLUTION INTRAVENOUS CONTINUOUS
Status: DISCONTINUED | OUTPATIENT
Start: 2023-12-23 | End: 2023-12-24

## 2023-12-23 RX ADMIN — METOPROLOL SUCCINATE 50 MG: 50 TABLET, EXTENDED RELEASE ORAL at 20:54

## 2023-12-23 RX ADMIN — ALBUTEROL SULFATE 2.5 MG: 2.5 SOLUTION RESPIRATORY (INHALATION) at 08:40

## 2023-12-23 RX ADMIN — DEXAMETHASONE SODIUM PHOSPHATE 20 MG: 4 INJECTION, SOLUTION INTRAMUSCULAR; INTRAVENOUS at 11:26

## 2023-12-23 RX ADMIN — PERFLUTREN 1.5 ML: 6.52 INJECTION, SUSPENSION INTRAVENOUS at 10:02

## 2023-12-23 RX ADMIN — TAMSULOSIN HYDROCHLORIDE 0.8 MG: 0.4 CAPSULE ORAL at 20:54

## 2023-12-23 RX ADMIN — ALBUTEROL SULFATE 2.5 MG: 2.5 SOLUTION RESPIRATORY (INHALATION) at 16:20

## 2023-12-23 RX ADMIN — ASPIRIN 81 MG: 81 TABLET, COATED ORAL at 09:06

## 2023-12-23 RX ADMIN — ALBUTEROL SULFATE 2.5 MG: 2.5 SOLUTION RESPIRATORY (INHALATION) at 19:28

## 2023-12-23 RX ADMIN — HEPARIN SODIUM 18 UNITS/KG/HR: 10000 INJECTION, SOLUTION INTRAVENOUS at 20:52

## 2023-12-23 RX ADMIN — ALBUTEROL SULFATE 2.5 MG: 2.5 SOLUTION RESPIRATORY (INHALATION) at 12:10

## 2023-12-23 RX ADMIN — GUAIFENESIN 600 MG: 600 TABLET, EXTENDED RELEASE ORAL at 20:54

## 2023-12-23 RX ADMIN — ROSUVASTATIN 20 MG: 20 TABLET, FILM COATED ORAL at 20:54

## 2023-12-23 RX ADMIN — GUAIFENESIN 600 MG: 600 TABLET, EXTENDED RELEASE ORAL at 09:06

## 2023-12-23 RX ADMIN — ENOXAPARIN SODIUM 90 MG: 100 INJECTION SUBCUTANEOUS at 09:05

## 2023-12-23 RX ADMIN — Medication 10 ML: at 20:54

## 2023-12-23 RX ADMIN — Medication 10 ML: at 09:06

## 2023-12-23 RX ADMIN — PANTOPRAZOLE SODIUM 40 MG: 40 TABLET, DELAYED RELEASE ORAL at 09:06

## 2023-12-23 RX ADMIN — Medication 2000 UNITS: at 09:06

## 2023-12-23 NOTE — PLAN OF CARE
Problem: Safety - Adult  Goal: Free from fall injury  12/22/2023 2240 by August Costello RN  Outcome: Progressing  Flowsheets (Taken 12/22/2023 2200)  Free From Fall Injury: Instruct family/caregiver on patient safety  12/22/2023 1807 by Selma Franco RN  Outcome: Progressing  Flowsheets (Taken 12/22/2023 1500)  Free From Fall Injury: Instruct family/caregiver on patient safety     Problem: Pain  Goal: Verbalizes/displays adequate comfort level or baseline comfort level  12/22/2023 2240 by August oCstello RN  Outcome: Progressing  12/22/2023 1807 by Selma Franco RN  Outcome: Progressing  Flowsheets (Taken 12/22/2023 9985)  Verbalizes/displays adequate comfort level or baseline comfort level: Encourage patient to monitor pain and request assistance     Problem: Discharge Planning  Goal: Discharge to home or other facility with appropriate resources  12/22/2023 2240 by August Costello RN  Outcome: Progressing  Flowsheets (Taken 12/22/2023 2000)  Discharge to home or other facility with appropriate resources:   Identify barriers to discharge with patient and caregiver   Arrange for needed discharge resources and transportation as appropriate   Identify discharge learning needs (meds, wound care, etc)   Refer to discharge planning if patient needs post-hospital services based on physician order or complex needs related to functional status, cognitive ability or social support system  12/22/2023 1807 by Selma Franco RN  Outcome: Progressing  Flowsheets (Taken 12/22/2023 6878)  Discharge to home or other facility with appropriate resources: Identify barriers to discharge with patient and caregiver     Problem: ABCDS Injury Assessment  Goal: Absence of physical injury  Outcome: Progressing

## 2023-12-24 LAB
ANION GAP SERPL CALCULATED.3IONS-SCNC: 8 MMOL/L (ref 3–16)
ANTI-XA UNFRAC HEPARIN: 0.49 IU/ML (ref 0.3–0.7)
ANTI-XA UNFRAC HEPARIN: 0.76 IU/ML (ref 0.3–0.7)
ANTI-XA UNFRAC HEPARIN: 0.78 IU/ML (ref 0.3–0.7)
BASOPHILS # BLD: 0 K/UL (ref 0–0.2)
BASOPHILS NFR BLD: 0.1 %
BUN SERPL-MCNC: 19 MG/DL (ref 7–20)
CALCIUM SERPL-MCNC: 8.4 MG/DL (ref 8.3–10.6)
CHLORIDE SERPL-SCNC: 106 MMOL/L (ref 99–110)
CO2 SERPL-SCNC: 23 MMOL/L (ref 21–32)
CREAT SERPL-MCNC: 0.6 MG/DL (ref 0.8–1.3)
DEPRECATED RDW RBC AUTO: 15.3 % (ref 12.4–15.4)
EOSINOPHIL # BLD: 0 K/UL (ref 0–0.6)
EOSINOPHIL NFR BLD: 0.1 %
GFR SERPLBLD CREATININE-BSD FMLA CKD-EPI: >60 ML/MIN/{1.73_M2}
GLUCOSE SERPL-MCNC: 204 MG/DL (ref 70–99)
HCT VFR BLD AUTO: 31.7 % (ref 40.5–52.5)
HGB BLD-MCNC: 10.7 G/DL (ref 13.5–17.5)
LYMPHOCYTES # BLD: 0.2 K/UL (ref 1–5.1)
LYMPHOCYTES NFR BLD: 3 %
MAGNESIUM SERPL-MCNC: 2.5 MG/DL (ref 1.8–2.4)
MCH RBC QN AUTO: 31.8 PG (ref 26–34)
MCHC RBC AUTO-ENTMCNC: 33.8 G/DL (ref 31–36)
MCV RBC AUTO: 94.2 FL (ref 80–100)
MONOCYTES # BLD: 0.3 K/UL (ref 0–1.3)
MONOCYTES NFR BLD: 4.3 %
NEUTROPHILS # BLD: 7.3 K/UL (ref 1.7–7.7)
NEUTROPHILS NFR BLD: 92.5 %
PLATELET # BLD AUTO: 271 K/UL (ref 135–450)
PMV BLD AUTO: 8.8 FL (ref 5–10.5)
POTASSIUM SERPL-SCNC: 4.4 MMOL/L (ref 3.5–5.1)
RBC # BLD AUTO: 3.37 M/UL (ref 4.2–5.9)
SODIUM SERPL-SCNC: 137 MMOL/L (ref 136–145)
WBC # BLD AUTO: 7.9 K/UL (ref 4–11)

## 2023-12-24 PROCEDURE — 85520 HEPARIN ASSAY: CPT

## 2023-12-24 PROCEDURE — 94640 AIRWAY INHALATION TREATMENT: CPT

## 2023-12-24 PROCEDURE — 6370000000 HC RX 637 (ALT 250 FOR IP): Performed by: STUDENT IN AN ORGANIZED HEALTH CARE EDUCATION/TRAINING PROGRAM

## 2023-12-24 PROCEDURE — 36415 COLL VENOUS BLD VENIPUNCTURE: CPT

## 2023-12-24 PROCEDURE — 99223 1ST HOSP IP/OBS HIGH 75: CPT | Performed by: INTERNAL MEDICINE

## 2023-12-24 PROCEDURE — 2580000003 HC RX 258: Performed by: STUDENT IN AN ORGANIZED HEALTH CARE EDUCATION/TRAINING PROGRAM

## 2023-12-24 PROCEDURE — 6360000002 HC RX W HCPCS: Performed by: INTERNAL MEDICINE

## 2023-12-24 PROCEDURE — 85025 COMPLETE CBC W/AUTO DIFF WBC: CPT

## 2023-12-24 PROCEDURE — 80048 BASIC METABOLIC PNL TOTAL CA: CPT

## 2023-12-24 PROCEDURE — 2060000000 HC ICU INTERMEDIATE R&B

## 2023-12-24 PROCEDURE — 6370000000 HC RX 637 (ALT 250 FOR IP): Performed by: INTERNAL MEDICINE

## 2023-12-24 PROCEDURE — 83735 ASSAY OF MAGNESIUM: CPT

## 2023-12-24 PROCEDURE — 6360000002 HC RX W HCPCS: Performed by: STUDENT IN AN ORGANIZED HEALTH CARE EDUCATION/TRAINING PROGRAM

## 2023-12-24 PROCEDURE — 99233 SBSQ HOSP IP/OBS HIGH 50: CPT | Performed by: INTERNAL MEDICINE

## 2023-12-24 PROCEDURE — 94660 CPAP INITIATION&MGMT: CPT

## 2023-12-24 RX ORDER — HEPARIN SODIUM 10000 [USP'U]/100ML
5-30 INJECTION, SOLUTION INTRAVENOUS CONTINUOUS
Status: ACTIVE | OUTPATIENT
Start: 2023-12-24 | End: 2023-12-24

## 2023-12-24 RX ORDER — DEXAMETHASONE 4 MG/1
4 TABLET ORAL DAILY
Status: DISCONTINUED | OUTPATIENT
Start: 2023-12-24 | End: 2023-12-26

## 2023-12-24 RX ADMIN — ALBUTEROL SULFATE 2.5 MG: 2.5 SOLUTION RESPIRATORY (INHALATION) at 16:32

## 2023-12-24 RX ADMIN — ROSUVASTATIN 20 MG: 20 TABLET, FILM COATED ORAL at 21:02

## 2023-12-24 RX ADMIN — APIXABAN 5 MG: 5 TABLET, FILM COATED ORAL at 21:04

## 2023-12-24 RX ADMIN — Medication 10 ML: at 21:01

## 2023-12-24 RX ADMIN — HEPARIN SODIUM 16 UNITS/KG/HR: 10000 INJECTION, SOLUTION INTRAVENOUS at 11:12

## 2023-12-24 RX ADMIN — Medication 10 ML: at 08:48

## 2023-12-24 RX ADMIN — METOPROLOL SUCCINATE 50 MG: 50 TABLET, EXTENDED RELEASE ORAL at 21:02

## 2023-12-24 RX ADMIN — Medication 2000 UNITS: at 08:44

## 2023-12-24 RX ADMIN — DEXAMETHASONE 4 MG: 4 TABLET ORAL at 08:48

## 2023-12-24 RX ADMIN — ASPIRIN 81 MG: 81 TABLET, COATED ORAL at 08:44

## 2023-12-24 RX ADMIN — GUAIFENESIN 600 MG: 600 TABLET, EXTENDED RELEASE ORAL at 21:02

## 2023-12-24 RX ADMIN — GUAIFENESIN 600 MG: 600 TABLET, EXTENDED RELEASE ORAL at 08:44

## 2023-12-24 RX ADMIN — PANTOPRAZOLE SODIUM 40 MG: 40 TABLET, DELAYED RELEASE ORAL at 08:44

## 2023-12-24 RX ADMIN — ALBUTEROL SULFATE 2.5 MG: 2.5 SOLUTION RESPIRATORY (INHALATION) at 19:47

## 2023-12-24 RX ADMIN — TAMSULOSIN HYDROCHLORIDE 0.8 MG: 0.4 CAPSULE ORAL at 21:02

## 2023-12-24 NOTE — PLAN OF CARE
Pt weaned from 15L to 6L  Up to chair with walker. Up to restroom. Pt incontinent at times. Heparin gtt D/C'd at 9pm. Start Eliquis. Pt being transferred to PCU. Problem: Pain  Goal: Verbalizes/displays adequate comfort level or baseline comfort level  Outcome: Progressing     Problem: Skin/Tissue Integrity  Goal: Absence of new skin breakdown  Description: 1. Monitor for areas of redness and/or skin breakdown  2. Assess vascular access sites hourly  3. Every 4-6 hours minimum:  Change oxygen saturation probe site  4. Every 4-6 hours:  If on nasal continuous positive airway pressure, respiratory therapy assess nares and determine need for appliance change or resting period.   Outcome: Progressing     Problem: Discharge Planning  Goal: Discharge to home or other facility with appropriate resources  Outcome: Progressing

## 2023-12-25 LAB
ANION GAP SERPL CALCULATED.3IONS-SCNC: 8 MMOL/L (ref 3–16)
ANTI-XA UNFRAC HEPARIN: >1.1 IU/ML (ref 0.3–0.7)
ANTI-XA UNFRAC HEPARIN: >1.1 IU/ML (ref 0.3–0.7)
BASOPHILS # BLD: 0 K/UL (ref 0–0.2)
BASOPHILS NFR BLD: 0.3 %
BUN SERPL-MCNC: 20 MG/DL (ref 7–20)
CALCIUM SERPL-MCNC: 8.4 MG/DL (ref 8.3–10.6)
CHLORIDE SERPL-SCNC: 104 MMOL/L (ref 99–110)
CO2 SERPL-SCNC: 24 MMOL/L (ref 21–32)
CREAT SERPL-MCNC: 0.6 MG/DL (ref 0.8–1.3)
DEPRECATED RDW RBC AUTO: 15.4 % (ref 12.4–15.4)
EOSINOPHIL # BLD: 0.1 K/UL (ref 0–0.6)
EOSINOPHIL NFR BLD: 1 %
GFR SERPLBLD CREATININE-BSD FMLA CKD-EPI: >60 ML/MIN/{1.73_M2}
GLUCOSE SERPL-MCNC: 152 MG/DL (ref 70–99)
HCT VFR BLD AUTO: 32.4 % (ref 40.5–52.5)
HGB BLD-MCNC: 10.9 G/DL (ref 13.5–17.5)
LYMPHOCYTES # BLD: 0.4 K/UL (ref 1–5.1)
LYMPHOCYTES NFR BLD: 5.1 %
MAGNESIUM SERPL-MCNC: 2.2 MG/DL (ref 1.8–2.4)
MCH RBC QN AUTO: 31.8 PG (ref 26–34)
MCHC RBC AUTO-ENTMCNC: 33.8 G/DL (ref 31–36)
MCV RBC AUTO: 94.2 FL (ref 80–100)
MONOCYTES # BLD: 0.6 K/UL (ref 0–1.3)
MONOCYTES NFR BLD: 8.2 %
NEUTROPHILS # BLD: 5.9 K/UL (ref 1.7–7.7)
NEUTROPHILS NFR BLD: 85.4 %
PLATELET # BLD AUTO: 263 K/UL (ref 135–450)
PMV BLD AUTO: 8.6 FL (ref 5–10.5)
POTASSIUM SERPL-SCNC: 4.4 MMOL/L (ref 3.5–5.1)
RBC # BLD AUTO: 3.44 M/UL (ref 4.2–5.9)
SODIUM SERPL-SCNC: 136 MMOL/L (ref 136–145)
WBC # BLD AUTO: 6.9 K/UL (ref 4–11)

## 2023-12-25 PROCEDURE — 36415 COLL VENOUS BLD VENIPUNCTURE: CPT

## 2023-12-25 PROCEDURE — 6370000000 HC RX 637 (ALT 250 FOR IP): Performed by: INTERNAL MEDICINE

## 2023-12-25 PROCEDURE — 85520 HEPARIN ASSAY: CPT

## 2023-12-25 PROCEDURE — 6370000000 HC RX 637 (ALT 250 FOR IP): Performed by: STUDENT IN AN ORGANIZED HEALTH CARE EDUCATION/TRAINING PROGRAM

## 2023-12-25 PROCEDURE — 94660 CPAP INITIATION&MGMT: CPT

## 2023-12-25 PROCEDURE — 2700000000 HC OXYGEN THERAPY PER DAY

## 2023-12-25 PROCEDURE — 2060000000 HC ICU INTERMEDIATE R&B

## 2023-12-25 PROCEDURE — 6360000002 HC RX W HCPCS: Performed by: STUDENT IN AN ORGANIZED HEALTH CARE EDUCATION/TRAINING PROGRAM

## 2023-12-25 PROCEDURE — 6360000002 HC RX W HCPCS: Performed by: INTERNAL MEDICINE

## 2023-12-25 PROCEDURE — 85025 COMPLETE CBC W/AUTO DIFF WBC: CPT

## 2023-12-25 PROCEDURE — 99232 SBSQ HOSP IP/OBS MODERATE 35: CPT | Performed by: INTERNAL MEDICINE

## 2023-12-25 PROCEDURE — 80048 BASIC METABOLIC PNL TOTAL CA: CPT

## 2023-12-25 PROCEDURE — 94640 AIRWAY INHALATION TREATMENT: CPT

## 2023-12-25 PROCEDURE — 2580000003 HC RX 258: Performed by: STUDENT IN AN ORGANIZED HEALTH CARE EDUCATION/TRAINING PROGRAM

## 2023-12-25 PROCEDURE — 83735 ASSAY OF MAGNESIUM: CPT

## 2023-12-25 PROCEDURE — 94761 N-INVAS EAR/PLS OXIMETRY MLT: CPT

## 2023-12-25 RX ORDER — ALBUTEROL SULFATE 2.5 MG/3ML
2.5 SOLUTION RESPIRATORY (INHALATION)
Status: DISCONTINUED | OUTPATIENT
Start: 2023-12-25 | End: 2023-12-27 | Stop reason: HOSPADM

## 2023-12-25 RX ADMIN — GUAIFENESIN 600 MG: 600 TABLET, EXTENDED RELEASE ORAL at 08:01

## 2023-12-25 RX ADMIN — ACETAMINOPHEN 650 MG: 325 TABLET ORAL at 20:30

## 2023-12-25 RX ADMIN — GUAIFENESIN 600 MG: 600 TABLET, EXTENDED RELEASE ORAL at 20:29

## 2023-12-25 RX ADMIN — PANTOPRAZOLE SODIUM 40 MG: 40 TABLET, DELAYED RELEASE ORAL at 08:01

## 2023-12-25 RX ADMIN — Medication 2000 UNITS: at 08:01

## 2023-12-25 RX ADMIN — APIXABAN 5 MG: 5 TABLET, FILM COATED ORAL at 20:30

## 2023-12-25 RX ADMIN — METOPROLOL SUCCINATE 50 MG: 50 TABLET, EXTENDED RELEASE ORAL at 20:30

## 2023-12-25 RX ADMIN — ALBUTEROL SULFATE 2.5 MG: 2.5 SOLUTION RESPIRATORY (INHALATION) at 12:55

## 2023-12-25 RX ADMIN — Medication 10 ML: at 20:30

## 2023-12-25 RX ADMIN — TIOTROPIUM BROMIDE AND OLODATEROL 2 PUFF: 3.124; 2.736 SPRAY, METERED RESPIRATORY (INHALATION) at 16:32

## 2023-12-25 RX ADMIN — ALBUTEROL SULFATE 2.5 MG: 2.5 SOLUTION RESPIRATORY (INHALATION) at 08:29

## 2023-12-25 RX ADMIN — TAMSULOSIN HYDROCHLORIDE 0.8 MG: 0.4 CAPSULE ORAL at 20:30

## 2023-12-25 RX ADMIN — APIXABAN 5 MG: 5 TABLET, FILM COATED ORAL at 08:02

## 2023-12-25 RX ADMIN — ROSUVASTATIN 20 MG: 20 TABLET, FILM COATED ORAL at 20:30

## 2023-12-25 RX ADMIN — ACETAMINOPHEN 650 MG: 325 TABLET ORAL at 02:21

## 2023-12-25 RX ADMIN — DEXAMETHASONE 4 MG: 4 TABLET ORAL at 08:01

## 2023-12-25 RX ADMIN — ALBUTEROL SULFATE 2.5 MG: 2.5 SOLUTION RESPIRATORY (INHALATION) at 16:31

## 2023-12-25 RX ADMIN — Medication 10 ML: at 08:02

## 2023-12-25 RX ADMIN — ASPIRIN 81 MG: 81 TABLET, COATED ORAL at 08:01

## 2023-12-25 NOTE — CONSULTS
HISTORY:  Notable for bullous emphysema, coronary artery  disease with bypass graft surgery in 2018, gastroesophageal reflux  disease, hypertension, hyperlipidemia, recent knee surgery. For that  recent knee surgery, he was off Eliquis for a short period time. Here  in the hospital, he is now receiving heparin. PAST SURGICAL HISTORY:  Cardiac catheterization with bypass graft  surgery in 2018. At that time, he had bypass x4 with LIMA to LAD and  vein grafts x3. MEDICATIONS:  At last office visit with us was Flomax, Eliquis, Diovan,  aspirin, Toprol XL, Crestor, Protonix. He is on Nicoderm patch  currently, BuSpar, Zoloft. Please see hospital orders for meds he is on  currently. SOCIAL HISTORY:  Works in the [employer]. 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 Lives with his wife. He has a  longstanding heavy cigarette user, but he says he has done, but  currently he does have a patch in place. FAMILY HISTORY:  Notable for coronary artery disease at young age with  his brother. ALLERGIES:  No known allergies. REVIEW OF SYSTEMS:  14-system review of systems is negative. PHYSICAL EXAMINATION:  Currently on physical examination:  VITAL SIGNS:  Blood pressure is 139/77, temperature 97.8, pulse 62. I  just observed him in the room due to COVID infection, did not perform  actual physical examination beyond observation. GENERAL:  He is alert, oriented, moving all extremities well. He does  not appear to be in any acute distress. Respiratory rate is 14 and no  obvious peripheral edema. IMPRESSION:  History of atrial fibrillation in the past on Eliquis,  temporarily hold, but now back on anticoagulation due to recent knee  surgery; advanced COPD; active COVID infection with COVID pneumonia,  which seems to be improving; coronary artery disease with no findings to  suggest acute coronary syndrome, no symptoms of angina.   Echo; EF shows  60% with small apical area of akinesis
right lower lobe  laterally there is an 8 mm nodule along the fissure inferiorly on the right  there is no pneumothorax there are small bibasilar pleural effusions  posteriorly     Upper Abdomen: The visualized liver and gallbladder grossly unremarkable  there is mild thickening of the right adrenal gland there is a 3.5 cm cystic  mass upper pole right kidney there is a 3.7 cm mass in the left adrenal gland. There are moderate degenerative changes throughout the spine with associated  moderate scoliosis     Soft Tissues/Bones: No acute bone or soft tissue abnormality. IMPRESSION:  Extensive motion artifact limiting the exam.  Within the limitations of the  exam, no obvious acute pulmonary embolus can be seen. Recommend clinical  follow-up. Mildly dilated and atherosclerotic thoracic aorta with no aneurysm or  dissection. Moderate mediastinal adenopathy which is of uncertain etiology, suggest PET  scan correlation. COPD and bullous emphysematous changes of the upper lobes. There is moderate  interstitial and ground-glass opacities scattered along both upper lobes  extending into the perihilar regions and along both lung bases which probably  represents multisegmental bronchopneumonia and/or pulmonary edema. Recommend  short-term follow-up. Small pulmonary nodules along the right lung base laterally which could  represent early metastatic disease but is indeterminate. Recommend  short-term follow-up or PET scan correlation     Tiny bibasilar pleural effusions. 3.7 cm left adrenal mass which could be due to metastatic disease. Suggest  PET scan follow-up.      3.5 cm right renal cyst    Problem List:   Bullous emphysema with acute exacerbation  Severe COVID-19 infection  Acute hypoxic respiratory failure    Assessment/Plan:     Personally reviewed CTA chest which was performed yesterday, severe bullous emphysematous changes particularly of both upper lobes and diffuse bilateral

## 2023-12-25 NOTE — PLAN OF CARE
Problem: Safety - Adult  Goal: Free from fall injury  12/25/2023 0127 by Rehan Daniel RN  Outcome: Progressing  12/24/2023 1610 by Rashaun Kirk RN  Outcome: Progressing     Problem: Pain  Goal: Verbalizes/displays adequate comfort level or baseline comfort level  12/25/2023 0127 by Rehan Daniel RN  Outcome: Progressing  12/24/2023 1610 by Rashaun Kirk RN  Outcome: Progressing     Problem: Discharge Planning  Goal: Discharge to home or other facility with appropriate resources  12/25/2023 0127 by Rehan Daniel RN  Outcome: Progressing  12/24/2023 1610 by Rashaun Kirk RN  Outcome: Progressing     Problem: ABCDS Injury Assessment  Goal: Absence of physical injury  12/25/2023 0127 by Rehan Daniel RN  Outcome: Progressing  12/24/2023 1610 by Rashaun Kirk RN  Outcome: Progressing     Problem: Skin/Tissue Integrity  Goal: Absence of new skin breakdown  Description: 1. Monitor for areas of redness and/or skin breakdown  2. Assess vascular access sites hourly  3. Every 4-6 hours minimum:  Change oxygen saturation probe site  4. Every 4-6 hours:  If on nasal continuous positive airway pressure, respiratory therapy assess nares and determine need for appliance change or resting period.   12/25/2023 0127 by Rehan Daniel RN  Outcome: Progressing  12/24/2023 1610 by Rashaun Kirk RN  Outcome: Progressing

## 2023-12-26 LAB
ANION GAP SERPL CALCULATED.3IONS-SCNC: 6 MMOL/L (ref 3–16)
ANTI-XA UNFRAC HEPARIN: >1.1 IU/ML (ref 0.3–0.7)
BASOPHILS # BLD: 0 K/UL (ref 0–0.2)
BASOPHILS NFR BLD: 0.1 %
BUN SERPL-MCNC: 21 MG/DL (ref 7–20)
CALCIUM SERPL-MCNC: 8.4 MG/DL (ref 8.3–10.6)
CHLORIDE SERPL-SCNC: 108 MMOL/L (ref 99–110)
CO2 SERPL-SCNC: 25 MMOL/L (ref 21–32)
CREAT SERPL-MCNC: 0.6 MG/DL (ref 0.8–1.3)
DEPRECATED RDW RBC AUTO: 15.2 % (ref 12.4–15.4)
EOSINOPHIL # BLD: 0.1 K/UL (ref 0–0.6)
EOSINOPHIL NFR BLD: 1.7 %
GFR SERPLBLD CREATININE-BSD FMLA CKD-EPI: >60 ML/MIN/{1.73_M2}
GLUCOSE SERPL-MCNC: 112 MG/DL (ref 70–99)
HCT VFR BLD AUTO: 33.5 % (ref 40.5–52.5)
HGB BLD-MCNC: 11.2 G/DL (ref 13.5–17.5)
LYMPHOCYTES # BLD: 0.4 K/UL (ref 1–5.1)
LYMPHOCYTES NFR BLD: 6.4 %
MAGNESIUM SERPL-MCNC: 2.2 MG/DL (ref 1.8–2.4)
MCH RBC QN AUTO: 31.7 PG (ref 26–34)
MCHC RBC AUTO-ENTMCNC: 33.5 G/DL (ref 31–36)
MCV RBC AUTO: 94.4 FL (ref 80–100)
MONOCYTES # BLD: 0.7 K/UL (ref 0–1.3)
MONOCYTES NFR BLD: 10.2 %
NEUTROPHILS # BLD: 5.4 K/UL (ref 1.7–7.7)
NEUTROPHILS NFR BLD: 81.6 %
PLATELET # BLD AUTO: 282 K/UL (ref 135–450)
PMV BLD AUTO: 8.6 FL (ref 5–10.5)
POTASSIUM SERPL-SCNC: 4.1 MMOL/L (ref 3.5–5.1)
RBC # BLD AUTO: 3.54 M/UL (ref 4.2–5.9)
SODIUM SERPL-SCNC: 139 MMOL/L (ref 136–145)
WBC # BLD AUTO: 6.6 K/UL (ref 4–11)

## 2023-12-26 PROCEDURE — 2700000000 HC OXYGEN THERAPY PER DAY

## 2023-12-26 PROCEDURE — 94761 N-INVAS EAR/PLS OXIMETRY MLT: CPT

## 2023-12-26 PROCEDURE — 97116 GAIT TRAINING THERAPY: CPT

## 2023-12-26 PROCEDURE — 6360000002 HC RX W HCPCS: Performed by: STUDENT IN AN ORGANIZED HEALTH CARE EDUCATION/TRAINING PROGRAM

## 2023-12-26 PROCEDURE — 97535 SELF CARE MNGMENT TRAINING: CPT

## 2023-12-26 PROCEDURE — 6370000000 HC RX 637 (ALT 250 FOR IP): Performed by: STUDENT IN AN ORGANIZED HEALTH CARE EDUCATION/TRAINING PROGRAM

## 2023-12-26 PROCEDURE — 85520 HEPARIN ASSAY: CPT

## 2023-12-26 PROCEDURE — 6370000000 HC RX 637 (ALT 250 FOR IP): Performed by: INTERNAL MEDICINE

## 2023-12-26 PROCEDURE — 97530 THERAPEUTIC ACTIVITIES: CPT

## 2023-12-26 PROCEDURE — 2580000003 HC RX 258: Performed by: STUDENT IN AN ORGANIZED HEALTH CARE EDUCATION/TRAINING PROGRAM

## 2023-12-26 PROCEDURE — 2060000000 HC ICU INTERMEDIATE R&B

## 2023-12-26 PROCEDURE — 99232 SBSQ HOSP IP/OBS MODERATE 35: CPT | Performed by: INTERNAL MEDICINE

## 2023-12-26 PROCEDURE — 94150 VITAL CAPACITY TEST: CPT

## 2023-12-26 PROCEDURE — 94660 CPAP INITIATION&MGMT: CPT

## 2023-12-26 PROCEDURE — 83735 ASSAY OF MAGNESIUM: CPT

## 2023-12-26 PROCEDURE — 94640 AIRWAY INHALATION TREATMENT: CPT

## 2023-12-26 PROCEDURE — 36415 COLL VENOUS BLD VENIPUNCTURE: CPT

## 2023-12-26 PROCEDURE — 80048 BASIC METABOLIC PNL TOTAL CA: CPT

## 2023-12-26 PROCEDURE — 85025 COMPLETE CBC W/AUTO DIFF WBC: CPT

## 2023-12-26 RX ORDER — DEXAMETHASONE 4 MG/1
6 TABLET ORAL DAILY
Status: DISCONTINUED | OUTPATIENT
Start: 2023-12-27 | End: 2023-12-27 | Stop reason: HOSPADM

## 2023-12-26 RX ADMIN — TAMSULOSIN HYDROCHLORIDE 0.8 MG: 0.4 CAPSULE ORAL at 19:50

## 2023-12-26 RX ADMIN — GUAIFENESIN 600 MG: 600 TABLET, EXTENDED RELEASE ORAL at 19:50

## 2023-12-26 RX ADMIN — ACETAMINOPHEN 650 MG: 325 TABLET ORAL at 19:51

## 2023-12-26 RX ADMIN — TIOTROPIUM BROMIDE AND OLODATEROL 2 PUFF: 3.124; 2.736 SPRAY, METERED RESPIRATORY (INHALATION) at 07:55

## 2023-12-26 RX ADMIN — APIXABAN 5 MG: 5 TABLET, FILM COATED ORAL at 19:50

## 2023-12-26 RX ADMIN — GUAIFENESIN 600 MG: 600 TABLET, EXTENDED RELEASE ORAL at 09:28

## 2023-12-26 RX ADMIN — ALBUTEROL SULFATE 2.5 MG: 2.5 SOLUTION RESPIRATORY (INHALATION) at 21:12

## 2023-12-26 RX ADMIN — ALBUTEROL SULFATE 2.5 MG: 2.5 SOLUTION RESPIRATORY (INHALATION) at 07:54

## 2023-12-26 RX ADMIN — Medication 10 ML: at 19:51

## 2023-12-26 RX ADMIN — ROSUVASTATIN 20 MG: 20 TABLET, FILM COATED ORAL at 19:50

## 2023-12-26 RX ADMIN — DEXAMETHASONE 4 MG: 4 TABLET ORAL at 09:28

## 2023-12-26 RX ADMIN — PANTOPRAZOLE SODIUM 40 MG: 40 TABLET, DELAYED RELEASE ORAL at 09:28

## 2023-12-26 RX ADMIN — ASPIRIN 81 MG: 81 TABLET, COATED ORAL at 09:28

## 2023-12-26 RX ADMIN — APIXABAN 5 MG: 5 TABLET, FILM COATED ORAL at 09:28

## 2023-12-26 RX ADMIN — Medication 10 ML: at 09:29

## 2023-12-26 RX ADMIN — METOPROLOL SUCCINATE 50 MG: 50 TABLET, EXTENDED RELEASE ORAL at 19:50

## 2023-12-26 RX ADMIN — Medication 2000 UNITS: at 09:28

## 2023-12-27 VITALS
WEIGHT: 212.96 LBS | TEMPERATURE: 98.2 F | SYSTOLIC BLOOD PRESSURE: 104 MMHG | HEART RATE: 64 BPM | RESPIRATION RATE: 18 BRPM | HEIGHT: 74 IN | OXYGEN SATURATION: 91 % | BODY MASS INDEX: 27.33 KG/M2 | DIASTOLIC BLOOD PRESSURE: 53 MMHG

## 2023-12-27 DIAGNOSIS — Z96.652 S/P TOTAL KNEE ARTHROPLASTY, LEFT: Primary | ICD-10-CM

## 2023-12-27 PROCEDURE — 6370000000 HC RX 637 (ALT 250 FOR IP): Performed by: STUDENT IN AN ORGANIZED HEALTH CARE EDUCATION/TRAINING PROGRAM

## 2023-12-27 PROCEDURE — 99232 SBSQ HOSP IP/OBS MODERATE 35: CPT | Performed by: INTERNAL MEDICINE

## 2023-12-27 PROCEDURE — 94761 N-INVAS EAR/PLS OXIMETRY MLT: CPT

## 2023-12-27 PROCEDURE — 94640 AIRWAY INHALATION TREATMENT: CPT

## 2023-12-27 PROCEDURE — 6370000000 HC RX 637 (ALT 250 FOR IP): Performed by: INTERNAL MEDICINE

## 2023-12-27 PROCEDURE — 97535 SELF CARE MNGMENT TRAINING: CPT

## 2023-12-27 PROCEDURE — 94680 O2 UPTK RST&XERS DIR SIMPLE: CPT

## 2023-12-27 PROCEDURE — 97530 THERAPEUTIC ACTIVITIES: CPT

## 2023-12-27 PROCEDURE — 6360000002 HC RX W HCPCS: Performed by: INTERNAL MEDICINE

## 2023-12-27 PROCEDURE — 2700000000 HC OXYGEN THERAPY PER DAY

## 2023-12-27 PROCEDURE — 6360000002 HC RX W HCPCS: Performed by: STUDENT IN AN ORGANIZED HEALTH CARE EDUCATION/TRAINING PROGRAM

## 2023-12-27 RX ORDER — ALBUTEROL SULFATE 2.5 MG/3ML
2.5 SOLUTION RESPIRATORY (INHALATION)
Qty: 120 EACH | Refills: 3 | Status: SHIPPED | OUTPATIENT
Start: 2023-12-27 | End: 2023-12-27

## 2023-12-27 RX ORDER — PREDNISONE 20 MG/1
TABLET ORAL
Qty: 5 TABLET | Refills: 0 | Status: SHIPPED | OUTPATIENT
Start: 2023-12-27 | End: 2024-01-06

## 2023-12-27 RX ORDER — VALSARTAN AND HYDROCHLOROTHIAZIDE 80; 12.5 MG/1; MG/1
1 TABLET, FILM COATED ORAL DAILY
Qty: 90 TABLET | Refills: 1 | Status: SHIPPED | OUTPATIENT
Start: 2023-12-27

## 2023-12-27 RX ORDER — ALBUTEROL SULFATE 2.5 MG/3ML
2.5 SOLUTION RESPIRATORY (INHALATION)
Qty: 120 EACH | Refills: 3 | Status: SHIPPED | OUTPATIENT
Start: 2023-12-27

## 2023-12-27 RX ADMIN — PANTOPRAZOLE SODIUM 40 MG: 40 TABLET, DELAYED RELEASE ORAL at 08:46

## 2023-12-27 RX ADMIN — ALBUTEROL SULFATE 2.5 MG: 2.5 SOLUTION RESPIRATORY (INHALATION) at 12:12

## 2023-12-27 RX ADMIN — ASPIRIN 81 MG: 81 TABLET, COATED ORAL at 08:46

## 2023-12-27 RX ADMIN — APIXABAN 5 MG: 5 TABLET, FILM COATED ORAL at 08:46

## 2023-12-27 RX ADMIN — GUAIFENESIN 600 MG: 600 TABLET, EXTENDED RELEASE ORAL at 08:46

## 2023-12-27 RX ADMIN — DEXAMETHASONE 6 MG: 4 TABLET ORAL at 08:45

## 2023-12-27 RX ADMIN — Medication 2000 UNITS: at 08:46

## 2023-12-27 RX ADMIN — ACETAMINOPHEN 650 MG: 325 TABLET ORAL at 11:31

## 2023-12-27 NOTE — PLAN OF CARE
Problem: Safety - Adult  Goal: Free from fall injury  Outcome: Progressing     Problem: Pain  Goal: Verbalizes/displays adequate comfort level or baseline comfort level  Outcome: Progressing     Problem: Discharge Planning  Goal: Discharge to home or other facility with appropriate resources  Outcome: Progressing     Problem: ABCDS Injury Assessment  Goal: Absence of physical injury  Outcome: Progressing     Problem: Skin/Tissue Integrity  Goal: Absence of new skin breakdown  Description: 1. Monitor for areas of redness and/or skin breakdown  2. Assess vascular access sites hourly  3. Every 4-6 hours minimum:  Change oxygen saturation probe site  4. Every 4-6 hours:  If on nasal continuous positive airway pressure, respiratory therapy assess nares and determine need for appliance change or resting period.   Outcome: Progressing     Problem: Neurosensory - Adult  Goal: Achieves maximal functionality and self care  Outcome: Progressing     Problem: Respiratory - Adult  Goal: Achieves optimal ventilation and oxygenation  Outcome: Progressing     Problem: Cardiovascular - Adult  Goal: Maintains optimal cardiac output and hemodynamic stability  Outcome: Progressing     Problem: Skin/Tissue Integrity - Adult  Goal: Incisions, wounds, or drain sites healing without S/S of infection  Outcome: Progressing     Problem: Musculoskeletal - Adult  Goal: Return mobility to safest level of function  Outcome: Progressing  Goal: Return ADL status to a safe level of function  Outcome: Progressing     Problem: Infection - Adult  Goal: Absence of infection at discharge  Outcome: Progressing

## 2023-12-27 NOTE — CARE COORDINATION
Discharge Planning:     (CM) Patient has a D/C order in. Patient is currently on 2L02 and there is a Home 02 Eval pending. CM called [facility] with Layla and BRIGID advising him of Patient and asked him to follow as Patient may need home oxygen. Electronically signed by SREE Will on 12/27/2023 at 8:32 AM      UPDATE :    CM reviewed PT/OT notes and saw they are recommending a Rolling Walker. CM asked Dr. Alvaro Johnson to please place DME order. CM called [facility] with [employer] and LVM about this need as well.        Electronically signed by SREE Will on 12/27/2023 at 8:52 AM

## 2023-12-27 NOTE — CARE COORDINATION
12/27/23 1256   IMM Letter   IMM Letter given to Patient/Family/Significant other/Guardian/POA/by: IMM Letter given to Patient by KIMBERLY. Patient signed IMM Letter and is in agreement with D/C home today.    IMM Letter date given: 12/27/23   IMM Letter time given: 1250         Electronically signed by SREE Marie on 12/27/2023 at 12:56 PM

## 2023-12-27 NOTE — CARE COORDINATION
Discharge Planning:     (CLARISA) rec'd a phone call from Joselo Martinez with [address] advising he is working on Patient now. He advised Patient declining Rolling Walker at this time. He is working on the oxygen. CM called Nata Lang with [address] [place] [address] [phone] and notified her of Patient D/C home today.      Electronically signed by SREE Kay on 12/27/2023 at 12:18 PM

## 2023-12-27 NOTE — CARE COORDINATION
[facility] received a referral to this patient for home 02 @ 1 lpm rest 6 lpm with exertion via nc. Home 02 has been arranged for delivery. Pt is aware to call [facility] [phone] to initiate setup. Portable tank has been delivered to the hospital floor for discharge. Thank you for the referral.  Electronically signed by Anand Segovia on 12/27/2023 at 1:35 PM  Cell ph# [phone]    NOTE: After 5:00 pm, Weekends, Holidays: Call [facility] at [phone] to coordinate delivery of home medical equipment.

## 2023-12-27 NOTE — CARE COORDINATION
Case Management -  Discharge Note      Patient Name: Sean Mukherjee                   YOB: 1955            Readmission Risk (Low < 19, Mod (19-27), High > 27): Readmission Risk Score: 13.3    Current PCP: You Huitron MD    ([facility]) Important Message from Medicare:    Date: 12/27/2023    PT AM-PAC: 23 /24  OT AM-PAC: 19 /24    Home Care Information:   Is patient resuming current home health care services: Yes [facility]  [address] [facility] Chip [address]patrick, [address]  Phone: [phone]  Fax: [phone]    Services: PT/OT/Nursing  Home Health Order Obtained: Yes    Home health agency notified of discharge. Financial    Payor: BCBS / Plan: BCBS - [place] PPO / Product Type: *No Product type* /     Pharmacy:  Potential assistance Purchasing Medications:    Meds-to-Beds request: Yes      Delroy Tellez [phone] Mikayla Larios, [address] [facility] [address] [facility] [phone] Altagracia Espinoza [phone]  [address] [place] [address],[address]  Phone: [phone] Fax: [phone]      Notes: Additional Case Management Notes: Jyoti Jansen with [employer] to deliver oxygen to the room. Patient's wife to transport home.        Electronically signed by SREE Will on 12/27/2023 at 12:59 PM

## 2023-12-28 ENCOUNTER — CARE COORDINATION (OUTPATIENT)
Dept: CASE MANAGEMENT | Age: 68
End: 2023-12-28

## 2023-12-28 NOTE — CARE COORDINATION
Care Transitions Outreach Attempt    Call within 2 business days of discharge: Yes     Attempted to reach patient for transitions of care follow up. Unable to reach patient. HC verified. Patient: Marilou Hirsch Patient : 1955 MRN: [id number]    Last Discharge Facility       Date Complaint Diagnosis Description Type Department Provider    23 Shortness of Breath Multifocal pneumonia . .. ED to Hosp-Admission (Discharged) (ADMITTED) [facility] 3T Olivia Mitchell MD; Carli Venegas... Was this an external facility discharge?  No Discharge Facility Name:     Noted following upcoming appointments from discharge chart review:   [facility] follow up appointment(s):   Future Appointments   Date Time Provider [address]   1/15/2024  3:30 PM Lanell Dandy, MD [facility]len[facility] Col[facility]     Non-[facility]  follow up appointment(s):

## 2023-12-29 ENCOUNTER — CARE COORDINATION (OUTPATIENT)
Dept: CASE MANAGEMENT | Age: 68
End: 2023-12-29

## 2023-12-29 ENCOUNTER — TELEPHONE (OUTPATIENT)
Dept: ORTHOPEDIC SURGERY | Age: 68
End: 2023-12-29

## 2023-12-29 DIAGNOSIS — U07.1 COVID-19: Primary | ICD-10-CM

## 2023-12-29 NOTE — TELEPHONE ENCOUNTER
I spoke with the pt giving him the number to [facility] to start his PT back up after being sick.  The  pt also agreed to come in for a PO on 1/11/24

## 2023-12-29 NOTE — TELEPHONE ENCOUNTER
General Question     Subject: L KNEE POST OP CARE   Patient and /or Facility Request: Rocco Councilman  Contact Number: [phone]    PATIENT CALLED IN TO SEE IF HE NEED TO COME IN THE OFFICE DISCHARGE FROM [address] [place] [address] ON 12-. . DO HE NEED TO DO PHYSICAL THERAPY OR COME IN FOR ANOTHER APPT BEFORE HOME CARE. Joe Ervni     PLEASE ADVISE

## 2023-12-29 NOTE — CARE COORDINATION
Care Transitions Initial Follow Up Call    Call within 2 business days of discharge: Yes    Patient Current Location:  Home: [address] [place] [address] 30  [facility] [address]    Care Transition Nurse contacted the patient by telephone to perform post hospital discharge assessment. Verified name and  with patient as identifiers. Provided introduction to self, and explanation of the Care Transition Nurse role. Patient: Brandy Zamora Patient : 1955   MRN: [id number]  Reason for Admission:   Discharge Date: 23 RARS: Readmission Risk Score: 13.3      Last Discharge Facility       Date Complaint Diagnosis Description Type Department Provider    23 Shortness of Breath Multifocal pneumonia . .. ED to Hosp-Admission (Discharged) (ADMITTED) [facility] 3T Mendoza Payan MD; Nguyễn Or... Was this an external facility discharge? No Discharge Facility:     Challenges to be reviewed by the provider   Additional needs identified to be addressed with provider: No  none               Method of communication with provider: none. Pt states doing better, no issues or concerns. Denies SOB, CP, fever. Using his O2 @ 1L. HC has called. They will call back with the time that they are coming out. Follow up appointments listed below. Agreed to more CTC f/u calls. Care Transition Nurse reviewed discharge instructions with patient who verbalized understanding. The patient was given an opportunity to ask questions and does not have any further questions or concerns at this time. Were discharge instructions available to patient? Yes. Reviewed appropriate site of care based on symptoms and resources available to patient including: When to call 911. The patient agrees to contact the PCP office for questions related to their healthcare. Advance Care Planning:   Does patient have an Advance Directive: reviewed and current.     Medication reconciliation was performed with patient, who verbalizes understanding of

## 2024-01-02 ENCOUNTER — HOSPITAL ENCOUNTER (OUTPATIENT)
Dept: PHYSICAL THERAPY | Age: 69
Setting detail: THERAPIES SERIES
Discharge: HOME | End: 2024-01-02
Payer: COMMERCIAL

## 2024-01-02 ENCOUNTER — TELEPHONE (OUTPATIENT)
Dept: ORTHOPEDIC SURGERY | Age: 69
End: 2024-01-02

## 2024-01-02 NOTE — TELEPHONE ENCOUNTER
I spoke with [facility],. They wanted clarification on PT orders. Dr Ford said that the pt can go to out pt PT even with oxygen    Instructions were understood

## 2024-01-02 NOTE — TELEPHONE ENCOUNTER
PLS CALL [facility], [phone], NEEDS CLARIFICATION OF ORDERS

## 2024-01-03 ENCOUNTER — CARE COORDINATION (OUTPATIENT)
Dept: CASE MANAGEMENT | Age: 69
End: 2024-01-03

## 2024-01-03 NOTE — CARE COORDINATION
Care Transitions Follow Up Call    Patient Current Location:  Home:  [facility] [address]    Care Transition Nurse contacted the patient by telephone to follow up after admission.  Verified name and  with patient as identifiers.    Patient: Georges Bonner  Patient : 1955   MRN: [id number]  Reason for Admission:   Discharge Date: 23 RARS: Readmission Risk Score: 13.3      Needs to be reviewed by the provider   Additional needs identified to be addressed with provider: No  none             Method of communication with provider: none.    Pt states doing well,  except he is post knee surgery and wants to resume his therapy which he can not do until he sees pulmonologist and/or HC because he was d/c'd with O2. He only wears as needed. Pt is having difficulty with HC coming out, schedule confusion. He is calling them back today. F/U appts listed below .Agreed to more CTC f/u calls.      Addressed changes since last contact:  none  Discussed follow-up appointments. If no appointment was previously scheduled, appointment scheduling offered: Yes.   Is follow up appointment scheduled within 7 days of discharge? No.    Follow Up  Future Appointments   Date Time Provider Department Center   2024  3:00 PM Deshaun Reza MD PULM & CC [facility]   2024  3:15 PM Javier Ford MD W CHEST ORTH [facility]   1/15/2024  3:30 PM Renu Harp MD [facility]     External follow up appointment(s):     Care Transition Nurse reviewed medical action plan with patient and discussed any barriers to care and/or understanding of plan of care after discharge. Discussed appropriate site of care based on symptoms and resources available to patient including: When to call 911. The patient agrees to contact the PCP office for questions related to their healthcare.     Advance Care Planning:   reviewed and current.     Patients top risk factors for readmission: medical condition-COVID  Interventions

## 2024-01-05 ENCOUNTER — HOSPITAL ENCOUNTER (OUTPATIENT)
Dept: PHYSICAL THERAPY | Age: 69
Setting detail: THERAPIES SERIES
Discharge: HOME | End: 2024-01-05
Payer: COMMERCIAL

## 2024-01-05 PROCEDURE — 97110 THERAPEUTIC EXERCISES: CPT

## 2024-01-05 PROCEDURE — 97530 THERAPEUTIC ACTIVITIES: CPT

## 2024-01-05 PROCEDURE — 97161 PT EVAL LOW COMPLEX 20 MIN: CPT

## 2024-01-05 NOTE — PLAN OF CARE
Other:                OBJECTIVE EXAMINATION     Observation:   1/5/24: Patients wound/incision appears to be healing as expected. Did note substantial ecchymosis and scabbing but wound/incision appears clean, dry and intact. No signs or symptoms indicating infection including: abnormal warmth/heat, streaking redness, pus/colored discharge, or odor    Test measurements:   1/5/23: Fair (-) quad set w/ note of superior patellar glide. Unable to perform SLS w/o UE assist.     DATE 11/29/23 1/5/24        PROM (L) 0-0-120 2-0-113          Patient not appropriate to strength test 1/5/24    Exercises/Interventions:   Therapeutic Ex (37956)   25' resistance Sets/time Reps Notes/Cues/Progressions     5'  All home exercises reviewed below. Each exercise performed for 1 set.   Recumbent bike  5'     EOB HS stretch  30\" x1    Heel slides  5\" x10    Prone quad stretch  30\" x5    SLR  1 x10    SL hip abd  1 x10    Bridge  1 x10    Bridge w/ BS  1 x15    Bridge w/ hip abduction    Bridge--> hold--> then hip abduction   SAQ 3# 1 x25    LAQ 3# 1 x15    Standing knee flexion 5# 2 x10    Standing marching  1 x15 Working on SLS   Machine knee ext 10# 2 x15    Machine HS curl 25-30# 2 x15                  Manual Intervention (71555)    TIME            Patellar glides  3'     Tib/fem mobs  5'  Flexion bias                 NMR re-education (86839) resistance Sets/time Reps CUES NEEDED                                      Therapeutic Activity (93271)  5'  Sets/time     Educated on post operative guidelines, activity modification, suggestions on icing, walking w/ AD, reviewed new HEP and    5'                                   Modalities:    No modalities applied this session    Education  1/5/23: Evaluation - Patient education regarding PT assessment and plan of care. Extensive portion of visit was focused on establishing HEP, discussing normal course of post operative treatment, activity modification acutely after surgery and addressing

## 2024-01-08 ENCOUNTER — HOSPITAL ENCOUNTER (OUTPATIENT)
Dept: PHYSICAL THERAPY | Age: 69
Setting detail: THERAPIES SERIES
Discharge: HOME | End: 2024-01-08
Payer: COMMERCIAL

## 2024-01-08 PROCEDURE — 97016 VASOPNEUMATIC DEVICE THERAPY: CPT

## 2024-01-08 PROCEDURE — 97110 THERAPEUTIC EXERCISES: CPT

## 2024-01-08 NOTE — PLAN OF CARE
patient has a musculoskeletal condition(s) with a corresponding ICD-10 code that is of complexity and severity that require skilled therapeutic intervention. This has a direct and significant impact on the need for therapy and significantly impacts the rate of recovery.     Treatment/Activity Tolerance:  [x] Patient tolerated treatment well [] Patient limited by fatique  [] Patient limited by pain  [] Patient limited by other medical complications  [] Other:     Return to Play: NA    Prognosis for POC: [x] Good [] Fair  [] Poor    Patient requires continued skilled intervention: [x] Yes  [] No      GOALS     Patient stated goal: \"Walk without knee pain\"  Status: [] Progressing: [] Met: [] Not Met: [] Adjusted    Therapist goals for Patient:   Short Term Goals: To be achieved in: 2-4 weeks  Independent in HEP and progression per patient tolerance, in order to progress toward full function and prevent re-injury.    Status: [] Progressing: [] Met: [] Not Met: [] Adjusted  Patient will have a decrease in pain to 3/10 to help  facilitate improvement in movement, function, and ADLs as indicated by functional deficits.   Status: [] Progressing: [] Met: [] Not Met: [] Adjusted    Long Term Goals: To be achieved in: 8-10 weeks  Disability index score of 15% or less for the WOMAC to assist with return top prior level of function.    Status: [] Progressing: [] Met: [] Not Met: [] Adjusted  LLE AROM = RLE AROM to allow for proper joint functioning as indicated by patients functional deficits.  Status: [] Progressing: [] Met: [] Not Met: [] Adjusted  Pt to improve strength to 4+/5 or better of proximal hip, quadriceps, and hamstringsto allow for proper muscle and joint use in functional mobility, ADLs and prior level of function  Status: [] Progressing: [] Met: [] Not Met: [] Adjusted  Patient will return to walk 1 mile without increased symptoms or restriction to work towards return to prior level of function.  Status: []

## 2024-01-09 ENCOUNTER — OFFICE VISIT (OUTPATIENT)
Dept: PULMONOLOGY | Age: 69
End: 2024-01-09
Payer: COMMERCIAL

## 2024-01-09 VITALS
SYSTOLIC BLOOD PRESSURE: 102 MMHG | HEART RATE: 80 BPM | BODY MASS INDEX: 23.49 KG/M2 | HEIGHT: 74 IN | OXYGEN SATURATION: 96 % | DIASTOLIC BLOOD PRESSURE: 58 MMHG | WEIGHT: 183 LBS

## 2024-01-09 DIAGNOSIS — R93.89 ABNORMAL CT OF THE CHEST: Primary | ICD-10-CM

## 2024-01-09 DIAGNOSIS — J44.9 COPD, SEVERITY TO BE DETERMINED (HCC): ICD-10-CM

## 2024-01-09 PROCEDURE — 3074F SYST BP LT 130 MM HG: CPT | Performed by: INTERNAL MEDICINE

## 2024-01-09 PROCEDURE — 99214 OFFICE O/P EST MOD 30 MIN: CPT | Performed by: INTERNAL MEDICINE

## 2024-01-09 PROCEDURE — 1123F ACP DISCUSS/DSCN MKR DOCD: CPT | Performed by: INTERNAL MEDICINE

## 2024-01-09 PROCEDURE — 3078F DIAST BP <80 MM HG: CPT | Performed by: INTERNAL MEDICINE

## 2024-01-09 RX ORDER — ALBUTEROL SULFATE 90 UG/1
2 AEROSOL, METERED RESPIRATORY (INHALATION) 4 TIMES DAILY PRN
Qty: 18 G | Refills: 5 | Status: SHIPPED | OUTPATIENT
Start: 2024-01-09

## 2024-01-09 ASSESSMENT — ENCOUNTER SYMPTOMS
RHINORRHEA: 0
VOICE CHANGE: 0
BACK PAIN: 0
ABDOMINAL PAIN: 0
CONSTIPATION: 0
SORE THROAT: 0
SINUS PRESSURE: 0
COLOR CHANGE: 0
DIARRHEA: 0
COUGH: 0
CHEST TIGHTNESS: 0
BLOOD IN STOOL: 0
CHOKING: 0
WHEEZING: 0
VOMITING: 0
STRIDOR: 0
SHORTNESS OF BREATH: 1
ABDOMINAL DISTENTION: 0
APNEA: 0

## 2024-01-09 NOTE — PROGRESS NOTES
Georges Bonner    YOB: 1955     Date of Service:  1/9/2024     Chief Complaint   Patient presents with    Follow-Up from Hospital       HPI patient has been accompanied by his wife to our office today.  Patient had prolonged hospitalization between 12/18 and 12/13 for severe COVID-19 infection leading to ARDS and need for high flow oxygen.  He currently has 2 L O2 at home, states that he barely needs it.  No significant dyspnea-states that he is currently at baseline, denies any significant cough or phlegm.  No chest pain.    No Known Allergies  Outpatient Medications Marked as Taking for the 1/9/24 encounter (Office Visit) with Deshaun Reza MD   Medication Sig Dispense Refill    tiotropium-olodaterol (STIOLTO) 2.5-2.5 MCG/ACT AERS Inhale 2 puffs into the lungs daily 4 g 1    albuterol sulfate HFA (VENTOLIN HFA) 108 (90 Base) MCG/ACT inhaler Inhale 2 puffs into the lungs 4 times daily as needed for Wheezing 18 g 5    valsartan-hydroCHLOROthiazide (DIOVAN-HCT) 80-12.5 MG per tablet Take 1 tablet by mouth daily 90 tablet 1    albuterol (PROVENTIL) (2.5 MG/3ML) 0.083% nebulizer solution Take 3 mLs by nebulization in the morning and 3 mLs in the evening. 120 each 3    vitamin D (ERGOCALCIFEROL) 1.25 MG (14250 UT) CAPS capsule Take once a week by mouth for 12 weeks. (Patient taking differently: Take 1 capsule by mouth once a week Take once a week by mouth for 12 weeks. (Tuesdays.)) 12 capsule 1    apixaban (ELIQUIS) 5 MG TABS tablet Take 1 tablet by mouth 2 times daily 60 tablet 5    aspirin EC 81 MG EC tablet Take 1 tablet by mouth daily 90 tablet 1       Immunization History   Administered Date(s) Administered    COVID-19, MODERNA BLUE border, Primary or Immunocompromised, (age 12y+), IM, 100 mcg/0.5mL 01/11/2021, 02/08/2021    INFLUENZA, INTRADERMAL, QUADRIVALENT, PRESERVATIVE FREE 09/12/2016    Influenza Virus Vaccine 08/10/2013, 10/25/2023    Influenza Whole 08/10/2013    Influenza,

## 2024-01-10 ENCOUNTER — CARE COORDINATION (OUTPATIENT)
Dept: CASE MANAGEMENT | Age: 69
End: 2024-01-10

## 2024-01-10 ENCOUNTER — HOSPITAL ENCOUNTER (OUTPATIENT)
Dept: PHYSICAL THERAPY | Age: 69
Setting detail: THERAPIES SERIES
Discharge: HOME | End: 2024-01-10
Payer: COMMERCIAL

## 2024-01-10 PROCEDURE — 97110 THERAPEUTIC EXERCISES: CPT

## 2024-01-10 PROCEDURE — 97016 VASOPNEUMATIC DEVICE THERAPY: CPT

## 2024-01-10 NOTE — PROGRESS NOTES
[facility]   Cardiac Evaluation      Patient: Georges Bonner  YOB: 1955         Chief Complaint   Patient presents with    Atrial Fibrillation    Hypertension            Referring provider: Prabhakar Veras MD    History of Present Illness:   Mr Bonner is seen today in follow up. He was hospitalized after found to have an abnormal CT scan showing a left ventricular thrombus.  His EKG was markedly abnormal and he underwent cardiac cath revealing 3V CAD. An incidental finding on the CT scan which was possibly consistent with malignancy and he required PET scanning and otherstudies to further identify the abnormality which turned out to be a benign cyst.  He is a very anxious individual. He underwent CABG X 4 on 10/29/2018.  He had an uneventful postoperative course with the exception of a short bout of A-Fib. The patient was discharged on 11/07/2018 in [facility].  Amiodarone has been discontinued.      He had a [facility] at [facility] and then saw Dr Alejandro and Dr Callejas. He was also referred to hematology and GI for splenomegaly but then found that he did not have splenomegaly.    Mr Bonner was hospitalized at  12/12/22 after falling in his bathroom. He was treated for the Flu and found to have urinary retention. He was found to be in atrial flutter and Eliquis was initiated. He also had an echo that revealed EF 40-45%, so Entresto was initiated as well.  He had a CV last Friday for AFL and was shocked to SR with 100 joules. No complications.      He was hospitalized 12/23 with COVID pneumonia. He had an echo and Dr Abraham saw in consult for small apical mural thrombus. It was advised that he restart Eliquis as previously prescribed. He had previously been in the hospital for a knee replacement and came home then developed shortness of breath. He has follow with Dr Hernandez, pulmonary for follow up.    Today, Mr Bonner presents with a cane. He is using inhalers and was sent home with O2. Georges denies chest

## 2024-01-10 NOTE — FLOWSHEET NOTE
movement, balance, coordination, kinesthetic sense, posture, and/or proprioception for sitting and/or standing activities    (35804) THERAPEUTIC ACTIVITY - use of dynamic activities to improve functional performance. (Ex include squatting, ascending/descending stairs, walking, bending, lifting, catching, throwing, pushing, pulling, jumping.)  Direct, one on one contact, billed in 15-minute increments.    TREATMENT PLAN   Plan: Frequency/Duration:  1-2 days per week for 8-12 Weeks: Progress strength, ROM and manage pain.    Electronically Signed by Adiel Mo PT              Date: 01/10/2024     Note: If patient does not return for scheduled/recommended follow up visits, this note will serve as a discharge from care along with the most recent update on progress.

## 2024-01-10 NOTE — CARE COORDINATION
Prescription approved per [facility] Refill Protocol.  Penny Iverson RN     1/16/2024  4:00 PM HornAdiel, PT [facility] WC PT [facility]   1/18/2024  1:20 PM Horn, Adiel J, PT [facility] PT [facility]   1/22/2024  1:20 PM Horn, Adiel J, PT [facility] PT [facility]   1/24/2024  1:20 PM Horn, Adiel J, PT [facility] PT [facility]   1/29/2024  1:20 PM Horn, Adiel J, PT [facility] PT [facility]   1/31/2024  1:20 PM Horn, Adiel J, PT [facility] PT [facility]   3/5/2024  3:00 PM Deshaun Reza MD PULM & [facility]     External follow up appointment(s): na    LPN Care Coordinator reviewed medical action plan with patient and discussed any barriers to care and/or understanding of plan of care after discharge. Discussed appropriate site of care based on symptoms and resources available to patient including: PCP  Specialist  Urgent care clinics  When to call abeo. The patient agrees to contact the PCP office for questions related to their healthcare.     Advance Care Planning   The patient has the following advanced directives on file:  Advance Directives       Power of  Living Will ACP-Advance Directive ACP-Power of     Not on File Not on File Filed Not on File            The patient has appointed the following active healthcare agents:    Primary Decision Maker: Kailyn Bonner - Spouse - [phone]        Patients top risk factors for readmission: medical condition-. .  Patient Active Problem List   Diagnosis    Hyperlipidemia    GERD (gastroesophageal reflux disease)    Essential hypertension    LV (left ventricular) mural thrombus    Tobacco use    Enlarged lymph node    Chest pain    Mediastinal cyst    Coronary artery disease involving native coronary artery of native heart with angina pectoris (HCC)    Splenomegaly    PAF (paroxysmal atrial fibrillation) (HCC)    Ear canal mass, left    Secondary hypercoagulable state (HCC)    Status post total knee replacement    Acute respiratory failure with hypoxia (HCC)    COPD with acute exacerbation (HCC)

## 2024-01-11 ENCOUNTER — OFFICE VISIT (OUTPATIENT)
Dept: ORTHOPEDIC SURGERY | Age: 69
End: 2024-01-11

## 2024-01-11 VITALS — BODY MASS INDEX: 23.49 KG/M2 | HEIGHT: 74 IN | WEIGHT: 183 LBS

## 2024-01-11 DIAGNOSIS — Z96.652 S/P TOTAL KNEE ARTHROPLASTY, LEFT: Primary | ICD-10-CM

## 2024-01-12 ENCOUNTER — TELEPHONE (OUTPATIENT)
Dept: PULMONOLOGY | Age: 69
End: 2024-01-12

## 2024-01-12 ENCOUNTER — TELEPHONE (OUTPATIENT)
Dept: ORTHOPEDIC SURGERY | Age: 69
End: 2024-01-12

## 2024-01-12 NOTE — TELEPHONE ENCOUNTER
Prescription Refill     Medication Name:  acetaminophen (TYLENOL) 500 MG tablet     Pharmacy: [facility] [id number] [place] [address] - P [phone] - F [phone]    Patient Contact Number:  [phone]     Pt ASKING IF DR PRUITT WILL REFILL THIS RX HE ORIGINALLY RCV'D IN THE [facility] AND SEND TO THE [facility] ABOVE.

## 2024-01-12 NOTE — TELEPHONE ENCOUNTER
Patient states that the stiolto is over 400 dollars is there something  else similar we can call in

## 2024-01-12 NOTE — TELEPHONE ENCOUNTER
Pt left voice mail saying he has question regarding inhaler, he didn't say which/what inhaler.    [phone]

## 2024-01-15 ENCOUNTER — OFFICE VISIT (OUTPATIENT)
Dept: CARDIOLOGY CLINIC | Age: 69
End: 2024-01-15
Payer: COMMERCIAL

## 2024-01-15 ENCOUNTER — TELEPHONE (OUTPATIENT)
Dept: PULMONOLOGY | Age: 69
End: 2024-01-15

## 2024-01-15 VITALS
SYSTOLIC BLOOD PRESSURE: 128 MMHG | DIASTOLIC BLOOD PRESSURE: 66 MMHG | HEART RATE: 82 BPM | WEIGHT: 191 LBS | HEIGHT: 74 IN | BODY MASS INDEX: 24.51 KG/M2

## 2024-01-15 DIAGNOSIS — I10 ESSENTIAL HYPERTENSION: ICD-10-CM

## 2024-01-15 DIAGNOSIS — Z72.0 TOBACCO USE: ICD-10-CM

## 2024-01-15 DIAGNOSIS — E78.2 MIXED HYPERLIPIDEMIA: ICD-10-CM

## 2024-01-15 DIAGNOSIS — I25.119 CORONARY ARTERY DISEASE INVOLVING NATIVE CORONARY ARTERY OF NATIVE HEART WITH ANGINA PECTORIS (HCC): Primary | ICD-10-CM

## 2024-01-15 DIAGNOSIS — I48.92 ATRIAL FLUTTER, UNSPECIFIED TYPE (HCC): ICD-10-CM

## 2024-01-15 PROCEDURE — 99214 OFFICE O/P EST MOD 30 MIN: CPT | Performed by: INTERNAL MEDICINE

## 2024-01-15 PROCEDURE — 1123F ACP DISCUSS/DSCN MKR DOCD: CPT | Performed by: INTERNAL MEDICINE

## 2024-01-15 PROCEDURE — 3078F DIAST BP <80 MM HG: CPT | Performed by: INTERNAL MEDICINE

## 2024-01-15 PROCEDURE — 3074F SYST BP LT 130 MM HG: CPT | Performed by: INTERNAL MEDICINE

## 2024-01-15 RX ORDER — UMECLIDINIUM BROMIDE AND VILANTEROL TRIFENATATE 62.5; 25 UG/1; UG/1
1 POWDER RESPIRATORY (INHALATION) DAILY
Qty: 60 EACH | Refills: 5 | Status: SHIPPED | OUTPATIENT
Start: 2024-01-15

## 2024-01-15 NOTE — TELEPHONE ENCOUNTER
Leigh and bevespi is to expensive for the patient. His nsurance stated that Anoro would be cheaper

## 2024-01-16 ENCOUNTER — HOSPITAL ENCOUNTER (OUTPATIENT)
Dept: PHYSICAL THERAPY | Age: 69
Setting detail: THERAPIES SERIES
Discharge: HOME | End: 2024-01-16
Payer: COMMERCIAL

## 2024-01-16 PROCEDURE — 97140 MANUAL THERAPY 1/> REGIONS: CPT

## 2024-01-16 PROCEDURE — 97530 THERAPEUTIC ACTIVITIES: CPT

## 2024-01-16 PROCEDURE — 97110 THERAPEUTIC EXERCISES: CPT

## 2024-01-16 NOTE — FLOWSHEET NOTE
proprioception for sitting and/or standing activities    (50344) THERAPEUTIC ACTIVITY - use of dynamic activities to improve functional performance. (Ex include squatting, ascending/descending stairs, walking, bending, lifting, catching, throwing, pushing, pulling, jumping.)  Direct, one on one contact, billed in 15-minute increments.    TREATMENT PLAN   Plan: Frequency/Duration:  1-2 days per week for 8-12 Weeks: Progress strength, ROM and manage pain.    Electronically Signed by Robi Sauceda, PT              Date: 01/16/2024     Note: If patient does not return for scheduled/recommended follow up visits, this note will serve as a discharge from care along with the most recent update on progress.

## 2024-01-17 ENCOUNTER — CARE COORDINATION (OUTPATIENT)
Dept: CASE MANAGEMENT | Age: 69
End: 2024-01-17

## 2024-01-17 NOTE — CARE COORDINATION
Care Transitions Follow Up Call    Patient Current Location:  Home:  [facility] [address]    [facility] Care Coordinator contacted the patient by telephone to follow up after admission on .  Verified name and  with patient as identifiers.    Patient: Georges Bonner  Patient : 1955   MRN: [id number]  Reason for Admission: COVID+ PNA  Discharge Date: 23 RARS: Readmission Risk Score: 13.3      Needs to be reviewed by the provider   Additional needs identified to be addressed with provider: No  none             Method of communication with provider: none.    Spoke with Georges Bonner who reported that he is doing ok. Patient stated that he did wake up with abdominal pains. Patient stated that it has been an issues for a long time. Patient stated that doctors aware. He stated that the pain comes and goes and since he has been up for awhile and moving around it has calmed down. Patient stated that he was off of his night O2 for a couple days but has now re started. Patient reported that he is on 2.5 liters at bedtime. Patient reported O2 sat today 97% RA. Patient reported that the Anoro Ellipta is to expensive. He stated that he had the same issue when he was ordered to take Eliquis. Patient stated that he will get online and see if he can get it cheaper by going through the same steps he did with the Eliquis. Writer offered assistance with our SW and patient declined and stated that he will be ok. Patient stated that he do use his albuterol and nebulizer Txs as ordered and taking all of his other medications as ordered. Patient denied cp, sob, cough, dizziness, headache, n/v, diarrhea, fever, or chills. Patient reported that appetite and fluid intake is good and denied any problems with bowel or bladder. Patient reported that he is still in out patient PT and it is going well. Patient denied any other needs at this time. Patient instructed to continue to monitor s/s, reporting any that may

## 2024-01-18 ENCOUNTER — HOSPITAL ENCOUNTER (OUTPATIENT)
Dept: PHYSICAL THERAPY | Age: 69
Setting detail: THERAPIES SERIES
Discharge: HOME | End: 2024-01-18
Payer: COMMERCIAL

## 2024-01-18 PROCEDURE — 97112 NEUROMUSCULAR REEDUCATION: CPT

## 2024-01-18 PROCEDURE — 97016 VASOPNEUMATIC DEVICE THERAPY: CPT

## 2024-01-18 PROCEDURE — 97530 THERAPEUTIC ACTIVITIES: CPT

## 2024-01-18 PROCEDURE — 97110 THERAPEUTIC EXERCISES: CPT

## 2024-01-18 NOTE — FLOWSHEET NOTE
substantial ecchymosis and scabbing but wound/incision appears clean, dry and intact. No signs or symptoms indicating infection including: abnormal warmth/heat, streaking redness, pus/colored discharge, or odor    Test measurements:   1/5/23: Fair (-) quad set w/ note of superior patellar glide. Unable to perform SLS w/o UE assist.     DATE 11/29/23 1/5/24 1/8/24 1/18/24      PROM (L) 0-0-120 2-0-113 0-0-115 0-0-120        Patient not appropriate to strength test 1/5/24    Exercises/Interventions:   Therapeutic Ex (20319)   15' resistance Sets/time Reps Notes/Cues/Progressions          Recumbent bike  5'     EOB HS stretch  30\" x1    Heel slides  5\" x15    Quad set w/ heel prop  5\" x10    Prone quad stretch  30\" x5    SLR 1# 2 x10 Performed hooklying   SL hip abd  1 x10    Supine Tball HS curl w/ Tband Thick orange 2 x20    Bridge  2 x10    Bridge w/ hip abduction       SAQ 5# 2 x15    LAQ 3# 1 x15    Standing knee flexion 5# 2 x10    Standing marching  10\" x10 Working on SLS   Machine knee ext 10# 2 x15    Machine HS curl 25-30# 2 x15    Banded TKE Thick Grn 2 x15                  Manual Intervention (80958)    TIME            Patellar glides  3'     Tib/fem mobs  5'  Flexion bias                 NMR re-education (26715)  10' resistance Sets/time Reps CUES NEEDED          SLS  1' x3 Trying to minimize UE assistance    Tandem stance Long air-ex 1' x4 Alternating foot forward                 Therapeutic Activity (14352)  15'  Sets/time     Education provided below  5'     Lateral step up  1 x25 Performed without support   FSU  1 x25    TRX squats  2 x10             Modalities:    Vasopneumatic Compression (Game Ready): MODERATE PRESSURE Applied to Knee Left for significant edema, swelling, pain control for 15 minutes.  Relevant ICD-10 R60.9 Edema unspecified.   Girth Left Right Post Vaso   Knee: Midpatella 45.5 cm 41 cm 45 cm   Knee: 5 cm above 43 cm 41 cm 42.5 cm   Knee: 15 cm below 39 34 cm 38.5   Ankle:

## 2024-01-22 ENCOUNTER — HOSPITAL ENCOUNTER (OUTPATIENT)
Dept: PHYSICAL THERAPY | Age: 69
Setting detail: THERAPIES SERIES
Discharge: HOME | End: 2024-01-22
Payer: COMMERCIAL

## 2024-01-22 PROCEDURE — 97112 NEUROMUSCULAR REEDUCATION: CPT

## 2024-01-22 PROCEDURE — 97530 THERAPEUTIC ACTIVITIES: CPT

## 2024-01-22 PROCEDURE — 97110 THERAPEUTIC EXERCISES: CPT

## 2024-01-22 PROCEDURE — 97016 VASOPNEUMATIC DEVICE THERAPY: CPT

## 2024-01-22 NOTE — FLOWSHEET NOTE
[facility] - Outpatient Rehabilitation and Therapy [address], [place] [address] office: [phone] fax: [phone]      Physical Therapy: TREATMENT/PROGRESS NOTE   Patient: Georges Bonner (69 y.o. male)   Treatment Date: 2024   :  1955 MRN: [id number]   Visit #: 10   Insurance Allowable Auth Needed   10 visits through 4/3/24 - 6 remaining [x]Yes    []No    Insurance: Payor: BCBS / Plan: BCBS - [place] PPO / Product Type: *No Product type* /   Insurance ID: [id number] - ([facility])  Secondary Insurance (if applicable):    Treatment Diagnosis:     ICD-10-CM    1. Left knee pain, unspecified chronicity  M25.562       2. Decreased range of motion (ROM) of left knee  M25.662       3. Left leg weakness  R29.898          Medical Diagnosis:    S/P total knee arthroplasty, left [Z96.652]   Referring Physician: Javier Ford MD  PCP: Prabhakar Veras MD                             Plan of care signed (Y/N): Y    Date of Patient follow up with Physician:      Progress Report/POC: NO  POC update due: (10 visits /OR AUTH LIMITS, whichever is less) 2/3/2023     Precautions/Contra-indications: Prior history of CABG 2018, HTN    Preferred Language for Healthcare:   [x]English       []other:    SUBJECTIVE EXAMINATION     Patient Report/Comments: Patient reports on going stiffness in the knee upon first steps.      Test used Initial score  2024     Pain Summary VAS 2-5/10 2-7/10, soreness 5/10, stiff   Functional questionnaire WOMAC  50/96    Other:                  OBJECTIVE EXAMINATION     Observation:   24: Patient with significant difficulty maintaining single leg stance on L. Was unable to hold >5 seconds when in SLS.   24: Patient with right lateral lean during ambulation secondary to weakness. Pitting edema noted in distal LLE.  24: Have noted some open wounds in the distal leg. Noted wound is wet. Will continue to

## 2024-01-24 ENCOUNTER — CARE COORDINATION (OUTPATIENT)
Dept: CASE MANAGEMENT | Age: 69
End: 2024-01-24

## 2024-01-24 ENCOUNTER — HOSPITAL ENCOUNTER (OUTPATIENT)
Dept: PHYSICAL THERAPY | Age: 69
Setting detail: THERAPIES SERIES
Discharge: HOME | End: 2024-01-24
Payer: COMMERCIAL

## 2024-01-24 PROCEDURE — 97110 THERAPEUTIC EXERCISES: CPT | Performed by: SPECIALIST/TECHNOLOGIST

## 2024-01-24 PROCEDURE — 97530 THERAPEUTIC ACTIVITIES: CPT | Performed by: SPECIALIST/TECHNOLOGIST

## 2024-01-24 PROCEDURE — 97016 VASOPNEUMATIC DEVICE THERAPY: CPT | Performed by: SPECIALIST/TECHNOLOGIST

## 2024-01-24 NOTE — TELEPHONE ENCOUNTER
LM on VM informing pt that there is a [employer] pt assistance program for Anoro but that he would need to sign the form and answer a few questions.  Asked patient to call office

## 2024-01-24 NOTE — CARE COORDINATION
Care Transitions Follow Up Call    Patient Current Location:  Home:  [facility] [address]    Care Transition Nurse contacted the patient by telephone to follow up after admission.  Verified name and  with patient as identifiers.    Patient: Georges Bonner  Patient : 1955   MRN: [id number]  Reason for Admission:  COVID+ PNA  Discharge Date: 23 RARS: Readmission Risk Score: 13.3      Needs to be reviewed by the provider   Additional needs identified to be addressed with provider: Yes  medications-pt went to  the Anoro inhaler  and this was also too expensive. The previous inhaler was Stiolto that was also too expensive. Talking with pt, he does not have Medicare, he has commercial BCBS and is able to use coupons to help with medications. Which inhaler would be initial one preferable for pt and we can then guide him to the appropriate patients assistance? Also does pt need to wear O2 continuously at night? He is at 2.5L.             Method of communication with provider: chart routing.      Pt is doing well other than issues with maintenance inhaler affordability. He was to start Stiloto but the expense was too high, his insurance company said to try Anoro which was then called in. Same issue with cost. Will ask Pulm office which one is preferred and send pt information via [employer] for assistance. Pt doesn't have Medicare D so he can use coupons for savings. This is what he does for his Eliquis.     Pt is doing with breathing, questions if he needs to use his O2 at night always or PRN. Will ask pulm as not notes in last OV regarding this. ADLs good, no issues with eating/drinking.     Addressed changes since last contact:  none  Discussed follow-up appointments. If no appointment was previously scheduled, appointment scheduling offered: No.   Is follow up appointment scheduled within 7 days of discharge? Yes.    Follow Up  Future Appointments   Date Time Provider Department

## 2024-01-24 NOTE — FLOWSHEET NOTE
[facility] - Outpatient Rehabilitation and Therapy [address], [place] [address] office: [phone] fax: [phone]      Physical Therapy: TREATMENT/PROGRESS NOTE   Patient: Georges Bonner (69 y.o. male)   Treatment Date: 2024   :  1955 MRN: [id number]   Visit #: 11   Insurance Allowable Auth Needed   10 visits through 4/3/24 - 6 remaining [x]Yes    []No    Insurance: Payor: BCBS / Plan: BCBS [place] PPO / Product Type: *No Product type* /   Insurance ID: [id number] - ([place] [employer])  Secondary Insurance (if applicable):    Treatment Diagnosis:     ICD-10-CM    1. Left knee pain, unspecified chronicity  M25.562       2. Decreased range of motion (ROM) of left knee  M25.662       3. Left leg weakness  R29.898          Medical Diagnosis:    S/P total knee arthroplasty, left [Z96.652]   Referring Physician: Javier Ford MD  PCP: Prabhakar Veras MD                             Plan of care signed (Y/N): Y    Date of Patient follow up with Physician:  Logan     Progress Report/POC: NO  POC update due: (10 visits /OR AUTH LIMITS, whichever is less) 2/3/2023     Precautions/Contra-indications: Prior history of CABG 2018, HTN    Preferred Language for Healthcare:   [x]English       []other:    SUBJECTIVE EXAMINATION     Patient Report/Comments: Pt reports having some increased stiffness and having some cramps in his hamstrings.  Admits to not drinking water,  Logan  Aching lots at night. Has some pins/ needles in his knee/ foot. Ices knee about 2x/ day     Test used Initial score  2024     Pain Summary VAS 2-5/10 2-7/10, soreness 5/10, stiff   Functional questionnaire WOMAC  50/96    Other:                  OBJECTIVE EXAMINATION     Observation:   24: Patient with significant difficulty maintaining single leg stance on L. Was unable to hold >5 seconds when in SLS.   24: Patient with right lateral lean during ambulation

## 2024-01-29 ENCOUNTER — HOSPITAL ENCOUNTER (OUTPATIENT)
Dept: PHYSICAL THERAPY | Age: 69
Setting detail: THERAPIES SERIES
Discharge: HOME | End: 2024-01-29
Payer: COMMERCIAL

## 2024-01-29 ENCOUNTER — CARE COORDINATION (OUTPATIENT)
Dept: CASE MANAGEMENT | Age: 69
End: 2024-01-29

## 2024-01-29 PROCEDURE — 97016 VASOPNEUMATIC DEVICE THERAPY: CPT

## 2024-01-29 PROCEDURE — 97110 THERAPEUTIC EXERCISES: CPT

## 2024-01-29 PROCEDURE — 97112 NEUROMUSCULAR REEDUCATION: CPT

## 2024-01-29 NOTE — CARE COORDINATION
[facility] Transitions Follow Up Call    2024    Patient: Georges Bonner  Patient : 1955   MRN: [id number]  Reason for Admission: COVID+ PNA;  Discharge Date: 23 RARS: Readmission Risk Score: 13.3         Spoke with: Georges Bonner who reported that he was dong ok. Patient stated that he still have not been able to get ordered inhaler d/t the cost. Patient stated that he was also suppose to get a CT scan today but he had to cancel because he need to pay $2300 first d/t having to meet his insurance deductible first. Patient stated that he will call his PCP to see if he has other options. Patient reported that appetite and fluid intake is good and denied any problems with bowel or bladder. Patient reported that he is taking all medications as ordered. Patient denied any other needs at this time. Patient instructed to continue to monitor s/s, reporting any that may present to MD immediately for early intervention.  Patient notified that this will be our last outreach and he is agreeable.  Program closed.    Care Transitions Subsequent and Final Call    Subsequent and Final Calls  Do you have any ongoing symptoms?: No  Have your medications changed?: No  Do you have any questions related to your medications?: No  Do you currently have any active services?: No  Are you currently active with any services?: Home Health  Do you have any needs or concerns that I can assist you with?: No  Care Transitions Interventions  No Identified Needs   Medication Assistance Program: Completed     Other Interventions:             Follow Up  Future Appointments   Date Time Provider Department Center   2024  1:20 PM Adiel Mo, PT [facility]LY [facility]   2024  1:20 PM Adiel Mo PT MHFZ [facility]   2024  1:45 PM Javier Ford MD [facility]   3/5/2024  3:00 PM Deshaun Reza MD PULM & CC [facility]   2024 10:45 AM Renu Harp MD [facility]

## 2024-01-29 NOTE — FLOWSHEET NOTE
[facility] - Outpatient Rehabilitation and Therapy [address], [place] [address] office: [phone] fax: [phone]      Physical Therapy: TREATMENT/PROGRESS NOTE   Patient: Georges Bonner (69 y.o. male)   Treatment Date: 2024   :  1955 MRN: [id number]   Visit #: 12   Insurance Allowable Auth Needed   10 visits through 4/3/24 - 6 remaining [x]Yes    []No    Insurance: Payor: BCBS / Plan: BCBS [place] PPO / Product Type: *No Product type* /   Insurance ID: [id number] - ([facility])  Secondary Insurance (if applicable):    Treatment Diagnosis:     ICD-10-CM    1. Left knee pain, unspecified chronicity  M25.562       2. Decreased range of motion (ROM) of left knee  M25.662       3. Left leg weakness  R29.898          Medical Diagnosis:    S/P total knee arthroplasty, left [Z96.652]   Referring Physician: Javier Ford MD  PCP: Prabhakar Veras MD                             Plan of care signed (Y/N): Y    Date of Patient follow up with Physician:  Logan     Progress Report/POC: NO  POC update due: (10 visits /OR AUTH LIMITS, whichever is less) 2/3/2023     Precautions/Contra-indications: Prior history of CABG 2018, HTN    Preferred Language for Healthcare:   [x]English       []other:    SUBJECTIVE EXAMINATION     Patient Report/Comments: Patient reports he is really not using his cane at all at this time. States the only time he really uses it is when he is transferring sit-stand.     Test used Initial score  2024     Pain Summary VAS 2-5/10 2-7/10, soreness 4/10, stiff   Functional questionnaire WOMAC  50/96    Other:                  OBJECTIVE EXAMINATION     Observation:   24: limited weight shifting onto the LLE. Significant inability to perform single leg stance on   24: Patient with significant difficulty maintaining single leg stance on L. Was unable to hold >5 seconds when in SLS.   24: Patient with right

## 2024-01-31 ENCOUNTER — HOSPITAL ENCOUNTER (OUTPATIENT)
Dept: PHYSICAL THERAPY | Age: 69
Setting detail: THERAPIES SERIES
Discharge: HOME | End: 2024-01-31
Payer: COMMERCIAL

## 2024-01-31 PROCEDURE — 97016 VASOPNEUMATIC DEVICE THERAPY: CPT

## 2024-01-31 PROCEDURE — 97530 THERAPEUTIC ACTIVITIES: CPT

## 2024-01-31 PROCEDURE — 97110 THERAPEUTIC EXERCISES: CPT

## 2024-01-31 PROCEDURE — 97112 NEUROMUSCULAR REEDUCATION: CPT

## 2024-01-31 NOTE — FLOWSHEET NOTE
[facility] - Outpatient Rehabilitation and Therapy [address], [place] [address] office: [phone] fax: [phone]      Physical Therapy: TREATMENT/PROGRESS NOTE   Patient: Georges Bonner (69 y.o. male)   Treatment Date: 2024   :  1955 MRN: [id number]   Visit #: 13   Insurance Allowable Auth Needed   10 visits through 4/3/24 - 6 remaining [x]Yes    []No    Insurance: Payor: BCBS / Plan: BCBS [place] PPO / Product Type: *No Product type* /   Insurance ID: [id number] - ([facility])  Secondary Insurance (if applicable):    Treatment Diagnosis:     ICD-10-CM    1. Left knee pain, unspecified chronicity  M25.562       2. Decreased range of motion (ROM) of left knee  M25.662       3. Left leg weakness  R29.898          Medical Diagnosis:    S/P total knee arthroplasty, left [Z96.652]   Referring Physician: Javier Ford MD  PCP: Prabhakar Veras MD                             Plan of care signed (Y/N): Y    Date of Patient follow up with Physician:  Logan     Progress Report/POC: NO  POC update due: (10 visits /OR AUTH LIMITS, whichever is less) 2/3/2023     Precautions/Contra-indications: Prior history of CABG 2018, HTN    Preferred Language for Healthcare:   [x]English       []other:    SUBJECTIVE EXAMINATION     Patient Report/Comments: Patient reports global knee stiffness and discomfort.      Test used Initial score  2024     Pain Summary VAS 2-5/10 2-7/10, soreness 4/10, stiff   Functional questionnaire WOMAC  50/96    Other:                  OBJECTIVE EXAMINATION     Observation:   24: limited weight shifting onto the LLE. Significant inability to perform single leg stance on   24: Patient with significant difficulty maintaining single leg stance on L. Was unable to hold >5 seconds when in SLS.   24: Patient with right lateral lean during ambulation secondary to weakness. Pitting edema noted in distal

## 2024-02-06 ENCOUNTER — HOSPITAL ENCOUNTER (OUTPATIENT)
Dept: PHYSICAL THERAPY | Age: 69
Setting detail: THERAPIES SERIES
Discharge: HOME | End: 2024-02-06
Payer: COMMERCIAL

## 2024-02-06 PROCEDURE — 97112 NEUROMUSCULAR REEDUCATION: CPT

## 2024-02-06 PROCEDURE — 97110 THERAPEUTIC EXERCISES: CPT

## 2024-02-06 PROCEDURE — 97016 VASOPNEUMATIC DEVICE THERAPY: CPT

## 2024-02-06 NOTE — PLAN OF CARE
8-10 weeks  Disability index score of 15% or less for the WOMAC to assist with return top prior level of function.    Status: [x] Progressing: [] Met: [] Not Met: [] Adjusted  LLE AROM = RLE AROM to allow for proper joint functioning as indicated by patients functional deficits.  Status: [] Progressing: [x] Met: [] Not Met: [] Adjusted  Pt to improve strength to 4+/5 or better of proximal hip, quadriceps, and hamstringsto allow for proper muscle and joint use in functional mobility, ADLs and prior level of function  Status: [x] Progressing: [] Met: [] Not Met: [] Adjusted  Patient will return to walk 1 mile without increased symptoms or restriction to work towards return to prior level of function.  Status: [x] Progressing: [] Met: [] Not Met: [] Adjusted  Patient will be able to manage symptoms while resuming full duty at work.   Status: [x] Progressing: [] Met: [] Not Met: [] Adjusted    Overall Progression Towards Functional goals/ Treatment Progress Update:  [x] Patient is progressing as expected towards functional goals listed.    [] Progression is slowed due to complexities/Impairments listed.  [] Progression has been slowed due to co-morbidities.  [] Plan just implemented, too soon (<30days) to assess goals progression   [] Goals require adjustment due to lack of progress  [] Patient is not progressing as expected and requires additional follow up with physician  [] Other:     CHARGE CAPTURE     PT CHARGE GRID   CPT Code (TIMED) minutes # CPT Code (UNTIMED) #     [x] Therex (16636)  35 2  [] EVAL:LOW (98555 - Typically 20 minutes face-to-face)     [x] Neuromusc. Re-ed (76625) 5 1  [] Re-Eval (18972)     [] Manual (96050)    [] Estim Unattended (82409)     [x] Ther. Act (86504) 5 1  [] Mech. Traction (51299)     [] Gait (35683)    [] Dry Needle 1-2 muscle (20560)     [] Aquatic Therex (58365)    [] Dry Needle 3+ muscle (20561)     [] Iontophoresis (69817)    [x] VASO (85657) 1    [] Ultrasound (81639)    []

## 2024-02-07 DIAGNOSIS — Z47.89 AFTERCARE FOLLOWING SURGERY OF THE MUSCULOSKELETAL SYSTEM: Primary | ICD-10-CM

## 2024-02-07 RX ORDER — TRAMADOL HYDROCHLORIDE 50 MG/1
50 TABLET ORAL EVERY 12 HOURS PRN
Qty: 14 TABLET | Refills: 0 | Status: SHIPPED | OUTPATIENT
Start: 2024-02-07 | End: 2024-02-14

## 2024-02-08 ENCOUNTER — HOSPITAL ENCOUNTER (OUTPATIENT)
Dept: PHYSICAL THERAPY | Age: 69
Setting detail: THERAPIES SERIES
Discharge: HOME | End: 2024-02-08
Payer: COMMERCIAL

## 2024-02-08 PROCEDURE — 97016 VASOPNEUMATIC DEVICE THERAPY: CPT

## 2024-02-08 PROCEDURE — 97112 NEUROMUSCULAR REEDUCATION: CPT

## 2024-02-08 PROCEDURE — 97110 THERAPEUTIC EXERCISES: CPT

## 2024-02-08 NOTE — FLOWSHEET NOTE
by Adiel Mo, PT          Date: 02/08/2024     Note: If patient does not return for scheduled/recommended follow up visits, this note will serve as a discharge from care along with the most recent update on progress.

## 2024-02-12 ENCOUNTER — HOSPITAL ENCOUNTER (OUTPATIENT)
Dept: PHYSICAL THERAPY | Age: 69
Setting detail: THERAPIES SERIES
Discharge: HOME | End: 2024-02-12
Payer: COMMERCIAL

## 2024-02-12 PROCEDURE — 97112 NEUROMUSCULAR REEDUCATION: CPT

## 2024-02-12 PROCEDURE — 97016 VASOPNEUMATIC DEVICE THERAPY: CPT

## 2024-02-12 PROCEDURE — 97110 THERAPEUTIC EXERCISES: CPT

## 2024-02-12 NOTE — FLOWSHEET NOTE
[facility] - Outpatient Rehabilitation and Therapy [address], [place] [address] office: [phone] fax: [phone]    Physical Therapy: TREATMENT/PROGRESS NOTE   Patient: Georges Bonner (69 y.o. male)   Treatment Date: 2024   :  1955 MRN: [id number]   Visit #: 16   Insurance Allowable Auth Needed   1/5 visits through 24 - 24 [x]Yes    []No    Insurance: Payor: [place] / Plan: BCBS [place] PPO / Product Type: *No Product type* /   Insurance ID: [id number] - ([facility])  Secondary Insurance (if applicable):    Treatment Diagnosis:     ICD-10-CM    1. Left knee pain, unspecified chronicity  M25.562       2. Decreased range of motion (ROM) of left knee  M25.662       3. Left leg weakness  R29.898          Medical Diagnosis:    S/P total knee arthroplasty, left [Z96.652]   Referring Physician: Javier Ford MD  PCP: Prabhakar Veras MD                             Plan of care signed (Y/N): Y    Date of Patient follow up with Physician:  Logan     Progress Report/POC: NO  POC update due: (10 visits /OR AUTH LIMITS, whichever is less) 3/5/2023     Precautions/Contra-indications: L TKA 23, Prior history of CABG , HTN     Preferred Language for Healthcare:   [x]English       []other:    SUBJECTIVE EXAMINATION     Patient Report/Comments: Patient reports improved level of pain since addition of Tramadol provided by Dr. Ford     Test used Initial score  2024     Pain Summary VAS 2-5/10 2-7/10, soreness 4/10, stiff 2/10   Functional questionnaire WOMAC  50/96 60/96    Other:                    OBJECTIVE EXAMINATION     Observation:   24: TKE still lacking strong quad contraction. Atrophy still noted. Lateral shifting to right during sit-stands.  24: Limited weight shifting onto the LLE. Significant inability to perform single leg stance on   24: Patient with significant difficulty maintaining

## 2024-02-13 ENCOUNTER — TELEPHONE (OUTPATIENT)
Dept: CASE MANAGEMENT | Age: 69
End: 2024-02-13

## 2024-02-14 ENCOUNTER — HOSPITAL ENCOUNTER (OUTPATIENT)
Dept: PHYSICAL THERAPY | Age: 69
Setting detail: THERAPIES SERIES
Discharge: HOME | End: 2024-02-14
Payer: COMMERCIAL

## 2024-02-14 DIAGNOSIS — T14.8XXA WOUND DISCHARGE: Primary | ICD-10-CM

## 2024-02-14 PROCEDURE — 97110 THERAPEUTIC EXERCISES: CPT

## 2024-02-14 NOTE — FLOWSHEET NOTE
[facility] - Outpatient Rehabilitation and Therapy [address], [place] [address] office: [phone] fax: [phone]    Physical Therapy: TREATMENT/PROGRESS NOTE   Patient: Georges Bonner (69 y.o. male)   Treatment Date: 2024   :  1955 MRN: [id number]   Visit #: 17   Insurance Allowable Auth Needed   2/5 visits through 24 - 24 [x]Yes    []No    Insurance: Payor: BC / Plan: BCBS [place] PPO / Product Type: *No Product type* /   Insurance ID: [id number] - ([facility])  Secondary Insurance (if applicable):    Treatment Diagnosis:     ICD-10-CM    1. Left knee pain, unspecified chronicity  M25.562       2. Decreased range of motion (ROM) of left knee  M25.662       3. Left leg weakness  R29.898          Medical Diagnosis:    S/P total knee arthroplasty, left [Z96.652]   Referring Physician: Javier Ford MD  PCP: Prabhakar Veras MD                             Plan of care signed (Y/N): Y    Date of Patient follow up with Physician:  Logan     Progress Report/POC: NO  POC update due: (10 visits /OR AUTH LIMITS, whichever is less) 3/5/2023     Precautions/Contra-indications: L TKA 23, Prior history of CABG , HTN     Preferred Language for Healthcare:   [x]English       []other:    SUBJECTIVE EXAMINATION     Patient Report/Comments: Patient continues to endorse medial, distal shin pain located around his lower leg wound.     Test used Initial score  2024     Pain Summary VAS 2-5/10 2-7/10, soreness 4/10, stiff 2/10   Functional questionnaire WOMAC  50/96 60/96    Other:                    OBJECTIVE EXAMINATION     Observation:   24: TKE still lacking strong quad contraction. Atrophy still noted. Lateral shifting to right during sit-stands.  24: Limited weight shifting onto the LLE. Significant inability to perform single leg stance on   24: Patient with significant difficulty maintaining

## 2024-02-14 NOTE — PROGRESS NOTES
Pt came to PT today with a wound on his operative leg on his shin. He does have a scab but the is a little spot with a yellow discharge. This is the second time coming in with this wound. It does look better today but there is a little discharge      I will send a referral for wound care and sent a picture to Gregory as Logan is out for the week to take a look at it

## 2024-02-16 NOTE — PATIENT INSTRUCTIONS
[facility]  [address]   [place], [place] [address]  Telephone: [phone]     FAX [phone]    Discharge Instructions    Important reminders:    **If you have any signs and symptoms of illness (Cough, fever, congestion, nausea, vomiting, diarrhea, etc.) please call the wound care center prior to your appointment.    1. Increase Protein intake for optimal wound healing  2. No added salt to reduce any swelling  3. If diabetic, maintain good glucose control  4. If you smoke, smoking prohibits wound healing, we ask that you refrain from smoking.  5. When taking antibiotics take the entire prescription as ordered. Do not stop taking until medication is all gone unless otherwise instructed.   6. Exercise as tolerated.   7. Keep weight off wounds and reposition every 2 hours if applicable.  8. If wound(s) is on your lower extremity, elevate legs to the level of the heart or above for 30 minutes 4-5 times a day and/or when sitting. Avoid standing for long periods of time.   9. Do not get wounds wet in bath or shower unless otherwise instructed by your physician. If your wound is on your foot or leg, you may purchase a cast bag. Please ask at the pharmacy.      If Vascular testing is ordered, please call [address] [place] [address] [phone]) to schedule.    Vascular tests ordered by Wound Care Physicians may take up to 2 hours to complete. Please keep that in mind when scheduling.     If Vascular testing is scheduled, please bring supplies to replace your dressing after testing is done. The vascular department does not stock supplies.     Wound: Left lower leg     With each dressing change, rinse wounds with 0.9% Saline. (May use wound wash or soft contact solution. Both can be purchased at a local drug store). If unable to obtain saline, may use a gentle soap and water.    Dressing care: Santyl, moist to dry, dry dressing, 2 layer tubi - change daily    Home Care Agency/Facility:     Your wound-care

## 2024-02-19 ENCOUNTER — HOSPITAL ENCOUNTER (OUTPATIENT)
Dept: PHYSICAL THERAPY | Age: 69
Setting detail: THERAPIES SERIES
Discharge: HOME | End: 2024-02-19
Payer: COMMERCIAL

## 2024-02-19 PROCEDURE — 97112 NEUROMUSCULAR REEDUCATION: CPT

## 2024-02-19 PROCEDURE — 97016 VASOPNEUMATIC DEVICE THERAPY: CPT

## 2024-02-19 PROCEDURE — 97110 THERAPEUTIC EXERCISES: CPT

## 2024-02-19 PROCEDURE — 97530 THERAPEUTIC ACTIVITIES: CPT
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
## 2024-02-20 ENCOUNTER — HOSPITAL ENCOUNTER (OUTPATIENT)
Dept: WOUND CARE | Age: 69
Discharge: HOME | End: 2024-02-20
Attending: SURGERY
Payer: COMMERCIAL

## 2024-02-20 VITALS
DIASTOLIC BLOOD PRESSURE: 89 MMHG | TEMPERATURE: 96 F | SYSTOLIC BLOOD PRESSURE: 135 MMHG | HEART RATE: 63 BPM | RESPIRATION RATE: 16 BRPM

## 2024-02-20 DIAGNOSIS — S81.802A OPEN WOUND OF LOWER LEG, LEFT, INITIAL ENCOUNTER: Primary | ICD-10-CM

## 2024-02-20 PROCEDURE — 11042 DBRDMT SUBQ TIS 1ST 20SQCM/<: CPT | Performed by: SURGERY

## 2024-02-20 PROCEDURE — 11042 DBRDMT SUBQ TIS 1ST 20SQCM/<: CPT

## 2024-02-20 PROCEDURE — 99213 OFFICE O/P EST LOW 20 MIN: CPT

## 2024-02-20 RX ORDER — BETAMETHASONE DIPROPIONATE 0.5 MG/G
CREAM TOPICAL ONCE
OUTPATIENT
Start: 2024-02-20 | End: 2024-02-20

## 2024-02-20 RX ORDER — LIDOCAINE 50 MG/G
OINTMENT TOPICAL ONCE
OUTPATIENT
Start: 2024-02-20 | End: 2024-02-20

## 2024-02-20 RX ORDER — LIDOCAINE HYDROCHLORIDE 40 MG/ML
SOLUTION TOPICAL ONCE
OUTPATIENT
Start: 2024-02-20 | End: 2024-02-20

## 2024-02-20 RX ORDER — LIDOCAINE HYDROCHLORIDE 20 MG/ML
JELLY TOPICAL ONCE
OUTPATIENT
Start: 2024-02-20 | End: 2024-02-20

## 2024-02-20 RX ORDER — SODIUM CHLOR/HYPOCHLOROUS ACID 0.033 %
SOLUTION, IRRIGATION IRRIGATION ONCE
OUTPATIENT
Start: 2024-02-20 | End: 2024-02-20

## 2024-02-20 RX ORDER — GENTAMICIN SULFATE 1 MG/G
OINTMENT TOPICAL ONCE
OUTPATIENT
Start: 2024-02-20 | End: 2024-02-20

## 2024-02-20 RX ORDER — TRIAMCINOLONE ACETONIDE 1 MG/G
OINTMENT TOPICAL ONCE
OUTPATIENT
Start: 2024-02-20 | End: 2024-02-20

## 2024-02-20 RX ORDER — CLOBETASOL PROPIONATE 0.5 MG/G
OINTMENT TOPICAL ONCE
OUTPATIENT
Start: 2024-02-20 | End: 2024-02-20

## 2024-02-20 RX ORDER — GINSENG 100 MG
CAPSULE ORAL ONCE
OUTPATIENT
Start: 2024-02-20 | End: 2024-02-20

## 2024-02-20 RX ORDER — IBUPROFEN 200 MG
TABLET ORAL ONCE
OUTPATIENT
Start: 2024-02-20 | End: 2024-02-20

## 2024-02-20 RX ORDER — LIDOCAINE 40 MG/G
CREAM TOPICAL ONCE
OUTPATIENT
Start: 2024-02-20 | End: 2024-02-20

## 2024-02-20 RX ORDER — LIDOCAINE 40 MG/G
CREAM TOPICAL ONCE
Status: DISCONTINUED | OUTPATIENT
Start: 2024-02-20 | End: 2024-02-21 | Stop reason: HOSPADM

## 2024-02-20 RX ORDER — BACITRACIN ZINC AND POLYMYXIN B SULFATE 500; 1000 [USP'U]/G; [USP'U]/G
OINTMENT TOPICAL ONCE
OUTPATIENT
Start: 2024-02-20 | End: 2024-02-20

## 2024-02-20 ASSESSMENT — PAIN DESCRIPTION - DESCRIPTORS: DESCRIPTORS: SHOOTING

## 2024-02-20 ASSESSMENT — PAIN DESCRIPTION - LOCATION: LOCATION: LEG

## 2024-02-20 ASSESSMENT — PAIN DESCRIPTION - PAIN TYPE: TYPE: ACUTE PAIN

## 2024-02-20 ASSESSMENT — PAIN DESCRIPTION - ONSET: ONSET: ON-GOING

## 2024-02-20 ASSESSMENT — PAIN SCALES - GENERAL: PAINLEVEL_OUTOF10: 9

## 2024-02-20 ASSESSMENT — PAIN DESCRIPTION - FREQUENCY: FREQUENCY: INTERMITTENT

## 2024-02-20 ASSESSMENT — PAIN DESCRIPTION - ORIENTATION: ORIENTATION: LEFT

## 2024-02-20 NOTE — PROGRESS NOTES
Georges Bonner  AGE: 69 y.o.   GENDER: male  : 1955  TODAY'S DATE:  2024    Chief Complaint   Patient presents with    Wound Check     LLE        HISTORY of PRESENT ILLNESS HPI     Georges Bonner is a 69 y.o. male who presents today for wound evaluation.   History of Wound: Left pretibial wound  Wound Pain:  moderate  Severity:  5 / 10   Wound Type:  undetermined  Modifying Factors:  edema  Associated Signs/Symptoms:  edema    Procedure Note    Performed by: Inocencio Quinones MD    Consent obtained: Yes    Time out taken:  Yes    Pain Control: Anesthetic  Anesthetic: 4% Lidocaine Cream     Debridement:Excisional Debridement    Using curette and forceps the wound was sharply debrided    down through and including the removal of subcutaneous tissue.        Devitalized Tissue Debrided:  necrotic/eschar    Pre Debridement Measurements:  Are located in the Wound Documentation Flow Sheet    Wound #: 1     Post  Debridement Measurements:  Wound 24 Leg Left;Lower;Anterior #1 (Active)   Wound Image   24 0815   Wound Etiology Other 24 0815   Wound Cleansed Cleansed with saline 24 0815   Wound Length (cm) 1.5 cm 24 0815   Wound Width (cm) 0.9 cm 24 0815   Wound Depth (cm) 0.1 cm 24 0815   Wound Surface Area (cm^2) 1.35 cm^2 24 0815   Wound Volume (cm^3) 0.135 cm^3 24 0815   Post-Procedure Length (cm) 2 cm 2442   Post-Procedure Width (cm) 1.4 cm 2442   Post-Procedure Depth (cm) 0.1 cm 24   Post-Procedure Surface Area (cm^2) 1.7 cm^2 2442   Post-Procedure Volume (cm^3) 0.17 cm^3 24 0842   Wound Assessment Bleeding 24   Drainage Amount Moderate (25-50%) 24   Drainage Description Serosanguinous 24   Odor None 24   Jacqueline-wound Assessment Non-blanchable erythema 24   Margins Defined edges 24 0815   Number of days: 0           Total Surface Area Debrided: 2.8 sq cm

## 2024-02-20 NOTE — PLAN OF CARE
Discharge instructions given.  Patient verbalized understanding.  Return to [facility] in 1 week(s).  Called/faxed orders to [facility] outpatient pharmacy- Santyl prescription  Pt states he will purchase gauze

## 2024-02-21 ENCOUNTER — HOSPITAL ENCOUNTER (OUTPATIENT)
Dept: PHYSICAL THERAPY | Age: 69
Setting detail: THERAPIES SERIES
Discharge: HOME | End: 2024-02-21
Payer: COMMERCIAL

## 2024-02-21 PROCEDURE — 97016 VASOPNEUMATIC DEVICE THERAPY: CPT

## 2024-02-21 PROCEDURE — 97530 THERAPEUTIC ACTIVITIES: CPT

## 2024-02-21 PROCEDURE — 97112 NEUROMUSCULAR REEDUCATION: CPT

## 2024-02-21 PROCEDURE — 97110 THERAPEUTIC EXERCISES: CPT

## 2024-02-21 NOTE — FLOWSHEET NOTE
[facility] - Outpatient Rehabilitation and Therapy [address], [place] [address] office: [phone] fax: [phone]    Physical Therapy: TREATMENT/PROGRESS NOTE   Patient: Georges Bonner (69 y.o. male)   Treatment Date: 2024   :  1955 MRN: [id number]   Visit #: 19   Insurance Allowable Auth Needed   3/5 visits through 24 - 24 [x]Yes    []No    Insurance: Payor: BCBS / Plan: BCBS - [place] PPO / Product Type: *No Product type* /   Insurance ID: [id number] - ([facility])  Secondary Insurance (if applicable):    Treatment Diagnosis:     ICD-10-CM    1. Left knee pain, unspecified chronicity  M25.562       2. Decreased range of motion (ROM) of left knee  M25.662       3. Left leg weakness  R29.898          Medical Diagnosis:    S/P total knee arthroplasty, left [Z96.652]   Referring Physician: Javier Ford MD  PCP: Prabhakar Veras MD                             Plan of care signed (Y/N): Y    Date of Patient follow up with Physician:  Logan     Progress Report/POC: NO  POC update due: (10 visits /OR AUTH LIMITS, whichever is less) 3/5/2023     Precautions/Contra-indications: L TKA 23, Prior history of CABG , HTN     Preferred Language for Healthcare:   [x]English       []other:    SUBJECTIVE EXAMINATION     Patient Report/Comments: Patient reports going down stairs has been going well but going up remains very difficult. Has been unable to traverse stairs with reciprocal patterning and has lost his balance when trying to go up steps or curbs.      Test used Initial score  2024     Pain Summary VAS 2-5/10 2-7/10, soreness 4/10, stiff 4/10    Functional questionnaire WOMAC 2996 50/96 60/96 51/96    Other:                      OBJECTIVE EXAMINATION     Observation:   24: TKE still lacking strong quad contraction. Atrophy still noted. Lateral shifting to right during sit-stands.  24:

## 2024-02-22 ENCOUNTER — OFFICE VISIT (OUTPATIENT)
Dept: ORTHOPEDIC SURGERY | Age: 69
End: 2024-02-22

## 2024-02-22 VITALS — HEIGHT: 74 IN | WEIGHT: 191 LBS | BODY MASS INDEX: 24.51 KG/M2

## 2024-02-22 DIAGNOSIS — Z96.652 S/P TOTAL KNEE ARTHROPLASTY, LEFT: Primary | ICD-10-CM

## 2024-02-22 PROCEDURE — 99024 POSTOP FOLLOW-UP VISIT: CPT | Performed by: ORTHOPAEDIC SURGERY

## 2024-02-22 NOTE — PATIENT INSTRUCTIONS
[facility]  [address]   [place], [place] [address]  Telephone: [phone]     FAX [phone]    Discharge Instructions    Important reminders:    **If you have any signs and symptoms of illness (Cough, fever, congestion, nausea, vomiting, diarrhea, etc.) please call the wound care center prior to your appointment.    1. Increase Protein intake for optimal wound healing  2. No added salt to reduce any swelling  3. If diabetic, maintain good glucose control  4. If you smoke, smoking prohibits wound healing, we ask that you refrain from smoking.  5. When taking antibiotics take the entire prescription as ordered. Do not stop taking until medication is all gone unless otherwise instructed.   6. Exercise as tolerated.   7. Keep weight off wounds and reposition every 2 hours if applicable.  8. If wound(s) is on your lower extremity, elevate legs to the level of the heart or above for 30 minutes 4-5 times a day and/or when sitting. Avoid standing for long periods of time.   9. Do not get wounds wet in bath or shower unless otherwise instructed by your physician. If your wound is on your foot or leg, you may purchase a cast bag. Please ask at the pharmacy.      If Vascular testing is ordered, please call [address] [place] [address] [phone]) to schedule.    Vascular tests ordered by Wound Care Physicians may take up to 2 hours to complete. Please keep that in mind when scheduling.     If Vascular testing is scheduled, please bring supplies to replace your dressing after testing is done. The vascular department does not stock supplies.     Wound: Left lower leg     With each dressing change, rinse wounds with 0.9% Saline. (May use wound wash or soft contact solution. Both can be purchased at a local drug store). If unable to obtain saline, may use a gentle soap and water.    Dressing care: Santyl, moist to dry, dry dressing, 2 layer tubi - change daily. You may shower- remove dressing before shower and place

## 2024-02-22 NOTE — PROGRESS NOTES
Patient: Georges Bonner                  : 1955   MRN: [id number]   Date of Visit: 24    Physician: Javier Ford MD.     Reason for Visit: Status post TKA     Subjective History of Present Illness:     Georges Bonner is here for regularly scheduled follow-up s/p LEFT  TKA. Reports they are doing well, occasional aches and pains are controlled with over the counter medications.     He was readmitted to the hospital with COVID requiring a stay in the ICU, he is doing well at this point, has started PT outpatient.    Physical Examination??: ?   General: Patient is alert and oriented x 3 and appears comfortable.   Incision is well-approximated, no erythema, fluctuance   Able to perform SLR, significant quad weakness  ROM 5-120    Radiographs: AP/lateral of the operative knee with well-positioned total knee arthroplasty. No evidence of fracture subluxation or dislocation. No evidence of migration or loosening.      Assessment and Plan?: The patient is progressing well approximately 10 weeks s/p TKA     1.  At this point I will approve him for more physical therapy as I do think he could continue benefit from significant quad strengthening as he becomes significantly atrophied and deconditioned over time.    I will see him back in 6 months with repeat x-rays at that time.  _______________________      Javier Ford MD

## 2024-02-26 ENCOUNTER — APPOINTMENT (OUTPATIENT)
Dept: PHYSICAL THERAPY | Age: 69
End: 2024-02-26
Payer: COMMERCIAL

## 2024-02-27 ENCOUNTER — HOSPITAL ENCOUNTER (OUTPATIENT)
Dept: WOUND CARE | Age: 69
Discharge: HOME | End: 2024-02-27
Attending: SURGERY
Payer: COMMERCIAL

## 2024-02-27 VITALS — DIASTOLIC BLOOD PRESSURE: 88 MMHG | TEMPERATURE: 96.4 F | SYSTOLIC BLOOD PRESSURE: 147 MMHG | HEART RATE: 74 BPM

## 2024-02-27 DIAGNOSIS — S81.802A OPEN WOUND OF LOWER LEG, LEFT, INITIAL ENCOUNTER: Primary | ICD-10-CM

## 2024-02-27 PROCEDURE — 11042 DBRDMT SUBQ TIS 1ST 20SQCM/<: CPT | Performed by: SURGERY

## 2024-02-27 PROCEDURE — 11042 DBRDMT SUBQ TIS 1ST 20SQCM/<: CPT

## 2024-02-27 RX ORDER — LIDOCAINE 40 MG/G
CREAM TOPICAL ONCE
Status: DISCONTINUED | OUTPATIENT
Start: 2024-02-27 | End: 2024-02-28 | Stop reason: HOSPADM

## 2024-02-27 RX ORDER — LIDOCAINE HYDROCHLORIDE 20 MG/ML
JELLY TOPICAL ONCE
OUTPATIENT
Start: 2024-02-27 | End: 2024-02-27

## 2024-02-27 RX ORDER — SODIUM CHLOR/HYPOCHLOROUS ACID 0.033 %
SOLUTION, IRRIGATION IRRIGATION ONCE
OUTPATIENT
Start: 2024-02-27 | End: 2024-02-27

## 2024-02-27 RX ORDER — BACITRACIN ZINC AND POLYMYXIN B SULFATE 500; 1000 [USP'U]/G; [USP'U]/G
OINTMENT TOPICAL ONCE
OUTPATIENT
Start: 2024-02-27 | End: 2024-02-27

## 2024-02-27 RX ORDER — TRIAMCINOLONE ACETONIDE 1 MG/G
OINTMENT TOPICAL ONCE
OUTPATIENT
Start: 2024-02-27 | End: 2024-02-27

## 2024-02-27 RX ORDER — IBUPROFEN 200 MG
TABLET ORAL ONCE
OUTPATIENT
Start: 2024-02-27 | End: 2024-02-27

## 2024-02-27 RX ORDER — GINSENG 100 MG
CAPSULE ORAL ONCE
OUTPATIENT
Start: 2024-02-27 | End: 2024-02-27

## 2024-02-27 RX ORDER — BETAMETHASONE DIPROPIONATE 0.5 MG/G
CREAM TOPICAL ONCE
OUTPATIENT
Start: 2024-02-27 | End: 2024-02-27

## 2024-02-27 RX ORDER — CLOBETASOL PROPIONATE 0.5 MG/G
OINTMENT TOPICAL ONCE
OUTPATIENT
Start: 2024-02-27 | End: 2024-02-27

## 2024-02-27 RX ORDER — GENTAMICIN SULFATE 1 MG/G
OINTMENT TOPICAL ONCE
OUTPATIENT
Start: 2024-02-27 | End: 2024-02-27

## 2024-02-27 RX ORDER — LIDOCAINE 40 MG/G
CREAM TOPICAL ONCE
OUTPATIENT
Start: 2024-02-27 | End: 2024-02-27

## 2024-02-27 RX ORDER — LIDOCAINE 50 MG/G
OINTMENT TOPICAL ONCE
OUTPATIENT
Start: 2024-02-27 | End: 2024-02-27

## 2024-02-27 RX ORDER — LIDOCAINE HYDROCHLORIDE 40 MG/ML
SOLUTION TOPICAL ONCE
OUTPATIENT
Start: 2024-02-27 | End: 2024-02-27

## 2024-02-27 NOTE — PROGRESS NOTES
Number of days: 7           Total Surface Area Debrided: 2.16 sq cm     Bleeding:  Minimal    Hemostasis Achieved:  by pressure    Procedural Pain:  3  / 10     Post Procedural Pain:  0 / 10     Response to treatment:  Well tolerated by patient.     The nature of the patient's condition was explained in depth. The patient was informed that their compliance to the treatment plan is paramount to successful healing and prevention of further ulceration and/or infection     Treatment Plan:   69-year-old male with a pressure ulceration of the left pretibial region.  It has improved slightly since last week's visit.  Debridement was performed.  We will continue Santyl wet-to-dry dressing changes.  We will increase him from 1 Tubigrip to 2 Tubigrips for additional compression.  Follow-up again in the wound center in 1 week    Inocencio Quinones MD, M.D.  2/27/2024

## 2024-02-27 NOTE — PLAN OF CARE
Discharge instructions given.  Patient verbalized understanding.  Return to [facility] in 1 week(s).

## 2024-02-28 ENCOUNTER — HOSPITAL ENCOUNTER (OUTPATIENT)
Dept: PHYSICAL THERAPY | Age: 69
Setting detail: THERAPIES SERIES
Discharge: HOME | End: 2024-02-28
Payer: COMMERCIAL

## 2024-02-28 PROCEDURE — 97530 THERAPEUTIC ACTIVITIES: CPT | Performed by: SPECIALIST/TECHNOLOGIST

## 2024-02-28 PROCEDURE — 97110 THERAPEUTIC EXERCISES: CPT | Performed by: SPECIALIST/TECHNOLOGIST

## 2024-02-28 PROCEDURE — 97016 VASOPNEUMATIC DEVICE THERAPY: CPT | Performed by: SPECIALIST/TECHNOLOGIST

## 2024-02-28 NOTE — FLOWSHEET NOTE
Progressing: [] Met: [] Not Met: [] Adjusted  Patient will be able to manage symptoms while resuming full duty at work.   Status: [x] Progressing: [] Met: [] Not Met: [] Adjusted  Patient will be able to complete 12 regular steps without UE assist to resume full duty at work.   Status: [x] Progressing: [] Met: [] Not Met: [] Adjusted  Patient will be able transfers floor to standing to resume full duty at work.   Status: [x] Progressing: [] Met: [] Not Met: [] Adjusted    Overall Progression Towards Functional goals/ Treatment Progress Update:  [x] Patient is progressing as expected towards functional goals listed.    [] Progression is slowed due to complexities/Impairments listed.  [] Progression has been slowed due to co-morbidities.  [] Plan just implemented, too soon (<30days) to assess goals progression   [] Goals require adjustment due to lack of progress  [] Patient is not progressing as expected and requires additional follow up with physician  [] Other:     CHARGE CAPTURE     PT CHARGE GRID   CPT Code (TIMED) minutes # CPT Code (UNTIMED) #     [x] Therex (24947)  20 1  [] EVAL:LOW (67616 - Typically 20 minutes face-to-face)     [x] Neuromusc. Re-ed (75347) 15 1  [] Re-Eval (41679)     [] Manual (45716)    [] Estim Unattended (61775)     [x] Ther. Act (95350) 10 1  [] [facility] Traction (17821)     [] Gait (75911)    [] Dry Needle 1-2 muscle (85108)     [] Aquatic Therex (93800)    [] Dry Needle 3+ muscle (04723)     [] Iontophoresis (26588)    [x] VASO (25845) 1    [] Ultrasound (51258)    [] Group Therapy (16976)     [] Estim Attended (79900)    [] Other:    Total Timed Code Tx Minutes 45 3  1     Total Treatment Minutes 60        Charge Justification:  (43998) THERAPEUTIC EXERCISE - Provided verbal/tactile cueing for activities related to strengthening, flexibility, endurance, ROM performed to prevent loss of range of motion, maintain or improve muscular strength or increase flexibility, following either an

## 2024-03-04 ENCOUNTER — APPOINTMENT (OUTPATIENT)
Dept: PHYSICAL THERAPY | Age: 69
End: 2024-03-04
Payer: COMMERCIAL

## 2024-03-05 ENCOUNTER — HOSPITAL ENCOUNTER (OUTPATIENT)
Dept: WOUND CARE | Age: 69
Discharge: HOME | End: 2024-03-05
Attending: SURGERY
Payer: COMMERCIAL

## 2024-03-05 VITALS — HEART RATE: 73 BPM | DIASTOLIC BLOOD PRESSURE: 90 MMHG | TEMPERATURE: 96 F | SYSTOLIC BLOOD PRESSURE: 133 MMHG

## 2024-03-05 DIAGNOSIS — S81.802A OPEN WOUND OF LOWER LEG, LEFT, INITIAL ENCOUNTER: Primary | ICD-10-CM

## 2024-03-05 PROCEDURE — 11042 DBRDMT SUBQ TIS 1ST 20SQCM/<: CPT

## 2024-03-05 PROCEDURE — 11042 DBRDMT SUBQ TIS 1ST 20SQCM/<: CPT | Performed by: SURGERY

## 2024-03-05 RX ORDER — BETAMETHASONE DIPROPIONATE 0.5 MG/G
CREAM TOPICAL ONCE
OUTPATIENT
Start: 2024-03-05 | End: 2024-03-05

## 2024-03-05 RX ORDER — LIDOCAINE 50 MG/G
OINTMENT TOPICAL ONCE
OUTPATIENT
Start: 2024-03-05 | End: 2024-03-05

## 2024-03-05 RX ORDER — IBUPROFEN 200 MG
TABLET ORAL ONCE
OUTPATIENT
Start: 2024-03-05 | End: 2024-03-05

## 2024-03-05 RX ORDER — SODIUM CHLOR/HYPOCHLOROUS ACID 0.033 %
SOLUTION, IRRIGATION IRRIGATION ONCE
OUTPATIENT
Start: 2024-03-05 | End: 2024-03-05

## 2024-03-05 RX ORDER — GENTAMICIN SULFATE 1 MG/G
OINTMENT TOPICAL ONCE
OUTPATIENT
Start: 2024-03-05 | End: 2024-03-05

## 2024-03-05 RX ORDER — GINSENG 100 MG
CAPSULE ORAL ONCE
OUTPATIENT
Start: 2024-03-05 | End: 2024-03-05

## 2024-03-05 RX ORDER — BACITRACIN ZINC AND POLYMYXIN B SULFATE 500; 1000 [USP'U]/G; [USP'U]/G
OINTMENT TOPICAL ONCE
OUTPATIENT
Start: 2024-03-05 | End: 2024-03-05

## 2024-03-05 RX ORDER — LIDOCAINE HYDROCHLORIDE 20 MG/ML
JELLY TOPICAL ONCE
OUTPATIENT
Start: 2024-03-05 | End: 2024-03-05

## 2024-03-05 RX ORDER — LIDOCAINE 40 MG/G
CREAM TOPICAL ONCE
OUTPATIENT
Start: 2024-03-05 | End: 2024-03-05

## 2024-03-05 RX ORDER — CLOBETASOL PROPIONATE 0.5 MG/G
OINTMENT TOPICAL ONCE
OUTPATIENT
Start: 2024-03-05 | End: 2024-03-05

## 2024-03-05 RX ORDER — LIDOCAINE HYDROCHLORIDE 40 MG/ML
SOLUTION TOPICAL ONCE
OUTPATIENT
Start: 2024-03-05 | End: 2024-03-05

## 2024-03-05 RX ORDER — TRIAMCINOLONE ACETONIDE 1 MG/G
OINTMENT TOPICAL ONCE
OUTPATIENT
Start: 2024-03-05 | End: 2024-03-05

## 2024-03-05 RX ORDER — LIDOCAINE 40 MG/G
CREAM TOPICAL ONCE
Status: DISCONTINUED | OUTPATIENT
Start: 2024-03-05 | End: 2024-03-06 | Stop reason: HOSPADM

## 2024-03-05 NOTE — PLAN OF CARE
Discharge instructions given.  Patient verbalized understanding.  Return to [facility] in 1 week(s).

## 2024-03-05 NOTE — PATIENT INSTRUCTIONS
new dressing after shower      Home Care Agency/Facility:     Your wound-care supplies will be provided by:   Please note, depending on your insurance coverage, you may have out-of-pocket expenses for these supplies. Someone from the company should call you to confirm your order and discuss those potential costs before they ship your products -- please anticipate that call. If your out-of-pocket cost could be substantial, Many companies have financial hardship programs for patients who qualify, so please ask about that if you might need a hand. If you have any questions about your supplies or your potential out-of-pocket costs, or if you need to place an order for a refill of supplies (typically monthly), please call the company directly.     Your  is Anna Mayer up with Dr Quinones In 1 week(s) in the wound care center.       Wound Care Center Information: Should you experience any significant changes in your wound(s) or have questions about your wound care, please contact the [facility] at [phone] Monday  - Thursday 8:00 am - 4:00 pm and Friday 8:00 am - 1:00pm. If you need help with your wound outside these hours and cannot wait until we are again available, contact your PCP or go to the hospital emergency room.     PLEASE NOTE: IF YOU ARE UNABLE TO OBTAIN WOUND SUPPLIES, CONTINUE TO USE THE SUPPLIES YOU HAVE AVAILABLE UNTIL YOU ARE ABLE TO REACH US. IT IS MOST IMPORTANT TO KEEP THE WOUND COVERED AT ALL TIMES.    Patient Experience    Thank you for trusting us with your care.  You may receive a survey from a company called [employer] asking for your feedback.  We would appreciate it if you took a few minutes to share your experience.  Your input is very valuable to us.

## 2024-03-05 NOTE — PROGRESS NOTES
Georges Bonner  AGE: 69 y.o.   GENDER: male  : 1955  TODAY'S DATE:  3/5/2024    Chief Complaint   Patient presents with    Wound Check     Left leg F/U        HISTORY of PRESENT ILLNESS HPI     Georges Bonner is a 69 y.o. male who presents today for wound evaluation.   History of Wound: Left pretibial ulcer  Wound Pain:  mild  Severity:  4 / 10   Wound Type:  pressure  Modifying Factors:  smoking  Associated Signs/Symptoms:  none    Procedure Note    Performed by: Inocencio Quinones MD    Consent obtained: Yes    Time out taken:  Yes    Pain Control: Anesthetic  Anesthetic: 4% Lidocaine Cream     Debridement:Excisional Debridement    Using curette the wound was sharply debrided    down through and including the removal of subcutaneous tissue.        Devitalized Tissue Debrided:  necrotic/eschar    Pre Debridement Measurements:  Are located in the Wound Documentation Flow Sheet    Wound #: 1     Post  Debridement Measurements:  Wound 24 Leg Left;Lower;Anterior #1 (Active)   Wound Image   24 0815   Wound Etiology Other 24 0808   Wound Cleansed Cleansed with saline 24 0808   Wound Length (cm) 1.8 cm 24 0808   Wound Width (cm) 1 cm 24 0808   Wound Depth (cm) 0.1 cm 24 08   Wound Surface Area (cm^2) 1.8 cm^2 24 0808   Change in Wound Size % (l*w) -33.33 24 0808   Wound Volume (cm^3) 0.18 cm^3 24 08   Wound Healing % -33 24 0808   Post-Procedure Length (cm) 1.8 cm 24 0832   Post-Procedure Width (cm) 1 cm 24 0832   Post-Procedure Depth (cm) 0.1 cm 2432   Post-Procedure Surface Area (cm^2) 1.8 cm^2 2432   Post-Procedure Volume (cm^3) 0.18 cm^3 24 0832   Wound Assessment Bleeding 24 0832   Drainage Amount Moderate (25-50%) 24 08   Drainage Description Serosanguinous 24 0808   Odor None 24 0808   Jacqueline-wound Assessment Fragile 24 0808   Margins Defined edges 24 0808   Number of

## 2024-03-06 ENCOUNTER — HOSPITAL ENCOUNTER (OUTPATIENT)
Dept: PHYSICAL THERAPY | Age: 69
Setting detail: THERAPIES SERIES
Discharge: HOME | End: 2024-03-06
Payer: COMMERCIAL

## 2024-03-06 PROCEDURE — 97112 NEUROMUSCULAR REEDUCATION: CPT | Performed by: SPECIALIST/TECHNOLOGIST

## 2024-03-06 PROCEDURE — 97110 THERAPEUTIC EXERCISES: CPT | Performed by: SPECIALIST/TECHNOLOGIST

## 2024-03-06 PROCEDURE — 97016 VASOPNEUMATIC DEVICE THERAPY: CPT | Performed by: SPECIALIST/TECHNOLOGIST

## 2024-03-06 PROCEDURE — 97530 THERAPEUTIC ACTIVITIES: CPT | Performed by: SPECIALIST/TECHNOLOGIST

## 2024-03-06 NOTE — FLOWSHEET NOTE
[facility] - Outpatient Rehabilitation and Therapy [address], [place] [address] office: [phone] fax: [phone]    Physical Therapy: TREATMENT/PROGRESS NOTE   Patient: Georges Bonner (69 y.o. male)   Treatment Date: 2024   :  1955 MRN: [id number]   Visit #: 21   Insurance Allowable Auth Needed   3/5 visits through 24 - 24 [x]Yes    []No    Insurance: Payor: [place] / Plan: [facility] - [place] PPO / Product Type: *No Product type* /   Insurance ID: [id number] - ([facility])  Secondary Insurance (if applicable):    Treatment Diagnosis:     ICD-10-CM    1. Left knee pain, unspecified chronicity  M25.562       2. Decreased range of motion (ROM) of left knee  M25.662       3. Left leg weakness  R29.898          Medical Diagnosis:    S/P total knee arthroplasty, left [Z96.652]   Referring Physician: Javier Ford MD  PCP: Prabhakar Veras MD                             Plan of care signed (Y/N): Y    Date of Patient follow up with Physician:  Logan     Progress Report/POC: NO  POC update due: (10 visits /OR AUTH LIMITS, whichever is less) 3/5/2023     Precautions/Contra-indications: L TKA 23, Prior history of CABG , HTN. RT knee OA     Preferred Language for Healthcare:   [x]English       []other:    SUBJECTIVE EXAMINATION     Patient Report/Comments: Balance has been his biggest issue still. Walking about 2 blocks and uses cane PRN. 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     Test used Initial score  11/8/23 2024 2024   2024   3/6/2024     Pain Summary VAS 2-5/10 2-7/10, soreness 4/10, stiff 4/10 2   Functional questionnaire WOMAC 2996 50/96 60/96 51/96    Other:                      OBJECTIVE EXAMINATION   Observation:    RT knee Antalgia present due to lacking TKE. Left knee mildly antalagic  24: TKE still lacking strong quad contraction. Atrophy still noted.

## 2024-03-08 ENCOUNTER — TELEPHONE (OUTPATIENT)
Dept: CASE MANAGEMENT | Age: 69
End: 2024-03-08

## 2024-03-11 NOTE — PATIENT INSTRUCTIONS
[facility]  [address]   [place], [place] [address]  Telephone: [phone]     FAX [phone]    Discharge Instructions    Important reminders:    **If you have any signs and symptoms of illness (Cough, fever, congestion, nausea, vomiting, diarrhea, etc.) please call the wound care center prior to your appointment.    1. Increase Protein intake for optimal wound healing  2. No added salt to reduce any swelling  3. If diabetic, maintain good glucose control  4. If you smoke, smoking prohibits wound healing, we ask that you refrain from smoking.  5. When taking antibiotics take the entire prescription as ordered. Do not stop taking until medication is all gone unless otherwise instructed.   6. Exercise as tolerated.   7. Keep weight off wounds and reposition every 2 hours if applicable.  8. If wound(s) is on your lower extremity, elevate legs to the level of the heart or above for 30 minutes 4-5 times a day and/or when sitting. Avoid standing for long periods of time.   9. Do not get wounds wet in bath or shower unless otherwise instructed by your physician. If your wound is on your foot or leg, you may purchase a cast bag. Please ask at the pharmacy.      If Vascular testing is ordered, please call [address] [place] [address] [phone]) to schedule.    Vascular tests ordered by Wound Care Physicians may take up to 2 hours to complete. Please keep that in mind when scheduling.     If Vascular testing is scheduled, please bring supplies to replace your dressing after testing is done. The vascular department does not stock supplies.     Wound: Left lower leg     With each dressing change, rinse wounds with 0.9% Saline. (May use wound wash or soft contact solution. Both can be purchased at a local drug store). If unable to obtain saline, may use a gentle soap and water.    Dressing care: Santyl, moist to dry, dry dressing, 2 layer tubi - change daily. You may shower- remove dressing before shower and place

## 2024-03-12 ENCOUNTER — HOSPITAL ENCOUNTER (OUTPATIENT)
Dept: WOUND CARE | Age: 69
Discharge: HOME | End: 2024-03-12
Attending: SURGERY
Payer: COMMERCIAL

## 2024-03-12 VITALS — DIASTOLIC BLOOD PRESSURE: 105 MMHG | TEMPERATURE: 96 F | SYSTOLIC BLOOD PRESSURE: 157 MMHG | HEART RATE: 74 BPM

## 2024-03-12 DIAGNOSIS — S81.802A OPEN WOUND OF LOWER LEG, LEFT, INITIAL ENCOUNTER: Primary | ICD-10-CM

## 2024-03-12 PROCEDURE — 11042 DBRDMT SUBQ TIS 1ST 20SQCM/<: CPT

## 2024-03-12 PROCEDURE — 11042 DBRDMT SUBQ TIS 1ST 20SQCM/<: CPT | Performed by: SURGERY

## 2024-03-12 RX ORDER — GINSENG 100 MG
CAPSULE ORAL ONCE
OUTPATIENT
Start: 2024-03-12 | End: 2024-03-12

## 2024-03-12 RX ORDER — TRIAMCINOLONE ACETONIDE 1 MG/G
OINTMENT TOPICAL ONCE
OUTPATIENT
Start: 2024-03-12 | End: 2024-03-12

## 2024-03-12 RX ORDER — GENTAMICIN SULFATE 1 MG/G
OINTMENT TOPICAL ONCE
OUTPATIENT
Start: 2024-03-12 | End: 2024-03-12

## 2024-03-12 RX ORDER — IBUPROFEN 200 MG
TABLET ORAL ONCE
OUTPATIENT
Start: 2024-03-12 | End: 2024-03-12

## 2024-03-12 RX ORDER — LIDOCAINE HYDROCHLORIDE 20 MG/ML
JELLY TOPICAL ONCE
OUTPATIENT
Start: 2024-03-12 | End: 2024-03-12

## 2024-03-12 RX ORDER — LIDOCAINE 40 MG/G
CREAM TOPICAL ONCE
OUTPATIENT
Start: 2024-03-12 | End: 2024-03-12

## 2024-03-12 RX ORDER — LIDOCAINE 40 MG/G
CREAM TOPICAL ONCE
Status: DISCONTINUED | OUTPATIENT
Start: 2024-03-12 | End: 2024-03-13 | Stop reason: HOSPADM

## 2024-03-12 RX ORDER — BETAMETHASONE DIPROPIONATE 0.5 MG/G
CREAM TOPICAL ONCE
OUTPATIENT
Start: 2024-03-12 | End: 2024-03-12

## 2024-03-12 RX ORDER — LIDOCAINE 50 MG/G
OINTMENT TOPICAL ONCE
OUTPATIENT
Start: 2024-03-12 | End: 2024-03-12

## 2024-03-12 RX ORDER — BACITRACIN ZINC AND POLYMYXIN B SULFATE 500; 1000 [USP'U]/G; [USP'U]/G
OINTMENT TOPICAL ONCE
OUTPATIENT
Start: 2024-03-12 | End: 2024-03-12

## 2024-03-12 RX ORDER — CLOBETASOL PROPIONATE 0.5 MG/G
OINTMENT TOPICAL ONCE
OUTPATIENT
Start: 2024-03-12 | End: 2024-03-12

## 2024-03-12 RX ORDER — SODIUM CHLOR/HYPOCHLOROUS ACID 0.033 %
SOLUTION, IRRIGATION IRRIGATION ONCE
OUTPATIENT
Start: 2024-03-12 | End: 2024-03-12

## 2024-03-12 RX ORDER — LIDOCAINE HYDROCHLORIDE 40 MG/ML
SOLUTION TOPICAL ONCE
OUTPATIENT
Start: 2024-03-12 | End: 2024-03-12

## 2024-03-12 NOTE — PROGRESS NOTES
Attached edges 03/12/24 0813   Number of days: 20           Total Surface Area Debrided: 1.2 sq cm     Bleeding:  Minimal    Hemostasis Achieved:  by pressure    Procedural Pain:  2  / 10     Post Procedural Pain:  0 / 10     Response to treatment:  Well tolerated by patient.     The nature of the patient's condition was explained in depth. The patient was informed that their compliance to the treatment plan is paramount to successful healing and prevention of further ulceration and/or infection     Treatment Plan:   69-year-old male with a pressure ulceration of the left pretibial region.  The ulcer continues to improve.  Debridement was performed.  We will continue current dressing changes and compression.  He will follow-up with the wound center in 2 weeks.    Inocencio Quinones MD, M.D.  3/12/2024

## 2024-03-12 NOTE — PLAN OF CARE
Discharge instructions given.  Patient verbalized understanding.  Return to [facility] in 2 week(s).

## 2024-03-13 ENCOUNTER — HOSPITAL ENCOUNTER (OUTPATIENT)
Dept: PHYSICAL THERAPY | Age: 69
Setting detail: THERAPIES SERIES
Discharge: HOME | End: 2024-03-13
Payer: COMMERCIAL

## 2024-03-13 PROCEDURE — 97530 THERAPEUTIC ACTIVITIES: CPT

## 2024-03-13 PROCEDURE — 97110 THERAPEUTIC EXERCISES: CPT

## 2024-03-13 NOTE — FLOWSHEET NOTE
curl (ECC) 25# 2 x15    TKE CC to neutral  50#  x15    DL leg press 110# 3 x15    SL leg press 45# 3 x10 Had to reduce weight following heavy DL leg press   Lateral stepping  Maroon 3 laps 15ft Performed    Monster walks Grn + Maroon 2 laps 15ft Use of cones to cue step distance and direction   Lateral step up and over   FSU  6\" avoid using Rt foot to push off  1 x20   3/6          Manual Intervention (98257)    TIME                          NMR re-education (48763)  ' resistance Sets/time Reps CUES NEEDED   SLS w/ RLE kick stand on black air-ex  1' x4    SLS w/ marching     Ladders moving fwd/ rev/ stepping and diagonals patterns w/ TR x 15  5-10\" X10    4 laps Working to SLS and appropriate weight transfer    3/6   SLS  5'  Back foot in kick stand on BOSU   Tandem stance w/ blazepod Long air-ex 55\"/20\" x4 Alternating foot forward          Therapeutic Activity (20620)  25'  Sets/time     Progress note  5'     Patient Education  3'  Provided strategies for mowing grass, instructed to work on walking with cane on grass, progressing to no AD.   TRX Lateral step up 6\" 1 x20 PT SBA     TRX FSU 6\" 1 x30 PT SBA   FSU w/ banded assist 8\" / Grn 2 x15 Performed with band [place] on rig   TRX squats   x20 Bench w/ air-ex, PT SBA  3/6   Sit-stand w/ banded assist Bench / Grn 3 x10 Down to bench w/ banded assist   Job simulation of kneeling  2 x10ea Use of 8\" box w/ air-ex and TRX straps for support            Modalities:         Education  1/5/24: Evaluation - Patient education regarding PT assessment and plan of care. Extensive portion of visit was focused on establishing HEP, discussing normal course of post operative treatment, activity modification acutely after surgery and addressing patient questioning. reviewed stair ambulation and gait with AD. discussed healing time frames, swelling management, return to work expectations and protocol progression. Provided patient time for questions with answers provided when

## 2024-03-18 NOTE — TELEPHONE ENCOUNTER
Detail Level: Detailed Patient called stating he is having chest pain that resolves with NTG, but comes back in couple hours. He is using NTG more often. He has used 20 NTG tabs since Monday. He is nervous and is asking what to do. I called Dr Juwan Blackwell and she is going to get in touch with Marce Gracia CNP for CV[facility] to call pt. I advised pt Marce Gracia will be calling him. Add 59086 Cpt? (Important Note: In 2017 The Use Of 14678 Is Being Tracked By Cms To Determine Future Global Period Reimbursement For Global Periods): yes

## 2024-03-20 ENCOUNTER — HOSPITAL ENCOUNTER (OUTPATIENT)
Dept: PHYSICAL THERAPY | Age: 69
Setting detail: THERAPIES SERIES
Discharge: HOME | End: 2024-03-20
Payer: COMMERCIAL

## 2024-03-20 ENCOUNTER — TELEPHONE (OUTPATIENT)
Dept: ORTHOPEDIC SURGERY | Age: 69
End: 2024-03-20

## 2024-03-20 PROCEDURE — 97110 THERAPEUTIC EXERCISES: CPT | Performed by: SPECIALIST/TECHNOLOGIST

## 2024-03-20 PROCEDURE — 97530 THERAPEUTIC ACTIVITIES: CPT | Performed by: SPECIALIST/TECHNOLOGIST

## 2024-03-20 NOTE — FLOWSHEET NOTE
exam    Home Exercise Program:   Access Code: [email or web address]  URL: [email or web address]  Date: 01/05/2024  Prepared by: Adiel Mo    Exercises  - Supine Active Straight Leg Raise  - 1 x daily - 1 sets - 10 reps - 10 seconds hold  - Short Arc Quad with Ankle Weight  - 1 x daily - 2 sets - 15 reps - 5 seconds hold  - Supine Heel Slide with Strap  - 1 x daily - 10 reps - 5 second hold  - Supine Bridge  - 1 x daily - 2 sets - 10 reps - 2 second hold  - Sidelying Hip Abduction  - 1 x daily - 2 sets - 10 reps - 3 seconds hold  - Seated Table Hamstring Stretch  - 1 x daily - 3 reps - 30 seconds hold  - Seated Long Arc Quad with Ankle Weight  - 1 x daily - 2 sets - 15 reps - 3 seconds hold  - Standing Knee Flexion with Ankle Weight  - 1 x daily - 2 sets - 20 reps  - Single Leg Stance with Support  - 1 x daily - 3 sets - 30 seconds hold        ASSESSMENT     Today's Assessment:  Patient continues to demonstrate significant strength deficits resulting in dysfunction squat, kneeling or stooping. Pt. Using his hands/ arms to assist with standing and performance of stairs today vs using his quads or posterior chain to assist.  Heavily worked on these activities today in preparation with return to work. Pt continues to compensate with his RT leg/ foot to help propel himself up stairs etc.   Pt. Denies pain but gets fatigued quickly but needs encouragement to push himself with HEP.       Medical Necessity Documentation:  I certify that this patient meets the below criteria necessary for medical necessity for care and/or justification of therapy services:  The patient has a musculoskeletal condition(s) with a corresponding ICD-10 code that is of complexity and severity that require skilled therapeutic intervention. This has a direct and significant impact on the need for therapy and significantly impacts the rate of recovery.     Treatment/Activity Tolerance:  [x] Patient tolerated treatment well [] Patient limited by

## 2024-03-20 NOTE — TELEPHONE ENCOUNTER
Pt came to the office today after his PT appt to ask if he can have another Parking Placard. His current one that was for 6 months is about to run out. I told him I would sent Dr Ford a message to see if he wants to renew it

## 2024-03-22 NOTE — PATIENT INSTRUCTIONS
[facility]  [address]   [place], [place] [address]  Telephone: [phone]     FAX [phone]    Discharge Instructions    Important reminders:    **If you have any signs and symptoms of illness (Cough, fever, congestion, nausea, vomiting, diarrhea, etc.) please call the wound care center prior to your appointment.    1. Increase Protein intake for optimal wound healing  2. No added salt to reduce any swelling  3. If diabetic, maintain good glucose control  4. If you smoke, smoking prohibits wound healing, we ask that you refrain from smoking.  5. When taking antibiotics take the entire prescription as ordered. Do not stop taking until medication is all gone unless otherwise instructed.   6. Exercise as tolerated.   7. Keep weight off wounds and reposition every 2 hours if applicable.  8. If wound(s) is on your lower extremity, elevate legs to the level of the heart or above for 30 minutes 4-5 times a day and/or when sitting. Avoid standing for long periods of time.   9. Do not get wounds wet in bath or shower unless otherwise instructed by your physician. If your wound is on your foot or leg, you may purchase a cast bag. Please ask at the pharmacy.      If Vascular testing is ordered, please call [address] [place] [address] [phone]) to schedule.    Vascular tests ordered by Wound Care Physicians may take up to 2 hours to complete. Please keep that in mind when scheduling.     If Vascular testing is scheduled, please bring supplies to replace your dressing after testing is done. The vascular department does not stock supplies.     Wound: Left lower leg     With each dressing change, rinse wounds with 0.9% Saline. (May use wound wash or soft contact solution. Both can be purchased at a local drug store). If unable to obtain saline, may use a gentle soap and water.    Dressing care: Antibiotic ointment, dry dressing (wrap with conforming gauze), 2 layer tubi - change daily. You may shower- remove

## 2024-03-26 ENCOUNTER — TELEPHONE (OUTPATIENT)
Dept: ORTHOPEDIC SURGERY | Age: 69
End: 2024-03-26

## 2024-03-26 ENCOUNTER — HOSPITAL ENCOUNTER (OUTPATIENT)
Dept: WOUND CARE | Age: 69
Discharge: HOME | End: 2024-03-26
Attending: SURGERY
Payer: COMMERCIAL

## 2024-03-26 VITALS — HEART RATE: 78 BPM | DIASTOLIC BLOOD PRESSURE: 76 MMHG | RESPIRATION RATE: 15 BRPM | SYSTOLIC BLOOD PRESSURE: 122 MMHG

## 2024-03-26 DIAGNOSIS — S81.802A OPEN WOUND OF LOWER LEG, LEFT, INITIAL ENCOUNTER: Primary | ICD-10-CM

## 2024-03-26 PROCEDURE — 99213 OFFICE O/P EST LOW 20 MIN: CPT

## 2024-03-26 PROCEDURE — 99213 OFFICE O/P EST LOW 20 MIN: CPT | Performed by: SURGERY

## 2024-03-26 RX ORDER — LIDOCAINE 40 MG/G
CREAM TOPICAL ONCE
Status: DISCONTINUED | OUTPATIENT
Start: 2024-03-26 | End: 2024-03-27 | Stop reason: HOSPADM

## 2024-03-26 RX ORDER — IBUPROFEN 200 MG
TABLET ORAL ONCE
OUTPATIENT
Start: 2024-03-26 | End: 2024-03-26

## 2024-03-26 RX ORDER — LIDOCAINE HYDROCHLORIDE 20 MG/ML
JELLY TOPICAL ONCE
OUTPATIENT
Start: 2024-03-26 | End: 2024-03-26

## 2024-03-26 RX ORDER — BETAMETHASONE DIPROPIONATE 0.5 MG/G
CREAM TOPICAL ONCE
OUTPATIENT
Start: 2024-03-26 | End: 2024-03-26

## 2024-03-26 RX ORDER — LIDOCAINE 40 MG/G
CREAM TOPICAL ONCE
OUTPATIENT
Start: 2024-03-26 | End: 2024-03-26

## 2024-03-26 RX ORDER — BACITRACIN ZINC AND POLYMYXIN B SULFATE 500; 1000 [USP'U]/G; [USP'U]/G
OINTMENT TOPICAL ONCE
OUTPATIENT
Start: 2024-03-26 | End: 2024-03-26

## 2024-03-26 RX ORDER — LIDOCAINE 50 MG/G
OINTMENT TOPICAL ONCE
OUTPATIENT
Start: 2024-03-26 | End: 2024-03-26

## 2024-03-26 RX ORDER — LIDOCAINE HYDROCHLORIDE 40 MG/ML
SOLUTION TOPICAL ONCE
OUTPATIENT
Start: 2024-03-26 | End: 2024-03-26

## 2024-03-26 RX ORDER — GENTAMICIN SULFATE 1 MG/G
OINTMENT TOPICAL ONCE
OUTPATIENT
Start: 2024-03-26 | End: 2024-03-26

## 2024-03-26 RX ORDER — TRIAMCINOLONE ACETONIDE 1 MG/G
OINTMENT TOPICAL ONCE
OUTPATIENT
Start: 2024-03-26 | End: 2024-03-26

## 2024-03-26 RX ORDER — CLOBETASOL PROPIONATE 0.5 MG/G
OINTMENT TOPICAL ONCE
OUTPATIENT
Start: 2024-03-26 | End: 2024-03-26

## 2024-03-26 RX ORDER — SODIUM CHLOR/HYPOCHLOROUS ACID 0.033 %
SOLUTION, IRRIGATION IRRIGATION ONCE
OUTPATIENT
Start: 2024-03-26 | End: 2024-03-26

## 2024-03-26 RX ORDER — GINSENG 100 MG
CAPSULE ORAL ONCE
OUTPATIENT
Start: 2024-03-26 | End: 2024-03-26

## 2024-03-26 NOTE — TELEPHONE ENCOUNTER
I left a VM asking about the pts upcoming appt with Dr Ford. In Logan's notes he states for the pt to come back in 6 months. The appt is only 6 weeks. I asked the pt to let me know if he is having any problems or concerns that are bringing him back so soon

## 2024-03-26 NOTE — PROGRESS NOTES
[facility]  Progress Note       Georges Bonner  AGE: 69 y.o.   GENDER: male  : 1955  TODAY'S DATE:  3/26/2024    Subjective:     Chief Complaint   Patient presents with    Wound Check     Follow up leg wound         HISTORY of PRESENT ILLNESS HPI     Georges Bonner is a 69 y.o. male who presents today for wound evaluation.   History of Wound: Left lower leg wound    Wound Pain:  mild  Severity:  2 / 10   Wound Type:  pressure  Modifying Factors:  smoking  Associated Signs/Symptoms:  none        PAST MEDICAL HISTORY        Diagnosis Date    Bullous emphysema (HCC)     CAD (coronary artery disease)     GERD (gastroesophageal reflux disease)     HTN (hypertension)     Hyperlipidemia     Mediastinal cyst 2018       PAST SURGICAL HISTORY    Past Surgical History:   Procedure Laterality Date    CARDIAC CATHETERIZATION  2018    Dr. Calderon    COLONOSCOPY  2017    Dr. Vaz - transverse colon polyps x2 (4mm, 5mm), tubular adenoma & hyperplastic    IN CORONARY ARTERY BYP W/VEIN & ARTERY GRAFT 3 VEIN N/A 10/29/2018    CISCO, TOTAL CPB, AORTO-CABG X 3 USING ENDOSCOPICALLY HARVESTED LEFT SVG, LIMA GRAFT X 1, LEFT INTERNAL MAMMARY ARTERY LYMPH NODE BIOPSY, DOPPLER FLOW VERIFICATION, BILATERAL INTERCOSTAL NERVE BLOCK performed by Willam Callejas MD at [facility] CVOR    TOTAL KNEE ARTHROPLASTY Left 2023    LEFT TOTAL KNEE REPLACEMENT -ADELITA performed by Javier Ford MD at [facility] OR    UPPER GASTROINTESTINAL ENDOSCOPY  2017    Dr. Vaz - erosive gastritis    UPPER GASTROINTESTINAL ENDOSCOPY N/A 2018    Dr. Pardo - w/EUS & needle biopsy       FAMILY HISTORY    Family History   Problem Relation Age of Onset    Heart Disease Brother 30        CABG x 2    Heart Disease Mother         CABG x 2       SOCIAL HISTORY    Social History     Tobacco Use    Smoking status: Every Day     Current packs/day: 0.50     Average packs/day: 0.5 packs/day for 45.2 years (22.6 ttl

## 2024-03-26 NOTE — PLAN OF CARE
Discharge instructions given.  Patient verbalized understanding.  Return to [facility] in 2 week(s).

## 2024-03-27 ENCOUNTER — HOSPITAL ENCOUNTER (OUTPATIENT)
Dept: PHYSICAL THERAPY | Age: 69
Setting detail: THERAPIES SERIES
Discharge: HOME | End: 2024-03-27
Payer: COMMERCIAL

## 2024-03-27 PROCEDURE — 97110 THERAPEUTIC EXERCISES: CPT

## 2024-03-27 PROCEDURE — 97530 THERAPEUTIC ACTIVITIES: CPT

## 2024-03-27 NOTE — FLOWSHEET NOTE
patient meets the below criteria necessary for medical necessity for care and/or justification of therapy services:  The patient has a musculoskeletal condition(s) with a corresponding ICD-10 code that is of complexity and severity that require skilled therapeutic intervention. This has a direct and significant impact on the need for therapy and significantly impacts the rate of recovery.     Treatment/Activity Tolerance:  [x] Patient tolerated treatment well [] Patient limited by fatique  [] Patient limited by pain  [] Patient limited by other medical complications  [] Other:     Return to Play: NA    Prognosis for POC: [x] Good [] Fair  [] Poor    Patient requires continued skilled intervention: [x] Yes  [] No      GOALS     Patient stated goal: \"Walk without knee pain\"  Status: [] Progressing: [x] Met: [] Not Met: [] Adjusted    Therapist goals for Patient:   Short Term Goals: To be achieved in: 2-4 weeks  Independent in HEP and progression per patient tolerance, in order to progress toward full function and prevent re-injury.    Status: [] Progressing: [x] Met: [] Not Met: [] Adjusted  Patient will have a decrease in pain to 3/10 to help  facilitate improvement in movement, function, and ADLs as indicated by functional deficits.   Status: [] Progressing: [x] Met: [] Not Met: [] Adjusted    Long Term Goals: To be achieved in: 8-10 weeks  Disability index score of 15% or less for the WOMAC to assist with return top prior level of function.    Status: [] Progressing: [x] Met: [] Not Met: [] Adjusted  LLE AROM = RLE AROM to allow for proper joint functioning as indicated by patients functional deficits.  Status: [] Progressing: [x] Met: [] Not Met: [] Adjusted  Pt to improve strength to 4+/5 or better of proximal hip, quadriceps, and hamstringsto allow for proper muscle and joint use in functional mobility, ADLs and prior level of function  Status: [] Progressing: [] Met: [x] Not Met: [] Adjusted  Patient will

## 2024-03-28 ENCOUNTER — OFFICE VISIT (OUTPATIENT)
Dept: ORTHOPEDIC SURGERY | Age: 69
End: 2024-03-28
Payer: COMMERCIAL

## 2024-03-28 VITALS — HEIGHT: 74 IN | WEIGHT: 191 LBS | BODY MASS INDEX: 24.51 KG/M2

## 2024-03-28 DIAGNOSIS — Z96.652 S/P TOTAL KNEE ARTHROPLASTY, LEFT: Primary | ICD-10-CM

## 2024-03-28 PROCEDURE — 99214 OFFICE O/P EST MOD 30 MIN: CPT | Performed by: ORTHOPAEDIC SURGERY

## 2024-03-28 PROCEDURE — 1123F ACP DISCUSS/DSCN MKR DOCD: CPT | Performed by: ORTHOPAEDIC SURGERY

## 2024-03-28 NOTE — PROGRESS NOTES
Patient: Georges Bonner                  : 1955   MRN: [id number]   Date of Visit: 3/28/24    Physician: Javier Ford MD.     Reason for Visit: Status post TKA     Subjective History of Present Illness:     Georges Bonner is here for regularly scheduled follow-up s/p LEFT  TKA. Reports they are doing well, occasional aches and pains are controlled with over the counter medications.     Physical Examination??: ?   General: Patient is alert and oriented x 3 and appears comfortable.   Incision is well-approximated, no erythema, fluctuance   Able to perform SLR, significant quad weakness  ROM 5-120    Radiographs: AP/lateral of the operative knee with well-positioned total knee arthroplasty. No evidence of fracture subluxation or dislocation. No evidence of migration or loosening.      Assessment and Plan?: The patient is progressing well approximately 4 months  s/p TKA     1.  At this point I will approve him for more physical therapy as I do think he could continue benefit from significant quad strengthening as he becomes significantly atrophied and deconditioned over time.    Provided him a prescription for amoxicillin for dental procedures, discussed risks and benefits.    I will see him back in 6 months with repeat x-rays at that time.  _______________________      Javier Ford MD

## 2024-04-04 RX ORDER — AMOXICILLIN 500 MG/1
500 CAPSULE ORAL DAILY PRN
Qty: 16 CAPSULE | Refills: 0 | Status: SHIPPED | OUTPATIENT
Start: 2024-04-04 | End: 2024-04-20

## 2024-07-01 ENCOUNTER — OFFICE VISIT (OUTPATIENT)
Dept: ORTHOPEDIC SURGERY | Age: 69
End: 2024-07-01
Payer: COMMERCIAL

## 2024-07-01 DIAGNOSIS — Z96.652 S/P TOTAL KNEE ARTHROPLASTY, LEFT: Primary | ICD-10-CM

## 2024-07-01 PROCEDURE — 1123F ACP DISCUSS/DSCN MKR DOCD: CPT | Performed by: ORTHOPAEDIC SURGERY

## 2024-07-01 PROCEDURE — 99213 OFFICE O/P EST LOW 20 MIN: CPT | Performed by: ORTHOPAEDIC SURGERY

## 2024-07-01 NOTE — PROGRESS NOTES
Patient: Georges Bonner                  : 1955   MRN: [id number]   Date of Visit: 24    Physician: Javier Ford MD.     Reason for Visit: Status post TKA     Subjective History of Present Illness:     Georges Bonner is here for regularly scheduled follow-up s/p LEFT  TKA. Reports they are doing well, occasional aches and pains are controlled with over the counter medications.     He does report some dizziness spells which I discussed follow-up with his primary care    Physical Examination??: ?   General: Patient is alert and oriented x 3 and appears comfortable.   Incision is well-approximated, no erythema, fluctuance   Able to perform SLR, significant quad weakness  ROM 5-120    Radiographs: AP/lateral of the operative knee with well-positioned total knee arthroplasty. No evidence of fracture subluxation or dislocation. No evidence of migration or loosening.      Assessment and Plan?: The patient is progressing well approximately 7 months  s/p TKA     1.  He is quite active on the knee however he continues to have some soreness and swelling in the knee at times I think this is largely activity related there does not appear to be any mechanical issues with the knee.    Spoke with the patient regarding the use of over-the-counter medications both oral and topical medications for pain control.    He does report some dizziness spells which I discussed follow-up with his primary care  _______________________      Javier Ford MD

## 2024-07-17 NOTE — PROGRESS NOTES
[facility]   Cardiac Evaluation      Patient: Georges Bonner  YOB: 1955         Chief Complaint   Patient presents with    Atrial Fibrillation    Hypertension    Coronary Artery Disease            Referring provider: Prabhakar Veras MD    History of Present Illness:   Mr Bonner is seen today in follow up. He was hospitalized after found to have an abnormal CT scan showing a left ventricular thrombus.  His EKG was markedly abnormal and he underwent cardiac cath revealing 3V CAD. An incidental finding on the CT scan which was possibly consistent with malignancy and he required PET scanning and otherstudies to further identify the abnormality which turned out to be a benign cyst.  He is a very anxious individual. He underwent CABG X 4 on 10/29/2018.  He had an uneventful postoperative course with the exception of a short bout of A-Fib. The patient was discharged on 11/07/2018 in [facility].  Amiodarone has been discontinued.      He had a CT at [facility] and then saw Dr Alejandro and Dr Callejas. He was also referred to hematology and GI for splenomegaly but then found that he did not have splenomegaly.    Mr Bonner was hospitalized at  12/12/22 after falling in his bathroom. He was treated for the Flu and found to have urinary retention. He was found to be in atrial flutter and Eliquis was initiated. He also had an echo that revealed EF 40-45%, so Entresto was initiated as well.  He had a CV last Friday for AFL and was shocked to SR with 100 joules. No complications.      He was hospitalized 12/23 with COVID pneumonia. He had an echo and Dr Abraham saw in consult for small apical mural thrombus. It was advised that he restart Eliquis as previously prescribed. He had previously been in the hospital for a knee replacement and came home then developed shortness of breath. He has follow with Dr Hernandez, pulmonary for follow up.    Today, Mr Bonner states he is well. He denies chest pain, palpitations, CHAVARRIA,

## 2024-07-31 ENCOUNTER — OFFICE VISIT (OUTPATIENT)
Dept: CARDIOLOGY CLINIC | Age: 69
End: 2024-07-31
Payer: COMMERCIAL

## 2024-07-31 VITALS
SYSTOLIC BLOOD PRESSURE: 120 MMHG | WEIGHT: 197 LBS | DIASTOLIC BLOOD PRESSURE: 70 MMHG | HEART RATE: 64 BPM | HEIGHT: 74 IN | BODY MASS INDEX: 25.28 KG/M2 | OXYGEN SATURATION: 92 %

## 2024-07-31 DIAGNOSIS — I25.119 CORONARY ARTERY DISEASE INVOLVING NATIVE CORONARY ARTERY OF NATIVE HEART WITH ANGINA PECTORIS (HCC): Primary | ICD-10-CM

## 2024-07-31 DIAGNOSIS — I10 ESSENTIAL HYPERTENSION: ICD-10-CM

## 2024-07-31 DIAGNOSIS — Z72.0 TOBACCO USE: ICD-10-CM

## 2024-07-31 DIAGNOSIS — I48.92 ATRIAL FLUTTER, UNSPECIFIED TYPE (HCC): ICD-10-CM

## 2024-07-31 DIAGNOSIS — E78.2 MIXED HYPERLIPIDEMIA: ICD-10-CM

## 2024-07-31 PROCEDURE — 99214 OFFICE O/P EST MOD 30 MIN: CPT | Performed by: INTERNAL MEDICINE

## 2024-07-31 PROCEDURE — 3074F SYST BP LT 130 MM HG: CPT | Performed by: INTERNAL MEDICINE

## 2024-07-31 PROCEDURE — 3078F DIAST BP <80 MM HG: CPT | Performed by: INTERNAL MEDICINE

## 2024-07-31 PROCEDURE — 1123F ACP DISCUSS/DSCN MKR DOCD: CPT | Performed by: INTERNAL MEDICINE

## 2024-07-31 RX ORDER — VALSARTAN AND HYDROCHLOROTHIAZIDE 80; 12.5 MG/1; MG/1
1 TABLET, FILM COATED ORAL DAILY
Qty: 90 TABLET | Refills: 3 | Status: SHIPPED | OUTPATIENT
Start: 2024-07-31

## 2024-08-26 ENCOUNTER — TELEPHONE (OUTPATIENT)
Dept: FAMILY MEDICINE CLINIC | Age: 69
End: 2024-08-26

## 2024-08-26 ASSESSMENT — PATIENT HEALTH QUESTIONNAIRE - PHQ9
2. FEELING DOWN, DEPRESSED OR HOPELESS: NOT AT ALL
SUM OF ALL RESPONSES TO PHQ QUESTIONS 1-9: 0
1. LITTLE INTEREST OR PLEASURE IN DOING THINGS: NOT AT ALL
SUM OF ALL RESPONSES TO PHQ9 QUESTIONS 1 & 2: 0
SUM OF ALL RESPONSES TO PHQ QUESTIONS 1-9: 0
SUM OF ALL RESPONSES TO PHQ9 QUESTIONS 1 & 2: 0
SUM OF ALL RESPONSES TO PHQ QUESTIONS 1-9: 0
SUM OF ALL RESPONSES TO PHQ QUESTIONS 1-9: 0
2. FEELING DOWN, DEPRESSED OR HOPELESS: NOT AT ALL
1. LITTLE INTEREST OR PLEASURE IN DOING THINGS: NOT AT ALL

## 2024-08-28 ENCOUNTER — OFFICE VISIT (OUTPATIENT)
Dept: FAMILY MEDICINE CLINIC | Age: 69
End: 2024-08-28
Payer: COMMERCIAL

## 2024-08-28 VITALS
HEART RATE: 72 BPM | DIASTOLIC BLOOD PRESSURE: 85 MMHG | OXYGEN SATURATION: 97 % | WEIGHT: 197 LBS | SYSTOLIC BLOOD PRESSURE: 142 MMHG | BODY MASS INDEX: 25.28 KG/M2 | HEIGHT: 74 IN

## 2024-08-28 DIAGNOSIS — I50.22 CHRONIC SYSTOLIC (CONGESTIVE) HEART FAILURE (HCC): ICD-10-CM

## 2024-08-28 DIAGNOSIS — R42 DIZZINESS: ICD-10-CM

## 2024-08-28 DIAGNOSIS — H69.93 DYSFUNCTION OF BOTH EUSTACHIAN TUBES: Primary | ICD-10-CM

## 2024-08-28 DIAGNOSIS — H93.13 TINNITUS OF BOTH EARS: ICD-10-CM

## 2024-08-28 PROCEDURE — 99213 OFFICE O/P EST LOW 20 MIN: CPT | Performed by: FAMILY MEDICINE

## 2024-08-28 PROCEDURE — 3079F DIAST BP 80-89 MM HG: CPT | Performed by: FAMILY MEDICINE

## 2024-08-28 PROCEDURE — 1123F ACP DISCUSS/DSCN MKR DOCD: CPT | Performed by: FAMILY MEDICINE

## 2024-08-28 PROCEDURE — 3077F SYST BP >= 140 MM HG: CPT | Performed by: FAMILY MEDICINE

## 2024-08-28 RX ORDER — LORATADINE 10 MG/1
10 TABLET ORAL DAILY
Qty: 30 TABLET | Refills: 1 | Status: SHIPPED | OUTPATIENT
Start: 2024-08-28 | End: 2024-10-27

## 2024-08-28 RX ORDER — FLUTICASONE PROPIONATE 50 MCG
1 SPRAY, SUSPENSION (ML) NASAL DAILY
Qty: 16 G | Refills: 2 | Status: SHIPPED | OUTPATIENT
Start: 2024-08-28

## 2024-08-28 NOTE — PROGRESS NOTES
[facility] Medicine  Clinic Note    Date: 8/28/2024                                               /Objective:     Chief Complaint   Patient presents with    Dizziness       HPI  Dizziness with walking starting about a month ago. Was getting worse but now it's little better. Describes it as lightheadedness. No room-spinning. Pt reports that he got his eyeglasses changed 2 months ago and it's a little blurry, had it adjusted once and it's better but still a little blurred. Also reports ringing in the ears starting about a month ago. Ringing gets better when he opens his jaw opens. Denies ear pain or muffled hearing.  Patient has history of CAD status post CABG in 2018. Saw his cardiologist about a month ago, said the dizziness was not cardiac related.          Patient Active Problem List    Diagnosis Date Noted    Atrial flutter (HCC) 12/30/2022    PAF (paroxysmal atrial fibrillation) ([facility]) 12/21/2022    Chronic systolic (congestive) heart failure 08/28/2024    Open wound of lower leg, left, initial encounter 02/20/2024    COPD with acute exacerbation ([facility]) 12/20/2023    COVID-19 12/20/2023    Acute respiratory failure with hypoxia ([facility]) 12/18/2023    Status post total knee replacement 12/06/2023    Secondary hypercoagulable state (HCC) 11/28/2023    Ear canal mass, left 11/13/2023    Splenomegaly 07/14/2021    Coronary artery disease involving native coronary artery of native heart with angina pectoris (HCC)     Tobacco use 09/24/2018    Enlarged lymph node 09/24/2018    LV (left ventricular) mural thrombus 09/19/2018    Mediastinal cyst 09/01/2018    Essential hypertension 03/14/2016    GERD (gastroesophageal reflux disease) 03/09/2015    Hyperlipidemia 09/08/2014    Chest pain 09/23/2013       Past Medical History:   Diagnosis Date    Bullous emphysema (HCC)     CAD (coronary artery disease)     GERD (gastroesophageal reflux disease)     HTN (hypertension)     Hyperlipidemia     Mediastinal cyst 09/2018

## 2024-10-22 DIAGNOSIS — R42 DIZZINESS: ICD-10-CM

## 2024-10-22 DIAGNOSIS — H69.93 DYSFUNCTION OF BOTH EUSTACHIAN TUBES: ICD-10-CM

## 2024-10-23 RX ORDER — LORATADINE 10 MG/1
10 TABLET ORAL DAILY
Qty: 30 TABLET | Refills: 1 | Status: SHIPPED | OUTPATIENT
Start: 2024-10-23 | End: 2024-12-22

## 2024-10-23 NOTE — TELEPHONE ENCOUNTER
Medication:   Requested Prescriptions     Pending Prescriptions Disp Refills    loratadine (CLARITIN) 10 MG tablet [Pharmacy Med Name: LORATADINE 10 MG TABLET] 30 tablet 1     Sig: TAKE 1 TABLET BY MOUTH DAILY        Last Filled:      Patient Phone Number: [phone] (home) [phone] (work)    Last appt: 8/28/2024   Next appt: Visit date not found    Last OARRS:        No data to display

## 2024-11-13 ENCOUNTER — OFFICE VISIT (OUTPATIENT)
Dept: FAMILY MEDICINE CLINIC | Age: 69
End: 2024-11-13

## 2024-11-13 VITALS
HEIGHT: 74 IN | BODY MASS INDEX: 25.23 KG/M2 | WEIGHT: 196.6 LBS | OXYGEN SATURATION: 98 % | DIASTOLIC BLOOD PRESSURE: 88 MMHG | TEMPERATURE: 98 F | SYSTOLIC BLOOD PRESSURE: 138 MMHG | HEART RATE: 79 BPM

## 2024-11-13 DIAGNOSIS — H93.13 TINNITUS OF BOTH EARS: ICD-10-CM

## 2024-11-13 DIAGNOSIS — R42 DIZZINESS: ICD-10-CM

## 2024-11-13 DIAGNOSIS — R10.13 EPIGASTRIC PAIN: Primary | ICD-10-CM

## 2024-11-13 DIAGNOSIS — Z00.00 ANNUAL PHYSICAL EXAM: ICD-10-CM

## 2024-11-13 RX ORDER — FAMOTIDINE 20 MG/1
20 TABLET, FILM COATED ORAL 2 TIMES DAILY
Qty: 60 TABLET | Refills: 3 | Status: SHIPPED | OUTPATIENT
Start: 2024-11-13

## 2024-11-13 SDOH — ECONOMIC STABILITY: INCOME INSECURITY: HOW HARD IS IT FOR YOU TO PAY FOR THE VERY BASICS LIKE FOOD, HOUSING, MEDICAL CARE, AND HEATING?: NOT HARD AT ALL

## 2024-11-13 SDOH — ECONOMIC STABILITY: FOOD INSECURITY: WITHIN THE PAST 12 MONTHS, YOU WORRIED THAT YOUR FOOD WOULD RUN OUT BEFORE YOU GOT MONEY TO BUY MORE.: NEVER TRUE

## 2024-11-13 SDOH — ECONOMIC STABILITY: FOOD INSECURITY: WITHIN THE PAST 12 MONTHS, THE FOOD YOU BOUGHT JUST DIDN'T LAST AND YOU DIDN'T HAVE MONEY TO GET MORE.: NEVER TRUE

## 2024-11-13 NOTE — PROGRESS NOTES
[facility] Medicine  Clinic Note    Date: 11/13/2024                                               /Objective:     Chief Complaint   Patient presents with    Dizziness     Nothing wrong with eyes       HPI  Dizziness starting 3-4 months ago. Describes it as lightheadedness. Feels a little off balance when walking. Is there every day but goes away in the afternoon. Doesn't not feel it when lying down at night. +continues to have ear ringing. Has been taking Claritin and Flonase consistently and it's not helping. Denies CP/SOB.     Does report getting epigastric pain about once a day, typically at night. Denies nausea/vomiting. Has been going on for several months.         Patient Active Problem List    Diagnosis Date Noted    Atrial flutter (HCC) 12/30/2022    PAF (paroxysmal atrial fibrillation) ([facility]) 12/21/2022    Chronic systolic (congestive) heart failure 08/28/2024    Open wound of lower leg, left, initial encounter 02/20/2024    COPD with acute exacerbation ([facility]) 12/20/2023    COVID-19 12/20/2023    Acute respiratory failure with hypoxia 12/18/2023    Status post total knee replacement 12/06/2023    Secondary hypercoagulable state (HCC) 11/28/2023    Ear canal mass, left 11/13/2023    Splenomegaly 07/14/2021    Coronary artery disease involving native coronary artery of native heart with angina pectoris ([facility])     Tobacco use 09/24/2018    Enlarged lymph node 09/24/2018    LV (left ventricular) mural thrombus 09/19/2018    Mediastinal cyst 09/01/2018    Essential hypertension 03/14/2016    GERD (gastroesophageal reflux disease) 03/09/2015    Hyperlipidemia 09/08/2014    Chest pain 09/23/2013       Past Medical History:   Diagnosis Date    Bullous emphysema (HCC)     CAD (coronary artery disease)     GERD (gastroesophageal reflux disease)     HTN (hypertension)     Hyperlipidemia     Mediastinal cyst 09/2018       Current Outpatient Medications   Medication Sig Dispense Refill    famotidine (PEPCID) 20 MG

## 2024-12-03 DIAGNOSIS — Z00.00 ANNUAL PHYSICAL EXAM: ICD-10-CM

## 2024-12-04 LAB
ALBUMIN SERPL-MCNC: 4.8 G/DL (ref 3.4–5)
ALBUMIN/GLOB SERPL: 1.9 {RATIO} (ref 1.1–2.2)
ALP SERPL-CCNC: 85 U/L (ref 40–129)
ALT SERPL-CCNC: 28 U/L (ref 10–40)
ANION GAP SERPL CALCULATED.3IONS-SCNC: 10 MMOL/L (ref 3–16)
AST SERPL-CCNC: 36 U/L (ref 15–37)
BASOPHILS # BLD: 0.1 K/UL (ref 0–0.2)
BASOPHILS NFR BLD: 0.8 %
BILIRUB SERPL-MCNC: 0.7 MG/DL (ref 0–1)
BUN SERPL-MCNC: 20 MG/DL (ref 7–20)
CALCIUM SERPL-MCNC: 10.1 MG/DL (ref 8.3–10.6)
CHLORIDE SERPL-SCNC: 104 MMOL/L (ref 99–110)
CHOLEST SERPL-MCNC: 151 MG/DL (ref 0–199)
CO2 SERPL-SCNC: 28 MMOL/L (ref 21–32)
CREAT SERPL-MCNC: 1.1 MG/DL (ref 0.8–1.3)
DEPRECATED RDW RBC AUTO: 14.9 % (ref 12.4–15.4)
EOSINOPHIL # BLD: 0.2 K/UL (ref 0–0.6)
EOSINOPHIL NFR BLD: 2.1 %
EST. AVERAGE GLUCOSE BLD GHB EST-MCNC: 111.2 MG/DL
GFR SERPLBLD CREATININE-BSD FMLA CKD-EPI: 73 ML/MIN/{1.73_M2}
GLUCOSE SERPL-MCNC: 94 MG/DL (ref 70–99)
HBA1C MFR BLD: 5.5 %
HCT VFR BLD AUTO: 51.6 % (ref 40.5–52.5)
HDLC SERPL-MCNC: 36 MG/DL (ref 40–60)
HGB BLD-MCNC: 17.7 G/DL (ref 13.5–17.5)
LDL CHOLESTEROL: 100 MG/DL
LYMPHOCYTES # BLD: 0.9 K/UL (ref 1–5.1)
LYMPHOCYTES NFR BLD: 13.1 %
MCH RBC QN AUTO: 33.7 PG (ref 26–34)
MCHC RBC AUTO-ENTMCNC: 34.2 G/DL (ref 31–36)
MCV RBC AUTO: 98.6 FL (ref 80–100)
MONOCYTES # BLD: 0.5 K/UL (ref 0–1.3)
MONOCYTES NFR BLD: 7.3 %
NEUTROPHILS # BLD: 5.5 K/UL (ref 1.7–7.7)
NEUTROPHILS NFR BLD: 76.7 %
PLATELET # BLD AUTO: 169 K/UL (ref 135–450)
PMV BLD AUTO: 8.5 FL (ref 5–10.5)
POTASSIUM SERPL-SCNC: 3.8 MMOL/L (ref 3.5–5.1)
PROT SERPL-MCNC: 7.3 G/DL (ref 6.4–8.2)
RBC # BLD AUTO: 5.24 M/UL (ref 4.2–5.9)
SODIUM SERPL-SCNC: 142 MMOL/L (ref 136–145)
TRIGL SERPL-MCNC: 76 MG/DL (ref 0–150)
VLDLC SERPL CALC-MCNC: 15 MG/DL
WBC # BLD AUTO: 7.2 K/UL (ref 4–11)

## 2024-12-05 ENCOUNTER — OFFICE VISIT (OUTPATIENT)
Dept: FAMILY MEDICINE CLINIC | Age: 69
End: 2024-12-05

## 2024-12-05 VITALS
SYSTOLIC BLOOD PRESSURE: 140 MMHG | HEIGHT: 74 IN | BODY MASS INDEX: 25.67 KG/M2 | DIASTOLIC BLOOD PRESSURE: 80 MMHG | TEMPERATURE: 97.9 F | WEIGHT: 200 LBS | OXYGEN SATURATION: 97 % | HEART RATE: 65 BPM

## 2024-12-05 DIAGNOSIS — D58.2 ELEVATED HEMOGLOBIN (HCC): ICD-10-CM

## 2024-12-05 DIAGNOSIS — D68.69 SECONDARY HYPERCOAGULABLE STATE (HCC): ICD-10-CM

## 2024-12-05 DIAGNOSIS — K21.9 GASTROESOPHAGEAL REFLUX DISEASE WITHOUT ESOPHAGITIS: ICD-10-CM

## 2024-12-05 DIAGNOSIS — I48.0 PAF (PAROXYSMAL ATRIAL FIBRILLATION) (HCC): ICD-10-CM

## 2024-12-05 DIAGNOSIS — Z00.00 ANNUAL PHYSICAL EXAM: ICD-10-CM

## 2024-12-05 DIAGNOSIS — J43.8 OTHER EMPHYSEMA (HCC): ICD-10-CM

## 2024-12-05 DIAGNOSIS — Z12.11 COLON CANCER SCREENING: Primary | ICD-10-CM

## 2024-12-05 DIAGNOSIS — Z87.891 HISTORY OF TOBACCO USE: ICD-10-CM

## 2024-12-05 PROBLEM — I48.92 ATRIAL FLUTTER (HCC): Status: RESOLVED | Noted: 2022-12-30 | Resolved: 2024-12-05

## 2024-12-05 RX ORDER — ALBUTEROL SULFATE 90 UG/1
INHALANT RESPIRATORY (INHALATION)
Qty: 18 G | Refills: 5 | OUTPATIENT
Start: 2024-12-05

## 2024-12-05 RX ORDER — PANTOPRAZOLE SODIUM 20 MG/1
20 TABLET, DELAYED RELEASE ORAL
Qty: 90 TABLET | Refills: 1 | Status: SHIPPED | OUTPATIENT
Start: 2024-12-05

## 2024-12-05 SDOH — HEALTH STABILITY: PHYSICAL HEALTH: ON AVERAGE, HOW MANY DAYS PER WEEK DO YOU ENGAGE IN MODERATE TO STRENUOUS EXERCISE (LIKE A BRISK WALK)?: 5 DAYS

## 2024-12-05 ASSESSMENT — LIFESTYLE VARIABLES
HOW MANY STANDARD DRINKS CONTAINING ALCOHOL DO YOU HAVE ON A TYPICAL DAY: 0
HOW OFTEN DO YOU HAVE A DRINK CONTAINING ALCOHOL: NEVER
HOW MANY STANDARD DRINKS CONTAINING ALCOHOL DO YOU HAVE ON A TYPICAL DAY: PATIENT DOES NOT DRINK
HOW OFTEN DO YOU HAVE A DRINK CONTAINING ALCOHOL: 1
HOW OFTEN DO YOU HAVE SIX OR MORE DRINKS ON ONE OCCASION: 1

## 2024-12-05 ASSESSMENT — PATIENT HEALTH QUESTIONNAIRE - PHQ9
1. LITTLE INTEREST OR PLEASURE IN DOING THINGS: NOT AT ALL
2. FEELING DOWN, DEPRESSED OR HOPELESS: SEVERAL DAYS
SUM OF ALL RESPONSES TO PHQ QUESTIONS 1-9: 1
SUM OF ALL RESPONSES TO PHQ9 QUESTIONS 1 & 2: 1

## 2024-12-05 NOTE — PROGRESS NOTES
[facility] Medicine  Clinic Note    Date: 12/5/2024                                               /Objective:     Chief Complaint   Patient presents with    Annual Exam       HPI  Patient is here for annual exam.  Last Tdap 2014.  Last colonoscopy 2017, had a tubular adenoma that was removed.    Patient has history of hypertension.  Currently takes valsartan and HCTZ.  Denies chest pain or shortness of breath.  Also has history of CAD status post CABG.  Also has A-fib.  Takes Crestor and baby aspirin and Eliquis.  Sees cardiology.    Patient has history of COPD/emphysema.  Currently takes Anoro.  Reports that breathing is well-controlled.  Only needs his Ventolin inhaler rarely.    Pt has hx of GERD. Has been taking Pepcid which helps a lot but still has symptoms at times. No vomiting.          Patient Active Problem List    Diagnosis Date Noted    PAF (paroxysmal atrial fibrillation) (HCC) 12/21/2022    Chronic systolic (congestive) heart failure 08/28/2024    Open wound of lower leg, left, initial encounter 02/20/2024    Other emphysema (HCC) 12/20/2023    COVID-19 12/20/2023    Acute respiratory failure with hypoxia 12/18/2023    Status post total knee replacement 12/06/2023    Secondary hypercoagulable state (HCC) 11/28/2023    Ear canal mass, left 11/13/2023    Splenomegaly 07/14/2021    Coronary artery disease involving native coronary artery of native heart with angina pectoris (HCC)     Tobacco use 09/24/2018    Enlarged lymph node 09/24/2018    LV (left ventricular) mural thrombus 09/19/2018    Mediastinal cyst 09/01/2018    Essential hypertension 03/14/2016    GERD (gastroesophageal reflux disease) 03/09/2015    Hyperlipidemia 09/08/2014    Chest pain 09/23/2013       Past Medical History:   Diagnosis Date    Bullous emphysema (HCC)     CAD (coronary artery disease)     GERD (gastroesophageal reflux disease)     HTN (hypertension)     Hyperlipidemia     Mediastinal cyst 09/2018       Current

## 2024-12-05 NOTE — TELEPHONE ENCOUNTER
Last appointment:  1/9/2024    Next appointment:  Needs appt, no f/u scheduled. Was due to see Dr Hurd in March 2024.

## 2024-12-24 RX ORDER — APIXABAN 5 MG/1
5 TABLET, FILM COATED ORAL 2 TIMES DAILY
Qty: 60 TABLET | Refills: 11 | Status: SHIPPED | OUTPATIENT
Start: 2024-12-24

## 2024-12-30 ENCOUNTER — OFFICE VISIT (OUTPATIENT)
Dept: ENT CLINIC | Age: 69
End: 2024-12-30
Payer: COMMERCIAL

## 2024-12-30 ENCOUNTER — PROCEDURE VISIT (OUTPATIENT)
Dept: AUDIOLOGY | Age: 69
End: 2024-12-30
Payer: COMMERCIAL

## 2024-12-30 VITALS
OXYGEN SATURATION: 89 % | HEART RATE: 148 BPM | BODY MASS INDEX: 25.67 KG/M2 | SYSTOLIC BLOOD PRESSURE: 152 MMHG | HEIGHT: 74 IN | DIASTOLIC BLOOD PRESSURE: 80 MMHG | WEIGHT: 200 LBS

## 2024-12-30 DIAGNOSIS — R42 DISEQUILIBRIUM: Primary | ICD-10-CM

## 2024-12-30 DIAGNOSIS — H90.3 SENSORINEURAL HEARING LOSS (SNHL) OF BOTH EARS: ICD-10-CM

## 2024-12-30 DIAGNOSIS — R42 VERTIGO: ICD-10-CM

## 2024-12-30 DIAGNOSIS — H93.13 TINNITUS OF BOTH EARS: ICD-10-CM

## 2024-12-30 DIAGNOSIS — R42 DIZZINESS AND GIDDINESS: Primary | ICD-10-CM

## 2024-12-30 DIAGNOSIS — D58.2 ELEVATED HEMOGLOBIN (HCC): ICD-10-CM

## 2024-12-30 PROCEDURE — 3079F DIAST BP 80-89 MM HG: CPT | Performed by: OTOLARYNGOLOGY

## 2024-12-30 PROCEDURE — 1123F ACP DISCUSS/DSCN MKR DOCD: CPT | Performed by: OTOLARYNGOLOGY

## 2024-12-30 PROCEDURE — 99203 OFFICE O/P NEW LOW 30 MIN: CPT | Performed by: OTOLARYNGOLOGY

## 2024-12-30 PROCEDURE — 92557 COMPREHENSIVE HEARING TEST: CPT

## 2024-12-30 PROCEDURE — 3077F SYST BP >= 140 MM HG: CPT | Performed by: OTOLARYNGOLOGY

## 2024-12-30 PROCEDURE — 92567 TYMPANOMETRY: CPT

## 2024-12-30 NOTE — PROGRESS NOTES
PAST MEDICAL HISTORY    Past Medical History:   Diagnosis Date    Bullous emphysema (HCC)     CAD (coronary artery disease)     GERD (gastroesophageal reflux disease)     HTN (hypertension)     Hyperlipidemia     Mediastinal cyst 09/2018       Past Surgical History:   Procedure Laterality Date    CARDIAC CATHETERIZATION  09/21/2018    Dr. Calderon    COLONOSCOPY  09/27/2017    Dr. Vaz - transverse colon polyps x2 (4mm, 5mm), tubular adenoma & hyperplastic    [place] CORONARY ARTERY BYP W/VEIN & ARTERY GRAFT 3 VEIN N/A 10/29/2018    CISCO, TOTAL CPB, AORTO-CABG X 3 USING ENDOSCOPICALLY HARVESTED LEFT SVG, LIMA GRAFT X 1, LEFT INTERNAL MAMMARY ARTERY LYMPH NODE BIOPSY, DOPPLER FLOW VERIFICATION, BILATERAL INTERCOSTAL NERVE BLOCK performed by Willam Callejas MD at [facility] CVOR    TOTAL KNEE ARTHROPLASTY Left 12/6/2023    LEFT TOTAL KNEE REPLACEMENT -ADELITA performed by Javier Ford MD at [facility] OR    UPPER GASTROINTESTINAL ENDOSCOPY  09/27/2017    Dr. Vaz - erosive gastritis    UPPER GASTROINTESTINAL ENDOSCOPY N/A 9/26/2018    Dr. Pardo - w/EUS & needle biopsy       Current Outpatient Medications   Medication Sig Dispense Refill    rosuvastatin (CRESTOR) 20 MG tablet Take 1 tablet by mouth daily 90 tablet 3    ELIQUIS 5 MG TABS tablet TAKE 1 TABLET BY MOUTH 2 TIMES A DAY 60 tablet 11    pantoprazole (PROTONIX) 20 MG tablet Take 1 tablet by mouth every morning (before breakfast) 90 tablet 1    famotidine (PEPCID) 20 MG tablet Take 1 tablet by mouth 2 times daily 60 tablet 3    fluticasone (FLONASE) 50 MCG/ACT nasal spray 1 spray by Each Nostril route daily 16 g 2    valsartan-hydroCHLOROthiazide (DIOVAN-HCT) 80-12.5 MG per tablet Take 1 tablet by mouth daily 90 tablet 3    umeclidinium-vilanterol (ANORO ELLIPTA) 62.5-25 MCG/ACT inhaler Inhale 1 puff into the lungs daily 60 each 5    albuterol sulfate HFA (VENTOLIN HFA) 108 (90 Base) MCG/ACT inhaler Inhale 2 puffs into the lungs 4 times daily as needed for

## 2024-12-30 NOTE — PROGRESS NOTES
Georges Bonner   1955, 69 y.o. male   [id number]       Referring Provider: Jose Frey MD  Referral Type: In an order in Epic    Reason for Visit: Evaluation of suspected change in hearing, tinnitus, or balance.    ADULT AUDIOLOGIC EVALUATION      Georges Bonner is a 69 y.o. male seen today, 12/30/2024 , for an initial audiologic evaluation.  Patient was seen by Jose Frey MD following today's evaluation.    AUDIOLOGIC AND OTHER PERTINENT MEDICAL HISTORY:      Georges Bonner reports spells of dizziness and imbalance every morning for approximately 5-10 minutes in duration. Patient denied true vertigo. Patient has experienced light-headedness and pre-syncope. Symptoms occur almost daily, and they typically resolve later in the day.     Patient reports tinnitus bilaterally but denies any concern for hearing. He reports occasional ear pressure bilaterally.     He denied otalgia, otorrhea, history of noise exposure, history of head trauma, and history of headaches/migraines    Date: 12/30/2024     IMPRESSIONS:      Today's results revealed a bilateral sensorineural hearing loss. Excellent/Good speech understanding when in quiet. Tympanometry indicates normal middle ear function.     Discussed test results and implications with patient. Discussed possible benefits of amplification.  Discussed scheduling a HAE. Recommended consult with ENT physician due to noted history of dizziness and imbalance. Discussed use of tinnitus management strategies. Hearing aids recommended at this time.    Follow medical recommendations of Jose Frey MD.     ASSESSMENT AND FINDINGS:     Otoscopy unremarkable.    RIGHT EAR:  Hearing Sensitivity: Normal sloping to Moderately-Severe Sensorineural hearing loss  Speech Recognition Threshold: 25 dB HL  Word Recognition: Good 84%, based on NU-6 25-word list at 65 dBHL with 35 dB HL masking noise using recorded speech stimuli.    Tympanometry: Normal peak pressure with high compliance,

## 2024-12-31 LAB
BASOPHILS # BLD: 0.1 K/UL (ref 0–0.2)
BASOPHILS NFR BLD: 1 %
BURR CELLS BLD QL SMEAR: ABNORMAL
DEPRECATED RDW RBC AUTO: 14.3 % (ref 12.4–15.4)
EOSINOPHIL # BLD: 0.2 K/UL (ref 0–0.6)
EOSINOPHIL NFR BLD: 2 %
HCT VFR BLD AUTO: 47.2 % (ref 40.5–52.5)
HGB BLD-MCNC: 16.2 G/DL (ref 13.5–17.5)
LYMPHOCYTES # BLD: 0.9 K/UL (ref 1–5.1)
LYMPHOCYTES NFR BLD: 11 %
MCH RBC QN AUTO: 33.4 PG (ref 26–34)
MCHC RBC AUTO-ENTMCNC: 34.3 G/DL (ref 31–36)
MCV RBC AUTO: 97.2 FL (ref 80–100)
MONOCYTES # BLD: 0.9 K/UL (ref 0–1.3)
MONOCYTES NFR BLD: 11 %
NEUTROPHILS # BLD: 6.1 K/UL (ref 1.7–7.7)
NEUTROPHILS NFR BLD: 74 %
NEUTS BAND NFR BLD MANUAL: 1 % (ref 0–7)
PLATELET # BLD AUTO: 160 K/UL (ref 135–450)
PLATELET BLD QL SMEAR: ADEQUATE
PMV BLD AUTO: 8.7 FL (ref 5–10.5)
RBC # BLD AUTO: 4.85 M/UL (ref 4.2–5.9)
WBC # BLD AUTO: 8.1 K/UL (ref 4–11)

## 2024-12-31 RX ORDER — ROSUVASTATIN CALCIUM 20 MG/1
20 TABLET, COATED ORAL DAILY
Qty: 90 TABLET | Refills: 3 | Status: SHIPPED | OUTPATIENT
Start: 2024-12-31

## 2025-01-16 ENCOUNTER — PROCEDURE VISIT (OUTPATIENT)
Dept: AUDIOLOGY | Age: 70
End: 2025-01-16
Payer: COMMERCIAL

## 2025-01-16 ENCOUNTER — OFFICE VISIT (OUTPATIENT)
Dept: ENT CLINIC | Age: 70
End: 2025-01-16
Payer: COMMERCIAL

## 2025-01-16 VITALS
WEIGHT: 200 LBS | BODY MASS INDEX: 25.67 KG/M2 | TEMPERATURE: 98 F | HEIGHT: 74 IN | DIASTOLIC BLOOD PRESSURE: 82 MMHG | SYSTOLIC BLOOD PRESSURE: 136 MMHG

## 2025-01-16 DIAGNOSIS — R42 DIZZINESS: ICD-10-CM

## 2025-01-16 DIAGNOSIS — H81.91 VESTIBULOPATHY OF RIGHT EAR: Primary | Chronic | ICD-10-CM

## 2025-01-16 DIAGNOSIS — H81.4 VERTIGO OF CENTRAL ORIGIN: Chronic | ICD-10-CM

## 2025-01-16 DIAGNOSIS — H90.3 SENSORINEURAL HEARING LOSS, BILATERAL: Primary | ICD-10-CM

## 2025-01-16 PROCEDURE — 92537 CALORIC VSTBLR TEST W/REC: CPT

## 2025-01-16 PROCEDURE — 92540 BASIC VESTIBULAR EVALUATION: CPT

## 2025-01-16 PROCEDURE — 99213 OFFICE O/P EST LOW 20 MIN: CPT | Performed by: OTOLARYNGOLOGY

## 2025-01-16 PROCEDURE — 1123F ACP DISCUSS/DSCN MKR DOCD: CPT | Performed by: OTOLARYNGOLOGY

## 2025-01-16 PROCEDURE — 3079F DIAST BP 80-89 MM HG: CPT | Performed by: OTOLARYNGOLOGY

## 2025-01-16 PROCEDURE — 3075F SYST BP GE 130 - 139MM HG: CPT | Performed by: OTOLARYNGOLOGY

## 2025-01-16 NOTE — PROGRESS NOTES
dizziness.  Patient was provided with information for fall risk prevention in the After Visit Summary.    The following items were discussed with the patient:    - Good Communication Strategies,  - Hearing Loss and Hearing Aids, and  - Dizziness    Educational information was shared in the After Visit Summary.   RECOMMENDATIONS:       - Continue medical follow-up with Jose Frey MD.   - Retest hearing as medically indicated and/or sooner if a change in hearing is noted.   - If desired, schedule a Hearing Aid Evaluation (HAE) appointment to discuss hearing aid options.  -  Vestibular and balance therapy as needed.  -  Consider follow up with neurology.    Awais Willis  Audiologist      Chart CC'd to: Jose Frey MD

## 2025-01-16 NOTE — PATIENT INSTRUCTIONS
Avoid working or being at heights, on ladders or scaffolding or near the edges of platforms or using heavy or complicated machinery or operating a motorized vehicle if you are experiencing dizziness and until having been dizzy free for 72 hours. Even then, take appropriate care and precautions as dizziness could occur at any time.     Avoid any motions or activity that results in the off balance sensation.  Stand up or arise slowly and in stages, as we discussed.   You should consider amplification therapy, hearing aid, if you are having difficulty communicating and/or hearing important sounds.  You may follow up with a hearing aid provider of your choice.    You should return for an annual hearing test to monitor your hearing, and whenever your hearing further decreases significantly.   You should return if your ears plug up with ear wax causing difficulty hearing or pressure.  You should employ the tinnitus masking techniques and strategies we discussed, as needed.  Bedside tinnitus masking devices (eg. a white noise machine, noise machine, noise dotty on your cell phone, fan on in the room, radio with light music or tuned between stations to white noise static) can be very helpful.  You should avoid exposure to excessively high levels of noise/sound and use hearing protection measures we discussed, as needed, if such exposure is unavoidable.  Proceed with vestibular physical therapy.  NO Q-TIPS IN THE EARS  You should never clean your ears with a Q-tip, cotton tipped applicator, Diana pin, paper clip, pen cap, nail file, or any other instrument.  I recommend only use of one the several ear wax removal kits available \"over the counter\" if you feel a need to try to remove ear wax.   No other methods should be self used for this purpose as there is danger of injury to the ear and risk of irreparable and irreversible permanent hearing loss.

## 2025-01-16 NOTE — PROGRESS NOTES
should avoid exposure to excessively high levels of noise/sound and use hearing protection measures we discussed, as needed, if such exposure is unavoidable.  Proceed with vestibular physical therapy.  NO Q-TIPS IN THE EARS  You should never clean your ears with a Q-tip, cotton tipped applicator, Diana pin, paper clip, pen cap, nail file, or any other instrument.  I recommend only use of one the several ear wax removal kits available \"over the counter\" if you feel a need to try to remove ear wax.   No other methods should be self used for this purpose as there is danger of injury to the ear and risk of irreparable and irreversible permanent hearing loss.              Follow-up  Return if symptoms worsen or fail to improve with physical therapy or, for any other ENT or sinus problems or symptoms.         MEDICAL DECISION MAKING    TIME:  I spent a total of ***10 ***20 ***30 ***40  minutes with this patient and visit for counseling, coordination of care, ordering vestibular physical therapy, reviewing the medical record, and documenting the visit.  ***Decision regarding hospitalization

## 2025-01-21 ENCOUNTER — HOSPITAL ENCOUNTER (OUTPATIENT)
Dept: PHYSICAL THERAPY | Age: 70
Setting detail: THERAPIES SERIES
Discharge: HOME | End: 2025-01-21
Attending: OTOLARYNGOLOGY
Payer: COMMERCIAL

## 2025-01-21 DIAGNOSIS — R26.9 ABNORMAL GAIT: ICD-10-CM

## 2025-01-21 DIAGNOSIS — R42 DIZZINESS: Primary | ICD-10-CM

## 2025-01-21 DIAGNOSIS — R42 DIZZINESS ON STANDING: ICD-10-CM

## 2025-01-21 DIAGNOSIS — R26.89 BALANCE PROBLEMS: ICD-10-CM

## 2025-01-21 DIAGNOSIS — H81.93 BALANCE PROBLEM DUE TO VESTIBULAR DYSFUNCTION OF BOTH EARS: ICD-10-CM

## 2025-01-21 DIAGNOSIS — R29.818 DIFFICULTY BALANCING: ICD-10-CM

## 2025-01-21 DIAGNOSIS — M53.82 DECREASED RANGE OF MOTION OF INTERVERTEBRAL DISCS OF CERVICAL SPINE: ICD-10-CM

## 2025-01-21 DIAGNOSIS — H83.2X3 VESTIBULAR DISEQUILIBRIUM INVOLVING BOTH INNER EARS: ICD-10-CM

## 2025-01-21 PROCEDURE — 97112 NEUROMUSCULAR REEDUCATION: CPT

## 2025-01-21 PROCEDURE — 97163 PT EVAL HIGH COMPLEX 45 MIN: CPT

## 2025-01-21 PROCEDURE — 97530 THERAPEUTIC ACTIVITIES: CPT

## 2025-01-21 PROCEDURE — 95992 CANALITH REPOSITIONING PROC: CPT

## 2025-01-21 NOTE — PLAN OF CARE
listed.  [] Progression has been slowed due to co-morbidities.  [x] Plan just implemented, too soon (<30days) to assess goals progression   [] Goals require adjustment due to lack of progress  [] Patient is not progressing as expected and requires additional follow up with physician  [] Other:     TREATMENT PLAN     Frequency/Duration: 2x/week for 4-6 weeks for the following treatment interventions:    Interventions:  Therapeutic Exercise (58287) including: strength training, ROM, and functional mobility  Therapeutic Activities (22268) including: functional mobility training and education.  Neuromuscular Re-education (21673) activation and proprioception, including postural re-education.    Gait Training (73717) for normalization of ambulation patterns and AD training.   Manual Therapy (37279) as indicated to include: Passive Range of Motion, Soft Tissue Mobilization, Dry Needling/IASTM, and Myofascial Release  Patient education on progression of HEP and vestibular fuction/BPPV  CRP for assessment, treatment and education of BPPV    Plan: POC initiated as per evaluation    Electronically Signed by FERMÍN CHONG PT  Date: 01/21/2025     Note: Portions of this note have been templated and/or copied from initial evaluation, reassessments and prior notes for documentation efficiency.    Note: If patient does not return for scheduled/recommended follow up visits, this note will serve as a discharge from care along with the most recent update on progress.    Vestibular Evaluation

## 2025-01-24 ENCOUNTER — APPOINTMENT (OUTPATIENT)
Dept: PHYSICAL THERAPY | Age: 70
End: 2025-01-24
Attending: OTOLARYNGOLOGY
Payer: COMMERCIAL

## 2025-01-28 ENCOUNTER — APPOINTMENT (OUTPATIENT)
Dept: PHYSICAL THERAPY | Age: 70
End: 2025-01-28
Attending: OTOLARYNGOLOGY
Payer: COMMERCIAL

## 2025-01-29 ENCOUNTER — TELEPHONE (OUTPATIENT)
Dept: FAMILY MEDICINE CLINIC | Age: 70
End: 2025-01-29

## 2025-01-29 DIAGNOSIS — F41.9 ANXIETY: Primary | ICD-10-CM

## 2025-01-29 RX ORDER — BUSPIRONE HYDROCHLORIDE 5 MG/1
5 TABLET ORAL 2 TIMES DAILY
Qty: 60 TABLET | Refills: 2 | Status: SHIPPED | OUTPATIENT
Start: 2025-01-29 | End: 2025-04-29

## 2025-01-29 NOTE — TELEPHONE ENCOUNTER
[id number] - [place], [place] - [address] - P [phone] - F [phone]     Pt called and said about 3-4 years ago after his heart surgery, audrey prescribed him anxiety/ depression medicine that helped but he stopped taking it, but pt now wants to start taking in.    Please call in medication to the pharmacy

## 2025-01-30 ENCOUNTER — APPOINTMENT (OUTPATIENT)
Dept: PHYSICAL THERAPY | Age: 70
End: 2025-01-30
Attending: OTOLARYNGOLOGY
Payer: COMMERCIAL

## 2025-02-23 DIAGNOSIS — H69.93 DYSFUNCTION OF BOTH EUSTACHIAN TUBES: ICD-10-CM

## 2025-02-23 DIAGNOSIS — R42 DIZZINESS: ICD-10-CM

## 2025-02-24 RX ORDER — LORATADINE 10 MG/1
10 TABLET ORAL DAILY
Qty: 30 TABLET | Refills: 1 | Status: SHIPPED | OUTPATIENT
Start: 2025-02-24 | End: 2025-04-25

## 2025-02-24 NOTE — TELEPHONE ENCOUNTER
Medication:   Requested Prescriptions     Pending Prescriptions Disp Refills    loratadine (CLARITIN) 10 MG tablet [Pharmacy Med Name: LORATADINE 10 MG TABLET] 30 tablet 1     Sig: TAKE 1 TABLET BY MOUTH DAILY        Last Filled:      Patient Phone Number: [phone] (home) [phone] (work)    Last appt: 12/5/2024   Next appt: Visit date not found    Last OARRS:        No data to display

## 2025-03-11 DIAGNOSIS — R10.13 EPIGASTRIC PAIN: ICD-10-CM

## 2025-03-11 RX ORDER — FAMOTIDINE 20 MG/1
20 TABLET, FILM COATED ORAL 2 TIMES DAILY
Qty: 60 TABLET | Refills: 3 | Status: SHIPPED | OUTPATIENT
Start: 2025-03-11

## 2025-03-11 NOTE — TELEPHONE ENCOUNTER
Medication:   Requested Prescriptions     Pending Prescriptions Disp Refills    famotidine (PEPCID) 20 MG tablet [Pharmacy Med Name: FAMOTIDINE 20 MG TABLET] 60 tablet 3     Sig: TAKE 1 TABLET BY MOUTH 2 TIMES A DAY        Last Filled:  11/13/2024    Patient Phone Number: [phone] (home) [phone] (work)    Last appt: 12/5/2024   Next appt: Visit date not found    Last OARRS:        No data to display

## 2025-03-26 DIAGNOSIS — F41.9 ANXIETY: ICD-10-CM

## 2025-03-27 RX ORDER — BUSPIRONE HYDROCHLORIDE 5 MG/1
5 TABLET ORAL 2 TIMES DAILY
Qty: 60 TABLET | Refills: 2 | Status: SHIPPED | OUTPATIENT
Start: 2025-03-27

## 2025-03-27 NOTE — TELEPHONE ENCOUNTER
Medication:   Requested Prescriptions     Pending Prescriptions Disp Refills    busPIRone (BUSPAR) 5 MG tablet [Pharmacy Med Name: busPIRone HCL 5 MG TABLET] 60 tablet 2     Sig: TAKE 1 TABLET BY MOUTH 2 TIMES A DAY        Last Filled:  1/29/25    Patient Phone Number: [phone] (home) [phone] (work)    Last appt: 12/5/2024   Next appt: Visit date not found    Last OARRS:        No data to display

## 2025-03-28 ENCOUNTER — TELEPHONE (OUTPATIENT)
Dept: FAMILY MEDICINE CLINIC | Age: 70
End: 2025-03-28

## 2025-03-28 NOTE — TELEPHONE ENCOUNTER
Medication and Quantity requested: viagra- pt is okay with generic brand     Last Visit  12/5/2024    Pharmacy and phone number updated in [facility]:  yes      [facility] [id number] - [place], [place] - [facility] - P [phone] - F [phone]

## 2025-04-01 SDOH — ECONOMIC STABILITY: FOOD INSECURITY: WITHIN THE PAST 12 MONTHS, YOU WORRIED THAT YOUR FOOD WOULD RUN OUT BEFORE YOU GOT MONEY TO BUY MORE.: NEVER TRUE

## 2025-04-01 SDOH — ECONOMIC STABILITY: INCOME INSECURITY: IN THE LAST 12 MONTHS, WAS THERE A TIME WHEN YOU WERE NOT ABLE TO PAY THE MORTGAGE OR RENT ON TIME?: NO

## 2025-04-01 SDOH — ECONOMIC STABILITY: FOOD INSECURITY: WITHIN THE PAST 12 MONTHS, THE FOOD YOU BOUGHT JUST DIDN'T LAST AND YOU DIDN'T HAVE MONEY TO GET MORE.: NEVER TRUE

## 2025-04-02 ENCOUNTER — TELEMEDICINE (OUTPATIENT)
Dept: FAMILY MEDICINE CLINIC | Age: 70
End: 2025-04-02

## 2025-04-02 DIAGNOSIS — N52.9 ERECTILE DYSFUNCTION, UNSPECIFIED ERECTILE DYSFUNCTION TYPE: Primary | ICD-10-CM

## 2025-04-02 RX ORDER — SILDENAFIL 100 MG/1
100 TABLET, FILM COATED ORAL DAILY PRN
Qty: 30 TABLET | Refills: 2 | Status: SHIPPED | OUTPATIENT
Start: 2025-04-02

## 2025-04-02 ASSESSMENT — PATIENT HEALTH QUESTIONNAIRE - PHQ9
1. LITTLE INTEREST OR PLEASURE IN DOING THINGS: NOT AT ALL
2. FEELING DOWN, DEPRESSED OR HOPELESS: NOT AT ALL
SUM OF ALL RESPONSES TO PHQ QUESTIONS 1-9: 0

## 2025-04-02 NOTE — PROGRESS NOTES
[facility] Medicine  Clinic Note    Date: 4/2/2025                                               /Objective:     Chief Complaint   Patient presents with    Medication Refill     Viagra        HPI  Pt has concerns for ED for the last 1-2 years. Sometimes he can perform well but other times where he either can't achieve erection or can't maintain it or it doesn't get totally firm. Desire is intact. Relationship with wife is good.         Patient Active Problem List    Diagnosis Date Noted    PAF (paroxysmal atrial fibrillation) ([facility]) 12/21/2022    Chronic systolic (congestive) heart failure 08/28/2024    Open wound of lower leg, left, initial encounter 02/20/2024    Other emphysema ([facility]) 12/20/2023    COVID-19 12/20/2023    Acute respiratory failure with hypoxia 12/18/2023    Status post total knee replacement 12/06/2023    Secondary hypercoagulable state 11/28/2023    Ear canal mass, left 11/13/2023    Splenomegaly 07/14/2021    Coronary artery disease involving native coronary artery of native heart with angina pectoris     Tobacco use 09/24/2018    Enlarged lymph node 09/24/2018    LV (left ventricular) mural thrombus 09/19/2018    Mediastinal cyst 09/01/2018    Essential hypertension 03/14/2016    GERD (gastroesophageal reflux disease) 03/09/2015    Hyperlipidemia 09/08/2014    Chest pain 09/23/2013       Past Medical History:   Diagnosis Date    Bullous emphysema (HCC)     CAD (coronary artery disease)     GERD (gastroesophageal reflux disease)     HTN (hypertension)     Hyperlipidemia     Mediastinal cyst 09/2018       Current Outpatient Medications   Medication Sig Dispense Refill    sildenafil (VIAGRA) 100 MG tablet Take 1 tablet by mouth daily as needed for Erectile Dysfunction 30 tablet 2    busPIRone (BUSPAR) 5 MG tablet TAKE 1 TABLET BY MOUTH 2 TIMES A DAY 60 tablet 2    famotidine (PEPCID) 20 MG tablet TAKE 1 TABLET BY MOUTH 2 TIMES A DAY 60 tablet 3    loratadine (CLARITIN) 10 MG tablet TAKE 1

## 2025-04-17 ENCOUNTER — OFFICE VISIT (OUTPATIENT)
Dept: NEUROLOGY | Age: 70
End: 2025-04-17
Payer: COMMERCIAL

## 2025-04-17 ENCOUNTER — TELEPHONE (OUTPATIENT)
Dept: FAMILY MEDICINE CLINIC | Age: 70
End: 2025-04-17

## 2025-04-17 VITALS
BODY MASS INDEX: 25.67 KG/M2 | WEIGHT: 200 LBS | DIASTOLIC BLOOD PRESSURE: 84 MMHG | SYSTOLIC BLOOD PRESSURE: 139 MMHG | HEART RATE: 74 BPM | HEIGHT: 74 IN

## 2025-04-17 DIAGNOSIS — R42 DIZZINESS: Primary | ICD-10-CM

## 2025-04-17 DIAGNOSIS — I48.0 PAF (PAROXYSMAL ATRIAL FIBRILLATION) (HCC): ICD-10-CM

## 2025-04-17 DIAGNOSIS — U07.1 COVID-19: ICD-10-CM

## 2025-04-17 PROCEDURE — 3079F DIAST BP 80-89 MM HG: CPT | Performed by: PSYCHIATRY & NEUROLOGY

## 2025-04-17 PROCEDURE — 99203 OFFICE O/P NEW LOW 30 MIN: CPT | Performed by: PSYCHIATRY & NEUROLOGY

## 2025-04-17 PROCEDURE — 3075F SYST BP GE 130 - 139MM HG: CPT | Performed by: PSYCHIATRY & NEUROLOGY

## 2025-04-17 PROCEDURE — 1123F ACP DISCUSS/DSCN MKR DOCD: CPT | Performed by: PSYCHIATRY & NEUROLOGY

## 2025-04-17 NOTE — TELEPHONE ENCOUNTER
Patient called and he saw the neuro. Said he needs his VIT D checked.     He also is having dizziness he is getting dizzy off and on as well       And would like to have something called in for dizziness or what to take over the counter. [      Please call and advise

## 2025-04-17 NOTE — PROGRESS NOTES
exclude central cause  History of A-fib on Eliquis  Hypertension    Plan:    MRI brain  Lengthy discussion with patient regarding vestibular precaution risk of falling blood thinner  Recheck vitamin D level with PCP  Continue Eliquis 5 mg x 2  Meclizine only as needed  Vestibular precautions  Hydration  Blood pressure diary at home on Diovan  RTC if symptoms worsen.

## 2025-04-17 NOTE — PATIENT INSTRUCTIONS

## 2025-04-18 DIAGNOSIS — E55.9 VITAMIN D DEFICIENCY: Primary | ICD-10-CM

## 2025-04-18 DIAGNOSIS — R42 DIZZINESS: Primary | ICD-10-CM

## 2025-04-18 RX ORDER — ONDANSETRON 8 MG/1
8 TABLET, ORALLY DISINTEGRATING ORAL EVERY 8 HOURS PRN
Qty: 15 TABLET | Refills: 0 | Status: SHIPPED | OUTPATIENT
Start: 2025-04-18

## 2025-04-21 ENCOUNTER — TELEPHONE (OUTPATIENT)
Dept: NEUROLOGY | Age: 70
End: 2025-04-21

## 2025-04-21 NOTE — TELEPHONE ENCOUNTER
Pt would like order sent to [facility]. Please fax to [phone]     Pt would also like to know if there is any medication pt can take for vertigo. If so, Pt uses [facility].     Please advise.

## 2025-04-22 DIAGNOSIS — E55.9 VITAMIN D DEFICIENCY: ICD-10-CM

## 2025-04-22 LAB — 25(OH)D3 SERPL-MCNC: 44.7 NG/ML

## 2025-04-23 ENCOUNTER — RESULTS FOLLOW-UP (OUTPATIENT)
Dept: FAMILY MEDICINE CLINIC | Age: 70
End: 2025-04-23

## 2025-04-25 NOTE — TELEPHONE ENCOUNTER
Mailbox not set up...    Faxed order to [facility] per pt request.   No meds can be prescribed at this time pending MRI results.

## 2025-05-04 DIAGNOSIS — R42 DIZZINESS: ICD-10-CM

## 2025-05-05 ENCOUNTER — TELEPHONE (OUTPATIENT)
Dept: NEUROLOGY | Age: 70
End: 2025-05-05

## 2025-05-05 RX ORDER — ONDANSETRON 8 MG/1
TABLET, ORALLY DISINTEGRATING ORAL
Qty: 15 TABLET | Refills: 0 | Status: SHIPPED | OUTPATIENT
Start: 2025-05-05

## 2025-05-05 NOTE — TELEPHONE ENCOUNTER
Medication:   Requested Prescriptions     Pending Prescriptions Disp Refills    ondansetron (ZOFRAN-ODT) 8 MG TBDP disintegrating tablet [Pharmacy Med Name: ONDANSETRON ODT 8 MG TABLET] 15 tablet 0     Sig: PLACE 1 TABLET BY MOUTH EVERY 8 HOURS AS NEEDED FOR NAUSEA AND/OR VOMITING        Last Filled:  04/18/2025    Patient Phone Number: [phone] (home)     Last appt: 4/2/2025   Next appt: Visit date not found    Last OARRS:        No data to display

## 2025-05-12 ENCOUNTER — RESULTS FOLLOW-UP (OUTPATIENT)
Dept: NEUROLOGY | Age: 70
End: 2025-05-12

## 2025-05-16 NOTE — RESULT ENCOUNTER NOTE
Spoke to pt and advised MRI results showed No acute changes Chronic small vessel disease and remote right occipital stroke. Pt states understanding

## 2025-05-21 DIAGNOSIS — R10.13 EPIGASTRIC PAIN: ICD-10-CM

## 2025-05-21 RX ORDER — FAMOTIDINE 20 MG/1
20 TABLET, FILM COATED ORAL 2 TIMES DAILY
Qty: 60 TABLET | Refills: 3 | Status: SHIPPED | OUTPATIENT
Start: 2025-05-21

## 2025-05-21 NOTE — TELEPHONE ENCOUNTER
Medication:   Requested Prescriptions     Pending Prescriptions Disp Refills    famotidine (PEPCID) 20 MG tablet 60 tablet 3     Sig: Take 1 tablet by mouth 2 times daily        Last Filled:  03/11/2025    Patient Phone Number: [phone] (home)     Last appt: 4/2/2025   Next appt: Visit date not found    Last OARRS:        No data to display

## 2025-06-25 DIAGNOSIS — F41.9 ANXIETY: ICD-10-CM

## 2025-06-25 RX ORDER — BUSPIRONE HYDROCHLORIDE 5 MG/1
5 TABLET ORAL 2 TIMES DAILY
Qty: 180 TABLET | Refills: 0 | Status: SHIPPED | OUTPATIENT
Start: 2025-06-25

## 2025-06-25 NOTE — TELEPHONE ENCOUNTER
Medication:   Requested Prescriptions     Pending Prescriptions Disp Refills    busPIRone (BUSPAR) 5 MG tablet [Pharmacy Med Name: busPIRone HCL 5 MG TABLET] 180 tablet      Sig: TAKE 1 TABLET BY MOUTH 2 TIMES A DAY        Last Filled:  03/27/2025    Patient Phone Number: [phone] (home)     Last appt: 4/2/2025   Next appt: Visit date not found    Last OARRS:        No data to display

## 2025-09-05 DIAGNOSIS — R42 DIZZINESS: ICD-10-CM

## 2025-09-05 DIAGNOSIS — H69.93 DYSFUNCTION OF BOTH EUSTACHIAN TUBES: ICD-10-CM

## 2025-09-05 RX ORDER — FLUTICASONE PROPIONATE 50 MCG
1 SPRAY, SUSPENSION (ML) NASAL DAILY
Qty: 16 G | Refills: 2 | Status: SHIPPED | OUTPATIENT
Start: 2025-09-05

## (undated) DEVICE — EVERGRIP INSERT SET 61MM: Brand: FOGARTY EVERGRIP

## (undated) DEVICE — SOLUTION IV IRRIG POUR BRL 0.9% SODIUM CHL 2F7124

## (undated) DEVICE — COVER,MAYO STAND,XL,STERILE: Brand: MEDLINE

## (undated) DEVICE — BW-412T DISP COMBO CLEANING BRUSH: Brand: SINGLE USE COMBINATION CLEANING BRUSH

## (undated) DEVICE — DRESSING WND SM W2.5XL4IN BRTH W/ CATH SECUREMENT AND

## (undated) DEVICE — DECANTER FLD 9IN ST BG FOR ASEP TRNSF OF FLD

## (undated) DEVICE — SUTURE NONABSORBABLE MONOFILAMENT 6-0 C-1 1X30 IN PROLENE 8706H

## (undated) DEVICE — CONNECTOR PERF W3/8XH0.25XL0.25IN BASE UNEQUAL Y SHP W/O

## (undated) DEVICE — TOTAL BASIC PK

## (undated) DEVICE — DRESSING GERM DIA1IN CNTR H DIA7MM BLU CHG ANTIMIC PROTCT

## (undated) DEVICE — DRAIN SURG SGL COLL PT TB FOR ATS BG OASIS

## (undated) DEVICE — DISCONTINUED USE 394516 DRESSING MEPILEX SACRUM 7.2X7.2

## (undated) DEVICE — BASIC SINGLE BASIN 1-LF: Brand: MEDLINE INDUSTRIES, INC.

## (undated) DEVICE — Device: Brand: STABLECUT®

## (undated) DEVICE — SUTURE VCRL + SZ 2-0 L36IN ABSRB UD L36MM CT-1 1/2 CIR VCP945H

## (undated) DEVICE — CATH CTR VEN 3LUM INTRLNK INJ 9FRX11CM

## (undated) DEVICE — GLOVE SURG SZ 7.5 L11.73IN FNGR THK9.8MIL STRW LTX POLYMER

## (undated) DEVICE — SPONGE LAP W18XL18IN WHT COT 4 PLY FLD STRUNG RADPQ DISP ST

## (undated) DEVICE — FAIRFIELD CABG ACCESSARY PK

## (undated) DEVICE — SUTURE VCRL SZ 0 L36IN ABSRB UD CT-1 L36MM 1/2 CIR TAPR PNT VCP946H

## (undated) DEVICE — TOWEL,OR,DSP,ST,BLUE,STD,6/PK,12PK/CS: Brand: MEDLINE

## (undated) DEVICE — ADHESIVE SKIN CLOSURE WND 8.661X1.5 IN 22 CM LIQUIBAND SECUR

## (undated) DEVICE — RETRACTOR SURG INSRT SUT HLD OCTOBASE

## (undated) DEVICE — PRESSURE MONITORING SET: Brand: TRUWAVE, VAMP PLUS

## (undated) DEVICE — GOWN AURORA NONREINF LG: Brand: MEDLINE INDUSTRIES, INC.

## (undated) DEVICE — BOWL MED L 32OZ PLAS W/ MOLD GRAD EZ OPN PEEL PCH

## (undated) DEVICE — PEN: MARKING STD 100/CS: Brand: MEDICAL ACTION INDUSTRIES

## (undated) DEVICE — SUTURE ETHBND EXCEL SZ 0 L18IN NONABSORBABLE GRN L36MM CT-1 CX21D

## (undated) DEVICE — ELECTRODE PT RET AD L9FT HI MOIST COND ADH HYDRGEL CORDED

## (undated) DEVICE — TOWEL,OR,DSP,ST,WHITE,DLX,XR,4/PK,20PK/C: Brand: MEDLINE

## (undated) DEVICE — SUTURE ETHBND SZ 3-0 L30IN NONABSORBABLE GRN SH L26MM 1/2 X562H

## (undated) DEVICE — SUTURE PERMA-HAND SZ 4-0 L144IN NONABSORBABLE BLK LIGAPAK LA53G

## (undated) DEVICE — BANDAGE COMPR W6INXL10YD ST M E WHITE/BEIGE

## (undated) DEVICE — COVER LT HNDL BLU PLAS

## (undated) DEVICE — PADDING UNDERCAST W4INXL4YD 100% COT CRIMPED FINISH WBRL II

## (undated) DEVICE — SHEET,DRAPE,53X77,STERILE: Brand: MEDLINE

## (undated) DEVICE — GLOVE SURG SZ 85 L12IN FNGR THK13MIL BRN LTX SYN POLYMER W

## (undated) DEVICE — SUTURE PERMAHAND SZ 2-0 L30IN NONABSORBABLE BLK SILK W/O A305H

## (undated) DEVICE — DRESSING CNTCT 4X12IN IS THERABOND 3D

## (undated) DEVICE — [HIGH FLOW INSUFFLATOR,  DO NOT USE IF PACKAGE IS DAMAGED,  KEEP DRY,  KEEP AWAY FROM SUNLIGHT,  PROTECT FROM HEAT AND RADIOACTIVE SOURCES.]: Brand: PNEUMOSURE

## (undated) DEVICE — ENDOSCOPIC ULTRASOUND FINE NEEDLE BIOPSY (FNB) DEVICE: Brand: ACQUIRE

## (undated) DEVICE — OPEN HRT BASIC PK

## (undated) DEVICE — SUTURE PDS II SZ 0 L27IN ABSRB VLT L36MM CT-1 1/2 CIR Z340H

## (undated) DEVICE — 35 ML SYRINGE LUER-LOCK TIP: Brand: MONOJECT

## (undated) DEVICE — PAD SURG INSUL AD L CLS CELL FOAM SLT W  TIED RADPQ MRK FOR

## (undated) DEVICE — SUTURE NONABSORBABLE MONOFILAMENT 4-0 RB-1 36 IN BLU PROLENE 8557H

## (undated) DEVICE — 2108 SERIES SAGITTAL BLADE, NO OFFSET  (12.4 X 1.19 X 82.1MM)

## (undated) DEVICE — KIT OR ROOM TURNOVER W/STRAP

## (undated) DEVICE — SUPPORT WRST AD W3.5XL9IN DIA14.5IN ART SFT ADJ HK AND LOOP

## (undated) DEVICE — WAX SURG 2.5GM HEMSTAT BNE BEESWAX PARAFFIN ISO PALMITATE

## (undated) DEVICE — SUTURE PERMAHAND SZ 3-0 L30IN NONABSORBABLE BLK SH L26MM K832H

## (undated) DEVICE — GLOVE SURG SZ 8.5 L11.85IN FNGR THK9.8MIL STRW LTX POLYMER

## (undated) DEVICE — SET ADMIN PRIMING 67ML L105IN NVENT 180UM FLTR 3 RLER CLMP

## (undated) DEVICE — SYRINGE MED 5ML STD CLR PLAS LUERLOCK TIP N CTRL DISP

## (undated) DEVICE — AORTIC PNCH 4.0MM 8IN BX 10 DISP

## (undated) DEVICE — MOUTHPIECE ENDOSCP L CTRL OPN AND SIDE PORTS DISP

## (undated) DEVICE — SOLUTION IV IRRIG WATER 500ML POUR BRL ST 2F7113

## (undated) DEVICE — SWAN-GANZ THERMODILUTION CATHETER: Brand: SWAN-GANZ

## (undated) DEVICE — CATHETER IV 18GA L1.25N GREEN FEP SFTY STRGHT HUB RDPQUE DSP

## (undated) DEVICE — SYRINGE MED 30ML STD CLR PLAS LUERLOCK TIP N CTRL DISP

## (undated) DEVICE — ZIMMER® STERILE DISPOSABLE TOURNIQUET CUFF WITH PLC, DUAL PORT, SINGLE BLADDER, 30 IN. (76 CM)

## (undated) DEVICE — HOOD: Brand: FLYTE

## (undated) DEVICE — TTL1LYR 16FR10ML 100%SIL TMPST TR: Brand: MEDLINE

## (undated) DEVICE — HYPODERMIC SAFETY NEEDLE: Brand: MAGELLAN

## (undated) DEVICE — CATHETER ETER IV 22GA L1IN POLYUR STR RADPQ INTROCAN SFTY

## (undated) DEVICE — SUTURE NONABSORBABLE MONOFILAMENT 7-0 BV-1 1X24 IN PROLENE 8702H

## (undated) DEVICE — SUTURE VCRL SZ 2-0 L36IN ABSRB UD L36MM CT-1 1/2 CIR J945H

## (undated) DEVICE — DRAIN SURG 24FR BLAK SIL HUBLESS 4 CHANNELED RADPQ EXTN TB

## (undated) DEVICE — SUTURE SZ 7 L18IN NONABSORBABLE SIL CCS L48MM 1/2 CIR STRNM M655G

## (undated) DEVICE — SPONGE LAP W18XL18IN WHT COT 4 PLY FLD STRUNG RADPQ DISP ST 2 PER PACK

## (undated) DEVICE — 12 FOOT DISPOSABLE EXTENSION CABLE WITH SAFE CONNECT / SCREW-DOWN

## (undated) DEVICE — VESSEL LOOPS,MAXI, BLUE: Brand: DEVON

## (undated) DEVICE — BANDAGE COMPR W6INXL11YD E SGL LAYERED VELC CLSR SHUR-BAND

## (undated) DEVICE — GOWN SIRUS NONREIN LG W/TWL: Brand: MEDLINE INDUSTRIES, INC.

## (undated) DEVICE — EZE-BAND LF ST 4X5.5 36/CS: Brand: EZE-BAND LF ST 4X5.5 36/CS

## (undated) DEVICE — SET CATH 20GA L1.75IN RAD ART POLYUR RADPQ W/ INTEGR

## (undated) DEVICE — LABEL MED CUST CVR

## (undated) DEVICE — SUTURE VCRL SZ 4-0 L18IN ABSRB UD L19MM PS-2 3/8 CIR PRIM J496H

## (undated) DEVICE — SUTURE PROL SZ 7 0 L24IN NONABSORBABLE BLU BV175 6 L8MM 3 8 EP8735H

## (undated) DEVICE — MEDI-VAC NON-CONDUCTIVE SUCTION TUBING: Brand: CARDINAL HEALTH

## (undated) DEVICE — SYSTEM ENDOSCP VES HARV W/ TOOL CANN SEAL SHT PRT BLNT TIP

## (undated) DEVICE — SUTURE VCRL SZ 3-0 L27IN ABSRB UD L26MM SH 1/2 CIR J416H

## (undated) DEVICE — DRAPE SLUSH DISC W44XL66IN ST FOR RND BSIN HUSH SLUSH SYS

## (undated) DEVICE — GLOVE SURG SZ 75 L12IN FNGR THK94MIL STD WHT LTX FREE

## (undated) DEVICE — DRAPE,U/ SHT,SPLIT,PLAS,STERIL: Brand: MEDLINE

## (undated) DEVICE — SUTURE VCRL + SZ 1 L36IN ABSRB UD L36MM CT-1 1/2 CIR VCP947H

## (undated) DEVICE — Z DISCONTINUED USE 2516375 APPLICATOR MEDICATED 3 CC CLR STRL CHLORAPREP

## (undated) DEVICE — APPLICATOR PREP 26ML 0.7% IOD POVACRYLEX 74% ISO ALC ST

## (undated) DEVICE — Z INACTIVE USE 2540311 LEAD PACE L475MM CHN A OR V MYOCARDIAL STEROID ELUT SIL

## (undated) DEVICE — STERILE TOTAL KNEE DRAPE PACK: Brand: CARDINAL HEALTH

## (undated) DEVICE — SUTURE STRATAFIX SZ 1 L14IN ABSRB VLT L36MM MO-4 TAPERPOINT SXPD2B400

## (undated) DEVICE — PROCEDURE KIT ENDOSCP CUST

## (undated) DEVICE — SPONGE GZ L3FTXW2IN COT VAG XR DTECT 4 PLY MESH PK RL

## (undated) DEVICE — BANDAGE ADH W4XL13 3 4IN POSTOP DSG PRIMAPORE

## (undated) DEVICE — APPLICATOR MEDICATED 26 CC SOLUTION HI LT ORNG CHLORAPREP

## (undated) DEVICE — PAD THRM M SPL TORSO

## (undated) DEVICE — 3M™ TEGADERM™ TRANSPARENT FILM DRESSING FRAME STYLE, 1626W, 4 IN X 4-3/4 IN (10 CM X 12 CM), 50/CT 4CT/CASE: Brand: 3M™ TEGADERM™

## (undated) DEVICE — DRESSING TRNSPAR W FAB BORD SORBAVIEW ULT 475X425IN

## (undated) DEVICE — 3 ML SYRINGE LUER-LOCK TIP: Brand: MONOJECT

## (undated) DEVICE — TOWEL 26X17IN 2 PER PACK COTTON DELINTED

## (undated) DEVICE — SUTURE ETHBND EXCEL SZ 2-0 L36IN NONABSORBABLE GRN L26MM SH X523H

## (undated) DEVICE — FAIRFIELD KNEE LF

## (undated) DEVICE — 3M™ STERI-STRIP™ REINFORCED ADHESIVE SKIN CLOSURES, R1547, 1/2 IN X 4 IN (12 MM X 100 MM), 6 STRIPS/ENVELOPE: Brand: 3M™ STERI-STRIP™

## (undated) DEVICE — BLADE CLIPPER GEN PURP NS

## (undated) DEVICE — CLIP SM RED INTERN HMOCLP TITAN LIGATING

## (undated) DEVICE — INDICATED FOR SURGICAL CLAMPING DURING CARDIOVASCULAR PERIPHERAL VASCULAR, AND GENERAL SURGERY.: Brand: SOFT/FIBRA® SPRING CLIP

## (undated) DEVICE — STERNUM BLADE, OFFSET (31.7 X 0.64 X 6.3MM)

## (undated) DEVICE — SCREW BNE L25MM DIA2.5MM KNEE FULL THRD HEX FEM PERSONA (NOT IMPLANTED)

## (undated) DEVICE — NEEDLE HYPO 18GA L1.5IN THN WALL PIVOTING SHLD BVL ORIENTED

## (undated) DEVICE — GOWN SIRUS NONREIN XL W/TWL: Brand: MEDLINE INDUSTRIES, INC.

## (undated) DEVICE — HANDPIECE SET WITH HIGH FLOW TIP AND SUCTION TUBE: Brand: INTERPULSE

## (undated) DEVICE — 20 ML SYRINGE LUER-LOCK TIP: Brand: MONOJECT

## (undated) DEVICE — GLOVE SURG SZ 8 L11.77IN FNGR THK9.8MIL STRW LTX POLYMER

## (undated) DEVICE — SUTURE MCRYL + SZ 3-0 L27IN ABSRB UD PS1 L24MM 3/8 CIR REV MCP936H

## (undated) DEVICE — Z CONVERTED USE 2275871 SPONGE GZ W4XL4IN WHT 8 PLY CURITY

## (undated) DEVICE — STRYKER PERFORMANCE SERIES SAGITTAL BLADE: Brand: STRYKER PERFORMANCE SERIES

## (undated) DEVICE — HOOD, PEEL-AWAY: Brand: FLYTE

## (undated) DEVICE — YANKAUER,OPEN TIP,W/O VENT,STERILE: Brand: MEDLINE INDUSTRIES, INC.